# Patient Record
Sex: MALE | Race: WHITE | NOT HISPANIC OR LATINO | Employment: OTHER | URBAN - METROPOLITAN AREA
[De-identification: names, ages, dates, MRNs, and addresses within clinical notes are randomized per-mention and may not be internally consistent; named-entity substitution may affect disease eponyms.]

---

## 2023-03-08 LAB
MUCUS, URINE: NORMAL /LPF
RBC, URINE: <1 /HPF (ref 0–5)
SQUAMOUS EPITHELIAL CELLS, URINE: <1 /HPF
WBC, URINE: 3 /HPF (ref 0–5)

## 2023-03-09 LAB — URINE CULTURE: NO GROWTH

## 2023-03-29 ENCOUNTER — HOSPITAL ENCOUNTER (OUTPATIENT)
Dept: DATA CONVERSION | Facility: HOSPITAL | Age: 73
End: 2023-03-30
Attending: ORTHOPAEDIC SURGERY | Admitting: ORTHOPAEDIC SURGERY
Payer: MEDICARE

## 2023-03-29 DIAGNOSIS — G47.00 INSOMNIA, UNSPECIFIED: ICD-10-CM

## 2023-03-29 DIAGNOSIS — Z96.612 PRESENCE OF LEFT ARTIFICIAL SHOULDER JOINT: ICD-10-CM

## 2023-03-29 DIAGNOSIS — G47.31 PRIMARY CENTRAL SLEEP APNEA: ICD-10-CM

## 2023-03-29 DIAGNOSIS — M06.9 RHEUMATOID ARTHRITIS, UNSPECIFIED (MULTI): ICD-10-CM

## 2023-03-29 DIAGNOSIS — G47.33 OBSTRUCTIVE SLEEP APNEA (ADULT) (PEDIATRIC): ICD-10-CM

## 2023-03-29 DIAGNOSIS — N40.1 BENIGN PROSTATIC HYPERPLASIA WITH LOWER URINARY TRACT SYMPTOMS: ICD-10-CM

## 2023-03-29 DIAGNOSIS — Z87.891 PERSONAL HISTORY OF NICOTINE DEPENDENCE: ICD-10-CM

## 2023-03-29 DIAGNOSIS — H91.93 UNSPECIFIED HEARING LOSS, BILATERAL: ICD-10-CM

## 2023-03-29 DIAGNOSIS — E78.5 HYPERLIPIDEMIA, UNSPECIFIED: ICD-10-CM

## 2023-03-29 DIAGNOSIS — L40.50 ARTHROPATHIC PSORIASIS, UNSPECIFIED (MULTI): ICD-10-CM

## 2023-03-29 DIAGNOSIS — I48.92 UNSPECIFIED ATRIAL FLUTTER (MULTI): ICD-10-CM

## 2023-03-29 DIAGNOSIS — Z79.01 LONG TERM (CURRENT) USE OF ANTICOAGULANTS: ICD-10-CM

## 2023-03-29 DIAGNOSIS — K21.9 GASTRO-ESOPHAGEAL REFLUX DISEASE WITHOUT ESOPHAGITIS: ICD-10-CM

## 2023-03-29 DIAGNOSIS — E66.9 OBESITY, UNSPECIFIED: ICD-10-CM

## 2023-03-29 DIAGNOSIS — I87.2 VENOUS INSUFFICIENCY (CHRONIC) (PERIPHERAL): ICD-10-CM

## 2023-03-29 DIAGNOSIS — Z96.643 PRESENCE OF ARTIFICIAL HIP JOINT, BILATERAL: ICD-10-CM

## 2023-03-29 DIAGNOSIS — I10 ESSENTIAL (PRIMARY) HYPERTENSION: ICD-10-CM

## 2023-03-29 DIAGNOSIS — Z95.5 PRESENCE OF CORONARY ANGIOPLASTY IMPLANT AND GRAFT: ICD-10-CM

## 2023-03-29 DIAGNOSIS — I25.118 ATHEROSCLEROTIC HEART DISEASE OF NATIVE CORONARY ARTERY WITH OTHER FORMS OF ANGINA PECTORIS (CMS-HCC): ICD-10-CM

## 2023-03-29 DIAGNOSIS — I48.19 OTHER PERSISTENT ATRIAL FIBRILLATION (MULTI): ICD-10-CM

## 2023-03-29 DIAGNOSIS — R33.8 OTHER RETENTION OF URINE: ICD-10-CM

## 2023-03-29 DIAGNOSIS — Z79.82 LONG TERM (CURRENT) USE OF ASPIRIN: ICD-10-CM

## 2023-03-29 DIAGNOSIS — M17.12 UNILATERAL PRIMARY OSTEOARTHRITIS, LEFT KNEE: ICD-10-CM

## 2023-03-30 LAB
ANION GAP IN SER/PLAS: 10 MMOL/L (ref 10–20)
BASOPHILS (10*3/UL) IN BLOOD BY AUTOMATED COUNT: 0.01 X10E9/L (ref 0–0.1)
BASOPHILS/100 LEUKOCYTES IN BLOOD BY AUTOMATED COUNT: 0.1 % (ref 0–2)
CALCIUM (MG/DL) IN SER/PLAS: 8.6 MG/DL (ref 8.6–10.3)
CARBON DIOXIDE, TOTAL (MMOL/L) IN SER/PLAS: 28 MMOL/L (ref 21–32)
CHLORIDE (MMOL/L) IN SER/PLAS: 102 MMOL/L (ref 98–107)
CREATININE (MG/DL) IN SER/PLAS: 0.82 MG/DL (ref 0.5–1.3)
ERYTHROCYTE DISTRIBUTION WIDTH (RATIO) BY AUTOMATED COUNT: 20.7 % (ref 11.5–14.5)
ERYTHROCYTE MEAN CORPUSCULAR HEMOGLOBIN CONCENTRATION (G/DL) BY AUTOMATED: 33.2 G/DL (ref 32–36)
ERYTHROCYTE MEAN CORPUSCULAR VOLUME (FL) BY AUTOMATED COUNT: 91 FL (ref 80–100)
ERYTHROCYTES (10*6/UL) IN BLOOD BY AUTOMATED COUNT: 3.5 X10E12/L (ref 4.5–5.9)
GFR MALE: >90 ML/MIN/1.73M2
GLUCOSE (MG/DL) IN SER/PLAS: 145 MG/DL (ref 74–99)
HEMATOCRIT (%) IN BLOOD BY AUTOMATED COUNT: 31.9 % (ref 41–52)
HEMOGLOBIN (G/DL) IN BLOOD: 10.6 G/DL (ref 13.5–17.5)
IMMATURE GRANULOCYTES/100 LEUKOCYTES IN BLOOD BY AUTOMATED COUNT: 0.4 % (ref 0–0.9)
LEUKOCYTES (10*3/UL) IN BLOOD BY AUTOMATED COUNT: 13.5 X10E9/L (ref 4.4–11.3)
LYMPHOCYTES (10*3/UL) IN BLOOD BY AUTOMATED COUNT: 0.74 X10E9/L (ref 0.8–3)
LYMPHOCYTES/100 LEUKOCYTES IN BLOOD BY AUTOMATED COUNT: 5.5 % (ref 13–44)
MONOCYTES (10*3/UL) IN BLOOD BY AUTOMATED COUNT: 1.44 X10E9/L (ref 0.05–0.8)
MONOCYTES/100 LEUKOCYTES IN BLOOD BY AUTOMATED COUNT: 10.7 % (ref 2–10)
NEUTROPHILS (10*3/UL) IN BLOOD BY AUTOMATED COUNT: 11.26 X10E9/L (ref 1.6–5.5)
NEUTROPHILS/100 LEUKOCYTES IN BLOOD BY AUTOMATED COUNT: 83.3 % (ref 40–80)
PLATELETS (10*3/UL) IN BLOOD AUTOMATED COUNT: 254 X10E9/L (ref 150–450)
POTASSIUM (MMOL/L) IN SER/PLAS: 3.6 MMOL/L (ref 3.5–5.3)
SODIUM (MMOL/L) IN SER/PLAS: 136 MMOL/L (ref 136–145)
UREA NITROGEN (MG/DL) IN SER/PLAS: 17 MG/DL (ref 6–23)

## 2023-04-11 LAB
ALANINE AMINOTRANSFERASE (SGPT) (U/L) IN SER/PLAS: 22 U/L (ref 10–52)
ALBUMIN (G/DL) IN SER/PLAS: 3.6 G/DL (ref 3.4–5)
ALKALINE PHOSPHATASE (U/L) IN SER/PLAS: 111 U/L (ref 33–136)
ANION GAP IN SER/PLAS: 14 MMOL/L (ref 10–20)
APPEARANCE, URINE: CLEAR
ASPARTATE AMINOTRANSFERASE (SGOT) (U/L) IN SER/PLAS: 26 U/L (ref 9–39)
BILIRUBIN TOTAL (MG/DL) IN SER/PLAS: 0.8 MG/DL (ref 0–1.2)
BILIRUBIN, URINE: NEGATIVE
BLOOD, URINE: NEGATIVE
CALCIUM (MG/DL) IN SER/PLAS: 9.5 MG/DL (ref 8.6–10.3)
CARBON DIOXIDE, TOTAL (MMOL/L) IN SER/PLAS: 26 MMOL/L (ref 21–32)
CHLORIDE (MMOL/L) IN SER/PLAS: 102 MMOL/L (ref 98–107)
CHOLESTEROL (MG/DL) IN SER/PLAS: 144 MG/DL (ref 0–199)
CHOLESTEROL IN HDL (MG/DL) IN SER/PLAS: 64.3 MG/DL
CHOLESTEROL/HDL RATIO: 2.2
COLOR, URINE: YELLOW
CREATININE (MG/DL) IN SER/PLAS: 0.94 MG/DL (ref 0.5–1.3)
GFR MALE: 85 ML/MIN/1.73M2
GLUCOSE (MG/DL) IN SER/PLAS: 122 MG/DL (ref 74–99)
GLUCOSE, URINE: NEGATIVE MG/DL
HYALINE CASTS, URINE: ABNORMAL /LPF
KETONES, URINE: NEGATIVE MG/DL
LDL: 57 MG/DL (ref 0–99)
LEUKOCYTE ESTERASE, URINE: ABNORMAL
MUCUS, URINE: ABNORMAL /LPF
NITRITE, URINE: NEGATIVE
PH, URINE: 7 (ref 5–8)
POTASSIUM (MMOL/L) IN SER/PLAS: 4.1 MMOL/L (ref 3.5–5.3)
PROTEIN TOTAL: 6.8 G/DL (ref 6.4–8.2)
PROTEIN, URINE: NEGATIVE MG/DL
RBC, URINE: 1 /HPF (ref 0–5)
SODIUM (MMOL/L) IN SER/PLAS: 138 MMOL/L (ref 136–145)
SPECIFIC GRAVITY, URINE: 1.01 (ref 1–1.03)
THYROTROPIN (MIU/L) IN SER/PLAS BY DETECTION LIMIT <= 0.05 MIU/L: 0.91 MIU/L (ref 0.44–3.98)
TRIGLYCERIDE (MG/DL) IN SER/PLAS: 114 MG/DL (ref 0–149)
UREA NITROGEN (MG/DL) IN SER/PLAS: 19 MG/DL (ref 6–23)
UROBILINOGEN, URINE: 4 MG/DL (ref 0–1.9)
VLDL: 23 MG/DL (ref 0–40)
WBC, URINE: 4 /HPF (ref 0–5)

## 2023-04-12 LAB
ESTIMATED AVERAGE GLUCOSE FOR HBA1C: 103 MG/DL
HEMOGLOBIN A1C/HEMOGLOBIN TOTAL IN BLOOD: 5.2 %
PROSTATE SPECIFIC AG (NG/ML) IN SER/PLAS: 2.09 NG/ML (ref 0–4)

## 2023-06-22 LAB — THYROTROPIN (MIU/L) IN SER/PLAS BY DETECTION LIMIT <= 0.05 MIU/L: 0.56 MIU/L (ref 0.44–3.98)

## 2023-08-16 LAB
ALANINE AMINOTRANSFERASE (SGPT) (U/L) IN SER/PLAS: 25 U/L (ref 10–52)
ALBUMIN (G/DL) IN SER/PLAS: 4 G/DL (ref 3.4–5)
ALKALINE PHOSPHATASE (U/L) IN SER/PLAS: 67 U/L (ref 33–136)
ANION GAP IN SER/PLAS: 14 MMOL/L (ref 10–20)
ASPARTATE AMINOTRANSFERASE (SGOT) (U/L) IN SER/PLAS: 29 U/L (ref 9–39)
BASOPHILS (10*3/UL) IN BLOOD BY AUTOMATED COUNT: 0.09 X10E9/L (ref 0–0.1)
BASOPHILS/100 LEUKOCYTES IN BLOOD BY AUTOMATED COUNT: 1.1 % (ref 0–2)
BILIRUBIN TOTAL (MG/DL) IN SER/PLAS: 0.8 MG/DL (ref 0–1.2)
C REACTIVE PROTEIN (MG/L) IN SER/PLAS: <0.1 MG/DL
CALCIUM (MG/DL) IN SER/PLAS: 10 MG/DL (ref 8.6–10.3)
CARBON DIOXIDE, TOTAL (MMOL/L) IN SER/PLAS: 25 MMOL/L (ref 21–32)
CHLORIDE (MMOL/L) IN SER/PLAS: 105 MMOL/L (ref 98–107)
CREATININE (MG/DL) IN SER/PLAS: 0.88 MG/DL (ref 0.5–1.3)
EOSINOPHILS (10*3/UL) IN BLOOD BY AUTOMATED COUNT: 0.36 X10E9/L (ref 0–0.4)
EOSINOPHILS/100 LEUKOCYTES IN BLOOD BY AUTOMATED COUNT: 4.3 % (ref 0–6)
ERYTHROCYTE DISTRIBUTION WIDTH (RATIO) BY AUTOMATED COUNT: 15.9 % (ref 11.5–14.5)
ERYTHROCYTE MEAN CORPUSCULAR HEMOGLOBIN CONCENTRATION (G/DL) BY AUTOMATED: 32.8 G/DL (ref 32–36)
ERYTHROCYTE MEAN CORPUSCULAR VOLUME (FL) BY AUTOMATED COUNT: 101 FL (ref 80–100)
ERYTHROCYTES (10*6/UL) IN BLOOD BY AUTOMATED COUNT: 3.58 X10E12/L (ref 4.5–5.9)
GFR MALE: 90 ML/MIN/1.73M2
GLUCOSE (MG/DL) IN SER/PLAS: 72 MG/DL (ref 74–99)
HEMATOCRIT (%) IN BLOOD BY AUTOMATED COUNT: 36 % (ref 41–52)
HEMOGLOBIN (G/DL) IN BLOOD: 11.8 G/DL (ref 13.5–17.5)
IMMATURE GRANULOCYTES/100 LEUKOCYTES IN BLOOD BY AUTOMATED COUNT: 0.5 % (ref 0–0.9)
LEUKOCYTES (10*3/UL) IN BLOOD BY AUTOMATED COUNT: 8.3 X10E9/L (ref 4.4–11.3)
LYMPHOCYTES (10*3/UL) IN BLOOD BY AUTOMATED COUNT: 1.31 X10E9/L (ref 0.8–3)
LYMPHOCYTES/100 LEUKOCYTES IN BLOOD BY AUTOMATED COUNT: 15.8 % (ref 13–44)
MONOCYTES (10*3/UL) IN BLOOD BY AUTOMATED COUNT: 0.84 X10E9/L (ref 0.05–0.8)
MONOCYTES/100 LEUKOCYTES IN BLOOD BY AUTOMATED COUNT: 10.1 % (ref 2–10)
NEUTROPHILS (10*3/UL) IN BLOOD BY AUTOMATED COUNT: 5.67 X10E9/L (ref 1.6–5.5)
NEUTROPHILS/100 LEUKOCYTES IN BLOOD BY AUTOMATED COUNT: 68.2 % (ref 40–80)
PLATELETS (10*3/UL) IN BLOOD AUTOMATED COUNT: 321 X10E9/L (ref 150–450)
POTASSIUM (MMOL/L) IN SER/PLAS: 4.1 MMOL/L (ref 3.5–5.3)
PROTEIN TOTAL: 6.8 G/DL (ref 6.4–8.2)
SEDIMENTATION RATE, ERYTHROCYTE: 4 MM/H (ref 0–20)
SODIUM (MMOL/L) IN SER/PLAS: 140 MMOL/L (ref 136–145)
URATE (MG/DL) IN SER/PLAS: 5.7 MG/DL (ref 4–7.5)
UREA NITROGEN (MG/DL) IN SER/PLAS: 20 MG/DL (ref 6–23)

## 2023-08-19 LAB
CHROMIUM SERUM: 2.4 UG/L
COBALT, SERUM OR PLASMA: 2.1 UG/L

## 2023-09-07 VITALS — BODY MASS INDEX: 31.51 KG/M2 | HEIGHT: 68 IN | WEIGHT: 207.89 LBS

## 2023-09-14 NOTE — DISCHARGE SUMMARY
Send Summary:   Discharge Summary Providers:  Provider Role Provider Name   · Attending USZAN NIELSON   · Referring SUZAN NIELSON   · Consulting Jared Harper   · Primary Ruben Bradford       Note Recipients: SUZAN NIELSON DO Znidarsic, Robert Mark, MD - [phone] []       Discharge:    Summary:   Admission Date: .29-Mar-2023 08:28:00   Discharge Date: 30-Mar-2023   Attending Physician at Discharge: SUZAN NIELSON   Admission Reason: S/p L TKA   Final Discharge Diagnoses: Status post total knee  replacement   Procedures: Date: 29-Mar-2023 14:15:00  Procedure Name: Left TKA   Condition at Discharge: Satisfactory   Disposition at Discharge: .Home   Vital Signs:        T   P  R  BP   MAP  SpO2   Value  36.3  54  18  148/73   98  94%  Date/Time 3/30 5:20 3/30 5:20 3/30 5:20 3/30 5:20  3/30 5:20 3/30 5:20  Range  (35.8C - 36.3C )  (53 - 66 )  (16 - 18 )  (140 - 160 )/ (73 - 85 )  (98 - 115 )  (94% - 97% )    Date:            Weight/Scale Type:  Height:   29-Mar-2023 16:24  94.3  kg         172.3  cm  Physical Exam:    Constitutional: Well developed, awake/alert/oriented x3, no distress, alert and cooperative  Head/Neck: NCAT  Respiratory/Thorax: Nonlabored breathing at rest  Cardiovascular: Warm, well perfused extremities  Extremities: See MSK  Neurological: Patient denies numbness and tingling of LLE  Psychological: Appropriate mood and behavior  Skin: Warm and dry. Surgical incision is clean, dry and intact.  Skin around the incision is soft, compressible, and non erythematous.   MSK; LLE  - SILT s/s/sp/dp/t  - fires PF/DF/EHL  - Toes WWP, 2+ DP pulses  - Calf soft and supple bilat   Hospital Course:    Patient was admitted s/p L TKA. Pt had uneventful hospital course. Pain was controlled with multimodal analgesics. Physical and Occupational therapy was started POD  1 and patient progressed well. After discussion and evaluation patient has elected to go home for continued care and physical therapy.  Dressing is clean, dry and intact, with no signs of infection. At time of discharge the patient was instructed about  continued care and will follow up in office for further evaluation.         Discharge Information:    and Continuing Care:   Lab Results - Pending:    None  Radiology Results - Pending: None   Discharge Instructions:    Activity:           activity as tolerated.          May shower..            May not drive.            Weight-bearing Instructions: weight-bearing as tolerated left leg.            Light activity. Frequent rest with leg elevated and Ice. Continue exercises to increase range of motion. Work on getting the knee straight.  Total Knee Replacement Precautions for 6 weeks: NO running, NO jumping, NO squatting, NO kneeling, NO twisting, NO crossing on operative knee.   Continue to wear compression stockings everyday. May take stockings off at night to sleep but reapply each morning for the next 2 weeks until seen by your surgeon at your follow up appt.    Nutrition/Diet:           resume normal diet    Wound Care:           Wound Site:   Left Total Knee Replacement          Wound Type:   surgical incision          Cover With:   Mepilex          Instructions:   no lotions, creams, or tub soaks          Other Instructions:   Do not remove Mepilex AG dressing from the knee  until follow up visit in 2 weeks with surgeon. If dressing gets saturated and leaks notify the surgeon.   Call the office if having increased redness, pain, swelling or drainage  at incision or if you have fever or chills.  May shower. pat dressing dry, no soap, no lotions and  no soaking.    Home Care Certification:           Home Care Agency:    [facility] [phone]          Skilled Disciplines Ordered:   PT    Home Care Services:           Home Care Skilled Service:   Rehab (PT/OT/SP eval and treat)    Discharge Medications: Home Medication   lisinopril 20 mg oral tablet - 1 tab(s) orally once a day  amiodarone 200  mg oral tablet - 1 tab(s) orally once a day  amLODIPine 5 mg oral tablet - 1 tab(s) orally once a day  Vitamin D3 50 mcg (2000 intl units) oral tablet - 1 tab(s) orally once a day  atorvastatin 80 mg oral tablet - 1 tab(s) orally once a day (at bedtime)  Metoprolol Succinate ER 25 mg oral tablet, extended release - 1 tab(s) orally once a day  Glucosamine Chondroitin MSM Complex oral tablet - 1  orally once a day  tamsulosin 0.4 mg oral capsule - 1 cap(s) orally once a day (at bedtime)  doxycycline hyclate 100 mg oral tablet - 1 tab(s) orally 2 times a day for 10 days  Aspirin Enteric Coated 325 mg oral delayed release tablet - 1 tab(s) orally 2 times a day   CeleBREX 200 mg oral capsule - 1 cap(s) orally 2 times a day   Colace 100 mg oral capsule - 1 cap(s) orally 2 times a day   gabapentin 300 mg oral capsule - 1 cap(s) orally 3 times a day   oxycodone-acetaminophen 5 mg-325 mg oral tablet - 1 tab(s) orally 4 times a day (after meals and at bedtime) as needed for pain  Soma 350 mg oral tablet - 1 tab(s) orally 3 times a day as needed for muscle spasms  Vitamin C 500 mg oral tablet - 1 tab(s) orally 2 times a day   omeprazole 20 mg oral delayed release capsule - 1 cap(s) orally once a day  folic acid 1 mg oral tablet - 1 tab(s) orally once a day  Xarelto 20 mg oral tablet - 1 tab(s) orally once a day (in the evening)     PRN Medication     DNR Status:   ·  Code Status Code Status order at time of discharge: Full Code     Attestation:   Note Completion:  I am a:  Resident/Fellow   Attending Attestation I saw and evaluated the patient.  I personally obtained the key and critical portions of the history and physical exam or was physically present for key and  critical portions performed by the resident/fellow. I reviewed the resident/fellow?s documentation and discussed the patient with the resident/fellow.  I agree with the resident/fellow?s medical decision making as documented in the note.     I personally evaluated  the patient on 30-Mar-2023         Electronic Signatures:  Kourtney Mckeon ( (Resident))  (Signed 30-Mar-2023 08:31)   Authored: Send Summary, Summary Content, Ongoing Care,  DNR Status, Note Completion  SUZAN NIELSON (DO)  (Signed 03-Apr-2023 07:34)   Authored: Note Completion   Co-Signer: Send Summary, Summary Content, Ongoing Care, DNR Status, Note Completion      Last Updated: 03-Apr-2023 07:34 by SUZAN NIELSON ()

## 2023-09-14 NOTE — H&P
History & Physical Reviewed:   I have reviewed the History and Physical dated:  09-Mar-2023   History and Physical reviewed and relevant findings noted. Patient examined to review pertinent physical  findings.: No significant changes   Home Medications Reviewed: no changes noted   Allergies Reviewed: no changes noted       ERAS (Enhanced Recovery After Surgery):  ·  ERAS Patient: no     Consent:   COVID-19 Consent:  ·  COVID-19 Risk Consent Surgeon has reviewed key risks related to the risk of nirmal COVID-19 and if they contract COVID-19 what the risks are.     Attestation:   Note Completion:  I am a:  Resident/Fellow   Attending Attestation I saw and evaluated the patient.  I personally obtained the key and critical portions of the history and physical exam or was physically present for key and  critical portions performed by the resident/fellow. I reviewed the resident/fellow?s documentation and discussed the patient with the resident/fellow.  I agree with the resident/fellow?s medical decision making as documented in the note.     I personally evaluated the patient on 29-Mar-2023         Electronic Signatures:  Kourtney Mckeon (Resident))  (Signed 29-Mar-2023 05:43)   Authored: History & Physical Reviewed, ERAS, Consent,  Note Completion  SUZAN NIELSON (DO)  (Signed 29-Mar-2023 07:26)   Authored: Note Completion   Co-Signer: History & Physical Reviewed, ERAS, Consent, Note Completion      Last Updated: 29-Mar-2023 07:26 by SUZAN NIELSON ()

## 2023-10-02 NOTE — OP NOTE
Post Operative Note:     PreOp Diagnosis: Left Knee Mixed Osteoarthritis and  Rheumatoid Arthritis   Post-Procedure Diagnosis: Same   Procedure: Left TKA   Surgeon: Ilana   Resident/Fellow/Other Assistant: DO Mike (Res)   Anesthesia: Spinal/MAC/Regional Block   I.V. Fluids: See Anesthesia Record   Estimated Blood Loss (mL): 35cc   Blood Replacement: None   Specimen: no   Complications: None   Findings: See OP Report   Patient Returned To/Condition: Stable to PACU   Urine Output: See Anesthesia Record   Drains and/or Catheters: None   Tourniquet Times: 85 Minutes   Implants: [place]     Operative Report Dictated:  Dictation: yes   Date of Dictation: 29-Mar-2023     Attestation:   Note Completion:  Attending Attestation I was present for the entire procedure         Electronic Signatures:  SUZAN NIELSON ()  (Signed 29-Mar-2023 14:17)   Authored: Post Operative Note, Note Completion      Last Updated: 29-Mar-2023 14:17 by SUZAN NIELSON ()

## 2023-10-08 DIAGNOSIS — E78.5 HYPERLIPIDEMIA, UNSPECIFIED HYPERLIPIDEMIA TYPE: Primary | ICD-10-CM

## 2023-10-10 RX ORDER — EZETIMIBE 10 MG/1
10 TABLET ORAL NIGHTLY
Qty: 90 TABLET | Refills: 3 | Status: SHIPPED | OUTPATIENT
Start: 2023-10-10

## 2023-10-24 DIAGNOSIS — I10 ESSENTIAL HYPERTENSION: Primary | ICD-10-CM

## 2023-10-24 RX ORDER — METOPROLOL SUCCINATE 25 MG/1
25 TABLET, EXTENDED RELEASE ORAL DAILY
Qty: 90 TABLET | Refills: 3 | Status: SHIPPED | OUTPATIENT
Start: 2023-10-24 | End: 2024-02-08 | Stop reason: WASHOUT

## 2023-10-25 ENCOUNTER — TELEPHONE (OUTPATIENT)
Dept: PRIMARY CARE | Facility: CLINIC | Age: 73
End: 2023-10-25
Payer: MEDICARE

## 2023-10-25 DIAGNOSIS — G62.9 NEUROPATHY: Primary | ICD-10-CM

## 2023-10-25 RX ORDER — GABAPENTIN 300 MG/1
CAPSULE ORAL
Qty: 90 CAPSULE | Refills: 2 | Status: SHIPPED | OUTPATIENT
Start: 2023-10-25 | End: 2024-03-22 | Stop reason: DRUGHIGH

## 2023-10-25 NOTE — TELEPHONE ENCOUNTER
Rasta wife, Jos stopped by with a paper to up date you on Rasta condition. He mentioned that his arm had recovered well with minor tingling in the AM. His thumb and for finger are still useless, has trouble bending them. Other fingers are fine with slight . Since he has stopped the Gabapentin the tingles have returned. It had worked best when taking two at night and 1 in the AM. He is asking should he start again, if so can you send it to [facility].    He does work his fingers several times a day, however, the two are not responding.     He wants to know when he should get his next shot.     Note placed in fax box.

## 2023-11-13 ENCOUNTER — HOSPITAL ENCOUNTER (OUTPATIENT)
Dept: RADIOLOGY | Facility: HOSPITAL | Age: 73
Discharge: HOME | End: 2023-11-13
Payer: MEDICARE

## 2023-11-13 DIAGNOSIS — M25.551 PAIN IN RIGHT HIP: ICD-10-CM

## 2023-11-13 DIAGNOSIS — Z96.641 PRESENCE OF RIGHT ARTIFICIAL HIP JOINT: ICD-10-CM

## 2023-11-13 PROCEDURE — 78315 BONE IMAGING 3 PHASE: CPT | Performed by: RADIOLOGY

## 2023-11-13 PROCEDURE — A9503 TC99M MEDRONATE: HCPCS | Performed by: ORTHOPAEDIC SURGERY

## 2023-11-13 PROCEDURE — 3430000001 HC RX 343 DIAGNOSTIC RADIOPHARMACEUTICALS: Performed by: ORTHOPAEDIC SURGERY

## 2023-11-13 PROCEDURE — 78315 BONE IMAGING 3 PHASE: CPT

## 2023-11-13 PROCEDURE — 78830 RP LOCLZJ TUM SPECT W/CT 1: CPT

## 2023-11-13 RX ADMIN — TECHNETIUM TC 99M MEDRONATE 24.9 MILLICURIE: 25 INJECTION, POWDER, FOR SOLUTION INTRAVENOUS at 09:45

## 2023-11-14 DIAGNOSIS — I10 PRIMARY HYPERTENSION: Primary | ICD-10-CM

## 2023-11-14 RX ORDER — LISINOPRIL 40 MG/1
40 TABLET ORAL DAILY
Qty: 90 TABLET | Refills: 3 | Status: SHIPPED | OUTPATIENT
Start: 2023-11-14

## 2023-11-18 DIAGNOSIS — E78.5 HYPERLIPIDEMIA, UNSPECIFIED HYPERLIPIDEMIA TYPE: Primary | ICD-10-CM

## 2023-11-20 RX ORDER — ATORVASTATIN CALCIUM 80 MG/1
80 TABLET, FILM COATED ORAL NIGHTLY
Qty: 90 TABLET | Refills: 3 | Status: SHIPPED | OUTPATIENT
Start: 2023-11-20

## 2023-11-30 DIAGNOSIS — I48.91 ATRIAL FIBRILLATION, UNSPECIFIED TYPE (MULTI): Primary | ICD-10-CM

## 2023-12-14 ENCOUNTER — OFFICE VISIT (OUTPATIENT)
Dept: CARDIOLOGY | Facility: CLINIC | Age: 73
End: 2023-12-14
Payer: MEDICARE

## 2023-12-14 VITALS
HEIGHT: 68 IN | SYSTOLIC BLOOD PRESSURE: 128 MMHG | RESPIRATION RATE: 16 BRPM | BODY MASS INDEX: 32.13 KG/M2 | OXYGEN SATURATION: 97 % | DIASTOLIC BLOOD PRESSURE: 80 MMHG | WEIGHT: 212 LBS | HEART RATE: 58 BPM

## 2023-12-14 DIAGNOSIS — I25.10 CORONARY ARTERY DISEASE INVOLVING NATIVE CORONARY ARTERY OF NATIVE HEART WITHOUT ANGINA PECTORIS: ICD-10-CM

## 2023-12-14 DIAGNOSIS — I10 PRIMARY HYPERTENSION: ICD-10-CM

## 2023-12-14 DIAGNOSIS — I48.21 PERMANENT ATRIAL FIBRILLATION (MULTI): ICD-10-CM

## 2023-12-14 DIAGNOSIS — E78.2 MIXED HYPERLIPIDEMIA: ICD-10-CM

## 2023-12-14 PROCEDURE — 99214 OFFICE O/P EST MOD 30 MIN: CPT | Performed by: INTERNAL MEDICINE

## 2023-12-14 PROCEDURE — 3079F DIAST BP 80-89 MM HG: CPT | Performed by: INTERNAL MEDICINE

## 2023-12-14 PROCEDURE — 1125F AMNT PAIN NOTED PAIN PRSNT: CPT | Performed by: INTERNAL MEDICINE

## 2023-12-14 PROCEDURE — 3074F SYST BP LT 130 MM HG: CPT | Performed by: INTERNAL MEDICINE

## 2023-12-14 PROCEDURE — 1159F MED LIST DOCD IN RCRD: CPT | Performed by: INTERNAL MEDICINE

## 2023-12-14 PROCEDURE — 1036F TOBACCO NON-USER: CPT | Performed by: INTERNAL MEDICINE

## 2023-12-14 RX ORDER — LANOLIN ALCOHOL/MO/W.PET/CERES
1000 CREAM (GRAM) TOPICAL
COMMUNITY

## 2023-12-14 RX ORDER — FOLIC ACID 1 MG/1
1 TABLET ORAL EVERY 24 HOURS
COMMUNITY
Start: 2023-06-14

## 2023-12-14 RX ORDER — PREDNISONE 20 MG/1
20 TABLET ORAL DAILY
COMMUNITY
Start: 2023-08-24 | End: 2024-02-08 | Stop reason: WASHOUT

## 2023-12-14 RX ORDER — RIVAROXABAN 20 MG/1
20 TABLET, FILM COATED ORAL EVERY 24 HOURS
COMMUNITY
Start: 2019-09-26 | End: 2024-03-04

## 2023-12-14 RX ORDER — TAMSULOSIN HYDROCHLORIDE 0.4 MG/1
0.4 CAPSULE ORAL EVERY 24 HOURS
COMMUNITY
Start: 2018-10-10 | End: 2024-03-19

## 2023-12-14 RX ORDER — NAPROXEN SODIUM 220 MG/1
81 TABLET, FILM COATED ORAL EVERY 24 HOURS
COMMUNITY

## 2023-12-14 RX ORDER — OMEPRAZOLE 20 MG/1
20 CAPSULE, DELAYED RELEASE ORAL DAILY
COMMUNITY
Start: 2013-03-21

## 2023-12-14 RX ORDER — TOFACITINIB 11 MG/1
11 TABLET, FILM COATED, EXTENDED RELEASE ORAL DAILY
COMMUNITY

## 2023-12-14 ASSESSMENT — ENCOUNTER SYMPTOMS: DEPRESSION: 0

## 2023-12-14 ASSESSMENT — PATIENT HEALTH QUESTIONNAIRE - PHQ9
SUM OF ALL RESPONSES TO PHQ9 QUESTIONS 1 AND 2: 0
2. FEELING DOWN, DEPRESSED OR HOPELESS: NOT AT ALL
1. LITTLE INTEREST OR PLEASURE IN DOING THINGS: NOT AT ALL

## 2023-12-14 ASSESSMENT — PAIN SCALES - GENERAL: PAINLEVEL: 4

## 2023-12-14 NOTE — PATIENT INSTRUCTIONS
Follow up in February  Lab work was done in August  Continue all current medications  Hold Xarelto three days prior to back injection

## 2023-12-14 NOTE — PROGRESS NOTES
Subjective   Pro Belle Jr. is a 73 y.o. male.    Chief Complaint:  PRO BELLE is being seen for a three month follow-up of atrial fibrillation,  coronary artery disease dyslipidemia, hypertension and a routine medication evaluation.   HPI  This is a 72 y/o male here today for a three month Cardiology follow up visit. A few months ago he had noticed his smart watch getting heart rates over 100 and came in for an ecg. Today he denies chest pain, sob, heart palpitations, or lower leg edema. He is schedule to have injections to his back for pain. Instructed to hold Xarelto for three days prior. If pain injections are not effective he will be scheduled for surgery. An Echo was done in May- see report.     ROS    Objective   Physical Exam  This is an older appearing 72 y/o male here today for a three month follow up visit  Cardiovascular Rate and Rhythm regular with S1 and S2 without murmur rub or click   Pulmonary lungs bilaterally clear to auscultation posterior   Neck supple no JVP no bruit good carotid upstroke   Abdomen soft nontender bowel sounds present in all four quadrants   Extremities with no lower leg edema, pedal pulse present bilaterally    Lab Review:   Legacy Encounter on 09/14/2023   Component Date Value    Ventricular Rate 09/14/2023 52     Atrial Rate 09/14/2023 52     ID Interval 09/14/2023 206     QRS Duration 09/14/2023 144     QT Interval 09/14/2023 482     QTC Calculation(Bazett) 09/14/2023 448     P Axis 09/14/2023 34     R Benson 09/14/2023 10     T Axis 09/14/2023 31     QRS Count 09/14/2023 8     Q Onset 09/14/2023 222     P Onset 09/14/2023 119     P Offset 09/14/2023 189     T Offset 09/14/2023 463     QTC Fredericia 09/14/2023 459    Legacy Encounter on 09/07/2023   Component Date Value    Ventricular Rate 09/07/2023 139     Atrial Rate 09/07/2023 139     ID Interval 09/07/2023 140     QRS Duration 09/07/2023 132     QT Interval 09/07/2023 300     QTC Calculation(Bazett) 09/07/2023 456      P Axis 09/07/2023 8     R Wellington 09/07/2023 -42     T Axis 09/07/2023 19     QRS Count 09/07/2023 23     Q Onset 09/07/2023 223     P Onset 09/07/2023 153     P Offset 09/07/2023 175     T Offset 09/07/2023 373     QTC Fredericia 09/07/2023 397    Legacy Encounter on 08/23/2023   Component Date Value    Ventricular Rate 08/23/2023 64     Atrial Rate 08/23/2023 64     VA Interval 08/23/2023 180     QRS Duration 08/23/2023 144     QT Interval 08/23/2023 474     QTC Calculation(Bazett) 08/23/2023 489     P Axis 08/23/2023 44     R Axis 08/23/2023 8     T [place] 08/23/2023 14     QRS Count 08/23/2023 11     Q Onset 08/23/2023 218     P Onset 08/23/2023 128     P Offset 08/23/2023 160     T Offset 08/23/2023 455     QTC Fredericia 08/23/2023 484    Orders Only on 08/16/2023   Component Date Value    Sedimentation Rate 08/16/2023 4     WBC 08/16/2023 8.3     RBC 08/16/2023 3.58 (L)     Hemoglobin 08/16/2023 11.8 (L)     Hematocrit 08/16/2023 36.0 (L)     MCV 08/16/2023 101 (H)     MCHC 08/16/2023 32.8     Platelets 08/16/2023 321     RDW 08/16/2023 15.9 (H)     Neutrophils % 08/16/2023 68.2     Immature Granulocytes %,* 08/16/2023 0.5     Lymphocytes % 08/16/2023 15.8     Monocytes % 08/16/2023 10.1     Eosinophils % 08/16/2023 4.3     Basophils % 08/16/2023 1.1     Neutrophils Absolute 08/16/2023 5.67 (H)     Lymphocytes Absolute 08/16/2023 1.31     Monocytes Absolute 08/16/2023 0.84 (H)     Eosinophils Absolute 08/16/2023 0.36     Basophils Absolute 08/16/2023 0.09     CRP 08/16/2023 <0.10     Uric Acid 08/16/2023 5.7     Glucose 08/16/2023 72 (L)     Sodium 08/16/2023 140     Potassium 08/16/2023 4.1     Chloride 08/16/2023 105     Bicarbonate 08/16/2023 25     Anion Gap 08/16/2023 14     Urea Nitrogen 08/16/2023 20     Creatinine 08/16/2023 0.88     GFR MALE 08/16/2023 90     Calcium 08/16/2023 10.0     Albumin 08/16/2023 4.0     Alkaline Phosphatase 08/16/2023 67     Total Protein 08/16/2023 6.8     AST 08/16/2023 29      Total Bilirubin 08/16/2023 0.8     ALT (SGPT) 08/16/2023 25     [place], Serum or Plasma 08/16/2023 2.1 (H)     Chromium, Serum 08/16/2023 2.4    Orders Only on 06/22/2023   Component Date Value    TSH 06/22/2023 0.56        Assessment/Plan   1. Coronary artery disease, status post PCI to the distal RCA 09/10/2019. This patient originally was identified as having low-grade CAD by cardiac catheterization 3/2003 [facility]. He did have a nuclear stress test performed in 1/2009 and again on 11/13/2015 negative for evidence of ischemia. He presented to [facility] system 9/9/ 2019 with central chest discomfort and shortness of breath. It was detected that he was in atrial fibrillation with RVR. He had EKG and enzymatic evidence of a NSTEMI. He had cardiac cath September 10, 2019 with Dr. Mynor Hurst which showed single-vessel CAD in the distal third of the distal right coronary artery about 90% stenosis and distal to the origin of the PDA but prior to the origin of the trifurcating post inferior lateral LV branch. He had successful distal RCA stenting September 10, 2019 with Dr. Aniceto Richardson at [facility]. He will continue metoprolol succinate to 100 mg daily, asa 81 mg daily. Now on metoprolol 25 mg daily. ECG done prior shows sinus tachycardia with PACs RBBB, 139 bpm. Will increase metoprolol to 100 mg daily. ECG done today shows sinus bradycardia.  The patient will be instructed that he should have a repeat pharmacological nuclear stress test performed if lumbar surgery is actually scheduled in the future.     2. Paroxysmal atrial fibrillation. Please see previous office notes for review. This patient was recently admitted to [facility] from 04/15/2018 to 04/18/2018 with pleuritic-type chest pain along with cough and shortness of breath. The patient was identified by chest x-ray to have a left lower lobe infiltrate suggestive of pneumonia associated with pleuritis. He was also  detected to have new onset or recurrent atrial fibrillation with a rapid ventricular rate. The patient was initially placed on an IV Cardizem infusion but ultimately was converted to metoprolol. The patient's ventricular rate remained rapid despite the initiation of metoprolol and as such amiodarone 200 mg daily was added. At the time of discharge on 04/18/2018 he was still in atrial fibrillation with a ventricular rate in the range of 110/m. He did have an echocardiogram performed during the admission on 04/16/2018 that demonstrated an LV ejection fraction of 60-64% with mild left atrial enlargement and trace to mild mitral valve regurgitation. At the time of his next follow-up visit it was found that by both EKG and examination he was back in sinus rhythm with sinus bradycardia. For now he will remain on the amiodarone 200 mg daily plus without will reduce metoprolol by switching metoprolol tartrate 50 mg twice a day to metoprolol succinate 50 mg daily. He will remain on Xarelto 20 mg daily. He presented to [facility] September 9, 2019 with central chest discomfort and shortness of breath. It was detected that he was in atrial fibrillation with RVR. He converted to sinus rhythm with IV diltiazem infusion. Echocardiogram done at the time showed EF 60-64%, left atrial size mildly dilated, trace MR, trivial to mild TR. He is now back on on amiodarone 200 mg daily as well as Xarelto 20 mg daily which had been discontinued prior to the readmission. Had afib with RVR and failed DCCV. He saw Dr Nassar and had RFA 10/2/2020. ECG today shows likely aflutter. Will plan for DCCV next week. See Dr Nassar in follow up. Had DCCV 11/12/2020. Went out of rhythm again for a brief period 11/2021. Went out of rhythm for 4 days 01/2022. Patient had RFA done 05/24/2022 with Dr Nassar. ECG done prior shows NSR, RBBB.  The patient is no longer on amiodarone which was discontinued last visit.  He will remain on Xarelto  anticoagulation.  He will be stopping 3 days before any type of back injection or lumbar surgery.  Patient will return in 8 weeks for follow-up.     3 Xarelto anticoagulation.      4. Hypertension. Blood pressure is adequately controlled today. He will be continued on amlodipine 5 mg daily and lisinopril 20 mg daily. His dose of metoprolol succinate 25 mg a day will be continued.      5. Hyperlipidemia. The patient did have lab work performed on 10/08/2019 including a lipid panel with cholesterol 218  HDL 61 triglyceride 154. These results are continue to be disappointing on his statin agent atorvastatin 80 mg daily. He will be started on supplemental Zetia 10 mg daily. 5/10/2022, Chol 141, LDL 67, HDL 60, trig 70.     6. Sleep apnea. Please see previous office notes. The patient was referred to sleep medicine and was in fact identified as having sleep apnea that was severe. The patient's initial sleep study was done at [facility] on 01/04/2018 and showed severe obstructive sleep apnea and he ultimately had a titration study on 02/22/2018. He was placed on ASV therapy with significant clinical improvement. His compliance has been 74% with no leak.     7. BPH.      8. GERD.     9. Bilateral carpal tunnel release     10. S/P Left hip surgery 2007, right 2021. Plans for LTK 03/2023.     11. S/P Right inguinal hernia repair 4/2014, [facility].     12. Osteoarthritis.      13. Status post left shoulder replacement 11/10/2015 with repeat left shoulder replacement 02/22/2016 because of repetitive dislocation.     14. Lumbar Laminectomy, 10/01/2018 with Dr Cardenas at [facility].  Patient recently has been experiencing and further lower back pain.  His L4-L5 disc is shifted.  He is scheduled for an epidural injection on 12/22/2023 and possible surgery on 2/14/2024.     15. Prostate Urolift surgery with Dr Werner, fall 2018.     16. Gen. medical. The patient had lab work on 10/08/2019 including  glycohemoglobin 5.2%, TSH 1.38, PSA 1.6 with both CBC and SMA panels being normal.     17. Venous insufficiency.      18. Hx of psoriatic arthritis     19. Hx of covid-19 vaccine #1 and #2.      20. Planned LTK.

## 2023-12-19 ENCOUNTER — TELEMEDICINE (OUTPATIENT)
Dept: PHARMACY | Facility: HOSPITAL | Age: 73
End: 2023-12-19
Payer: MEDICARE

## 2023-12-19 DIAGNOSIS — I48.91 ATRIAL FIBRILLATION, UNSPECIFIED TYPE (MULTI): ICD-10-CM

## 2023-12-19 RX ORDER — METOPROLOL SUCCINATE 100 MG/1
100 TABLET, EXTENDED RELEASE ORAL DAILY
COMMUNITY
End: 2024-02-08 | Stop reason: DRUGHIGH

## 2023-12-19 NOTE — PROGRESS NOTES
"Pharmacist Clinic: Anticoagulation Management  Pro Belle  was referred to the Clinical Pharmacy Team for his anticoagulation management.    Referring Provider:  ANA Dick  _______________________________________________________________________  PHARMACY ASSESSMENT    Allergies Reviewed? Yes  Home Pharmacy Reviewed? Yes, describe:  PAP pending approval    Affordability/Accessibility: Xarelto is becoming too expensive  Adherence/Organization: no issues reported   Adverse Effects: no issues with bruising or bleeding per patient    MEDICATION RECONCILIATION  Changed:  - Patient is taking metoprolol succinate 100mg daily (not taking metoprolol 25 mg)      RELEVANT LAB RESULTS  Lab Results   Component Value Date    BILITOT 0.8 08/16/2023    CALCIUM 10.0 08/16/2023    CO2 25 08/16/2023     08/16/2023    CREATININE 0.88 08/16/2023    GLUCOSE 72 (L) 08/16/2023    ALKPHOS 67 08/16/2023    K 4.1 08/16/2023    PROT 6.8 08/16/2023     08/16/2023    AST 29 08/16/2023    ALT 25 08/16/2023    BUN 20 08/16/2023    ANIONGAP 14 08/16/2023    MG 2.04 05/25/2022    PHOS 2.1 (L) 05/25/2022    ALBUMIN 4.0 08/16/2023    GFRMALE 90 08/16/2023     Lab Results   Component Value Date    TRIG 114 04/11/2023    CHOL 144 04/11/2023    LDLCALC 52 (L) 07/13/2021    HDL 64.3 04/11/2023     No results found for: \"BMCBC\", \"CBCDIF\"       DRUG INTERACTIONS  - No clinically significant drug interactions found at the time of this visit.  _______________________________________________________________________  ANTICOAGULATION ASSESSMENT    The ASCVD Risk score (Alphonso ORELLANA, et al., 2019) failed to calculate for the following reasons:    The patient has a prior MI or stroke diagnosis    DIAGNOSIS: prevention of nonvalvular atrial fibrilliation stroke and systemic embolism  - Patient is projected to be on anticoagulation - depending on if in afib  - [id number] Score: [4] (only included if diagnosis is atrial fibrillation) "   Age: [<65 (0)] [65-74 (+1)] [> 75 (+2)]: 2  Sex: [Male/Female (+1)]: 0  CHF history: [No/Yes(+1)]: 0  Hypertension history: [No/Yes(+1)]: 1  Stroke/TIA/thromboembolism history: [No/Yes(+2)]: 0  Vascular disease history (prior MI, peripheral artery disease, aortic plaque): [No/Yes(+1)]: 1  Diabetes history: [No/Yes(+1)]: 0    CURRENT PHARMACOTHERAPY:   - Xarelto 20mg 1 tablet by mouth daily   Dosage is appropriate for patient's renal function (eGFR 90 mL/min/1.73m2 8/16/2023)      REVIEW OF PHARMACOTHERAPY/MEDICAL HISTORY  - Atrial fibrillation detected in 2018, anticoagulated with Xarelto. Has been previously off Xarelto when not in afib, but has been back on since 5/2022.    PERTINANT MEDICAL HISTORY:  - Medical history: Afib, HTN, HLD, CAD, venous insufficiency   - Medication history: Anticoagulated with Xarelto on and off since 2018    _______________________________________________________________________  PATIENT EDUCATION/GOALS  - Counseled patient on MOA, expectations, duration of therapy, contraindications, administration, and monitoring parameters  - Counseled patient of side effects that are indicative of bleeding such as dark tarry stool, unexplainable bruising, or vomiting up a coffee ground like substance  - Answered all patient questions and concerns     Patient Assistance Program (PAP)    Patient verbally reports monthly or yearly income which is less than 400% federal poverty level    Application for program to be submitted for the following medications: Xarelto    Prescription Insurance: Yes  Members of Household: 2  Files Taxes: Yes    Patient will be email financial information to pharmacist directly at [email or web address].    Patient aware this process may take up to 6 weeks.     If approved medication must be filled through [facility] pharmacy and mailed to patient.    _______________________________________________________________________  RECOMMENDATIONS/PLAN  1. Patient to email  1040 form to pharmacist for submittal to [facility]. Will follow up with patient pending approval and additionally follow up to evaluate Xarelto in 3 months.    Next Cardiology Appointment: 2/8/2024  Clinical Pharmacist follow up: 3/19/2024  Type of Encounter: Deepak Ordonez Pharm D  PGY-1 Pharmacy Resident, [facility]     Verbal consent to manage patient's drug therapy was obtained from the patient . They were informed they may decline to participate or withdraw from participation in pharmacy services at any time.    Continue all meds under the continuation of care with the referring provider and clinical pharmacy team.

## 2023-12-19 NOTE — Clinical Note
Hi Isabella! We are going to be working with Pro to get him signed up for [facility] PAP. Will keep you updated!

## 2023-12-20 ENCOUNTER — HOSPITAL ENCOUNTER (OUTPATIENT)
Dept: RADIOLOGY | Facility: HOSPITAL | Age: 73
Discharge: HOME | End: 2023-12-20
Payer: MEDICARE

## 2023-12-20 DIAGNOSIS — M48.062 SPINAL STENOSIS, LUMBAR REGION WITH NEUROGENIC CLAUDICATION: ICD-10-CM

## 2023-12-20 PROCEDURE — 72131 CT LUMBAR SPINE W/O DYE: CPT

## 2023-12-20 PROCEDURE — 72131 CT LUMBAR SPINE W/O DYE: CPT | Performed by: RADIOLOGY

## 2023-12-22 ENCOUNTER — TELEPHONE (OUTPATIENT)
Dept: PHARMACY | Facility: HOSPITAL | Age: 73
End: 2023-12-22
Payer: MEDICARE

## 2023-12-22 NOTE — TELEPHONE ENCOUNTER
Unfortunately, at this time, Pro Belle Jr. is ineligible to receive financial assistance through  Patient Assistance Program because his household income for 2022 was too high.    Patient was notified of ineligibility and given the following options: may be re-evaluated after taxes are filed for 2023. Patient would like to keep 3/19/24 appointment for re-evaluation through DOAC optimization clinic. Patient will also contact cardiology office for Xarelto samples as he states he will need about a 1 week supply to last until January when co-pay will decrease.    Referring provider will be notified of denial.     Eva Ordonez, Pharm D  PGY-1 Pharmacy Resident, [facility]

## 2024-01-17 ENCOUNTER — EVALUATION (OUTPATIENT)
Dept: OCCUPATIONAL THERAPY | Facility: CLINIC | Age: 74
End: 2024-01-17
Payer: MEDICARE

## 2024-01-17 DIAGNOSIS — G56.91 NEUROPATHY OF UPPER EXTREMITY, RIGHT: Primary | ICD-10-CM

## 2024-01-17 DIAGNOSIS — B02.29 POST HERPETIC NEURALGIA: ICD-10-CM

## 2024-01-17 PROCEDURE — 97166 OT EVAL MOD COMPLEX 45 MIN: CPT | Mod: GO | Performed by: OCCUPATIONAL THERAPIST

## 2024-01-17 PROCEDURE — 97110 THERAPEUTIC EXERCISES: CPT | Mod: GO | Performed by: OCCUPATIONAL THERAPIST

## 2024-01-18 ASSESSMENT — ENCOUNTER SYMPTOMS
PAIN LOCATION: R HAND
PAIN SCALE: 1

## 2024-01-18 NOTE — PROGRESS NOTES
Evaluation/Treatment    Patient Name: Pro Belle Jr.  MRN: [id number]  : 1950  Today's Date: 24    Time Calculation  Start Time: 1145  Stop Time: 1230  Time Calculation (min): 45 min  OT Evaluation Time Entry  OT Evaluation (Moderate) Time Entry: 20  OT Therapeutic Procedures Time Entry  Therapeutic Exercise Time Entry: 25      Subjective   Current Problem/Diagnosis:  1. Neuropathy of upper extremity, right  Referral to Occupational Therapy    Follow Up In Occupational Therapy      2. Post herpetic neuralgia  Referral to Occupational Therapy    Follow Up In Occupational Therapy        Subjective Evaluation    History of Present Illness  Mechanism of injury: Patient reports onset of Shingles in May of 2023 along R FA; per patient description pattern of Shingles along C6, C7, and C8 dermatomes. Patient reports following outbreak and clear-up, inability to move R hand; since onset, patient is now able to functionally flex and extend digits 3-5, however, minimal movement noted at IF and thumb.    Pain  Current pain ratin  Location: R hand (Reports use of Gabapentin for residual nerve pain)    Social Support  Lives with: spouse    Hand dominance: left    Patient Goals  Patient goals for therapy: decreased edema, decreased pain, increased motion, increased strength, independence with ADLs/IADLs and return to sport/leisure activities         Precautions: N/a       Objective     Prior Functional Status: Fully Independent     Work History:     Occupation and Activities:  Work status: retired  Job title/type of work:       Current functional limitations: Grooming, Bathing, Dressing, Lifting, Holding, Crafts/hobbies, Tool handling, and Driving       Objective     Sensation: +Constant numbness to volar side of R thumb and tip of R IF; +Mid-FA centeral tingling occasionally; patient reports RUE is consistently cold to touch (due to temperatures outside on arrival to clinic, but UE's cold to touch this  date)    Appearance: +Discoloration to skin    Edema: +Slight throughout R distal FA and R hand    Coordination: +Impaired       Range of Motion/Strength:     Upper Extremity ROM    AROM     Right Left   FOREARM Pronation 90 90    Supination  65 65   WRIST Extension  40 50    Flexion  40 60    Radial Deviation  10 15    Ulnar Deviation  25 25       Hand Range of Motion       AROM     Right Left   THUMB CMC Extension/Flexion 0/20 WFL    MP Extension/Flexion 0/30 WFL    IP Extension/Flexion 0/0 WFL     AROM  INDEX LONG RING SMALL    MCP Extension/Flexion 0/65 0/90 0/90 0/90    PIP Extension/Flexion 0/15 0/90 0/90 0/90    DIP Extension/Flexion 0/0 0/45 0/20 0/45   WAKEFIELD  80 225 200 225     **Noted triggering/catching in R IF  **Noted wandering of R SF with intentional use     Hand Strength   (lbs) Right Left   1     2 28# 65#   3     4     5       Pinch Right Left   2-pt Flicker 10#   3-pt 1# 10#   Lateral 1# 17#       Outcome Measure: UEFI=52/80; 35% impaired    Splinting: N/a    Modalities: N/a    Therapy/Activity: Therapist provided demonstration with verbal instruction for R thumb AROM including radial abduction/adduction, opposition, composite flexion/extension, acosta abduction/adduction, IP flexion/extension, and circumduction x10 reps each; written handout issued; HEP established. Instructed patient in PROM composite thumb flexion x5 reps with prolonged hold at end range. Therapist provided demonstration with verbal instruction for place and hold composite fist R hand with 5 second hold; 10 reps. Therapist instructed patient in PIP blocking ther ex for R IF with 5 second hold x10 reps; handout issued for all. Therapist provided demonstration for thenar and hypothenar strengthening with use of two band resistance x10 reps with 5 second hold and isometric composite and lumbrical gripping x10 reps; written handout issued. Patient demonstrates good return demo for all ther ex at this time.    EDUCATION: Therapist  established HEP; instructed patient in proper form/tech to decrease compensatory body movement.         Assessment & Plan     Assessment  Impairments: abnormal coordination, abnormal or restricted ROM, activity intolerance, impaired physical strength and lacks appropriate home exercise program  Assessment details: Patient is a 73-year-old male who presents s/p outbreak of herpes zoster ~6 months ago with residual impairments to R hand/UE including pain, edema, weakness, impaired coordination, and impaired functional use of R hand. Skilled OT intervention indicated to address deficits and facilitate greater functional use of hand for return to PLOF.     Moderate complexity evaluation selected due to patient's evolving clinical presentation with changing characteristics, existing and impacting co-morbidities including age, medical history, and persistent symptoms, all of which may negatively impact patient's rehab tolerance, progression, and potential.   Prognosis: good    Plan  Planned modality interventions: fluidotherapy, low level laser therapy and electrical stimulation/Russian stimulation  Planned therapy interventions: ADL retraining, fine motor coordination training, flexibility, functional ROM exercises, home exercise program, IADL retraining, manual therapy, motor coordination training, neuromuscular re-education, strengthening, stretching and therapeutic activities  Frequency: 1-2x/week.  Duration in visits: 12  Treatment plan discussed with: patient           Goals:  Active       OT Goals       1. Patient will increase R IF MP and PIP flexion by at least 25 degrees to increase functional gripping abilities with ADL's with R hand.       Start:  01/18/24    Expected End:  01/31/24            2. Patient will increase R thumb MP flexion by at least 20 degrees and IP flexion by at least 10 to increase functional use of R thumb with gripping tasks.       Start:  01/18/24    Expected End:  01/31/24            3.  Patient will demonstrate ability to utilize 2-pt and 3-pt pinch patterns to  10 small objects without dropping.       Start:  01/18/24    Expected End:  02/14/24            4. Patient will increase R hand  strength by at least 15# to increase functional use of R hand.        Start:  01/18/24    Expected End:  04/10/24            5. Patient will increase R hand pinch strength in all patterns to at least 3# to increase functional use of R hand.       Start:  01/18/24    Expected End:  04/10/24            7. Patient will increase overall functional ease and independence to at least 65/80 for increased quality of life for patient.        Start:  01/18/24    Expected End:  04/10/24

## 2024-01-24 ENCOUNTER — TREATMENT (OUTPATIENT)
Dept: OCCUPATIONAL THERAPY | Facility: CLINIC | Age: 74
End: 2024-01-24
Payer: MEDICARE

## 2024-01-24 DIAGNOSIS — B02.29 POST HERPETIC NEURALGIA: Primary | ICD-10-CM

## 2024-01-24 DIAGNOSIS — G56.91 NEUROPATHY OF UPPER EXTREMITY, RIGHT: ICD-10-CM

## 2024-01-24 PROCEDURE — 97022 WHIRLPOOL THERAPY: CPT | Mod: GO,CO

## 2024-01-24 PROCEDURE — 97110 THERAPEUTIC EXERCISES: CPT | Mod: GO,CO

## 2024-01-24 ASSESSMENT — PAIN SCALES - GENERAL: PAINLEVEL_OUTOF10: 0 - NO PAIN

## 2024-01-24 ASSESSMENT — PAIN - FUNCTIONAL ASSESSMENT: PAIN_FUNCTIONAL_ASSESSMENT: 0-10

## 2024-01-24 NOTE — PROGRESS NOTES
"Occupational Therapy    Occupational Therapy Treatment    Patient Name: Pro Belle Jr.  MRN: [id number]  Today's Date: 1/24/2024    Time Calculation  Start Time: 1115  Stop Time: 1208  Time Calculation (min): 53 min  OT Modalities Time Entry  Whirlpool Time Entry: 10  OT Therapeutic Procedures Time Entry  Therapeutic Exercise Time Entry: 43  Insurance:  Visit number: 2 of 12  Insurance Type: Medicare A and B    Subjective     Neuropathy of upper extremity, right  Referral to Occupational Therapy      Follow Up In Occupational Therapy       2. Post herpetic neuralgia  Referral to Occupational Therapy     Follow Up In Occupational Therapy             History of Present Illness  Mechanism of injury: Patient reports onset of Shingles in May of 2023 along R FA; per patient description pattern of Shingles along C6, C7, and C8 dermatomes. Patient reports following outbreak and clear-up, inability to move R hand; since onset, patient is now able to functionally flex and extend digits 3-5, however, minimal movement noted at IF and thumb.     Patient reports \" I feel like my 3 fingers are  already moving better. I am still  stiff and struggling with my thumb and index finger\". Goals reviewed with pt, he verbalized understanding.     Performing HEP?: Yes    Pain:  Pain Assessment  Pain Assessment: 0-10  Pain Score: 0 - No pain  Clinical Progression: Gradually improving  Pain Interventions:  (pt using heat daily. educated in \"cold hand\") Handout provided    Objective     Sensory: impaired  Numbness/Tingling: noted tingling in tip of ring finger , thumb and base of palm.   Educated pt in re-sensitization tech , provided with written information. Pt able to return demo from writer.   Pt now able to oppose all fingers to thumb with the exception of small finger.   Treatment:    Modalities:  R thumb  and hand AROM x 10 min while in fluidotherapy to promote muscle movement during session.    Therapeutic Exercise:  Pt completed 10 " reps of R thumb AROM  10 reps of place and hold thumb flexion with focus on IP joint and R index finger with focus on PIP and DIP flexion. Added to HEP  Pt educated in and completed blocking flexion of thumb IP and R index finger PIP and DIP, 10 reps with 5 sec hold. Added to HEP  Pt completed golf ball manipulation with moderate difficulty, 5 reps clock wise and 5 reps counter clockwise, added to HEP.     Post-tx pain:0/10, unchanged since start of session    Assessment/Plan moderate fatigue at end of session. Pt to follow through with added HEP for place and hold, fine motor coordination , re-sensitization and blocking exer. Pt highly motivated to return to function.     OP EDUCATION:  Education  Individual(s) Educated: Patient  Education Provided: POC discussed and agreed upon, Symptom management  Home Program: Modalities, Tendon gliding, PROM, AROM  Risk and Benefits Discussed with Patient/Caregiver/Other: yes  Patient/Caregiver Demonstrated Understanding: yes  Patient Response to Education: Patient/Caregiver Verbalized Understanding of Information, Patient/Caregiver Performed Return Demonstration of Exercises/Activities, Patient/Caregiver Asked Appropriate Questions    Goals:  Active       OT Goals       1. Patient will increase R IF MP and PIP flexion by at least 25 degrees to increase functional gripping abilities with ADL's with R hand. (Progressing)       Start:  01/18/24    Expected End:  01/31/24            2. Patient will increase R thumb MP flexion by at least 20 degrees and IP flexion by at least 10 to increase functional use of R thumb with gripping tasks. (Progressing)       Start:  01/18/24    Expected End:  01/31/24            3. Patient will demonstrate ability to utilize 2-pt and 3-pt pinch patterns to  10 small objects without dropping. (Progressing)       Start:  01/18/24    Expected End:  02/14/24            4. Patient will increase R hand  strength by at least 15# to increase  functional use of R hand.  (Progressing)       Start:  01/18/24    Expected End:  04/10/24            5. Patient will increase R hand pinch strength in all patterns to at least 3# to increase functional use of R hand. (Progressing)       Start:  01/18/24    Expected End:  04/10/24            7. Patient will increase overall functional ease and independence to at least 65/80 for increased quality of life for patient.  (Progressing)       Start:  01/18/24    Expected End:  04/10/24

## 2024-01-31 ENCOUNTER — TREATMENT (OUTPATIENT)
Dept: OCCUPATIONAL THERAPY | Facility: CLINIC | Age: 74
End: 2024-01-31
Payer: MEDICARE

## 2024-01-31 DIAGNOSIS — G56.91 NEUROPATHY OF UPPER EXTREMITY, RIGHT: ICD-10-CM

## 2024-01-31 DIAGNOSIS — B02.29 POST HERPETIC NEURALGIA: ICD-10-CM

## 2024-01-31 PROCEDURE — 97022 WHIRLPOOL THERAPY: CPT | Mod: GO,CO

## 2024-01-31 PROCEDURE — 97110 THERAPEUTIC EXERCISES: CPT | Mod: GO,CO

## 2024-01-31 ASSESSMENT — PAIN - FUNCTIONAL ASSESSMENT: PAIN_FUNCTIONAL_ASSESSMENT: 0-10

## 2024-01-31 ASSESSMENT — PAIN SCALES - GENERAL: PAINLEVEL_OUTOF10: 0 - NO PAIN

## 2024-01-31 NOTE — PROGRESS NOTES
"  Occupational Therapy    Occupational Therapy Treatment    Patient Name: Pro Belle Jr.  MRN: [id number]  Today's Date: 1/31/2024    Time Calculation  Start Time: 1115  Stop Time: 1210  Time Calculation (min): 55 min  OT Modalities Time Entry  Whirlpool Time Entry: 10  OT Therapeutic Procedures Time Entry  Therapeutic Exercise Time Entry: 45  Insurance:  Visit number: 3 of 12  Insurance Type: Medicare A and B    Subjective     Neuropathy of upper extremity, right  Referral to Occupational Therapy      Follow Up In Occupational Therapy       2. Post herpetic neuralgia  Referral to Occupational Therapy     Follow Up In Occupational Therapy             History of Present Illness  Mechanism of injury: Patient reports onset of Shingles in May of 2023 along R FA; per patient description pattern of Shingles along C6, C7, and C8 dermatomes. Patient reports following outbreak and clear-up, inability to move R hand; since onset, patient is now able to functionally flex and extend digits 3-5, however, minimal movement noted at IF and thumb.     Patient reports \" I am doing my exercises and using my heat multiple times a day but I am still having problems with my thumb and index finger moving. \". Goals reviewed with pt, he verbalized understanding.  Pt stated he is out of his script for Gabopenten for overall nerve pain. Stated he will call Dr Yanez and request a refill.     Performing HEP?: Yes    Pain:0/10    Objective   Hand Range of Motion                AROM       Right Left   THUMB CMC Extension/Flexion 0/20 WFL     MP Extension/Flexion 0/45(inc 15) WFL     IP Extension/Flexion 0/0(same) WFL      AROM   INDEX LONG RING SMALL     MCP Extension/Flexion 0/75(inc 10) 0/90 0/90 0/90     PIP Extension/Flexion 0/15 0/90 0/90 0/90     DIP Extension/Flexion 0/0 0/45 0/20 0/45   WAKEFIELD   80 225 200 225     Sensory: impaired  Numbness/Tingling: noted tingling in tip of small  finger , thumb and base of palm.   Reviewed " re-sensitization tech. Pt able to return demo from writer.   Pt still able to oppose all fingers to thumb with the exception of small finger.   Treatment:    Modalities:  R thumb  and hand AROM x 10 min while in fluidotherapy to promote muscle movement during session.    Therapeutic Exercise:  Tendon glides x 5 reps performed with cues for positioning  Educated pt in coban wrapping of IF , thumb IP , completed 1 min long stretch with each finger,  provided with coban for home follow through  Isolated flexion of IF x 5 reps, added to HEP  10 reps of place and hold thumb flexion with focus on IP joint and R index finger with focus on PIP and DIP flexion.   Reviewed and completed  blocking flexion of thumb IP and R index finger PIP and DIP, 10 reps with 5 sec hold. Added to HEP  3 point pinch completed with 3# resistance clothespins,2 sets of 15 reps  mod cues to use IF. Added to HEP  FMC task performed withpalm down and focus on IF and thumb flexion, gathering small items into palm. To follow through as part of HEP  3 point pinch completed with mod difficulty including index finger placing cloth pieces into bucket.   Post-tx pain:0/10, unchanged since start of session    Assessment/Plan min (down from moderate) fatigue at end of session. Pt to follow through with added HEP for place and hold, fine motor coordination , re-sensitization and blocking exer. Noted increase in active MP movement in IF and thumb. Pt highly motivated to return to function.     OP EDUCATION:  Education  Individual(s) Educated: Patient  Education Provided: POC discussed and agreed upon, Edema control  Home Program: AROM, PROM, Edema control, Fine motor tasks, Modalities, Tendon gliding  Equipment: Coban (for long finger flexion hold)  Risk and Benefits Discussed with Patient/Caregiver/Other: yes  Patient/Caregiver Demonstrated Understanding: yes  Plan of Care Discussed and Agreed Upon: yes  Patient Response to Education: Patient/Caregiver  Verbalized Understanding of Information, Patient/Caregiver Performed Return Demonstration of Exercises/Activities, Patient/Caregiver Asked Appropriate Questions    Goals:  Active       OT Goals       1. Patient will increase R IF MP and PIP flexion by at least 25 degrees to increase functional gripping abilities with ADL's with R hand. (Progressing)       Start:  01/18/24    Expected End:  01/31/24            2. Patient will increase R thumb MP flexion by at least 20 degrees and IP flexion by at least 10 to increase functional use of R thumb with gripping tasks. (Progressing)       Start:  01/18/24    Expected End:  01/31/24            3. Patient will demonstrate ability to utilize 2-pt and 3-pt pinch patterns to  10 small objects without dropping. (Progressing)       Start:  01/18/24    Expected End:  02/14/24            4. Patient will increase R hand  strength by at least 15# to increase functional use of R hand.  (Progressing)       Start:  01/18/24    Expected End:  04/10/24            5. Patient will increase R hand pinch strength in all patterns to at least 3# to increase functional use of R hand. (Progressing)       Start:  01/18/24    Expected End:  04/10/24            7. Patient will increase overall functional ease and independence to at least 65/80 for increased quality of life for patient.  (Progressing)       Start:  01/18/24    Expected End:  04/10/24

## 2024-02-07 ENCOUNTER — TREATMENT (OUTPATIENT)
Dept: OCCUPATIONAL THERAPY | Facility: CLINIC | Age: 74
End: 2024-02-07
Payer: MEDICARE

## 2024-02-07 DIAGNOSIS — B02.29 POST HERPETIC NEURALGIA: ICD-10-CM

## 2024-02-07 DIAGNOSIS — G56.91 NEUROPATHY OF UPPER EXTREMITY, RIGHT: ICD-10-CM

## 2024-02-07 PROCEDURE — 97022 WHIRLPOOL THERAPY: CPT | Mod: GO | Performed by: OCCUPATIONAL THERAPIST

## 2024-02-07 PROCEDURE — 97110 THERAPEUTIC EXERCISES: CPT | Mod: GO | Performed by: OCCUPATIONAL THERAPIST

## 2024-02-07 ASSESSMENT — PAIN SCALES - GENERAL: PAINLEVEL_OUTOF10: 0 - NO PAIN

## 2024-02-07 ASSESSMENT — PAIN - FUNCTIONAL ASSESSMENT: PAIN_FUNCTIONAL_ASSESSMENT: 0-10

## 2024-02-07 NOTE — PROGRESS NOTES
"Occupational Therapy Treatment    Patient Name: Pro Belle Jr.  MRN: [id number]  Today's Date: 2/8/2024    Time Calculation  Start Time: 1100  Stop Time: 1150  Time Calculation (min): 50 min  OT Modalities Time Entry  Infrared Time Entry: 10  Whirlpool Time Entry: 10  OT Therapeutic Procedures Time Entry  Therapeutic Exercise Time Entry: 30    Insurance:  Visit number: 4 of 12  Insurance Type: Medicare    Subjective   Current Problem/Reason for visit:  Patient reports onset of Shingles in May of 2023 along R FA; per patient description pattern of Shingles along C6, C7, and C8 dermatomes. Patient reports following outbreak and clear-up, inability to move R hand; since onset, patient is now able to functionally flex and extend digits 3-5, however, minimal movement noted at IF and thumb.     Referred by: Dr. Yanez    Patient reports \"The thumb is worrisome. My index finger is moving more, but the thumb just doesn't seem to want to go.\"    Performing HEP?: Yes    Pain:  Pain Assessment  Pain Assessment: 0-10  Pain Score: 0 - No pain  \"It just seems stiff, but not painful.\"    Objective     Edema: Mild R hand  Sensory: +Impaired   Numbness/Tingling: Randomized numbness throughout R hand including tips of all digits, in palm, and distal FA.      Treatment:    Modalities:   Fluidotherapy in conjunction with AROM ther ex R hand/thumb all planes to optimize joint mobility and comfort x10 minutes.   Use of infrared light administered to right hand x10 minutes to promote improved circulation/blood flow, reduce inflammation, facilitate healing, and promote nitric oxide synthesis, to improve nerve and muscle responses for increased R IF AROM.  Light Therapy: Infrared/Red wave length; 10J/cm2 applied w/pads; 10 mins; applied to R hand, in Seated     Therapeutic Exercise:  Therapist implemented R thumb IP blocking for flexion/extension; subtle flicker of movement noted with attempts; 10 reps with 5 second hold; therapist " fabricated patient hand based exercise splint to immobilize CMC add MP to allow for isolated EPL exertion with ther ex during HEP; instructed patient in use recommendations, don/doff tech.  Therapist implemented R IF PIP and DIP blocking ther ex x10 reps each with 5 second hold.  Place and hold for composite fist R hand x10 reps with 5 second hold.  Place and hold composite thumb flexion x10 reps with 5 second hold.      Post-tx pain: 0/10    Assessment/Plan Patient with improving AROM in R IF; minimally improvements visualized in R thumb; however, patient reporting improved functional use of R hand with ADL's including pinching tasks. Patient with planned back sx on 2/14/24; will resume OT following surgical intervention as able. At that time will continue to advance ther ex as tolerated.      OP EDUCATION:  Education  Individual(s) Educated: Patient  Home Program: PROM, AAROM, AROM, Fine motor tasks, Modalities    Goals:  Active       OT Goals       1. Patient will increase R IF MP and PIP flexion by at least 25 degrees to increase functional gripping abilities with ADL's with R hand. (Progressing)       Start:  01/18/24    Expected End:  01/31/24            2. Patient will increase R thumb MP flexion by at least 20 degrees and IP flexion by at least 10 to increase functional use of R thumb with gripping tasks. (Progressing)       Start:  01/18/24    Expected End:  01/31/24            3. Patient will demonstrate ability to utilize 2-pt and 3-pt pinch patterns to  10 small objects without dropping. (Progressing)       Start:  01/18/24    Expected End:  02/14/24            4. Patient will increase R hand  strength by at least 15# to increase functional use of R hand.  (Progressing)       Start:  01/18/24    Expected End:  04/10/24            5. Patient will increase R hand pinch strength in all patterns to at least 3# to increase functional use of R hand. (Progressing)       Start:  01/18/24    Expected  End:  04/10/24            7. Patient will increase overall functional ease and independence to at least 65/80 for increased quality of life for patient.  (Progressing)       Start:  01/18/24    Expected End:  04/10/24

## 2024-02-08 ENCOUNTER — OFFICE VISIT (OUTPATIENT)
Dept: CARDIOLOGY | Facility: CLINIC | Age: 74
End: 2024-02-08
Payer: MEDICARE

## 2024-02-08 VITALS
RESPIRATION RATE: 18 BRPM | BODY MASS INDEX: 32.84 KG/M2 | HEART RATE: 60 BPM | HEIGHT: 68 IN | SYSTOLIC BLOOD PRESSURE: 176 MMHG | OXYGEN SATURATION: 99 % | DIASTOLIC BLOOD PRESSURE: 78 MMHG | WEIGHT: 216.7 LBS

## 2024-02-08 DIAGNOSIS — I10 PRIMARY HYPERTENSION: ICD-10-CM

## 2024-02-08 PROCEDURE — 3077F SYST BP >= 140 MM HG: CPT | Performed by: INTERNAL MEDICINE

## 2024-02-08 PROCEDURE — 99214 OFFICE O/P EST MOD 30 MIN: CPT | Performed by: INTERNAL MEDICINE

## 2024-02-08 PROCEDURE — 1159F MED LIST DOCD IN RCRD: CPT | Performed by: INTERNAL MEDICINE

## 2024-02-08 PROCEDURE — 1125F AMNT PAIN NOTED PAIN PRSNT: CPT | Performed by: INTERNAL MEDICINE

## 2024-02-08 PROCEDURE — 3078F DIAST BP <80 MM HG: CPT | Performed by: INTERNAL MEDICINE

## 2024-02-08 PROCEDURE — 1036F TOBACCO NON-USER: CPT | Performed by: INTERNAL MEDICINE

## 2024-02-08 RX ORDER — AMLODIPINE BESYLATE 5 MG/1
5 TABLET ORAL DAILY
Qty: 30 TABLET | Refills: 11 | Status: SHIPPED | OUTPATIENT
Start: 2024-02-08 | End: 2025-02-07

## 2024-02-08 RX ORDER — CHOLECALCIFEROL (VITAMIN D3) 50 MCG
1 TABLET ORAL DAILY
COMMUNITY
Start: 2021-09-02

## 2024-02-08 RX ORDER — METOPROLOL SUCCINATE 50 MG/1
50 TABLET, EXTENDED RELEASE ORAL DAILY
Qty: 30 TABLET | Refills: 11 | Status: SHIPPED | OUTPATIENT
Start: 2024-02-08 | End: 2024-05-09 | Stop reason: HOSPADM

## 2024-02-08 ASSESSMENT — PATIENT HEALTH QUESTIONNAIRE - PHQ9
1. LITTLE INTEREST OR PLEASURE IN DOING THINGS: NOT AT ALL
2. FEELING DOWN, DEPRESSED OR HOPELESS: NOT AT ALL
SUM OF ALL RESPONSES TO PHQ9 QUESTIONS 1 AND 2: 0

## 2024-02-08 ASSESSMENT — ENCOUNTER SYMPTOMS: DEPRESSION: 0

## 2024-02-08 NOTE — PROGRESS NOTES
Eamon Belle Jr. is a 73 y.o. male.    Chief Complaint:  Follow-up    HPI    ROS    Objective   Physical Exam    Lab Review:   Legacy Encounter on 09/14/2023   Component Date Value    Ventricular Rate 09/14/2023 52     Atrial Rate 09/14/2023 52     [place] Interval 09/14/2023 206     QRS Duration 09/14/2023 144     QT Interval 09/14/2023 482     QTC Calculation(Bazett) 09/14/2023 448     P Axis 09/14/2023 34     R [place] 09/14/2023 10     T Axis 09/14/2023 31     QRS Count 09/14/2023 8     Q Onset 09/14/2023 222     P Onset 09/14/2023 119     P Offset 09/14/2023 189     T Offset 09/14/2023 463     QTC Fred[place]ia 09/14/2023 459    Legacy Encounter on 09/07/2023   Component Date Value    Ventricular Rate 09/07/2023 139     Atrial Rate 09/07/2023 139     NJ Interval 09/07/2023 140     QRS Duration 09/07/2023 132     QT Interval 09/07/2023 300     QTC Calculation(Bazett) 09/07/2023 456     P Axis 09/07/2023 8     R [place] 09/07/2023 -42     T Axis 09/07/2023 19     QRS Count 09/07/2023 23     Q Onset 09/07/2023 223     P Onset 09/07/2023 153     P Offset 09/07/2023 175     T Offset 09/07/2023 373     QTC Fredericia 09/07/2023 397    Legacy Encounter on 08/23/2023   Component Date Value    Ventricular Rate 08/23/2023 64     Atrial Rate 08/23/2023 64     NJ Interval 08/23/2023 180     QRS Duration 08/23/2023 144     QT Interval 08/23/2023 474     QTC Calculation(Bazett) 08/23/2023 489     P Axis 08/23/2023 44     R Axis 08/23/2023 8     T [place] 08/23/2023 14     QRS Count 08/23/2023 11     Q Onset 08/23/2023 218     P Onset 08/23/2023 128     P Offset 08/23/2023 160     T Offset 08/23/2023 455     QTC Fredericia 08/23/2023 484    Orders Only on 08/16/2023   Component Date Value    Sedimentation Rate 08/16/2023 4     WBC 08/16/2023 8.3     RBC 08/16/2023 3.58 (L)     Hemoglobin 08/16/2023 11.8 (L)     Hematocrit 08/16/2023 36.0 (L)     MCV 08/16/2023 101 (H)     MCHC 08/16/2023 32.8     Platelets 08/16/2023 321      RDW 08/16/2023 15.9 (H)     Neutrophils % 08/16/2023 68.2     Immature Granulocytes %,* 08/16/2023 0.5     Lymphocytes % 08/16/2023 15.8     Monocytes % 08/16/2023 10.1     Eosinophils % 08/16/2023 4.3     Basophils % 08/16/2023 1.1     Neutrophils Absolute 08/16/2023 5.67 (H)     Lymphocytes Absolute 08/16/2023 1.31     Monocytes Absolute 08/16/2023 0.84 (H)     Eosinophils Absolute 08/16/2023 0.36     Basophils Absolute 08/16/2023 0.09     CRP 08/16/2023 <0.10     Uric Acid 08/16/2023 5.7     Glucose 08/16/2023 72 (L)     Sodium 08/16/2023 140     Potassium 08/16/2023 4.1     Chloride 08/16/2023 105     Bicarbonate 08/16/2023 25     Anion Gap 08/16/2023 14     Urea Nitrogen 08/16/2023 20     Creatinine 08/16/2023 0.88     GFR MALE 08/16/2023 90     Calcium 08/16/2023 10.0     Albumin 08/16/2023 4.0     Alkaline Phosphatase 08/16/2023 67     Total Protein 08/16/2023 6.8     AST 08/16/2023 29     Total Bilirubin 08/16/2023 0.8     ALT (SGPT) 08/16/2023 25     [place], Serum or Plasma 08/16/2023 2.1 (H)     Chromium, Serum 08/16/2023 2.4        Assessment/Plan   There were no encounter diagnoses.  1. Coronary artery disease, status post PCI to the distal RCA 09/10/2019. This patient originally was identified as having low-grade CAD by cardiac catheterization 3/2003 [facility]. He did have a nuclear stress test performed in 1/2009 and again on 11/13/2015 negative for evidence of ischemia. He presented to [facility] system 9/9/ 2019 with central chest discomfort and shortness of breath. It was detected that he was in atrial fibrillation with RVR. He had EKG and enzymatic evidence of a NSTEMI. He had cardiac cath September 10, 2019 with Dr. Mynor Hurst which showed single-vessel CAD in the distal third of the distal right coronary artery about 90% stenosis and distal to the origin of the PDA but prior to the origin of the trifurcating post inferior lateral LV branch. He had successful distal RCA stenting  September 10, 2019 with Dr. Aniceto Richardson at [facility]. He will continue metoprolol succinate to 100 mg daily, asa 81 mg daily. Now on metoprolol 25 mg daily. ECG done prior shows sinus tachycardia with PACs RBBB, 139 bpm. Will increase metoprolol to 100 mg daily. ECG done today shows sinus bradycardia.  The patient will be instructed that he should have a repeat pharmacological nuclear stress test performed if lumbar surgery is actually scheduled in the future.     2. Paroxysmal atrial fibrillation. Please see previous office notes for review. This patient was recently admitted to [facility] from 04/15/2018 to 04/18/2018 with pleuritic-type chest pain along with cough and shortness of breath. The patient was identified by chest x-ray to have a left lower lobe infiltrate suggestive of pneumonia associated with pleuritis. He was also detected to have new onset or recurrent atrial fibrillation with a rapid ventricular rate. The patient was initially placed on an IV Cardizem infusion but ultimately was converted to metoprolol. The patient's ventricular rate remained rapid despite the initiation of metoprolol and as such amiodarone 200 mg daily was added. At the time of discharge on 04/18/2018 he was still in atrial fibrillation with a ventricular rate in the range of 110/m. He did have an echocardiogram performed during the admission on 04/16/2018 that demonstrated an LV ejection fraction of 60-64% with mild left atrial enlargement and trace to mild mitral valve regurgitation. At the time of his next follow-up visit it was found that by both EKG and examination he was back in sinus rhythm with sinus bradycardia. For now he will remain on the amiodarone 200 mg daily plus without will reduce metoprolol by switching metoprolol tartrate 50 mg twice a day to metoprolol succinate 50 mg daily. He will remain on Xarelto 20 mg daily. He presented to [facility] September 9, 2019 with [place]  chest discomfort and shortness of breath. It was detected that he was in atrial fibrillation with RVR. He converted to sinus rhythm with IV diltiazem infusion. Echocardiogram done at the time showed EF 60-64%, left atrial size mildly dilated, trace MR, trivial to mild TR. He is now back on on amiodarone 200 mg daily as well as Xarelto 20 mg daily which had been discontinued prior to the readmission. Had afib with RVR and failed DCCV. He saw Dr Nassar and had RFA 10/2/2020. ECG today shows likely aflutter. Will plan for DCCV next week. See Dr Nassar in follow up. Had DCCV 11/12/2020. Went out of rhythm again for a brief period 11/2021. Went out of rhythm for 4 days 01/2022. Patient had RFA done 05/24/2022 with Dr Nassar. ECG done prior shows NSR, RBBB.  The patient is no longer on amiodarone which was discontinued last visit.  He will remain on Xarelto anticoagulation.  He will be stopping 3 days before any type of back injection or lumbar surgery.  Patient will return in 8 weeks for follow-up.  The patient currently remains in sinus rhythm.  He underwent preadmission testing on 2/1/2024 for lumbar surgery EKG shows sinus bradycardia at 49/min occasional PVCs nonspecific ST-T abnormality.  Will reduce the dose of Toprol-XL from 100 mg daily to 50 mg daily.     3 Xarelto anticoagulation.  Patient may hold Xarelto and aspirin for 5 days prior to lumbar surgery.     4. Hypertension. Blood pressure is adequately controlled today. He will be continued on amlodipine 5 mg daily and lisinopril 20 mg daily. His dose of metoprolol succinate 25 mg a day will be continued.  His systolic blood pressure is elevated today.  Will begin amlodipine 5 mg daily.  His dose of Toprol-XL as noted above will be reduced from 100 to 50 mg daily to avoid prominent bradycardia.     5. Hyperlipidemia. The patient did have lab work performed on 10/08/2019 including a lipid panel with cholesterol 218  HDL 61 triglyceride 154. These  results are continue to be disappointing on his statin agent atorvastatin 80 mg daily. He will be started on supplemental Zetia 10 mg daily. 5/10/2022, Chol 141, LDL 67, HDL 60, trig 70.     6. Sleep apnea. Please see previous office notes. The patient was referred to sleep medicine and was in fact identified as having sleep apnea that was severe. The patient's initial sleep study was done at [facility] on 01/04/2018 and showed severe obstructive sleep apnea and he ultimately had a titration study on 02/22/2018. He was placed on ASV therapy with significant clinical improvement. His compliance has been 74% with no leak.     7. BPH.      8. GERD.     9. Bilateral carpal tunnel release     10. S/P Left hip surgery 2007, right 2021. Plans for LTK 03/2023.     11. S/P Right inguinal hernia repair 4/2014, [facility].     12. Osteoarthritis.      13. Status post left shoulder replacement 11/10/2015 with repeat left shoulder replacement 02/22/2016 because of repetitive dislocation.     14. Lumbar Laminectomy, 10/01/2018 with Dr Cardenas at [facility].  Patient recently has been experiencing and further lower back pain.  His L4-L5 disc is shifted.  He is scheduled for an epidural injection on 12/22/2023 and possible surgery on 2/14/2024.  The patient is in fact scheduled for revision of an L4 laminectomy with excision of synovial cyst the right with robotic pedicle screw and interbody fusion 2/14/2024.  He is cleared from the cardiac standpoint for the procedure may hold Xarelto and aspirin 5 days preoperatively.  Lab work 8/16/2023 included sedimentation rate 4 CRP less than 0.10 hematocrit 36.0 uric acid 5.7 and normal electrolyte panel.     15. Prostate Urolift surgery with Dr Werner, fall 2018.     16. Gen. medical. The patient had lab work on 10/08/2019 including glycohemoglobin 5.2%, TSH 1.38, PSA 1.6 with both CBC and SMA panels being normal.     17. Venous insufficiency.      18. Hx of psoriatic  arthritis     19. Hx of covid-19 vaccine #1 and #2.      20. Planned LTK.

## 2024-02-21 ENCOUNTER — TREATMENT (OUTPATIENT)
Dept: OCCUPATIONAL THERAPY | Facility: CLINIC | Age: 74
End: 2024-02-21
Payer: MEDICARE

## 2024-02-21 DIAGNOSIS — G56.91 NEUROPATHY OF UPPER EXTREMITY, RIGHT: ICD-10-CM

## 2024-02-21 DIAGNOSIS — B02.29 POST HERPETIC NEURALGIA: ICD-10-CM

## 2024-02-21 PROCEDURE — 97110 THERAPEUTIC EXERCISES: CPT | Mod: GO,CO

## 2024-02-21 PROCEDURE — 97026 INFRARED THERAPY: CPT | Mod: GO,CO

## 2024-02-21 PROCEDURE — 97022 WHIRLPOOL THERAPY: CPT | Mod: GO,CO

## 2024-02-21 ASSESSMENT — PAIN - FUNCTIONAL ASSESSMENT: PAIN_FUNCTIONAL_ASSESSMENT: 0-10

## 2024-02-21 ASSESSMENT — PAIN SCALES - GENERAL: PAINLEVEL_OUTOF10: 0 - NO PAIN

## 2024-02-21 NOTE — PROGRESS NOTES
"  Occupational Therapy    Occupational Therapy Treatment    Patient Name: Pro Belle Jr.  MRN: [id number]  Today's Date: 2/21/2024    Insurance:  Visit number: 5 of 12  Insurance Type: Medicare A and B    Subjective     Neuropathy of upper extremity, right  Referral to Occupational Therapy      Follow Up In Occupational Therapy       2. Post herpetic neuralgia  Referral to Occupational Therapy     Follow Up In Occupational Therapy             History of Present Illness  Mechanism of injury: Patient reports onset of Shingles in May of 2023 along R FA; per patient description pattern of Shingles along C6, C7, and C8 dermatomes. Patient reports following outbreak and clear-up, inability to move R hand; since onset, patient is now able to functionally flex and extend digits 3-5, however, minimal movement noted at IF and thumb.     Patient reports \" I am having tingling in my fingers, I assume that is a good thing. I am now able to  more things with this right hand\". Goals reviewed with pt, he verbalized understanding. Pt had spine surgery a week ago on his lower back and nerve in his R hip. Stated he is \"very sore\". Now ambulating slowly to session on a wheeled walker. He noted L arm weakness since the surgery, \"but not so much in my left hand \". Pt stated he is taking pain meds every 4 hours due to surgery. Using heat and ice sometimes on his R hand.     Performing HEP?: Yes    Pt referred by doctor Yanez, next follow up 2/22/24    Pain:0/10    Objective   Hand Range of Motion   Grasp the same as last session  2 point pinch R at 1 #(was flicker) and L at # 12(was 10#)  3 point pinch R at  5#(was 1#) and L at14 # (was 10#)  Lat pinch R at 3# (was 1#) and L at  18# (was 17#)     All AROM measurements remain the same.             AROM       Right Left   THUMB CMC Extension/Flexion 0/20 WFL     MP Extension/Flexion 0/45(same) WFL     IP Extension/Flexion 0/0(same) WFL      AROM   INDEX LONG RING SMALL     MCP " Extension/Flexion 0/75 0/90 0/90 0/90     PIP Extension/Flexion 0/15 0/90 0/90 0/90     DIP Extension/Flexion 0/0 0/45 0/20 0/45   [place]   80 225 200 225     Sensory: impaired  Numbness/Tingling: noted tingling in tip of small  finger , thumb and base of palm.   Reviewed re-sensitization tech. Pt able to return demo from writer.   Pt still able to oppose all fingers to thumb with the exception of small finger.   Treatment:    Modalities:  R thumb  and hand AROM x 10 min while in fluidotherapy to promote muscle movement during session.  Infared heat to R hand x 10 min to remodel tissue, promote blood flow and circulation during goal review.     Therapeutic Exercise:  Educated pt in re-sensitization tech, tapping, rubbing various fabrics, slides along table for thumb, index and middle finger.   Reviewed and completed  blocking flexion of thumb IP and R index finger PIP and DIP, 10 reps with 5 sec hold.  AROM of R thumb for circumduction, flexion/ext and abd/adduction  Educated pt in and performed yellow (low resistance) theraputty HEP, completed 10 reps for gross grasp, thumb flexion, individual pinch and lumbrical pinch. Written program provided.   Post-tx pain:2/10, volar side of SF increased since start of session    Assessment/Plan min (down from moderate) fatigue at end of session. Pt to follow through with modalities,  HEP for place and hold, fine motor coordination ,added theraputty, re-sensitization and blocking exer. Noted increase in all 3 areas of pinch today. Pt highly motivated to return to function.     OP EDUCATION:  Education  Individual(s) Educated: Patient  Education Provided: POC discussed and agreed upon  Home Program: PROM, AAROM, AROM, Fine motor tasks, Modalities  Equipment: Thera-putty  Risk and Benefits Discussed with Patient/Caregiver/Other: yes  Patient/Caregiver Demonstrated Understanding: yes  Patient Response to Education: Patient/Caregiver Verbalized Understanding of Information,  Patient/Caregiver Performed Return Demonstration of Exercises/Activities, Patient/Caregiver Asked Appropriate Questions    Goals:  Active       OT Goals       1. Patient will increase R IF MP and PIP flexion by at least 25 degrees to increase functional gripping abilities with ADL's with R hand. (Progressing)       Start:  01/18/24    Expected End:  01/31/24            2. Patient will increase R thumb MP flexion by at least 20 degrees and IP flexion by at least 10 to increase functional use of R thumb with gripping tasks. (Progressing)       Start:  01/18/24    Expected End:  01/31/24            3. Patient will demonstrate ability to utilize 2-pt and 3-pt pinch patterns to  10 small objects without dropping. (Progressing)       Start:  01/18/24    Expected End:  02/14/24            4. Patient will increase R hand  strength by at least 15# to increase functional use of R hand.  (Progressing)       Start:  01/18/24    Expected End:  04/10/24            5. Patient will increase R hand pinch strength in all patterns to at least 3# to increase functional use of R hand. (Progressing)       Start:  01/18/24    Expected End:  04/10/24            7. Patient will increase overall functional ease and independence to at least 65/80 for increased quality of life for patient.  (Progressing)       Start:  01/18/24    Expected End:  04/10/24

## 2024-02-28 ENCOUNTER — TREATMENT (OUTPATIENT)
Dept: OCCUPATIONAL THERAPY | Facility: CLINIC | Age: 74
End: 2024-02-28
Payer: MEDICARE

## 2024-02-28 DIAGNOSIS — B02.29 POST HERPETIC NEURALGIA: ICD-10-CM

## 2024-02-28 DIAGNOSIS — G56.91 NEUROPATHY OF UPPER EXTREMITY, RIGHT: ICD-10-CM

## 2024-02-28 PROCEDURE — 97022 WHIRLPOOL THERAPY: CPT | Mod: GO,CO

## 2024-02-28 PROCEDURE — 97110 THERAPEUTIC EXERCISES: CPT | Mod: GO,CO

## 2024-02-28 ASSESSMENT — PAIN - FUNCTIONAL ASSESSMENT: PAIN_FUNCTIONAL_ASSESSMENT: 0-10

## 2024-02-28 ASSESSMENT — PAIN SCALES - GENERAL: PAINLEVEL_OUTOF10: 0 - NO PAIN

## 2024-02-28 NOTE — PROGRESS NOTES
"  Occupational Therapy    Occupational Therapy Treatment    Patient Name: Pro Belle Jr.  MRN: [id number]  Today's Date: 2/28/2024    Insurance:  Visit number: 5 of 12  Insurance Type: Medicare A and B    Subjective     Neuropathy of upper extremity, right  Referral to Occupational Therapy      Follow Up In Occupational Therapy       2. Post herpetic neuralgia  Referral to Occupational Therapy     Follow Up In Occupational Therapy             History of Present Illness  Mechanism of injury: Patient reports onset of Shingles in May of 2023 along R FA; per patient description pattern of Shingles along C6, C7, and C8 dermatomes. Patient reports following outbreak and clear-up, inability to move R hand; since onset, patient is now able to functionally flex and extend digits 3-5, however, minimal movement noted at IF and thumb.     Patient reports \" I am waking up with my hand stiff and I still am not sure about this thumb\". Goals reviewed with pt, he verbalized understanding. Pt had spine surgery 2 weeks ago on his lower back and nerve in his R hip. Stated he is \"very sore\". Now ambulated to session with straight cane. . He noted continued L arm weakness since the surgery, \"but not so much in my left hand \". Pt stated he is now only taking pain meds twice a day , mostly at bed time(was  every 4 hours due to surgery.) Using heat and ice sometimes on his R hand. Educated in importance of ice use to reduce edema.     Performing HEP?: Yes    Pt referred by doctor Yanez, next follow up 2/22/24    Pain:0/10    Objective   Sensory: impaired  Numbness/Tingling: noted tingling in tip of  thumb only today, was in palm and base of thumb  Reviewed re-sensitization tech. Pt able to return demo from writer.   Pt still able to oppose all fingers to thumb with the exception of small finger.(MKI 5)   Treatment:Explored online options for isotoner glove for night wear, pt to order a size large with open fingers.     Modalities:  R " thumb  and hand AROM x 10 min while in fluidotherapy to promote muscle movement during session.  Infared heat to R hand x 10 min to remodel tissue, promote blood flow and circulation during goal review.     Therapeutic Exercise:   Reviewed e-sensitization tech, tapping, rubbing various fabrics, slides along table for thumb, index and middle finger.   AROM of R thumb for circumduction, flexion/ext and abd/adduction  Initiated BTE today, 2 full charts completed at the following resistance:Grasp at 15#, 3 point pinch at 9#, horizontal(palm down) jar lid at 4# clockwise and 4# counter clockwise, jar lid laterally at 4# CW and 3# CCW with difficulty placing index finger.    Fine motor manipulation performed with focus on 3 point pinch into slotted bucket(provided with bucket for home use) and FT to palm marble manipulation. Able to hold 7 of 10 marbles before dropping.   Reviewed place and hold of index finger.   Post-tx pain:2/10, volar side of SF increased since start of session    Assessment/Plan  moderate fatigue at end of session. Pt to follow through with modalities,  HEP for place and hold, fine motor coordination ,theraputty, re-sensitization and blocking exer. Pt highly motivated to return to function.     OP EDUCATION:  Education  Individual(s) Educated: Patient  Education Provided: POC discussed and agreed upon  Home Program: PROM, AAROM, AROM, Fine motor tasks, Modalities  Equipment: Glove(Edema) (to purchase in community)  Risk and Benefits Discussed with Patient/Caregiver/Other: yes  Patient/Caregiver Demonstrated Understanding: yes  Patient Response to Education: Patient/Caregiver Verbalized Understanding of Information, Patient/Caregiver Performed Return Demonstration of Exercises/Activities, Patient/Caregiver Asked Appropriate Questions    Goals:  Active       OT Goals       1. Patient will increase R IF MP and PIP flexion by at least 25 degrees to increase functional gripping abilities with ADL's with R  hand. (Progressing)       Start:  01/18/24    Expected End:  01/31/24            2. Patient will increase R thumb MP flexion by at least 20 degrees and IP flexion by at least 10 to increase functional use of R thumb with gripping tasks. (Progressing)       Start:  01/18/24    Expected End:  01/31/24            3. Patient will demonstrate ability to utilize 2-pt and 3-pt pinch patterns to  10 small objects without dropping. (Progressing)       Start:  01/18/24    Expected End:  02/14/24            4. Patient will increase R hand  strength by at least 15# to increase functional use of R hand.  (Progressing)       Start:  01/18/24    Expected End:  04/10/24            5. Patient will increase R hand pinch strength in all patterns to at least 3# to increase functional use of R hand. (Progressing)       Start:  01/18/24    Expected End:  04/10/24            7. Patient will increase overall functional ease and independence to at least 65/80 for increased quality of life for patient.  (Progressing)       Start:  01/18/24    Expected End:  04/10/24

## 2024-03-04 DIAGNOSIS — I48.21 PERMANENT ATRIAL FIBRILLATION (MULTI): Primary | ICD-10-CM

## 2024-03-04 RX ORDER — RIVAROXABAN 20 MG/1
20 TABLET, FILM COATED ORAL DAILY
Qty: 90 TABLET | Refills: 3 | Status: SHIPPED | OUTPATIENT
Start: 2024-03-04 | End: 2024-04-10

## 2024-03-06 ENCOUNTER — TREATMENT (OUTPATIENT)
Dept: OCCUPATIONAL THERAPY | Facility: CLINIC | Age: 74
End: 2024-03-06
Payer: MEDICARE

## 2024-03-06 DIAGNOSIS — G56.91 NEUROPATHY OF UPPER EXTREMITY, RIGHT: ICD-10-CM

## 2024-03-06 DIAGNOSIS — B02.29 POST HERPETIC NEURALGIA: ICD-10-CM

## 2024-03-06 PROCEDURE — 97032 APPL MODALITY 1+ESTIM EA 15: CPT | Mod: GO | Performed by: OCCUPATIONAL THERAPIST

## 2024-03-06 PROCEDURE — 97110 THERAPEUTIC EXERCISES: CPT | Mod: GO | Performed by: OCCUPATIONAL THERAPIST

## 2024-03-06 PROCEDURE — 97014 ELECTRIC STIMULATION THERAPY: CPT | Mod: GO | Performed by: OCCUPATIONAL THERAPIST

## 2024-03-06 ASSESSMENT — PAIN - FUNCTIONAL ASSESSMENT: PAIN_FUNCTIONAL_ASSESSMENT: 0-10

## 2024-03-06 ASSESSMENT — PAIN SCALES - GENERAL: PAINLEVEL_OUTOF10: 0 - NO PAIN

## 2024-03-06 NOTE — PROGRESS NOTES
"Occupational Therapy Treatment    Patient Name: Pro Belle Jr.  MRN: [id number]  Today's Date: 3/6/2024    Time Calculation  Start Time: 1145  Stop Time: 1234  Time Calculation (min): 49 min  OT Modalities Time Entry  E-Stim (Attended) Time Entry: 9  E-Stim (Unattended) Time Entry: 15  Whirlpool Time Entry: 10  OT Therapeutic Procedures Time Entry  Therapeutic Exercise Time Entry: 15    Insurance:  Visit number: 7 of 12  Insurance Type: Medicare    Subjective   Current Problem/Reason for visit:  Patient reports onset of Shingles in May of 2023 along R FA; per patient description pattern of Shingles along C6, C7, and C8 dermatomes. Patient reports following outbreak and clear-up, inability to move R hand; since onset, patient is now able to functionally flex and extend digits 3-5, however, minimal movement noted at IF and thumb.     Referred by: Dr. Yanez    Patient reports \"My fingers are moving better and I can use my hand more, but it just isn't where I want it to be.\"    Performing HEP?: Yes    Pain:  Pain Assessment  Pain Assessment: 0-10  Pain Score: 0 - No pain    Objective   Edema: Mild R hand  Sensory: +Impaired   Numbness/Tingling: Randomized numbness throughout R hand including tips of all digits, in palm, and distal FA.      Treatment:    Modalities:   Fluidotherapy in conjunction with AROM ther ex R hand/thumb all planes to optimize joint mobility and comfort x10 minutes.   Therapist educated patient on purpose of/benefits of Electrical Stimulation for neuromuscular re-education to promote digital and thumb flexion for improved gripping with ADL's and activities of choice.  Unattended e-stim: Thai: applied to R volar FA at flexor muscle bellies; Intensity: 35 Ekta, continuous, applied for 15 minutes with patient seated at table; promoted composite R IF flexion intermittently throughout administration of stim and encouraged patient to actively contract stimulated muscles for max " benefit/neuroplasticity in conjunction with stim cycle.  Attended e-stim administered with use of US gel to allow for subtle electrode movement throughout session to elecit maximum muscle contraction; with focus on terminal flexion of digits/thumb; therapist promoted thumb flexion through PROM in conjunction with electical stimulation  Therapeutic Exercise:  Reviewed HEP; all questions/concerns answered/addressed.  Therapist implemented PROM ther ex with prolonged hold at end range to R IF for composite flexion and R thumb for composite flexion x10 reps in preparation for use of electrical stim as described above.        Post-tx pain: 0/10    Assessment/Plan Patient demonstrates good tolerance for intervention/use of electrical stimulation this date; patient reports he will obtain personal unit to establish HEP; anticipate set-up of personal unit at next follow-up.      OP EDUCATION:  Education  Individual(s) Educated: Patient  Home Program: Modalities (Electrical stimulation)    Goals:  Active       OT Goals       1. Patient will increase R IF MP and PIP flexion by at least 25 degrees to increase functional gripping abilities with ADL's with R hand. (Progressing)       Start:  01/18/24    Expected End:  01/31/24            2. Patient will increase R thumb MP flexion by at least 20 degrees and IP flexion by at least 10 to increase functional use of R thumb with gripping tasks. (Progressing)       Start:  01/18/24    Expected End:  01/31/24            3. Patient will demonstrate ability to utilize 2-pt and 3-pt pinch patterns to  10 small objects without dropping. (Progressing)       Start:  01/18/24    Expected End:  02/14/24            4. Patient will increase R hand  strength by at least 15# to increase functional use of R hand.  (Progressing)       Start:  01/18/24    Expected End:  04/10/24            5. Patient will increase R hand pinch strength in all patterns to at least 3# to increase functional  use of R hand. (Progressing)       Start:  01/18/24    Expected End:  04/10/24            7. Patient will increase overall functional ease and independence to at least 65/80 for increased quality of life for patient.  (Progressing)       Start:  01/18/24    Expected End:  04/10/24

## 2024-03-06 NOTE — CONSULTS
Service:   Service: Medicine     Consult:  Consult requested by (Attending Name): SUZAN NIELSON   Reason: medical management s/p tka     History of Present Illness:   HPI:    JAKOB BUTLER is a 72 year old Male with PMH of Afib on xarelto, BPH, HTN, CAD, and GERD that presented for scheduled left TKA. Medicine was consulted for apoorva-operative  medical management. Patient currently had no complaints and states that he has already worked a little with physical therapy. He denies any recent fever/chills, chest pain, dyspnea, N/V, abdominal pain, and changes in urinary or bowel habits.    PMH: Afib on xarelto, BPH, HTN, CAD, and GERD  PSH: bilateral RADHA, inguinal hernia repair, left shoulder surgery  SH: former tobacc o abuse, occasional ETOH use, denies illicit drug use  FH: Father- CVA     Review Family/Social History and ROS:   Social History:    Smoking Status: former smoker  (1)   Alcohol Use: occasionally (1)   Drug Use: denies  (1)     Constitutional: NEGATIVE: Fever, Chills     Eyes: NEGATIVE: Vision Loss/ Change     ENMT: NEGATIVE: Throat Pain     Respiratory: NEGATIVE: Dry Cough, Productive Cough,  Shortness of Breath     Cardiac: NEGATIVE: Chest Pain, Dyspnea on Exertion,  Palpitations     Gastrointestinal: NEGATIVE: Nausea, Vomiting, Diarrhea,  Constipation, Abdominal Pain     Genitourinary: NEGATIVE: Dysuria     Musculoskeletal: POSITIVE: Decreased ROM, Pain, Weakness     Neurological: NEGATIVE: Dizziness, Headache     Skin: NEGATIVE: Rash              Allergies:  ·  No Known Allergies :     Objective:     Objective Information:        T   P  R  BP   MAP  SpO2   Value  35.8  66  16  160/85   115  97%  Date/Time 3/29 17:54 3/29 17:54 3/29 17:54 3/29 17:54  3/29 17:54 3/29 17:54  Range  (35.8C - 35.8C )  (53 - 66 )  (16 - 16 )  (140 - 160 )/ (77 - 85 )  (98 - 115 )  (95% - 97% )      Physical Exam by System:    Constitutional: Well developed, no apparent distress,  alert and oriented x3    Head/Neck: Normocephalic, atraumatic   Respiratory/Thorax: CTAB with good inspiratory effort,  no wheezing/crackles/rales   Cardiovascular: RRR with normal S1 and S2, no murmurs   Gastrointestinal: Soft, nontender, nondistended,  BSx4   Musculoskeletal: ROM intact, normal strength except  LLE limited in post-op setting   Extremities: No pretibial edema, good and equal pedal  pulses   Neurological: Alert and oriented x3   Psychological: Appropriate mood and behavior   Skin: Warm and dry, left knee post-op dressing in  place     Refresh Home Medications List:  ·  Select to Refresh Home Medication List Refresh Home Medications      Medications:    Medications:          Continuous Medications       --------------------------------    1. Lactated Ringers Infusion:  1000  mL  IntraVenous  <Continuous>    2. Lactated Ringers Infusion:  1000  mL  IntraVenous  <Continuous>         Scheduled Medications       --------------------------------    1. Acetaminophen:  975  mg  Oral  Every 8 Hours    2. Amiodarone:  200  mg  Oral  Every 24 Hours    3. amLODIPine (NORVASC):  5  mg  Oral  Daily    4. Ascorbic Acid:  500  mg  Oral  2 Times a Day    5. Atorvastatin:  80  mg  Oral  Every Night    6. ceFAZolin 2 gram/ D5W 100 mL Premix IVPB:  100  mL  IntraVenous Piggyback  Every 6 Hours    7. Cholecalciferol (Vitamin D3):  2000  International Unit(s)  Oral  Daily    8. Docusate:  100  mg  Oral  2 Times a Day    9. Folic Acid:  1  mg  Oral  Daily    10. Gabapentin:  300  mg  Oral  3 Times a Day    11. Lisinopril:  20  mg  Oral  Daily    12. Metoprolol Succinate Extended Release:  25  mg  Oral  Daily    13. Pantoprazole:  40  mg  Oral  Daily    14. Polyethylene Glycol:  17  gram(s)  Oral  2 Times a Day    15. Tamsulosin:  0.4  mg  Oral  Daily         PRN Medications       --------------------------------    1. Carisoprodol:  350  mg  Oral  Every 8 Hours    2. diphenhydrAMINE Injectable:  12.5  mg  IntraVenous Push  Every 6 Hours    3.  "HYDROmorphone Injectable:  0.4  mg  IntraVenous Push  Every 2 Hours    4. Ketorolac Injectable:  15  mg  IntraVenous Push  Every 6 Hours    5. Ondansetron Injectable:  4  mg  IntraVenous Push  Every 6 Hours    6. oxyCODONE Immediate Release:  5  mg  Oral  Every 4 Hours    7. oxyCODONE Immediate Release:  10  mg  Oral  Every 4 Hours    8. Sore Throat Lozenge:  1  lozenge(s)  Oral  Every 4 Hours    9. traMADol:  50  mg  Oral  Every 6 Hours            Assessment:    JAKOB BUTLER is a 72 year old Male with PMH of Afib on xarelto, BPH, HTN, CAD, and GERD that presented for scheduled left TKA. Medicine was consulted for apoorva-operative  medical management.     Left knee pain  - POD#0 left TKA  - multimodal pain regimen per primary ortho team  - PT/OT consulted    Afib, HTN, CAD  - continue all home meds except rivaroxaban in post-op setting    GERD  - continue home PPI    BPH  - continue home tamsulosin    DVT Proph: xarelto to start POD#1 per ortho    Dispo: Patient appears medically stable, discharge per primary orthopedic team. Will continue to follow while admitted.       Consult Status:  Consult Order ID: [id number]       Electronic Signatures:  Ara Ruby)  (Signed 29-Mar-2023 20:24)   Authored: Service, History of Present Illness, Review  Family/Social History and ROS, Allergies, Objective, Assessment/Recommendations, Note Completion      Last Updated: 29-Mar-2023 20:24 by Ara Ruby ()    References:  1.  Data Referenced From \"Patient Profile - Adult v2\" 29-Mar-2023 16:24   "

## 2024-03-13 ENCOUNTER — TREATMENT (OUTPATIENT)
Dept: OCCUPATIONAL THERAPY | Facility: CLINIC | Age: 74
End: 2024-03-13
Payer: MEDICARE

## 2024-03-13 DIAGNOSIS — B02.29 POST HERPETIC NEURALGIA: ICD-10-CM

## 2024-03-13 DIAGNOSIS — G56.91 NEUROPATHY OF UPPER EXTREMITY, RIGHT: ICD-10-CM

## 2024-03-13 PROCEDURE — 97014 ELECTRIC STIMULATION THERAPY: CPT | Mod: GO,CO

## 2024-03-13 PROCEDURE — 97022 WHIRLPOOL THERAPY: CPT | Mod: GO,CO

## 2024-03-13 PROCEDURE — 97110 THERAPEUTIC EXERCISES: CPT | Mod: GO,CO

## 2024-03-13 ASSESSMENT — PAIN SCALES - GENERAL: PAINLEVEL_OUTOF10: 0 - NO PAIN

## 2024-03-13 ASSESSMENT — PAIN - FUNCTIONAL ASSESSMENT: PAIN_FUNCTIONAL_ASSESSMENT: 0-10

## 2024-03-13 NOTE — PROGRESS NOTES
"  Occupational Therapy    Occupational Therapy Treatment    Patient Name: Pro Belle Jr.  MRN: [id number]  Today's Date: 3/13/2024    Insurance:  Visit number: 8 of 12  Insurance Type: Medicare A and B    Subjective     Neuropathy of upper extremity, right  Referral to Occupational Therapy      Follow Up In Occupational Therapy       2. Post herpetic neuralgia  Referral to Occupational Therapy     Follow Up In Occupational Therapy             History of Present Illness  Mechanism of injury: Patient reports onset of Shingles in May of 2023 along R FA; per patient description pattern of Shingles along C6, C7, and C8 dermatomes. Patient reports following outbreak and clear-up, inability to move R hand; since onset, patient is now able to functionally flex and extend digits 3-5, however, minimal movement noted at IF and thumb.     Patient reports \" I am waking up with my hand stiff and I still am not sure about this thumb\". Goals reviewed with pt, he verbalized understanding.. Pt stated he is now only taking pain meds twice a day , mostly at bed time(was  every 4 hours due to surgery.) Using heat and ice sometimes on his R hand. Educated in importance of ice use to reduce edema.     Performing HEP?: Yes    Pt referred by doctor Renata pt to follow up after therapy is completed.    Pain:0/10, \" nothing really at all now\".   Continues to lack R IF PIP and DIP flexion    Objective   Sensory: impaired  Numbness/Tingling: noted tingling in tip of  thumb only today, sometimes along lateral side of small finger. Previously reported it was in palm and base of thumb  Reviewed re-sensitization tech. Pt able to return demo from writer.   Pt able to oppose all fingers to thumb with the exception of ring and small finger.(MKI 4)   Treatment:  Modalities:  R thumb  and hand AROM x 10 min while in fluidotherapy to promote muscle movement during session.      Therapeutic Exercise:   Reviewed re-sensitization tech, tapping, rubbing " "various fabrics, slides along table for thumb, index and middle finger.   Electrical e stim completed /educated pt today with patients personal unit with focus on R IF PIP and DIP flexion, encouraging pt to actively engage muscles with contraction. Provided pt with written information for follow through twice daily, 10-15 min per session at 15MHZ , decreasing or increasing as tolerated. Pt to use only channel A (10HZ), setting P1 \"chronic constant\". Pt also took photo with his phone for follow up as part of HEP  Reviewed existing HEP, encouraged pt to gradually increase reps as tolerated to build strength and endurance.   Post-tx pain:0/10, unchanged  since start of session    Assessment/Plan Plan to educate pt in thumb e stim program on next visit.  Min fatigue at end of session. Pt to follow through with added e stim program, use of modalities,  HEP for place and hold, fine motor coordination ,theraputty, isotoner glove wear, re-sensitization and blocking exer. Pt highly motivated to return to function.     OP EDUCATION:  Education  Individual(s) Educated: Patient  Education Provided: POC discussed and agreed upon  Home Program: Modalities (Electrical stimulation)  Equipment:  (personal e stim unit)  Risk and Benefits Discussed with Patient/Caregiver/Other: yes  Patient/Caregiver Demonstrated Understanding: yes  Patient Response to Education: Patient/Caregiver Verbalized Understanding of Information, Patient/Caregiver Performed Return Demonstration of Exercises/Activities, Patient/Caregiver Asked Appropriate Questions    Goals:  Active       OT Goals       1. Patient will increase R IF MP and PIP flexion by at least 25 degrees to increase functional gripping abilities with ADL's with R hand. (Progressing)       Start:  01/18/24    Expected End:  01/31/24            2. Patient will increase R thumb MP flexion by at least 20 degrees and IP flexion by at least 10 to increase functional use of R thumb with gripping " tasks. (Progressing)       Start:  01/18/24    Expected End:  01/31/24            3. Patient will demonstrate ability to utilize 2-pt and 3-pt pinch patterns to  10 small objects without dropping. (Progressing)       Start:  01/18/24    Expected End:  02/14/24            4. Patient will increase R hand  strength by at least 15# to increase functional use of R hand.  (Progressing)       Start:  01/18/24    Expected End:  04/10/24            5. Patient will increase R hand pinch strength in all patterns to at least 3# to increase functional use of R hand. (Progressing)       Start:  01/18/24    Expected End:  04/10/24            7. Patient will increase overall functional ease and independence to at least 65/80 for increased quality of life for patient.  (Progressing)       Start:  01/18/24    Expected End:  04/10/24

## 2024-03-17 DIAGNOSIS — N40.1 BENIGN PROSTATIC HYPERPLASIA WITH LOWER URINARY TRACT SYMPTOMS, SYMPTOM DETAILS UNSPECIFIED: Primary | ICD-10-CM

## 2024-03-19 ENCOUNTER — TELEMEDICINE (OUTPATIENT)
Dept: PHARMACY | Facility: HOSPITAL | Age: 74
End: 2024-03-19
Payer: MEDICARE

## 2024-03-19 DIAGNOSIS — I48.21 PERMANENT ATRIAL FIBRILLATION (MULTI): ICD-10-CM

## 2024-03-19 DIAGNOSIS — I48.91 ATRIAL FIBRILLATION, UNSPECIFIED TYPE (MULTI): ICD-10-CM

## 2024-03-19 RX ORDER — TAMSULOSIN HYDROCHLORIDE 0.4 MG/1
0.4 CAPSULE ORAL DAILY
Qty: 90 CAPSULE | Refills: 3 | Status: SHIPPED | OUTPATIENT
Start: 2024-03-19

## 2024-03-19 NOTE — PROGRESS NOTES
"Pharmacist Clinic: Anticoagulation Management  Pro Belle JrLolita was referred to the Clinical Pharmacy Team for his anticoagulation management.    Referring Provider:  Mildred DAMICO-CNP    Last Appointment w/ Pharmacist: 12/19/23  Pharmacist Name: Eva Ordonez  _______________________________________________________________________  PHARMACY ASSESSMENT    Review of Past Appointment:   - Screened for [facility] PAP, and was found to have too high of income to qualify for the program. Will reassess with 2023 tax information.    RELEVANT LAB RESULTS  Lab Results   Component Value Date    BILITOT 0.8 08/16/2023    CALCIUM 10.0 08/16/2023    CO2 25 08/16/2023     08/16/2023    CREATININE 0.88 08/16/2023    GLUCOSE 72 (L) 08/16/2023    ALKPHOS 67 08/16/2023    K 4.1 08/16/2023    PROT 6.8 08/16/2023     08/16/2023    AST 29 08/16/2023    ALT 25 08/16/2023    BUN 20 08/16/2023    ANIONGAP 14 08/16/2023    MG 2.04 05/25/2022    PHOS 2.1 (L) 05/25/2022    ALBUMIN 4.0 08/16/2023    GFRMALE 90 08/16/2023     Lab Results   Component Value Date    TRIG 114 04/11/2023    CHOL 144 04/11/2023    LDLCALC 52 (L) 07/13/2021    HDL 64.3 04/11/2023     No results found for: \"BMCBC\", \"CBCDIF\"   _______________________________________________________________________  ANTICOAGULATION ASSESSMENT    The ASCVD Risk score (Alphonso ORELLANA, et al., 2019) failed to calculate for the following reasons:    The patient has a prior MI or stroke diagnosis    DIAGNOSIS: prevention of nonvalvular atrial fibrilliation stroke and systemic embolism  - Patient is projected to be on anticoagulation - depending on if in afib  - [id number] Score: [4] (only included if diagnosis is atrial fibrillation)   Age: [<65 (0)] [65-74 (+1)] [> 75 (+2)]: 2  Sex: [Male/Female (+1)]: 0  CHF history: [No/Yes(+1)]: 0  Hypertension history: [No/Yes(+1)]: 1  Stroke/TIA/thromboembolism history: [No/Yes(+2)]: 0  Vascular disease history (prior MI, peripheral artery " disease, aortic plaque): [No/Yes(+1)]: 1  Diabetes history: [No/Yes(+1)]: 0    CURRENT PHARMACOTHERAPY:   - Xarelto 20mg one tablet daily which is appropraite for a patient whose estimated CrCl is 82ml/min.    UPDATE ON PHARMACOTHERAPY:   Affordability/Accessibility: has been paying out for Xarelto and has about 2 weeks of medication left.  Adherence/Organization: reports adherence  Adverse Effects: minor bruises but all heal  Recent Hospitalizations: Back surgery a month ago  Recent Falls/Trauma: none  Changes in Tobacco or Alcohol Intake: none noted    DISCUSSION/NOTES:  - previously had bleeding issues when taking other blood thinners.    _______________________________________________________________________  PATIENT EDUCATION/GOALS  - Counseled patient on MOA, expectations, duration of therapy, contraindications, administration, and monitoring parameters  - Counseled patient of side effects that are indicative of bleeding such as dark tarry stool, unexplainable bruising, or vomiting up a coffee ground like substance  - Answered all patient questions and concerns  _______________________________________________________________________   Patient Assistance Program (PAP)    Patient verbally reports monthly or yearly income which is less than 400% federal poverty level    Application for program to be submitted for the following medications: Xarelto    Prescription Insurance: Yes  Members of Household: 2  Files Taxes: Yes    Patient will be email financial information to pharmacist directly at [email or web address].    Patient aware this process may take up to 6 weeks.     If approved medication must be filled through [facility] pharmacy and mailed to patient.       RECOMMENDATIONS/PLAN  1. Continue Xarelto 20mg one tablet daily.    Next Cardiology Appointment: 3/21/24  Clinical Pharmacist follow up: pending approval of  PAP  Type of Encounter: Virtual    Jani Clayton PharmD  PGY1 Resident     Verbal  consent to manage patient's drug therapy was obtained from the patient . They were informed they may decline to participate or withdraw from participation in pharmacy services at any time.    Continue all meds under the continuation of care with the referring provider and clinical pharmacy team.

## 2024-03-20 ENCOUNTER — TREATMENT (OUTPATIENT)
Dept: OCCUPATIONAL THERAPY | Facility: CLINIC | Age: 74
End: 2024-03-20
Payer: MEDICARE

## 2024-03-20 DIAGNOSIS — B02.29 POST HERPETIC NEURALGIA: ICD-10-CM

## 2024-03-20 DIAGNOSIS — G56.91 NEUROPATHY OF UPPER EXTREMITY, RIGHT: ICD-10-CM

## 2024-03-20 PROCEDURE — 97022 WHIRLPOOL THERAPY: CPT | Mod: GO,CO

## 2024-03-20 PROCEDURE — 97110 THERAPEUTIC EXERCISES: CPT | Mod: GO,CO

## 2024-03-20 PROCEDURE — 97014 ELECTRIC STIMULATION THERAPY: CPT | Mod: GO,CO

## 2024-03-20 ASSESSMENT — PAIN - FUNCTIONAL ASSESSMENT: PAIN_FUNCTIONAL_ASSESSMENT: 0-10

## 2024-03-20 ASSESSMENT — PAIN SCALES - GENERAL: PAINLEVEL_OUTOF10: 2

## 2024-03-20 NOTE — PROGRESS NOTES
"  Occupational Therapy    Occupational Therapy Treatment    Patient Name: Pro Belle Jr.  MRN: [id number]  Today's Date: 3/20/2024    Insurance:  Visit number: 9 of 12  Insurance Type: Medicare A and B    Subjective     Neuropathy of upper extremity, right  Referral to Occupational Therapy      Follow Up In Occupational Therapy       2. Post herpetic neuralgia  Referral to Occupational Therapy     Follow Up In Occupational Therapy             History of Present Illness  Mechanism of injury: Patient reports onset of Shingles in May of 2023 along R FA; per patient description pattern of Shingles along C6, C7, and C8 dermatomes. Patient reports following outbreak and clear-up, inability to move R hand; since onset, patient is now able to functionally flex and extend digits 3-5, however, minimal movement noted at IF and thumb.     Patient reports \" I have been using my e stim twice a day for this index finger. The stim is working pretty good. I feel like I am gripping better.\". Goals reviewed with pt, he verbalized understanding.. Pt stated he is now only taking pain meds twice a day , mostly at bed time(was  every 4 hours due to surgery.) Using heat on and off all day on his R hand.     Performing HEP?: Yes    Pt referred by doctor avtar Yanez to follow up after therapy is completed.    Pain:1-2/10, \" it's definitely better\".     Objective   Hand Strength   (lbs) Right Left   1       2 28#(same) 65#(same)   3       4       5          Pinch Right Left   2-pt 1#(was flicker) 11#(inc 1)   3-pt 3.5#(inc 2.5#) 11(inc 1)   Lateral 2.5#(inc 1.5#) 17#(same)        Sensory: impaired  Numbness/Tingling: noted tingling in tip of  thumb today and along lateral side of small finger. Previously reported it was in palm and base of thumb    Pt able to oppose all fingers to thumb with the exception of ring and small finger  MKI 5(was 4)   Treatment:  Modalities:  R thumb  and hand AROM x 10 min while in fluidotherapy to promote " "muscle movement during session.      Therapeutic Exercise:  Electrical e stim completed /educated pt today with patients personal unit with focus on R thumb flexion, encouraging pt to actively engage muscles with contraction. Provided pt with written information for follow through twice daily, 15 min per session at 8MHZ , decreasing or increasing as tolerated. Pt to use only channel A (10HZ), setting P1 \"chronic constant\". Pt also took photo with his phone for follow up as part of HEP  3 point pinch with marbles, able to  10 with min difficulty  2 point pinch attempted with marbles, unable to complete. Attempted with cloth pieces, placed into a slotted container with min/mod difficulty  Reviewed tendon glides, encouraged pt to complete  to discourage tendon \"sticking\" in R IF Pt able to return demo from writer.   Provided pt with orange(low/medium resistance) theraputty  for upgrading gross grasp only portion of HEP  Post-tx pain:0/10, unchanged  since start of session    Assessment/Plan   Min fatigue at end of session. Pt to follow through with added e stim program, use of modalities,  HEP for place and hold, fine motor coordination ,theraputty, continued isotoner glove wear, re-sensitization and blocking exer. Pt highly motivated to return to function.     OP EDUCATION:  Education  Individual(s) Educated: Patient  Education Provided: POC discussed and agreed upon  Home Program: Modalities (Electrical stimulation)  Equipment:  (personal e stim unit)  Risk and Benefits Discussed with Patient/Caregiver/Other: yes  Patient/Caregiver Demonstrated Understanding: yes  Patient Response to Education: Patient/Caregiver Verbalized Understanding of Information, Patient/Caregiver Performed Return Demonstration of Exercises/Activities, Patient/Caregiver Asked Appropriate Questions    Goals:  Active       OT Goals       1. Patient will increase R IF MP and PIP flexion by at least 25 degrees to increase functional gripping " abilities with ADL's with R hand. (Progressing)       Start:  01/18/24    Expected End:  01/31/24            2. Patient will increase R thumb MP flexion by at least 20 degrees and IP flexion by at least 10 to increase functional use of R thumb with gripping tasks. (Progressing)       Start:  01/18/24    Expected End:  01/31/24            3. Patient will demonstrate ability to utilize 2-pt and 3-pt pinch patterns to  10 small objects without dropping. (Progressing)       Start:  01/18/24    Expected End:  02/14/24            4. Patient will increase R hand  strength by at least 15# to increase functional use of R hand.  (Progressing)       Start:  01/18/24    Expected End:  04/10/24            5. Patient will increase R hand pinch strength in all patterns to at least 3# to increase functional use of R hand. (Progressing)       Start:  01/18/24    Expected End:  04/10/24            7. Patient will increase overall functional ease and independence to at least 65/80 for increased quality of life for patient.  (Progressing)       Start:  01/18/24    Expected End:  04/10/24

## 2024-03-21 ENCOUNTER — OFFICE VISIT (OUTPATIENT)
Dept: CARDIOLOGY | Facility: CLINIC | Age: 74
End: 2024-03-21
Payer: MEDICARE

## 2024-03-21 VITALS
OXYGEN SATURATION: 100 % | RESPIRATION RATE: 16 BRPM | HEIGHT: 68 IN | BODY MASS INDEX: 31.98 KG/M2 | WEIGHT: 211 LBS | DIASTOLIC BLOOD PRESSURE: 74 MMHG | HEART RATE: 50 BPM | SYSTOLIC BLOOD PRESSURE: 130 MMHG

## 2024-03-21 DIAGNOSIS — I48.21 PERMANENT ATRIAL FIBRILLATION (MULTI): Primary | ICD-10-CM

## 2024-03-21 PROCEDURE — 1036F TOBACCO NON-USER: CPT | Performed by: NURSE PRACTITIONER

## 2024-03-21 PROCEDURE — 93010 ELECTROCARDIOGRAM REPORT: CPT | Performed by: INTERNAL MEDICINE

## 2024-03-21 PROCEDURE — 93005 ELECTROCARDIOGRAM TRACING: CPT | Performed by: NURSE PRACTITIONER

## 2024-03-21 PROCEDURE — 1160F RVW MEDS BY RX/DR IN RCRD: CPT | Performed by: NURSE PRACTITIONER

## 2024-03-21 PROCEDURE — 1159F MED LIST DOCD IN RCRD: CPT | Performed by: NURSE PRACTITIONER

## 2024-03-21 PROCEDURE — 99214 OFFICE O/P EST MOD 30 MIN: CPT | Performed by: NURSE PRACTITIONER

## 2024-03-21 ASSESSMENT — ENCOUNTER SYMPTOMS
MUSCULOSKELETAL NEGATIVE: 1
CONSTITUTIONAL NEGATIVE: 1
CARDIOVASCULAR NEGATIVE: 1
GASTROINTESTINAL NEGATIVE: 1
RESPIRATORY NEGATIVE: 1
NEUROLOGICAL NEGATIVE: 1

## 2024-03-21 ASSESSMENT — PATIENT HEALTH QUESTIONNAIRE - PHQ9
1. LITTLE INTEREST OR PLEASURE IN DOING THINGS: NOT AT ALL
SUM OF ALL RESPONSES TO PHQ9 QUESTIONS 1 AND 2: 0
2. FEELING DOWN, DEPRESSED OR HOPELESS: NOT AT ALL

## 2024-03-21 NOTE — PROGRESS NOTES
"Chief Complaint:   Atrial Fibrillation    History Of Present Illness:    .Mr Belle returns in follow up.  He noted an episode of afib on his smart watch, but it did not last long.  Denies chest pain, sob, palpitations or pedal edema.  He would like to postpone an EP visit until this summer as he has two trips coming up.  Will notify the office if symptoms persist.         Last Recorded Vitals:  Blood pressure 130/74, pulse 50, resp. rate 16, height 1.727 m (5' 8\"), weight 95.7 kg (211 lb), SpO2 100 %.     Past Medical History:  History reviewed. No pertinent past medical history.     Past Surgical History:  Past Surgical History:   Procedure Laterality Date    HERNIA REPAIR  06/20/2014    Inguinal Hernia Repair    OTHER SURGICAL HISTORY  10/06/2022    Cystoscopy       Social History:  Social History     Socioeconomic History    Marital status:      Spouse name: None    Number of children: None    Years of education: None    Highest education level: None   Occupational History    None   Tobacco Use    Smoking status: Former     Types: Cigarettes    Smokeless tobacco: Never   Substance and Sexual Activity    Alcohol use: Not Currently     Alcohol/week: 2.0 standard drinks of alcohol     Types: 2 Standard drinks or equivalent per week    Drug use: Never    Sexual activity: None   Other Topics Concern    None   Social History Narrative    None     Social Determinants of Health     Financial Resource Strain: Not on file   Food Insecurity: Not on file   Transportation Needs: Not on file   Physical Activity: Not on file   Stress: Not on file   Social Connections: Not on file   Intimate Partner Violence: Not on file   Housing Stability: Not on file       Family History:  No family history on file.      Allergies:  Patient has no known allergies.    Outpatient Medications:  Current Outpatient Medications   Medication Sig Dispense Refill    amLODIPine (Norvasc) 5 mg tablet Take 1 tablet (5 mg) by mouth once daily. 30 " tablet 11    aspirin 81 mg chewable tablet Chew 1 tablet (81 mg) once every 24 hours.      atorvastatin (Lipitor) 80 mg tablet TAKE 1 TABLET BY MOUTH AT  BEDTIME 90 tablet 3    cholecalciferol (Vitamin D-3) 50 MCG (2000 UT) tablet Take 1 tablet (2,000 Units) by mouth once daily.      cyanocobalamin (Vitamin B-12) 1,000 mcg tablet Take 1 tablet (1,000 mcg) by mouth once daily.      ezetimibe (Zetia) 10 mg tablet TAKE 1 TABLET BY MOUTH AT  BEDTIME 90 tablet 3    folic acid (Folvite) 1 mg tablet Take 1 tablet (1 mg) by mouth once every 24 hours.      gabapentin (Neurontin) 300 mg capsule 1 capsule in am and 2 capsules in pm (Patient taking differently: Take 2 capsules (600 mg) by mouth 2 times a day.) 90 capsule 2    lisinopril 40 mg tablet TAKE 1 TABLET BY MOUTH ONCE DAILY 90 tablet 3    metoprolol succinate XL (Toprol XL) 50 mg 24 hr tablet Take 1 tablet (50 mg) by mouth once daily. Do not crush or chew. 30 tablet 11    omeprazole (PriLOSEC) 20 mg DR capsule Take 1 capsule (20 mg) by mouth once daily.      tamsulosin (Flomax) 0.4 mg 24 hr capsule TAKE 1 CAPSULE BY MOUTH DAILY 90 capsule 3    tofacitinib ER (Xeljanz XR) 11 mg tablet extended release 24 hr Take 1 tablet (11 mg) by mouth once daily. Do not crush, chew or split. Swallow whole.      Xarelto 20 mg tablet TAKE 1 TABLET BY MOUTH DAILY 90 tablet 3     No current facility-administered medications for this visit.        Physical Exam:  Cardiovascular:      PMI at left midclavicular line. Normal rate. Regular rhythm. Normal S1. Normal S2.       Murmurs: There is no murmur.      No gallop.  No click. No rub.   Pulses:     Intact distal pulses.   Edema:     Peripheral edema absent.         ROS:  Review of Systems   Constitutional: Negative.   Cardiovascular: Negative.    Respiratory: Negative.     Skin: Negative.    Musculoskeletal: Negative.    Gastrointestinal: Negative.    Genitourinary: Negative.    Neurological: Negative.           Last Labs:  CBC -  Lab  Results   Component Value Date    WBC 8.3 08/16/2023    HGB 11.8 (L) 08/16/2023    HCT 36.0 (L) 08/16/2023     (H) 08/16/2023     08/16/2023       CMP -  Lab Results   Component Value Date    CALCIUM 10.0 08/16/2023    PHOS 2.1 (L) 05/25/2022    PROT 6.8 08/16/2023    ALBUMIN 4.0 08/16/2023    ALBUMIN 4.1 07/12/2021    AST 29 08/16/2023    ALT 25 08/16/2023    ALKPHOS 67 08/16/2023    BILITOT 0.8 08/16/2023       LIPID PANEL -   Lab Results   Component Value Date    CHOL 144 04/11/2023    TRIG 114 04/11/2023    HDL 64.3 04/11/2023    CHHDL 2.2 04/11/2023    CHHDL 1.8 07/13/2021    LDLF 57 04/11/2023    VLDL 23 04/11/2023       RENAL FUNCTION PANEL -   Lab Results   Component Value Date    GLUCOSE 72 (L) 08/16/2023     08/16/2023    K 4.1 08/16/2023     08/16/2023    CO2 25 08/16/2023    ANIONGAP 14 08/16/2023    BUN 20 08/16/2023    CREATININE 0.88 08/16/2023    GFRMALE 90 08/16/2023    CALCIUM 10.0 08/16/2023    PHOS 2.1 (L) 05/25/2022    ALBUMIN 4.0 08/16/2023    ALBUMIN 4.1 07/12/2021        Lab Results   Component Value Date    HGBA1C 5.2 04/11/2023         Assessment/Plan   Problem List Items Addressed This Visit    None  Visit Diagnoses       Permanent atrial fibrillation (CMS/HCC)    -  Primary    Relevant Orders    ECG 12 lead (Clinic Performed)            1. Coronary artery disease, status post PCI to the distal RCA 09/10/2019. This patient originally was identified as having low-grade CAD by cardiac catheterization 3/2003 [facility]. He did have a nuclear stress test performed in 1/2009 and again on 11/13/2015 negative for evidence of ischemia. He presented to [facility] 9/9/ 2019 with central chest discomfort and shortness of breath. It was detected that he was in atrial fibrillation with RVR. He had EKG and enzymatic evidence of a NSTEMI. He had cardiac cath September 10, 2019 with Dr. Mynor Hurst which showed single-vessel CAD in the distal third of the  distal right coronary artery about 90% stenosis and distal to the origin of the PDA but prior to the origin of the trifurcating post inferior lateral LV branch. He had successful distal RCA stenting September 10, 2019 with Dr. Aniceto Richardson at [facility]. He will continue metoprolol succinate to 100 mg daily, asa 81 mg daily. Now on metoprolol 25 mg daily. ECG done prior shows sinus tachycardia with PACs RBBB, 139 bpm. Will increase metoprolol to 100 mg daily. ECG done today shows sinus bradycardia.  The patient will be instructed that he should have a repeat pharmacological nuclear stress test performed if lumbar surgery is actually scheduled in the future.     2. Paroxysmal atrial fibrillation. Please see previous office notes for review. This patient was recently admitted to [facility] from 04/15/2018 to 04/18/2018 with pleuritic-type chest pain along with cough and shortness of breath. The patient was identified by chest x-ray to have a left lower lobe infiltrate suggestive of pneumonia associated with pleuritis. He was also detected to have new onset or recurrent atrial fibrillation with a rapid ventricular rate. The patient was initially placed on an IV Cardizem infusion but ultimately was converted to metoprolol. The patient's ventricular rate remained rapid despite the initiation of metoprolol and as such amiodarone 200 mg daily was added. At the time of discharge on 04/18/2018 he was still in atrial fibrillation with a ventricular rate in the range of 110/m. He did have an echocardiogram performed during the admission on 04/16/2018 that demonstrated an LV ejection fraction of 60-64% with mild left atrial enlargement and trace to mild mitral valve regurgitation. At the time of his next follow-up visit it was found that by both EKG and examination he was back in sinus rhythm with sinus bradycardia. For now he will remain on the amiodarone 200 mg daily plus without will reduce metoprolol  by switching metoprolol tartrate 50 mg twice a day to metoprolol succinate 50 mg daily. He will remain on Xarelto 20 mg daily. He presented to [facility] September 9, 2019 with central chest discomfort and shortness of breath. It was detected that he was in atrial fibrillation with RVR. He converted to sinus rhythm with IV diltiazem infusion. Echocardiogram done at the time showed EF 60-64%, left atrial size mildly dilated, trace MR, trivial to mild TR. He is now back on on amiodarone 200 mg daily as well as Xarelto 20 mg daily which had been discontinued prior to the readmission. Had afib with RVR and failed DCCV. He saw Dr Nassar and had RFA 10/2/2020. ECG today shows likely aflutter. Will plan for DCCV next week. See Dr Nassar in follow up. Had DCCV 11/12/2020. Went out of rhythm again for a brief period 11/2021. Went out of rhythm for 4 days 01/2022. Patient had RFA done 05/24/2022 with Dr Nassar. ECG done prior shows NSR, RBBB.  The patient is no longer on amiodarone which was discontinued last visit.  He will remain on Xarelto anticoagulation.  He will be stopping 3 days before any type of back injection or lumbar surgery.  Patient will return in 8 weeks for follow-up.  The patient currently remains in sinus rhythm.  He underwent preadmission testing on 2/1/2024 for lumbar surgery EKG shows sinus bradycardia at 49/min occasional PVCs nonspecific ST-T abnormality.  Will reduce the dose of Toprol-XL from 100 mg daily to 50 mg daily.     3 Xarelto anticoagulation.  Patient may hold Xarelto and aspirin for 5 days prior to lumbar surgery.     4. Hypertension. Blood pressure is adequately controlled today. He will be continued on amlodipine 5 mg daily and lisinopril 20 mg daily. His dose of metoprolol succinate 25 mg a day will be continued.  His systolic blood pressure is elevated today.  Will begin amlodipine 5 mg daily.  His dose of Toprol-XL as noted above will be reduced from 100 to 50 mg daily to  avoid prominent bradycardia.     5. Hyperlipidemia. The patient did have lab work performed on 10/08/2019 including a lipid panel with cholesterol 218  HDL 61 triglyceride 154. These results are continue to be disappointing on his statin agent atorvastatin 80 mg daily. He will be started on supplemental Zetia 10 mg daily. 5/10/2022, Chol 141, LDL 67, HDL 60, trig 70.     6. Sleep apnea. Please see previous office notes. The patient was referred to sleep medicine and was in fact identified as having sleep apnea that was severe. The patient's initial sleep study was done at [facility] on 01/04/2018 and showed severe obstructive sleep apnea and he ultimately had a titration study on 02/22/2018. He was placed on ASV therapy with significant clinical improvement. His compliance has been 74% with no leak.     7. BPH.      8. GERD.     9. Bilateral carpal tunnel release     10. S/P Left hip surgery 2007, right 2021. Plans for LTK 03/2023.     11. S/P Right inguinal hernia repair 4/2014, [facility].     12. Osteoarthritis.      13. Status post left shoulder replacement 11/10/2015 with repeat left shoulder replacement 02/22/2016 because of repetitive dislocation.     14. Lumbar Laminectomy, 10/01/2018 with Dr Cardenas at [facility].  Patient recently has been experiencing and further lower back pain.  His L4-L5 disc is shifted.  He is scheduled for an epidural injection on 12/22/2023 and possible surgery on 2/14/2024.  The patient is in fact scheduled for revision of an L4 laminectomy with excision of synovial cyst the right with robotic pedicle screw and interbody fusion 2/14/2024.  He is cleared from the cardiac standpoint for the procedure may hold Xarelto and aspirin 5 days preoperatively.  Lab work 8/16/2023 included sedimentation rate 4 CRP less than 0.10 hematocrit 36.0 uric acid 5.7 and normal electrolyte panel.     15. Prostate Urolift surgery with Dr Werner, fall 2018.     16. Gen. medical.  The patient had lab work on 10/08/2019 including glycohemoglobin 5.2%, TSH 1.38, PSA 1.6 with both CBC and SMA panels being normal.     17. Venous insufficiency.      18. Hx of psoriatic arthritis     19. Hx of covid-19 vaccine #1 and #2.      20. Planned LTK.        Mildred Valencia, APRN-CNP

## 2024-03-22 ENCOUNTER — OFFICE VISIT (OUTPATIENT)
Dept: PRIMARY CARE | Facility: CLINIC | Age: 74
End: 2024-03-22
Payer: MEDICARE

## 2024-03-22 VITALS
HEIGHT: 68 IN | TEMPERATURE: 98.6 F | BODY MASS INDEX: 32.43 KG/M2 | WEIGHT: 214 LBS | SYSTOLIC BLOOD PRESSURE: 136 MMHG | OXYGEN SATURATION: 96 % | DIASTOLIC BLOOD PRESSURE: 72 MMHG | HEART RATE: 52 BPM

## 2024-03-22 DIAGNOSIS — Z00.00 ROUTINE GENERAL MEDICAL EXAMINATION AT HEALTH CARE FACILITY: Primary | ICD-10-CM

## 2024-03-22 DIAGNOSIS — G62.9 NEUROPATHY: ICD-10-CM

## 2024-03-22 DIAGNOSIS — Z12.5 SCREENING FOR PROSTATE CANCER: ICD-10-CM

## 2024-03-22 DIAGNOSIS — I25.118 ATHEROSCLEROTIC HEART DISEASE OF NATIVE CORONARY ARTERY WITH OTHER FORMS OF ANGINA PECTORIS (CMS-HCC): ICD-10-CM

## 2024-03-22 DIAGNOSIS — M06.9 RHEUMATOID ARTHRITIS, INVOLVING UNSPECIFIED SITE, UNSPECIFIED WHETHER RHEUMATOID FACTOR PRESENT (MULTI): ICD-10-CM

## 2024-03-22 DIAGNOSIS — I10 BENIGN ESSENTIAL HYPERTENSION: ICD-10-CM

## 2024-03-22 DIAGNOSIS — I83.003: ICD-10-CM

## 2024-03-22 PROBLEM — N40.1 BPH WITH OBSTRUCTION/LOWER URINARY TRACT SYMPTOMS: Status: ACTIVE | Noted: 2024-03-22

## 2024-03-22 PROBLEM — E78.2 COMBINED HYPERLIPIDEMIA: Status: ACTIVE | Noted: 2024-03-22

## 2024-03-22 PROBLEM — L40.50 PSORIASIS WITH ARTHROPATHY (MULTI): Status: ACTIVE | Noted: 2024-03-22

## 2024-03-22 PROBLEM — G47.31 PRIMARY CENTRAL SLEEP APNEA: Status: ACTIVE | Noted: 2024-03-22

## 2024-03-22 PROBLEM — M54.12 CERVICAL RADICULOPATHY: Status: ACTIVE | Noted: 2024-03-22

## 2024-03-22 PROBLEM — Z96.643 HISTORY OF BILATERAL HIP REPLACEMENTS: Status: ACTIVE | Noted: 2024-03-22

## 2024-03-22 PROBLEM — R73.03 PREDIABETES: Status: ACTIVE | Noted: 2024-03-22

## 2024-03-22 PROBLEM — K21.9 GASTROESOPHAGEAL REFLUX DISEASE: Status: ACTIVE | Noted: 2023-06-28

## 2024-03-22 PROBLEM — M51.369 DDD (DEGENERATIVE DISC DISEASE), LUMBAR: Status: ACTIVE | Noted: 2024-03-22

## 2024-03-22 PROBLEM — H91.93 BILATERAL HEARING LOSS: Status: ACTIVE | Noted: 2024-03-22

## 2024-03-22 PROBLEM — I25.10 ARTERIOSCLEROTIC CORONARY ARTERY DISEASE: Status: ACTIVE | Noted: 2024-03-22

## 2024-03-22 PROBLEM — K59.09 CHRONIC CONSTIPATION: Status: ACTIVE | Noted: 2024-03-22

## 2024-03-22 PROBLEM — N13.8 BPH WITH OBSTRUCTION/LOWER URINARY TRACT SYMPTOMS: Status: ACTIVE | Noted: 2024-03-22

## 2024-03-22 PROBLEM — M51.36 DDD (DEGENERATIVE DISC DISEASE), LUMBAR: Status: ACTIVE | Noted: 2024-03-22

## 2024-03-22 LAB
ATRIAL RATE: 59 BPM
P AXIS: 86 DEGREES
P OFFSET: 166 MS
P ONSET: 127 MS
PR INTERVAL: 190 MS
Q ONSET: 222 MS
QRS COUNT: 9 BEATS
QRS DURATION: 146 MS
QT INTERVAL: 488 MS
QTC CALCULATION(BAZETT): 483 MS
QTC FREDERICIA: 485 MS
R AXIS: -10 DEGREES
T AXIS: -6 DEGREES
T OFFSET: 466 MS
VENTRICULAR RATE: 59 BPM

## 2024-03-22 PROCEDURE — 1123F ACP DISCUSS/DSCN MKR DOCD: CPT | Performed by: FAMILY MEDICINE

## 2024-03-22 PROCEDURE — 99215 OFFICE O/P EST HI 40 MIN: CPT | Performed by: FAMILY MEDICINE

## 2024-03-22 PROCEDURE — 1159F MED LIST DOCD IN RCRD: CPT | Performed by: FAMILY MEDICINE

## 2024-03-22 PROCEDURE — G0439 PPPS, SUBSEQ VISIT: HCPCS | Performed by: FAMILY MEDICINE

## 2024-03-22 PROCEDURE — 1160F RVW MEDS BY RX/DR IN RCRD: CPT | Performed by: FAMILY MEDICINE

## 2024-03-22 PROCEDURE — 1170F FXNL STATUS ASSESSED: CPT | Performed by: FAMILY MEDICINE

## 2024-03-22 PROCEDURE — 1036F TOBACCO NON-USER: CPT | Performed by: FAMILY MEDICINE

## 2024-03-22 PROCEDURE — 1126F AMNT PAIN NOTED NONE PRSNT: CPT | Performed by: FAMILY MEDICINE

## 2024-03-22 PROCEDURE — 3075F SYST BP GE 130 - 139MM HG: CPT | Performed by: FAMILY MEDICINE

## 2024-03-22 PROCEDURE — 99214 OFFICE O/P EST MOD 30 MIN: CPT | Performed by: FAMILY MEDICINE

## 2024-03-22 PROCEDURE — 3078F DIAST BP <80 MM HG: CPT | Performed by: FAMILY MEDICINE

## 2024-03-22 PROCEDURE — 1158F ADVNC CARE PLAN TLK DOCD: CPT | Performed by: FAMILY MEDICINE

## 2024-03-22 RX ORDER — GABAPENTIN 300 MG/1
600 CAPSULE ORAL 2 TIMES DAILY
Qty: 180 CAPSULE | Refills: 1 | Status: SHIPPED | OUTPATIENT
Start: 2024-03-22

## 2024-03-22 ASSESSMENT — ACTIVITIES OF DAILY LIVING (ADL)
DRESSING: INDEPENDENT
DOING HOUSEWORK: INDEPENDENT
STIL DRIVING: NO
PILL BOX USED: YES
GROCERY_SHOPPING: INDEPENDENT
GROOMING: INDEPENDENT
TOILETING: INDEPENDENT
WALKS IN HOME: INDEPENDENT
PATIENT'S MEMORY ADEQUATE TO SAFELY COMPLETE DAILY ACTIVITIES?: YES
BATHING: INDEPENDENT
BATHING: INDEPENDENT
NEEDS ASSISTANCE WITH FOOD: INDEPENDENT
JUDGMENT_ADEQUATE_SAFELY_COMPLETE_DAILY_ACTIVITIES: YES
HEARING - LEFT EAR: FUNCTIONAL
HEARING - RIGHT EAR: FUNCTIONAL
EATING: INDEPENDENT
USING TRANSPORTATION: INDEPENDENT
TAKING MEDICATION: INDEPENDENT
MANAGING FINANCES: INDEPENDENT
MANAGING_FINANCES: INDEPENDENT
TAKING_MEDICATION: INDEPENDENT
FEEDING YOURSELF: INDEPENDENT
ADEQUATE_TO_COMPLETE_ADL: YES
DOING_HOUSEWORK: INDEPENDENT
DRESSING YOURSELF: INDEPENDENT

## 2024-03-22 ASSESSMENT — ENCOUNTER SYMPTOMS
LOSS OF SENSATION IN FEET: 0
NEUROLOGICAL NEGATIVE: 1
ALLERGIC/IMMUNOLOGIC NEGATIVE: 1
EYES NEGATIVE: 1
MUSCULOSKELETAL NEGATIVE: 1
GASTROINTESTINAL NEGATIVE: 1
PSYCHIATRIC NEGATIVE: 1
CONSTITUTIONAL NEGATIVE: 1
ENDOCRINE NEGATIVE: 1
DEPRESSION: 0
OCCASIONAL FEELINGS OF UNSTEADINESS: 0
RESPIRATORY NEGATIVE: 1

## 2024-03-22 ASSESSMENT — PAIN SCALES - GENERAL: PAINLEVEL: 0-NO PAIN

## 2024-03-22 NOTE — PROGRESS NOTES
"Subjective   Reason for Visit: Pro Belle Jr. is an 73 y.o. male here for a Medicare Wellness visit.     Past Medical, Surgical, and Family History reviewed and updated in chart.    Reviewed all medications by prescribing practitioner or clinical pharmacist (such as prescriptions, OTCs, herbal therapies and supplements) and documented in the medical record.    HPI  Hypertension:          c/o Patient here for F/U. at goal in office          c/o Med compliance.          Denies : Med side effects.          Denies : Chest pain.          Denies : Dizziness.          Denies : Leg edema.          Denies : Palpitations.   CAD/ASCVD:          c/o Patient here for F/U. stable, stent to rca.   Hyperlipidemia:          Patient here for F/U. doing well and without complaints, tolerating medicine well.   GERD:          GERD F/U doing well and without complaints, controlled.   Patient Care Team:  Ruben Bradford MD as PCP - General  Taj Cardenas MD as Surgeon (Orthopaedic Surgery)  Mynor Hurst MD as Consulting Physician (Cardiology)  Umu Cage MD as Referring Physician (Rheumatology)     Review of Systems   Constitutional: Negative.    HENT: Negative.     Eyes: Negative.    Respiratory: Negative.     Gastrointestinal: Negative.    Endocrine: Negative.    Genitourinary: Negative.    Musculoskeletal: Negative.    Skin: Negative.    Allergic/Immunologic: Negative.    Neurological: Negative.    Psychiatric/Behavioral: Negative.         Objective   Vitals:  /72 (BP Location: Right arm)   Pulse 52   Temp 37 °C (98.6 °F) (Temporal)   Ht 1.727 m (5' 8\")   Wt 97.1 kg (214 lb)   SpO2 96%   BMI 32.54 kg/m²       Physical Exam  Vitals reviewed.   Constitutional:       Appearance: Normal appearance.   HENT:      Right Ear: Tympanic membrane, ear canal and external ear normal.      Left Ear: Tympanic membrane, ear canal and external ear normal.      Nose: Nose normal.      Mouth/Throat:      Mouth: Mucous membranes " are moist.   Eyes:      Extraocular Movements: Extraocular movements intact.      Conjunctiva/sclera: Conjunctivae normal.      Pupils: Pupils are equal, round, and reactive to light.   Cardiovascular:      Rate and Rhythm: Normal rate and regular rhythm.      Pulses: Normal pulses.      Heart sounds: Normal heart sounds.   Pulmonary:      Breath sounds: Normal breath sounds.   Abdominal:      General: Abdomen is flat. Bowel sounds are normal.      Palpations: Abdomen is soft.   Musculoskeletal:         General: Normal range of motion.      Cervical back: Neck supple.      Right lower leg: Edema present.      Left lower leg: Edema present.   Skin:     General: Skin is warm.   Neurological:      General: No focal deficit present.      Mental Status: He is alert and oriented to person, place, and time.      Cranial Nerves: Cranial nerves 2-12 are intact.   Psychiatric:         Attention and Perception: Attention normal.         Mood and Affect: Mood normal.         Assessment/Plan   Problem List Items Addressed This Visit       Atherosclerotic heart disease of native coronary artery with other forms of angina pectoris (CMS/HCC)    Benign essential hypertension    Relevant Orders    Urinalysis with Reflex Culture and Microscopic    Comprehensive Metabolic Panel    CBC and Auto Differential    TSH with reflex to Free T4 if abnormal    Lipid Panel    Neuropathy    Relevant Medications    gabapentin (Neurontin) 300 mg capsule    Rheumatoid arthritis (CMS/HCC)    Varicose veins of unspecified lower extremity with ulcer of ankle (CODE) (CMS/HCC)     Other Visit Diagnoses       Routine general medical examination at health care facility    -  Primary    Relevant Orders    1 Year Follow Up In Primary Care - Wellness Exam    Screening for prostate cancer        Relevant Orders    Prostate Specific Antigen, Screen

## 2024-03-27 ENCOUNTER — TREATMENT (OUTPATIENT)
Dept: OCCUPATIONAL THERAPY | Facility: CLINIC | Age: 74
End: 2024-03-27
Payer: MEDICARE

## 2024-03-27 ENCOUNTER — LAB (OUTPATIENT)
Dept: LAB | Facility: LAB | Age: 74
End: 2024-03-27
Payer: MEDICARE

## 2024-03-27 DIAGNOSIS — Z12.5 SCREENING FOR PROSTATE CANCER: ICD-10-CM

## 2024-03-27 DIAGNOSIS — B02.29 POST HERPETIC NEURALGIA: ICD-10-CM

## 2024-03-27 DIAGNOSIS — G56.91 NEUROPATHY OF UPPER EXTREMITY, RIGHT: ICD-10-CM

## 2024-03-27 DIAGNOSIS — I10 BENIGN ESSENTIAL HYPERTENSION: ICD-10-CM

## 2024-03-27 LAB
ALBUMIN SERPL BCP-MCNC: 3.9 G/DL (ref 3.4–5)
ALP SERPL-CCNC: 106 U/L (ref 33–136)
ALT SERPL W P-5'-P-CCNC: 17 U/L (ref 10–52)
AMORPH CRY #/AREA UR COMP ASSIST: ABNORMAL /HPF
ANION GAP SERPL CALC-SCNC: 11 MMOL/L (ref 10–20)
APPEARANCE UR: ABNORMAL
AST SERPL W P-5'-P-CCNC: 26 U/L (ref 9–39)
BACTERIA #/AREA URNS AUTO: ABNORMAL /HPF
BASOPHILS # BLD AUTO: 0.03 X10*3/UL (ref 0–0.1)
BASOPHILS NFR BLD AUTO: 0.4 %
BILIRUB SERPL-MCNC: 0.5 MG/DL (ref 0–1.2)
BILIRUB UR STRIP.AUTO-MCNC: NEGATIVE MG/DL
BUN SERPL-MCNC: 20 MG/DL (ref 6–23)
CALCIUM SERPL-MCNC: 10 MG/DL (ref 8.6–10.3)
CHLORIDE SERPL-SCNC: 105 MMOL/L (ref 98–107)
CHOLEST SERPL-MCNC: 140 MG/DL (ref 0–199)
CHOLESTEROL/HDL RATIO: 1.9
CO2 SERPL-SCNC: 29 MMOL/L (ref 21–32)
COLOR UR: YELLOW
CREAT SERPL-MCNC: 0.88 MG/DL (ref 0.5–1.3)
EGFRCR SERPLBLD CKD-EPI 2021: 90 ML/MIN/1.73M*2
EOSINOPHIL # BLD AUTO: 0.3 X10*3/UL (ref 0–0.4)
EOSINOPHIL NFR BLD AUTO: 3.8 %
ERYTHROCYTE [DISTWIDTH] IN BLOOD BY AUTOMATED COUNT: 14.5 % (ref 11.5–14.5)
GLUCOSE SERPL-MCNC: 84 MG/DL (ref 74–99)
GLUCOSE UR STRIP.AUTO-MCNC: NEGATIVE MG/DL
HCT VFR BLD AUTO: 40.6 % (ref 41–52)
HDLC SERPL-MCNC: 74.9 MG/DL
HGB BLD-MCNC: 13 G/DL (ref 13.5–17.5)
HOLD SPECIMEN: NORMAL
IMM GRANULOCYTES # BLD AUTO: 0.03 X10*3/UL (ref 0–0.5)
IMM GRANULOCYTES NFR BLD AUTO: 0.4 % (ref 0–0.9)
KETONES UR STRIP.AUTO-MCNC: NEGATIVE MG/DL
LDLC SERPL CALC-MCNC: 53 MG/DL
LEUKOCYTE ESTERASE UR QL STRIP.AUTO: ABNORMAL
LYMPHOCYTES # BLD AUTO: 1.33 X10*3/UL (ref 0.8–3)
LYMPHOCYTES NFR BLD AUTO: 16.7 %
MCH RBC QN AUTO: 32.7 PG (ref 26–34)
MCHC RBC AUTO-ENTMCNC: 32 G/DL (ref 32–36)
MCV RBC AUTO: 102 FL (ref 80–100)
MONOCYTES # BLD AUTO: 0.96 X10*3/UL (ref 0.05–0.8)
MONOCYTES NFR BLD AUTO: 12 %
NEUTROPHILS # BLD AUTO: 5.33 X10*3/UL (ref 1.6–5.5)
NEUTROPHILS NFR BLD AUTO: 66.7 %
NITRITE UR QL STRIP.AUTO: NEGATIVE
NON HDL CHOLESTEROL: 65 MG/DL (ref 0–149)
NRBC BLD-RTO: 0 /100 WBCS (ref 0–0)
PH UR STRIP.AUTO: 7 [PH]
PLATELET # BLD AUTO: 329 X10*3/UL (ref 150–450)
POTASSIUM SERPL-SCNC: 4.1 MMOL/L (ref 3.5–5.3)
PROT SERPL-MCNC: 7.1 G/DL (ref 6.4–8.2)
PROT UR STRIP.AUTO-MCNC: NEGATIVE MG/DL
RBC # BLD AUTO: 3.97 X10*6/UL (ref 4.5–5.9)
RBC # UR STRIP.AUTO: ABNORMAL /UL
RBC #/AREA URNS AUTO: ABNORMAL /HPF
SODIUM SERPL-SCNC: 141 MMOL/L (ref 136–145)
SP GR UR STRIP.AUTO: 1.01
TRIGL SERPL-MCNC: 59 MG/DL (ref 0–149)
TSH SERPL-ACNC: 0.91 MIU/L (ref 0.44–3.98)
UROBILINOGEN UR STRIP.AUTO-MCNC: <2 MG/DL
VLDL: 12 MG/DL (ref 0–40)
WBC # BLD AUTO: 8 X10*3/UL (ref 4.4–11.3)
WBC #/AREA URNS AUTO: >50 /HPF
WBC CLUMPS #/AREA URNS AUTO: ABNORMAL /HPF

## 2024-03-27 PROCEDURE — 84443 ASSAY THYROID STIM HORMONE: CPT

## 2024-03-27 PROCEDURE — 97110 THERAPEUTIC EXERCISES: CPT | Mod: GO | Performed by: OCCUPATIONAL THERAPIST

## 2024-03-27 PROCEDURE — 85025 COMPLETE CBC W/AUTO DIFF WBC: CPT

## 2024-03-27 PROCEDURE — 87186 SC STD MICRODIL/AGAR DIL: CPT

## 2024-03-27 PROCEDURE — 80061 LIPID PANEL: CPT

## 2024-03-27 PROCEDURE — 80053 COMPREHEN METABOLIC PANEL: CPT

## 2024-03-27 PROCEDURE — G0103 PSA SCREENING: HCPCS

## 2024-03-27 PROCEDURE — 36415 COLL VENOUS BLD VENIPUNCTURE: CPT

## 2024-03-27 PROCEDURE — 87086 URINE CULTURE/COLONY COUNT: CPT

## 2024-03-27 PROCEDURE — 81001 URINALYSIS AUTO W/SCOPE: CPT

## 2024-03-27 ASSESSMENT — PAIN SCALES - GENERAL: PAINLEVEL_OUTOF10: 0 - NO PAIN

## 2024-03-27 ASSESSMENT — PAIN - FUNCTIONAL ASSESSMENT: PAIN_FUNCTIONAL_ASSESSMENT: 0-10

## 2024-03-27 NOTE — PROGRESS NOTES
"         Occupational Therapy Treatment    Patient Name: Pro Belle Jr.  MRN: [id number]  Today's Date: 3/27/24    Time Calculation  Start Time: 1155  Stop Time: 1235  Time Calculation (min): 40 min  OT Therapeutic Procedures Time Entry  Therapeutic Exercise Time Entry: 40    Insurance:  Visit number: 10 of 12  Insurance Type: Medicare    Subjective   Current Problem/Reason for visit:  Patient reports onset of Shingles in May of 2023 along R FA; per patient description pattern of Shingles along C6, C7, and C8 dermatomes. Patient reports following outbreak and clear-up, inability to move R hand; since onset, patient is now able to functionally flex and extend digits 3-5, however, minimal movement noted at IF and thumb.     Referred by: Dr. Yanez    Patient reports \"The hand does get stiff; every morning it's the worst, but I don't have pain.\"    Performing HEP?: Yes    Pain:  Pain Assessment  Pain Assessment: 0-10  Pain Score: 0 - No pain    Objective     UEFI= 41/80; 49% impaired       AROM     Right Left   THUMB CMC Extension/Flexion 0/20 WFL    MP Extension/Flexion 0/45 WFL    IP Extension/Flexion +25/0 WFL     AROM  INDEX LONG RING SMALL    MCP Extension/Flexion 0/80 0/90 0/90 0/90    PIP Extension/Flexion 0/15 0/90 0/90 0/90    DIP Extension/Flexion 0/0 0/45 0/35 0/55   [place]  95 225 215 235       Edema: Mild R hand  Sensory: +Impaired   Numbness/Tingling: Randomized numbness throughout R hand including tips of all digits, in palm, and distal FA.      Treatment:    Therapeutic Exercise:  Reassessment this date; results discussed with patient.  Reviewed HEP; all questions/concerns answered/addressed.        Post-tx pain: 0/10    Assessment/Plan Patient with improvement noted in R thumb and IF AROM from evaluation, improved strength noted in R hand as well. No significant improvements noted in functional use of R hand since evaluation. Patient appears to have reached maximum potential with skilled OT intervention " at this time.       OP EDUCATION:  Education  Individual(s) Educated: Patient  Home Program: AROM, AAROM, PROM, Strengthening (electrical stimulation)    Goals:  Active       OT Goals       1. Patient will increase R IF MP and PIP flexion by at least 25 degrees to increase functional gripping abilities with ADL's with R hand. (Progressing)       Start:  01/18/24    Expected End:  01/31/24            2. Patient will increase R thumb MP flexion by at least 20 degrees and IP flexion by at least 10 to increase functional use of R thumb with gripping tasks. (Progressing)       Start:  01/18/24    Expected End:  01/31/24            3. Patient will demonstrate ability to utilize 2-pt and 3-pt pinch patterns to  10 small objects without dropping. (Met)       Start:  01/18/24    Expected End:  02/14/24    Resolved:  03/27/24         4. Patient will increase R hand  strength by at least 15# to increase functional use of R hand.  (Progressing)       Start:  01/18/24    Expected End:  04/10/24            5. Patient will increase R hand pinch strength in all patterns to at least 3# to increase functional use of R hand. (Progressing)       Start:  01/18/24    Expected End:  04/10/24            7. Patient will increase overall functional ease and independence to at least 65/80 for increased quality of life for patient.  (Not Progressing)       Start:  01/18/24    Expected End:  04/10/24

## 2024-03-28 LAB — PSA SERPL-MCNC: 4.33 NG/ML

## 2024-03-28 NOTE — PROGRESS NOTES
Occupational Therapy Discharge [facility]    Patient Name: Pro Belle Jr.  MRN: [id number]  Today's Date: 3/28/2024    Subjective   Current Problem:  Patient reports onset of Shingles in May of 2023 along R FA; per patient description pattern of Shingles along C6, C7, and C8 dermatomes. Patient reports following outbreak and clear-up, inability to move R hand; since onset, patient is now able to functionally flex and extend digits 3-5, however, minimal movement noted at IF and thumb.     Pain:  Pain Assessment  Pain Assessment: 0-10  Pain Score: 0 - No pain    Objective   Interventions: Ther ex, manual, ther act, neuromuscular re-education, ADL retraining, infrared light, electrical stimulation     ADL Assessment:   Current UEFI= 41/80; 49% impaired      UEFI at evaluation= 52/80; 35% impaired     Extremity Assessments:   Current AROM:    AROM     Right Left   THUMB CMC Extension/Flexion 0/20 WFL    MP Extension/Flexion 0/45 WFL    IP Extension/Flexion +25/0 WFL     AROM  INDEX LONG RING SMALL    MCP Extension/Flexion 0/80 0/90 0/90 0/90    PIP Extension/Flexion 0/15 0/90 0/90 0/90    DIP Extension/Flexion 0/0 0/45 0/35 0/55   [place]  95 225 215 235     AROM at evaluation:    AROM     Right Left   THUMB CMC Extension/Flexion 0/20 WFL    MP Extension/Flexion 0/30 WFL    IP Extension/Flexion 0/0 WFL     AROM  INDEX LONG RING SMALL    MCP Extension/Flexion 0/65 0/90 0/90 0/90    PIP Extension/Flexion 0/15 0/90 0/90 0/90    DIP Extension/Flexion 0/0 0/45 0/20 0/45   [place]  80 225 200 225     Strength as of 3/20/24:   (lbs) Right Left   1       2 28# 65#   3       4       5          Pinch Right Left   2-pt 1# 11#   3-pt 3.5# 11#   Lateral 2.5# 17#      Strength at evaluation:   (lbs) Right Left   1     2 28# 65#   3     4     5       Pinch Right Left   2-pt [place] 10#   3-pt 1# 10#   Lateral 1# 17#          Assessment/Plan Patient with improvement noted in R thumb and IF AROM from evaluation, improved strength noted  in R hand as well. No significant improvements noted in functional use of R hand since evaluation. Patient appears to have reached maximum potential with skilled OT intervention at this time.           Goals:  Active       OT Goals       1. Patient will increase R IF MP and PIP flexion by at least 25 degrees to increase functional gripping abilities with ADL's with R hand. (Progressing)       Start:  01/18/24    Expected End:  01/31/24            2. Patient will increase R thumb MP flexion by at least 20 degrees and IP flexion by at least 10 to increase functional use of R thumb with gripping tasks. (Progressing)       Start:  01/18/24    Expected End:  01/31/24            3. Patient will demonstrate ability to utilize 2-pt and 3-pt pinch patterns to  10 small objects without dropping. (Met)       Start:  01/18/24    Expected End:  02/14/24    Resolved:  03/27/24         4. Patient will increase R hand  strength by at least 15# to increase functional use of R hand.  (Progressing)       Start:  01/18/24    Expected End:  04/10/24            5. Patient will increase R hand pinch strength in all patterns to at least 3# to increase functional use of R hand. (Progressing)       Start:  01/18/24    Expected End:  04/10/24            7. Patient will increase overall functional ease and independence to at least 65/80 for increased quality of life for patient.  (Not Progressing)       Start:  01/18/24    Expected End:  04/10/24

## 2024-03-30 LAB — BACTERIA UR CULT: ABNORMAL

## 2024-04-10 ENCOUNTER — SPECIALTY PHARMACY (OUTPATIENT)
Dept: PHARMACY | Facility: CLINIC | Age: 74
End: 2024-04-10

## 2024-04-10 DIAGNOSIS — I48.21 PERMANENT ATRIAL FIBRILLATION (MULTI): ICD-10-CM

## 2024-04-11 ENCOUNTER — TELEPHONE (OUTPATIENT)
Dept: PHARMACY | Facility: HOSPITAL | Age: 74
End: 2024-04-11
Payer: MEDICARE

## 2024-04-11 PROCEDURE — RXMED WILLOW AMBULATORY MEDICATION CHARGE

## 2024-04-11 NOTE — TELEPHONE ENCOUNTER
Patient Assistance Program Approval:     We are pleased to inform you that your application for assistance has been approved.     This approval is valid through  4/11/2025  as long as the following criteria continue to be satisfied:     Your medication (Xarelto) remains covered under your current insurance plan.   Your prescriber does not discontinue therapy.   You do not seek reimbursement from any other private or government-funded programs for the  medication.    Under this program, the pharmacy will first bill your insurance plan for your indemnified specified medication. The [employer] Assistance Fund will then offset your copay balance, so that your out-of pocket expense for your specialty medication will be $0.00.    Rosario Abebe, PharmD

## 2024-04-12 ENCOUNTER — PHARMACY VISIT (OUTPATIENT)
Dept: PHARMACY | Facility: CLINIC | Age: 74
End: 2024-04-12
Payer: COMMERCIAL

## 2024-04-18 ENCOUNTER — DOCUMENTATION (OUTPATIENT)
Dept: PRIMARY CARE | Facility: CLINIC | Age: 74
End: 2024-04-18
Payer: MEDICARE

## 2024-04-24 ENCOUNTER — TREATMENT (OUTPATIENT)
Dept: PHYSICAL THERAPY | Facility: CLINIC | Age: 74
End: 2024-04-24

## 2024-04-24 DIAGNOSIS — M54.12 CERVICAL RADICULOPATHY: Primary | ICD-10-CM

## 2024-04-24 DIAGNOSIS — G56.00 CARPAL TUNNEL SYNDROME: ICD-10-CM

## 2024-04-24 DIAGNOSIS — G56.10 MEDIAN NEUROPATHY: ICD-10-CM

## 2024-04-24 PROCEDURE — 4200000004 HC PT PHASE II 15 MIN CHG: Mod: GP

## 2024-04-24 PROCEDURE — 4200000002 HC PT PACKAGE EVALUATION: Mod: GP

## 2024-04-24 ASSESSMENT — PAIN SCALES - GENERAL: PAINLEVEL_OUTOF10: 0 - NO PAIN

## 2024-04-24 ASSESSMENT — PAIN - FUNCTIONAL ASSESSMENT: PAIN_FUNCTIONAL_ASSESSMENT: 0-10

## 2024-04-24 NOTE — PROGRESS NOTES
Physical Therapy    Patient Name: Pro Belle Jr.  MRN: [id number]  PT Received On: 04/24/24  Time Calculation  Start Time: 1330  Stop Time: 1405  Time Calculation (min): 35 min  Phase II and Package Pay  Package Evaluation : 15  Phase II 15 minute: 15    Current Problem:   1. Cervical radiculopathy  Follow Up In Physical Therapy      2. Carpal tunnel syndrome  Follow Up In Physical Therapy      3. Median neuropathy          Precautions:        Subjective:      Phase II service:   Dry Needling    Appropriateness for service:   Discharged from Therapy in the past 30 days  Medical History and Health Screening Form Reviewed  Phase II Service is Appropriate and Safe for Patient to Receive    Objective: Mini-Evaluation/Treatment:   74 yo male presents after recently completing hand therapy/OT for a median neuropathy, post herpetic neuralgia/neuropathy of R UE. He had shingles one year ago and started developing hand weakness since in his R hand. He also recently had back surgery in February that he is recovering from. He has a history of carpal tunnel surgery bilaterally 20 years ago. Pt c/o R thumb and 2nd digit weakness especially with pinching. Wants to dry needling to reduce numbness along his R lateral forearm ( C6/C7 dermatome) and to help his pinch/ and hand function.     Objective:     R lateral and anterior forearm atrophy compared to L     strength: R: 33# L: 36#  Pinch strength: R: 1# L: 18.5#    R 2nd PIP trigger finger    R hand feels cold to touch    Treatment:     Dry needling following informed consent: with e-stim; 6Hz, mA to tolerance, applied to R superficial deep and radial nerve points, median nerve points, abductor pollicis brevis, extensor pollicis, opponens pollicis, extensor digitorum, and brachialis and flexor carpi radialis , for 15 minutes. Pt seated.     Assessment:   Pt would benefit from dry needling intervention to promote median nerve stimulation and blood to his wrist and  thumb/hand muscles to promote function. Pt would benefit from 6 dry needling visits while he continues his previously prescribed home exercises from OT/hand therapy.     Rehab potential:   fair    Plan: 6 dry needling visits

## 2024-04-26 ENCOUNTER — OFFICE VISIT (OUTPATIENT)
Dept: PRIMARY CARE | Facility: CLINIC | Age: 74
End: 2024-04-26
Payer: MEDICARE

## 2024-04-26 VITALS
BODY MASS INDEX: 32.58 KG/M2 | HEART RATE: 62 BPM | OXYGEN SATURATION: 98 % | TEMPERATURE: 98.4 F | SYSTOLIC BLOOD PRESSURE: 122 MMHG | DIASTOLIC BLOOD PRESSURE: 78 MMHG | WEIGHT: 215 LBS | HEIGHT: 68 IN

## 2024-04-26 DIAGNOSIS — R53.1 WEAKNESS: Primary | Chronic | ICD-10-CM

## 2024-04-26 PROBLEM — R73.03 PREDIABETES: Chronic | Status: ACTIVE | Noted: 2024-03-22

## 2024-04-26 PROBLEM — I10 BENIGN ESSENTIAL HYPERTENSION: Chronic | Status: ACTIVE | Noted: 2023-06-28

## 2024-04-26 PROBLEM — I10 HYPERTENSION: Chronic | Status: ACTIVE | Noted: 2024-03-22

## 2024-04-26 PROBLEM — E78.2 COMBINED HYPERLIPIDEMIA: Chronic | Status: ACTIVE | Noted: 2024-03-22

## 2024-04-26 PROBLEM — I10 HYPERTENSION: Chronic | Status: RESOLVED | Noted: 2024-03-22 | Resolved: 2024-04-26

## 2024-04-26 PROCEDURE — 1123F ACP DISCUSS/DSCN MKR DOCD: CPT | Performed by: FAMILY MEDICINE

## 2024-04-26 PROCEDURE — 1036F TOBACCO NON-USER: CPT | Performed by: FAMILY MEDICINE

## 2024-04-26 PROCEDURE — 1125F AMNT PAIN NOTED PAIN PRSNT: CPT | Performed by: FAMILY MEDICINE

## 2024-04-26 PROCEDURE — 1159F MED LIST DOCD IN RCRD: CPT | Performed by: FAMILY MEDICINE

## 2024-04-26 PROCEDURE — 3074F SYST BP LT 130 MM HG: CPT | Performed by: FAMILY MEDICINE

## 2024-04-26 PROCEDURE — 1160F RVW MEDS BY RX/DR IN RCRD: CPT | Performed by: FAMILY MEDICINE

## 2024-04-26 PROCEDURE — 99213 OFFICE O/P EST LOW 20 MIN: CPT | Performed by: FAMILY MEDICINE

## 2024-04-26 PROCEDURE — 3078F DIAST BP <80 MM HG: CPT | Performed by: FAMILY MEDICINE

## 2024-04-26 ASSESSMENT — PATIENT HEALTH QUESTIONNAIRE - PHQ9
2. FEELING DOWN, DEPRESSED OR HOPELESS: NOT AT ALL
1. LITTLE INTEREST OR PLEASURE IN DOING THINGS: NOT AT ALL
SUM OF ALL RESPONSES TO PHQ9 QUESTIONS 1 AND 2: 0

## 2024-04-26 ASSESSMENT — ENCOUNTER SYMPTOMS
DEPRESSION: 0
SHORTNESS OF BREATH: 0
ABDOMINAL DISTENTION: 0
LOSS OF SENSATION IN FEET: 0
PALPITATIONS: 0
OCCASIONAL FEELINGS OF UNSTEADINESS: 0
COUGH: 0

## 2024-04-26 ASSESSMENT — PAIN SCALES - GENERAL: PAINLEVEL: 3

## 2024-04-26 NOTE — PROGRESS NOTES
"Subjective   Patient ID: Pro Belle Jr. is a 73 y.o. male who presents for No chief complaint on file..    HPI     S/p back  2/14/2024 and hip surgery, Dr TAMEKA Cardenas, just recommended walking for post op treatment for this, he is getting weaker, wants to be set up for physical therapy  Review of Systems   Respiratory:  Negative for cough and shortness of breath.    Cardiovascular:  Negative for chest pain and palpitations.   Gastrointestinal:  Negative for abdominal distention.       Objective   /78 (BP Location: Left arm)   Pulse 62   Temp 36.9 °C (98.4 °F) (Temporal)   Ht 1.727 m (5' 8\")   Wt 97.5 kg (215 lb)   SpO2 98%   BMI 32.69 kg/m²     Physical Exam  Vitals reviewed.   Constitutional:       Appearance: Normal appearance.   Cardiovascular:      Rate and Rhythm: Normal rate and regular rhythm.      Heart sounds: Normal heart sounds. No murmur heard.  Pulmonary:      Breath sounds: Normal breath sounds.   Abdominal:      General: Abdomen is flat. Bowel sounds are normal.      Palpations: Abdomen is soft.   Musculoskeletal:         General: No swelling.   Neurological:      Mental Status: He is alert.     4+/5 danyelle lower/upper strength    Assessment/Plan   Diagnoses and all orders for this visit:  Weakness  -     Referral to Physical Therapy; Future  Encouraged lifting weights  Rec Going to the [facility] for resistance training       "

## 2024-05-02 ENCOUNTER — TREATMENT (OUTPATIENT)
Dept: PHYSICAL THERAPY | Facility: CLINIC | Age: 74
End: 2024-05-02

## 2024-05-02 DIAGNOSIS — G56.10 MEDIAN NEUROPATHY: ICD-10-CM

## 2024-05-02 DIAGNOSIS — M54.12 CERVICAL RADICULOPATHY: ICD-10-CM

## 2024-05-02 DIAGNOSIS — G56.00 CARPAL TUNNEL SYNDROME: Primary | ICD-10-CM

## 2024-05-02 PROCEDURE — 4200000004 HC PT PHASE II 15 MIN CHG: Mod: GP

## 2024-05-02 ASSESSMENT — PAIN SCALES - GENERAL: PAINLEVEL_OUTOF10: 0 - NO PAIN

## 2024-05-02 ASSESSMENT — PAIN - FUNCTIONAL ASSESSMENT: PAIN_FUNCTIONAL_ASSESSMENT: 0-10

## 2024-05-02 NOTE — PROGRESS NOTES
Physical Therapy Note    Patient Name: Pro Belle Jr.  MRN: [id number]  PT Received On: 05/02/24  Time Calculation  Start Time: 1330  Stop Time: 1350  Time Calculation (min): 20 min  Phase II and Package Pay  Phase II 15 minute: 15  Visit: 2/6  Current Problem:   No diagnosis found.    Precautions:        Subjective:  Pt states he had no bruising or soreness after the last needling treatment. Feels like he has gotten more ROM with his thumb flexion.     Phase II service:   Dry Needling    Appropriateness for service:   Discharged from Therapy in the past 30 days  Medical History and Health Screening Form Reviewed  Phase II Service is Appropriate and Safe for Patient to Receive    Objective: Mini-Evaluation/Treatment:       Objective:     R lateral and anterior forearm atrophy compared to L     strength: R: 33# L: 36#  Pinch strength: R: 1# L: 18.5#    R 2nd PIP trigger finger    R hand feels cold to touch    Treatment:     Dry needling following informed consent: with e-stim; 100Hz, mA to tolerance, applied to R superficial deep and radial nerve points, median nerve points, adductor pollicis, abductor pollicis brevis, extensor pollicis, opponens pollicis, extensor digitorum, and flexor carpi radialis , for 15 minutes. Pt seated.     Assessment:   Completed median nerve needling protocol to help stimulate median nerve innervation as well as muscular needling into digits 1-2 to improve his hand function.     Rehab potential:   fair    Plan: 6 dry needling visits

## 2024-05-06 NOTE — OP NOTE
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     OPERATION/PROCEDURE:  Left total knee arthroplasty.    SURGEON:  Noble Preciado DO.    ASSISTANT(S):  First assistant is Kourtney Mckeon DO, orthopedic resident.    ANESTHESIA:    IMPLANTS UTILIZED:  1. [place] Triathlon posterior-stabilized femur, size 6.  2. [place] Triathlon Universal tibial base plate, size 6.  3. [place] X3 TS polyethylene, size 6 x 11 mm thickness.  4. [place] X3 asymmetric all-polyethylene patella, size A35.  5. Components were cemented.    INTRAOPERATIVE PATHOLOGY AND FINDINGS/OPERATIVE INDICATIONS:  The patient is a pleasant 72-year-old male with past medical history  of rheumatoid arthritis.  He does take disease-modifying  anti-rheumatologic drugs.  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  Both of his knees are significantly  painful, left worse than right, with noticeable instability about the  left knee while ambulating.  He has been treated with extensive  conservative care, ultimately with short-term relief.  His left knee  pain progressed to the point that it greatly interfered with his  quality of life.  He presented for evaluation.  He was found to have  a varus thrust on ambulation.  Range of motion 0 degrees of  extension, flexion to approximately 120 degrees.  Proximal tibial  varus, which was correctable towards neutral.  Obvious laxity of the  fibular collateral ligament and posterolateral capsule was  appreciated.  Crepitance and palpable effusion of his knee noted.  Preoperative radiographs revealed severe bone-on-bone  tricompartmental osteoarthritis of bilateral  knees.  Left knee  demonstrated significant varus and gapping of the lateral joint line  on both the AP as well as Agrawal view, bone-on-bone apposition in  the medial compartment, bone-on-bone apposition in the patellofemoral  compartment, subchondral cystic changes, subchondral sclerosis, and  small marginal osteophytic formation tricompartmentally.  Treatment  options were discussed in detail with the patient.  With continued  pain and disability related to his left knee, ultimately he did  choose to move forward with total knee arthroplasty.  At the time of  the procedure, exam under anesthesia revealed preoperative range of  motion 0 degrees of extension, flexion to 130 degrees, varus  minimally correctable towards neutral, obvious varus thrust  instability again noted.  Intraoperatively, the patient was found to  have a large arthritic-related joint effusion, hypertrophic  arthritic-related synovitis.  Complete articular cartilage loss noted  in the medial and patellofemoral compartments with macerated  articular cartilage of the lateral compartment, macerated medial and  lateral meniscus tissue was noted with mucoid degeneration with  synovial erosion of the cruciate ligaments also appreciated.  Bone  quality was well preserved without obvious indication of osteopenia  or osteoporosis.  There was no indication of infection at the time of  the procedure.     PROCEDURE IN DETAIL:  The patient was correctly identified and marked in preoperative  holding.  Risks, benefits, alternatives, reasonable expectations for  outcomes had previously been discussed.  Also in preoperative  holding, the patient received regional nerve blocks by the Department  of Anesthesia.  The patient was then escorted to operative suite #4  at [facility].  Once in the operative suite, surgical  pause/time-out was performed, preoperative antibiotics were  administered, intravenous tranexamic acid was administered, and  spinal  anesthetic was provided by the Department of Anesthesia  followed by MAC sedation.  The patient was positioned supine on a  standard operative table, bony prominences well padded, nonsterile  tourniquet applied to the left proximal thigh.  Left lower extremity  was then sterilely prepped and draped in standard fashion.  Anatomic  landmarks were identified and marked with a sterile marking pen.  Esmarch bandage was applied.  Knee was flexed.  Tourniquet was raised  to 250 mmHg.  A standard anterior incision was created beginning at  the medial aspect of the tibial tubercle and carried 4 fingerbreadths  proximal to the superior pole of the patella.  Careful dissection  through subcutaneous tissues deep to the level of the Stulberg fascia  exposing the underlying extensor mechanism was performed, creating a  medial flap only.  At this time, a fresh 10 blade was used to make a  standard medial parapatellar arthrotomy.  Anteromedial capsule and  MCL were then elevated off the medial tibial plateau subperiosteally  with Bovie cautery.  Medial collateral ligament retractor was  inserted.  Suprapatellar synovitis was sharply excised with Bovie  cautery.  Retropatellar recess was lysed of adhesions with Bovie  cautery.  Patella everted, knee hyperflexed.  Hoffa fat pad was  sharply excised with a 10 blade.  At this time, the knee was taken to  terminal extension.  Patella was measured at 25 mm of thickness.  Freehand cut of the patella was performed, resecting 10 mm of bone.  At this time, the knee was once again hyperflexed.  ACL was  sacrificed with 10 blade.  Appropriate retractors were positioned.  Retrograde reamer passed up the femoral canal, femoral canal was  suctioned.  Long intramedullary guide was then inserted, set at 8 mm  of distal femoral resection and 5 degrees of valgus.  Distal femoral  cut was then performed.  Posterior referencing guide was attached.  The femur was sized.  A size 6 femoral component was  found to be most  appropriate.  As such, a size 6 4-in-1 cut block was then attached to  the femur, pinned in place, and sequential distal femoral cuts were  then performed.  Excess bone then removed with curved osteotome and a  rongeur.  PCL retractor was then inserted, knee hyperflexed, medial  and lateral meniscus sharply excised with Bovie cautery.  Extramedullary tibial guide was then attached, set at 0 degrees of  posterior slope, 0 degrees of varus/valgus in line with the long axis  of the tibia, measuring 2 mm of medial tibial plateau as there was  some associated medial tibial plateau bone loss.  Proximal tibial cut  was then performed.  A wafer of bone was removed utilizing Bovie  cautery to free any soft tissue adhesions.  Medial osteophytes were  then removed with a rongeur.  The knee was taken to 90 degrees of  flexion.  Lamina  was placed between the femur and the tibia.   Any remnant posterior capsular adhesion and meniscus tissue were  sharply excised with Bovie cautery.  Posterior condylar osteophytes  were removed with a curved osteotome.  The ACL and PCL were  sacrificed with Bovie cautery.  The knee was hyperflexed.  Femoral  box cut guide was then attached, box cut then created in standard  fashion, wafer of bone removed.  Knee once again hyperflexed, tibia  subluxed anteriorly.  Tibia was sized, size 6 tibial tray was found  to be most appropriate.  Trial tibial tray was then pinned into place  with rotation oriented towards the medial one-third of the tibial  tubercle.  Trial femoral component was impacted into place and trial  polyethylene was inserted.  Knee was then taken through range of  motion and found to easily achieve 0 degrees of extension, flexion to  140 degrees.  At this time, a size A35 guide was attached to the  patella.  Three lug holes were created through the already prepared  patella and a size A35 trial patellar button was attached,  recapitulating 25 mm of patellar  thickness.  The knee again taken  through range of motion, patella again found to track midline.  The  knee was found to be varus and valgus stable at both 0 degrees, 30  degrees, 60 degrees.  No pistoning noted at 90 degrees.  At this  time, the knee was hyperflexed, trial polyethylenes and trial femoral  components were removed.  Repeat retractors inserted.  Keel punch  guide was attached to the trial tibial tray, Boss reamer was passed  followed by the keel punch.  Trial tibial tray was then removed.  Final implants were opened and cement was mixed.  Wound copiously  irrigated with sterile saline via Simpulse lavage.  Knee hyperflexed,  retractors re-inserted.  Simpulse lavage of the bony prominences was  performed creating a dry bony bed.  At this time, the final implants  were cemented and impacted into place.  Excess cement was removed  with [place] elevators.  The knee was kept in terminal extension as the  cement was allowed to cure.  Knee copiously irrigated with dilute  Betadine and allowed to soak x3 minutes followed by copious  irrigation with sterile saline via Simpulse lavage.  At this time,  the knee was once again hyperflexed.  The tourniquet was dropped at  85 minutes.  Hemostasis was achieved with a combination of Gracy  powder and Bovie cautery.  The knee then taken back into extension.  Medial parapatellar arthrotomy was closed with a #1 Vicryl ligature  in interrupted figure-of-eight and running locked segmental fashion.  Deep subcutaneous tissues were closed with 2-0 Vicryl ligature in  buried interrupted fashion.  Skin reapproximated with 3-0 V-Loc in  running subcuticular fashion followed by application of a Prineo  dressing.  Once the Prineo dressing was completely dried,  self-adherent Mepilex silver dressing was applied over top of the  wound followed by application of a thigh-high CÉSAR hose.  The patient  was then awoken, transferred to a hospital bed, and returned to PACU  in stable  condition.  Counts were correct.  Case was clean, elective.     COMPLICATIONS:  None.    SPECIMENS:  None.    ESTIMATED BLOOD LOSS:  35 cc post tourniquet drop.    TOURNIQUET TIME:  85 minutes.    The patient has been off his disease-modifying anti-rheumatologic  drugs for approximately 9 weeks.  He will remain off those for  another 15 weeks postoperative, then restart.  He will be treated  with extended oral antibiotics for 10 days postoperatively as a  precautionary measure as well.       Noble Nielson DO    DD:  03/29/2023 14:48:49 EST  DT:  03/30/2023 11:07:22 EST  DICTATION NUMBER:  [id number]  INTERNAL JOB NUMBER:  [id number]    CC:  Noble Nielson DO, Fax: [phone]        Electronic Signatures:  NOBLE NIELSON () (Signed on 03-Apr-2023 07:39)   Authored  Unsigned, Draft (SYS GENERATED) (Entered on 30-Mar-2023 11:07)   Entered    Last Updated: 03-Apr-2023 07:39 by NOBLE NIELSON ()

## 2024-05-07 ENCOUNTER — PATIENT OUTREACH (OUTPATIENT)
Dept: PRIMARY CARE | Facility: CLINIC | Age: 74
End: 2024-05-07
Payer: MEDICARE

## 2024-05-07 DIAGNOSIS — I10 BENIGN ESSENTIAL HYPERTENSION: Chronic | ICD-10-CM

## 2024-05-07 DIAGNOSIS — R73.03 PREDIABETES: Chronic | ICD-10-CM

## 2024-05-07 DIAGNOSIS — M06.9 RHEUMATOID ARTHRITIS, INVOLVING UNSPECIFIED SITE, UNSPECIFIED WHETHER RHEUMATOID FACTOR PRESENT (MULTI): ICD-10-CM

## 2024-05-07 PROCEDURE — 99490 CHRNC CARE MGMT STAFF 1ST 20: CPT | Performed by: FAMILY MEDICINE

## 2024-05-07 NOTE — PROGRESS NOTES
"Initial CCM call with patient.  Introduced self and explained reason for call.  Reviewed chronic care case management services with patient, who is in agreement at this time.  Patient stated that he is getting ready to start physical therapy in the next few days.  Patient stated that his blood pressure was \"good\".  Does not check daily at home, provided education regarding the importance of checking blood pressure prior to taking any antihypertensives.  Patient denies any recent falls, acute pain,or shortness of breath.  No prescription refills needed at time of call, in agreement to future follow-up calls.  "

## 2024-05-08 ENCOUNTER — APPOINTMENT (OUTPATIENT)
Dept: UROLOGY | Facility: CLINIC | Age: 74
End: 2024-05-08
Payer: MEDICARE

## 2024-05-08 ENCOUNTER — APPOINTMENT (OUTPATIENT)
Dept: CARDIOLOGY | Facility: HOSPITAL | Age: 74
DRG: 309 | End: 2024-05-08
Payer: MEDICARE

## 2024-05-08 ENCOUNTER — APPOINTMENT (OUTPATIENT)
Dept: RADIOLOGY | Facility: HOSPITAL | Age: 74
DRG: 309 | End: 2024-05-08
Payer: MEDICARE

## 2024-05-08 ENCOUNTER — HOSPITAL ENCOUNTER (OUTPATIENT)
Dept: CARDIOLOGY | Facility: CLINIC | Age: 74
Discharge: HOME | End: 2024-05-08
Payer: MEDICARE

## 2024-05-08 ENCOUNTER — HOSPITAL ENCOUNTER (INPATIENT)
Facility: HOSPITAL | Age: 74
LOS: 1 days | Discharge: HOME | DRG: 309 | End: 2024-05-09
Attending: INTERNAL MEDICINE | Admitting: INTERNAL MEDICINE
Payer: MEDICARE

## 2024-05-08 ENCOUNTER — TELEPHONE (OUTPATIENT)
Dept: CARDIOLOGY | Facility: CLINIC | Age: 74
End: 2024-05-08

## 2024-05-08 DIAGNOSIS — R07.9 CHEST PAIN, UNSPECIFIED TYPE: ICD-10-CM

## 2024-05-08 DIAGNOSIS — I48.91 ATRIAL FIBRILLATION WITH RVR (MULTI): Primary | ICD-10-CM

## 2024-05-08 DIAGNOSIS — I48.91 ATRIAL FIBRILLATION, UNSPECIFIED TYPE (MULTI): Primary | ICD-10-CM

## 2024-05-08 LAB
ALBUMIN SERPL-MCNC: 3.7 G/DL (ref 3.5–5)
ALP BLD-CCNC: 120 U/L (ref 35–125)
ALT SERPL-CCNC: 19 U/L (ref 5–40)
ANION GAP SERPL CALC-SCNC: 11 MMOL/L
AST SERPL-CCNC: 35 U/L (ref 5–40)
ATRIAL RATE: 174 BPM
BASOPHILS # BLD AUTO: 0.04 X10*3/UL (ref 0–0.1)
BASOPHILS NFR BLD AUTO: 0.5 %
BILIRUB SERPL-MCNC: 0.5 MG/DL (ref 0.1–1.2)
BUN SERPL-MCNC: 19 MG/DL (ref 8–25)
CALCIUM SERPL-MCNC: 9.8 MG/DL (ref 8.5–10.4)
CHLORIDE SERPL-SCNC: 101 MMOL/L (ref 97–107)
CO2 SERPL-SCNC: 24 MMOL/L (ref 24–31)
CREAT SERPL-MCNC: 1 MG/DL (ref 0.4–1.6)
EGFRCR SERPLBLD CKD-EPI 2021: 78 ML/MIN/1.73M*2
EOSINOPHIL # BLD AUTO: 0.16 X10*3/UL (ref 0–0.4)
EOSINOPHIL NFR BLD AUTO: 1.9 %
ERYTHROCYTE [DISTWIDTH] IN BLOOD BY AUTOMATED COUNT: 14.1 % (ref 11.5–14.5)
GLUCOSE SERPL-MCNC: 146 MG/DL (ref 65–99)
HCT VFR BLD AUTO: 39.1 % (ref 41–52)
HGB BLD-MCNC: 13.1 G/DL (ref 13.5–17.5)
IMM GRANULOCYTES # BLD AUTO: 0.02 X10*3/UL (ref 0–0.5)
IMM GRANULOCYTES NFR BLD AUTO: 0.2 % (ref 0–0.9)
LYMPHOCYTES # BLD AUTO: 1.67 X10*3/UL (ref 0.8–3)
LYMPHOCYTES NFR BLD AUTO: 20 %
MAGNESIUM SERPL-MCNC: 2.1 MG/DL (ref 1.6–3.1)
MCH RBC QN AUTO: 32.3 PG (ref 26–34)
MCHC RBC AUTO-ENTMCNC: 33.5 G/DL (ref 32–36)
MCV RBC AUTO: 97 FL (ref 80–100)
MONOCYTES # BLD AUTO: 0.86 X10*3/UL (ref 0.05–0.8)
MONOCYTES NFR BLD AUTO: 10.3 %
NEUTROPHILS # BLD AUTO: 5.58 X10*3/UL (ref 1.6–5.5)
NEUTROPHILS NFR BLD AUTO: 67.1 %
NRBC BLD-RTO: 0 /100 WBCS (ref 0–0)
PLATELET # BLD AUTO: 284 X10*3/UL (ref 150–450)
POTASSIUM SERPL-SCNC: 3.9 MMOL/L (ref 3.4–5.1)
PROT SERPL-MCNC: 6.6 G/DL (ref 5.9–7.9)
Q ONSET: 199 MS
QRS COUNT: 24 BEATS
QRS DURATION: 122 MS
QT INTERVAL: 324 MS
QTC CALCULATION(BAZETT): 510 MS
QTC FREDERICIA: 439 MS
R AXIS: -45 DEGREES
RBC # BLD AUTO: 4.05 X10*6/UL (ref 4.5–5.9)
SODIUM SERPL-SCNC: 136 MMOL/L (ref 133–145)
T AXIS: 6 DEGREES
T OFFSET: 361 MS
TROPONIN T SERPL-MCNC: 126 NG/L
TROPONIN T SERPL-MCNC: 163 NG/L
TROPONIN T SERPL-MCNC: 218 NG/L
VENTRICULAR RATE: 149 BPM
WBC # BLD AUTO: 8.3 X10*3/UL (ref 4.4–11.3)

## 2024-05-08 PROCEDURE — 80053 COMPREHEN METABOLIC PANEL: CPT | Performed by: EMERGENCY MEDICINE

## 2024-05-08 PROCEDURE — 71046 X-RAY EXAM CHEST 2 VIEWS: CPT | Performed by: RADIOLOGY

## 2024-05-08 PROCEDURE — 2500000005 HC RX 250 GENERAL PHARMACY W/O HCPCS

## 2024-05-08 PROCEDURE — 71046 X-RAY EXAM CHEST 2 VIEWS: CPT

## 2024-05-08 PROCEDURE — 2500000006 HC RX 250 W HCPCS SELF ADMINISTERED DRUGS (ALT 637 FOR ALL PAYERS): Mod: MUEWO,MUE | Performed by: NURSE PRACTITIONER

## 2024-05-08 PROCEDURE — 93005 ELECTROCARDIOGRAM TRACING: CPT

## 2024-05-08 PROCEDURE — 2500000004 HC RX 250 GENERAL PHARMACY W/ HCPCS (ALT 636 FOR OP/ED)

## 2024-05-08 PROCEDURE — 36415 COLL VENOUS BLD VENIPUNCTURE: CPT | Performed by: EMERGENCY MEDICINE

## 2024-05-08 PROCEDURE — 2060000001 HC INTERMEDIATE ICU ROOM DAILY

## 2024-05-08 PROCEDURE — 96375 TX/PRO/DX INJ NEW DRUG ADDON: CPT

## 2024-05-08 PROCEDURE — 96365 THER/PROPH/DIAG IV INF INIT: CPT

## 2024-05-08 PROCEDURE — 83735 ASSAY OF MAGNESIUM: CPT | Performed by: EMERGENCY MEDICINE

## 2024-05-08 PROCEDURE — 84484 ASSAY OF TROPONIN QUANT: CPT | Mod: 91 | Performed by: EMERGENCY MEDICINE

## 2024-05-08 PROCEDURE — 93010 ELECTROCARDIOGRAM REPORT: CPT | Performed by: INTERNAL MEDICINE

## 2024-05-08 PROCEDURE — 2500000001 HC RX 250 WO HCPCS SELF ADMINISTERED DRUGS (ALT 637 FOR MEDICARE OP): Performed by: NURSE PRACTITIONER

## 2024-05-08 PROCEDURE — 99291 CRITICAL CARE FIRST HOUR: CPT

## 2024-05-08 PROCEDURE — 93005 ELECTROCARDIOGRAM TRACING: CPT | Mod: 59

## 2024-05-08 PROCEDURE — 85025 COMPLETE CBC W/AUTO DIFF WBC: CPT | Performed by: EMERGENCY MEDICINE

## 2024-05-08 PROCEDURE — 84484 ASSAY OF TROPONIN QUANT: CPT | Performed by: EMERGENCY MEDICINE

## 2024-05-08 RX ORDER — ACETAMINOPHEN 325 MG/1
975 TABLET ORAL ONCE
Status: COMPLETED | OUTPATIENT
Start: 2024-05-08 | End: 2024-05-08

## 2024-05-08 RX ORDER — ACETAMINOPHEN 325 MG/1
650 TABLET ORAL EVERY 4 HOURS PRN
Status: DISCONTINUED | OUTPATIENT
Start: 2024-05-08 | End: 2024-05-09 | Stop reason: HOSPADM

## 2024-05-08 RX ORDER — ATORVASTATIN CALCIUM 80 MG/1
80 TABLET, FILM COATED ORAL NIGHTLY
Status: DISCONTINUED | OUTPATIENT
Start: 2024-05-08 | End: 2024-05-09 | Stop reason: HOSPADM

## 2024-05-08 RX ORDER — LANOLIN ALCOHOL/MO/W.PET/CERES
1000 CREAM (GRAM) TOPICAL
Status: DISCONTINUED | OUTPATIENT
Start: 2024-05-09 | End: 2024-05-09 | Stop reason: HOSPADM

## 2024-05-08 RX ORDER — EZETIMIBE 10 MG/1
10 TABLET ORAL NIGHTLY
Status: DISCONTINUED | OUTPATIENT
Start: 2024-05-08 | End: 2024-05-09 | Stop reason: HOSPADM

## 2024-05-08 RX ORDER — METOPROLOL SUCCINATE 50 MG/1
50 TABLET, EXTENDED RELEASE ORAL DAILY
Status: DISCONTINUED | OUTPATIENT
Start: 2024-05-09 | End: 2024-05-09

## 2024-05-08 RX ORDER — ONDANSETRON HYDROCHLORIDE 2 MG/ML
4 INJECTION, SOLUTION INTRAVENOUS EVERY 8 HOURS PRN
Status: DISCONTINUED | OUTPATIENT
Start: 2024-05-08 | End: 2024-05-09 | Stop reason: HOSPADM

## 2024-05-08 RX ORDER — DILTIAZEM HCL/D5W 125 MG/125
5-15 PLASTIC BAG, INJECTION (ML) INTRAVENOUS CONTINUOUS
Status: DISCONTINUED | OUTPATIENT
Start: 2024-05-08 | End: 2024-05-09

## 2024-05-08 RX ORDER — GABAPENTIN 600 MG/1
600 TABLET ORAL 2 TIMES DAILY
Status: DISCONTINUED | OUTPATIENT
Start: 2024-05-08 | End: 2024-05-09 | Stop reason: HOSPADM

## 2024-05-08 RX ORDER — DEXTROMETHORPHAN HYDROBROMIDE, GUAIFENESIN 5; 100 MG/5ML; MG/5ML
650 LIQUID ORAL EVERY 8 HOURS PRN
COMMUNITY

## 2024-05-08 RX ORDER — PANTOPRAZOLE SODIUM 40 MG/1
40 TABLET, DELAYED RELEASE ORAL
Status: DISCONTINUED | OUTPATIENT
Start: 2024-05-09 | End: 2024-05-09 | Stop reason: HOSPADM

## 2024-05-08 RX ORDER — ONDANSETRON 4 MG/1
4 TABLET, ORALLY DISINTEGRATING ORAL EVERY 8 HOURS PRN
Status: DISCONTINUED | OUTPATIENT
Start: 2024-05-08 | End: 2024-05-09 | Stop reason: HOSPADM

## 2024-05-08 RX ORDER — METOPROLOL TARTRATE 1 MG/ML
5 INJECTION, SOLUTION INTRAVENOUS ONCE
Status: COMPLETED | OUTPATIENT
Start: 2024-05-08 | End: 2024-05-08

## 2024-05-08 RX ORDER — POLYETHYLENE GLYCOL 3350 17 G/17G
17 POWDER, FOR SOLUTION ORAL DAILY PRN
Status: DISCONTINUED | OUTPATIENT
Start: 2024-05-08 | End: 2024-05-09 | Stop reason: HOSPADM

## 2024-05-08 RX ORDER — TAMSULOSIN HYDROCHLORIDE 0.4 MG/1
0.4 CAPSULE ORAL DAILY
Status: DISCONTINUED | OUTPATIENT
Start: 2024-05-09 | End: 2024-05-09 | Stop reason: HOSPADM

## 2024-05-08 RX ORDER — CHOLECALCIFEROL (VITAMIN D3) 50 MCG
2000 TABLET ORAL DAILY
Status: DISCONTINUED | OUTPATIENT
Start: 2024-05-09 | End: 2024-05-09 | Stop reason: HOSPADM

## 2024-05-08 RX ORDER — AMLODIPINE BESYLATE 5 MG/1
5 TABLET ORAL DAILY
Status: DISCONTINUED | OUTPATIENT
Start: 2024-05-09 | End: 2024-05-09 | Stop reason: HOSPADM

## 2024-05-08 RX ORDER — FOLIC ACID 1 MG/1
1 TABLET ORAL DAILY
Status: DISCONTINUED | OUTPATIENT
Start: 2024-05-09 | End: 2024-05-09 | Stop reason: HOSPADM

## 2024-05-08 RX ORDER — LISINOPRIL 40 MG/1
40 TABLET ORAL DAILY
Status: DISCONTINUED | OUTPATIENT
Start: 2024-05-09 | End: 2024-05-09 | Stop reason: HOSPADM

## 2024-05-08 RX ORDER — NAPROXEN SODIUM 220 MG/1
81 TABLET, FILM COATED ORAL EVERY 24 HOURS
Status: DISCONTINUED | OUTPATIENT
Start: 2024-05-09 | End: 2024-05-09 | Stop reason: HOSPADM

## 2024-05-08 RX ADMIN — ATORVASTATIN CALCIUM 80 MG: 80 TABLET, FILM COATED ORAL at 20:30

## 2024-05-08 RX ADMIN — Medication 5 MG/HR: at 16:14

## 2024-05-08 RX ADMIN — ACETAMINOPHEN 975 MG: 325 TABLET ORAL at 16:34

## 2024-05-08 RX ADMIN — METOPROLOL TARTRATE 5 MG: 5 INJECTION INTRAVENOUS at 14:48

## 2024-05-08 RX ADMIN — EZETIMIBE 10 MG: 10 TABLET ORAL at 20:30

## 2024-05-08 RX ADMIN — GABAPENTIN 600 MG: 600 TABLET, FILM COATED ORAL at 20:30

## 2024-05-08 SDOH — ECONOMIC STABILITY: HOUSING INSECURITY
IN THE LAST 12 MONTHS, WAS THERE A TIME WHEN YOU DID NOT HAVE A STEADY PLACE TO SLEEP OR SLEPT IN A SHELTER (INCLUDING NOW)?: NO

## 2024-05-08 SDOH — SOCIAL STABILITY: SOCIAL INSECURITY: ARE YOU OR HAVE YOU BEEN THREATENED OR ABUSED PHYSICALLY, EMOTIONALLY, OR SEXUALLY BY ANYONE?: NO

## 2024-05-08 SDOH — ECONOMIC STABILITY: INCOME INSECURITY: IN THE LAST 12 MONTHS, WAS THERE A TIME WHEN YOU WERE NOT ABLE TO PAY THE MORTGAGE OR RENT ON TIME?: NO

## 2024-05-08 SDOH — HEALTH STABILITY: MENTAL HEALTH
STRESS IS WHEN SOMEONE FEELS TENSE, NERVOUS, ANXIOUS, OR CAN'T SLEEP AT NIGHT BECAUSE THEIR MIND IS TROUBLED. HOW STRESSED ARE YOU?: NOT AT ALL

## 2024-05-08 SDOH — SOCIAL STABILITY: SOCIAL INSECURITY
WITHIN THE LAST YEAR, HAVE TO BEEN RAPED OR FORCED TO HAVE ANY KIND OF SEXUAL ACTIVITY BY YOUR PARTNER OR EX-PARTNER?: NO

## 2024-05-08 SDOH — HEALTH STABILITY: MENTAL HEALTH: HOW MANY STANDARD DRINKS CONTAINING ALCOHOL DO YOU HAVE ON A TYPICAL DAY?: 1 OR 2

## 2024-05-08 SDOH — SOCIAL STABILITY: SOCIAL INSECURITY
WITHIN THE LAST YEAR, HAVE YOU BEEN KICKED, HIT, SLAPPED, OR OTHERWISE PHYSICALLY HURT BY YOUR PARTNER OR EX-PARTNER?: NO

## 2024-05-08 SDOH — ECONOMIC STABILITY: HOUSING INSECURITY: IN THE LAST 12 MONTHS, HOW MANY PLACES HAVE YOU LIVED?: 1

## 2024-05-08 SDOH — ECONOMIC STABILITY: FOOD INSECURITY: WITHIN THE PAST 12 MONTHS, THE FOOD YOU BOUGHT JUST DIDN'T LAST AND YOU DIDN'T HAVE MONEY TO GET MORE.: NEVER TRUE

## 2024-05-08 SDOH — SOCIAL STABILITY: SOCIAL INSECURITY: WERE YOU ABLE TO COMPLETE ALL THE BEHAVIORAL HEALTH SCREENINGS?: YES

## 2024-05-08 SDOH — SOCIAL STABILITY: SOCIAL NETWORK
DO YOU BELONG TO ANY CLUBS OR ORGANIZATIONS SUCH AS CHURCH GROUPS UNIONS, FRATERNAL OR ATHLETIC GROUPS, OR SCHOOL GROUPS?: NO

## 2024-05-08 SDOH — SOCIAL STABILITY: SOCIAL NETWORK: HOW OFTEN DO YOU ATTENT MEETINGS OF THE CLUB OR ORGANIZATION YOU BELONG TO?: NEVER

## 2024-05-08 SDOH — HEALTH STABILITY: MENTAL HEALTH: HOW OFTEN DO YOU HAVE 6 OR MORE DRINKS ON ONE OCCASION?: LESS THAN MONTHLY

## 2024-05-08 SDOH — SOCIAL STABILITY: SOCIAL INSECURITY: ARE THERE ANY APPARENT SIGNS OF INJURIES/BEHAVIORS THAT COULD BE RELATED TO ABUSE/NEGLECT?: NO

## 2024-05-08 SDOH — ECONOMIC STABILITY: INCOME INSECURITY: IN THE PAST 12 MONTHS, HAS THE ELECTRIC, GAS, OIL, OR WATER COMPANY THREATENED TO SHUT OFF SERVICE IN YOUR HOME?: NO

## 2024-05-08 SDOH — ECONOMIC STABILITY: FOOD INSECURITY: WITHIN THE PAST 12 MONTHS, YOU WORRIED THAT YOUR FOOD WOULD RUN OUT BEFORE YOU GOT MONEY TO BUY MORE.: NEVER TRUE

## 2024-05-08 SDOH — SOCIAL STABILITY: SOCIAL INSECURITY: WITHIN THE LAST YEAR, HAVE YOU BEEN AFRAID OF YOUR PARTNER OR EX-PARTNER?: NO

## 2024-05-08 SDOH — SOCIAL STABILITY: SOCIAL INSECURITY: WITHIN THE LAST YEAR, HAVE YOU BEEN HUMILIATED OR EMOTIONALLY ABUSED IN OTHER WAYS BY YOUR PARTNER OR EX-PARTNER?: NO

## 2024-05-08 SDOH — HEALTH STABILITY: MENTAL HEALTH
HOW OFTEN DO YOU NEED TO HAVE SOMEONE HELP YOU WHEN YOU READ INSTRUCTIONS, PAMPHLETS, OR OTHER WRITTEN MATERIAL FROM YOUR DOCTOR OR PHARMACY?: NEVER

## 2024-05-08 SDOH — SOCIAL STABILITY: SOCIAL NETWORK: ARE YOU MARRIED, WIDOWED, DIVORCED, SEPARATED, NEVER MARRIED, OR LIVING WITH A PARTNER?: MARRIED

## 2024-05-08 SDOH — SOCIAL STABILITY: SOCIAL INSECURITY: HAVE YOU HAD THOUGHTS OF HARMING ANYONE ELSE?: NO

## 2024-05-08 SDOH — HEALTH STABILITY: PHYSICAL HEALTH: ON AVERAGE, HOW MANY MINUTES DO YOU ENGAGE IN EXERCISE AT THIS LEVEL?: 0 MIN

## 2024-05-08 SDOH — HEALTH STABILITY: MENTAL HEALTH: HOW OFTEN DO YOU HAVE A DRINK CONTAINING ALCOHOL?: 2-3 TIMES A WEEK

## 2024-05-08 SDOH — ECONOMIC STABILITY: TRANSPORTATION INSECURITY
IN THE PAST 12 MONTHS, HAS THE LACK OF TRANSPORTATION KEPT YOU FROM MEDICAL APPOINTMENTS OR FROM GETTING MEDICATIONS?: NO

## 2024-05-08 SDOH — SOCIAL STABILITY: SOCIAL INSECURITY: DO YOU FEEL ANYONE HAS EXPLOITED OR TAKEN ADVANTAGE OF YOU FINANCIALLY OR OF YOUR PERSONAL PROPERTY?: NO

## 2024-05-08 SDOH — SOCIAL STABILITY: SOCIAL INSECURITY: ABUSE: ADULT

## 2024-05-08 SDOH — SOCIAL STABILITY: SOCIAL INSECURITY: DOES ANYONE TRY TO KEEP YOU FROM HAVING/CONTACTING OTHER FRIENDS OR DOING THINGS OUTSIDE YOUR HOME?: NO

## 2024-05-08 SDOH — SOCIAL STABILITY: SOCIAL INSECURITY: HAS ANYONE EVER THREATENED TO HURT YOUR FAMILY OR YOUR PETS?: NO

## 2024-05-08 SDOH — SOCIAL STABILITY: SOCIAL INSECURITY: HAVE YOU HAD ANY THOUGHTS OF HARMING ANYONE ELSE?: NO

## 2024-05-08 SDOH — SOCIAL STABILITY: SOCIAL NETWORK: HOW OFTEN DO YOU ATTEND CHURCH OR RELIGIOUS SERVICES?: 1 TO 4 TIMES PER YEAR

## 2024-05-08 SDOH — SOCIAL STABILITY: SOCIAL NETWORK: HOW OFTEN DO YOU GET TOGETHER WITH FRIENDS OR RELATIVES?: THREE TIMES A WEEK

## 2024-05-08 SDOH — HEALTH STABILITY: PHYSICAL HEALTH: ON AVERAGE, HOW MANY DAYS PER WEEK DO YOU ENGAGE IN MODERATE TO STRENUOUS EXERCISE (LIKE A BRISK WALK)?: 0 DAYS

## 2024-05-08 SDOH — ECONOMIC STABILITY: INCOME INSECURITY: HOW HARD IS IT FOR YOU TO PAY FOR THE VERY BASICS LIKE FOOD, HOUSING, MEDICAL CARE, AND HEATING?: NOT HARD AT ALL

## 2024-05-08 SDOH — SOCIAL STABILITY: SOCIAL INSECURITY: DO YOU FEEL UNSAFE GOING BACK TO THE PLACE WHERE YOU ARE LIVING?: NO

## 2024-05-08 SDOH — SOCIAL STABILITY: SOCIAL NETWORK
IN A TYPICAL WEEK, HOW MANY TIMES DO YOU TALK ON THE PHONE WITH FAMILY, FRIENDS, OR NEIGHBORS?: MORE THAN THREE TIMES A WEEK

## 2024-05-08 SDOH — ECONOMIC STABILITY: TRANSPORTATION INSECURITY
IN THE PAST 12 MONTHS, HAS LACK OF TRANSPORTATION KEPT YOU FROM MEETINGS, WORK, OR FROM GETTING THINGS NEEDED FOR DAILY LIVING?: NO

## 2024-05-08 ASSESSMENT — LIFESTYLE VARIABLES
AUDIT TOTAL SCORE: 0
AUDIT-C TOTAL SCORE: 5
HOW OFTEN DURING THE LAST YEAR HAVE YOU HAD A FEELING OF GUILT OR REMORSE AFTER DRINKING: NEVER
HOW OFTEN DO YOU HAVE A DRINK CONTAINING ALCOHOL: 2-3 TIMES A WEEK
PRESCIPTION_ABUSE_PAST_12_MONTHS: NO
HOW OFTEN DURING THE LAST YEAR HAVE YOU NEEDED AN ALCOHOLIC DRINK FIRST THING IN THE MORNING TO GET YOURSELF GOING AFTER A NIGHT OF HEAVY DRINKING: NEVER
AUDIT TOTAL SCORE: 5
AUDIT-C TOTAL SCORE: 5
HAVE YOU OR SOMEONE ELSE BEEN INJURED AS A RESULT OF YOUR DRINKING: NO
SUBSTANCE_ABUSE_PAST_12_MONTHS: NO
HOW OFTEN DURING THE LAST YEAR HAVE YOU BEEN UNABLE TO REMEMBER WHAT HAPPENED THE NIGHT BEFORE BECAUSE YOU HAD BEEN DRINKING: NEVER
SKIP TO QUESTIONS 9-10: 0
HOW OFTEN DO YOU HAVE 6 OR MORE DRINKS ON ONE OCCASION: LESS THAN MONTHLY
HOW MANY STANDARD DRINKS CONTAINING ALCOHOL DO YOU HAVE ON A TYPICAL DAY: 3 OR 4
SKIP TO QUESTIONS 9-10: 0
HAS A RELATIVE, FRIEND, DOCTOR, OR ANOTHER HEALTH PROFESSIONAL EXPRESSED CONCERN ABOUT YOUR DRINKING OR SUGGESTED YOU CUT DOWN: NO
HOW OFTEN DURING THE LAST YEAR HAVE YOU FAILED TO DO WHAT WAS NORMALLY EXPECTED FROM YOU BECAUSE OF DRINKING: NEVER
AUDIT-C TOTAL SCORE: 4
HOW OFTEN DURING THE LAST YEAR HAVE YOU FOUND THAT YOU WERE NOT ABLE TO STOP DRINKING ONCE YOU HAD STARTED: NEVER

## 2024-05-08 ASSESSMENT — ACTIVITIES OF DAILY LIVING (ADL)
WALKS IN HOME: NEEDS ASSISTANCE
ASSISTIVE_DEVICE: EYEGLASSES;CANE
HEARING - LEFT EAR: HEARING AID
ADEQUATE_TO_COMPLETE_ADL: YES
DRESSING YOURSELF: INDEPENDENT
GROOMING: INDEPENDENT
HEARING - RIGHT EAR: HEARING AID
BATHING: INDEPENDENT
PATIENT'S MEMORY ADEQUATE TO SAFELY COMPLETE DAILY ACTIVITIES?: YES
TOILETING: INDEPENDENT
JUDGMENT_ADEQUATE_SAFELY_COMPLETE_DAILY_ACTIVITIES: YES
FEEDING YOURSELF: INDEPENDENT

## 2024-05-08 ASSESSMENT — ENCOUNTER SYMPTOMS
NEUROLOGICAL NEGATIVE: 1
ENDOCRINE NEGATIVE: 1
RESPIRATORY NEGATIVE: 1
FATIGUE: 1
GASTROINTESTINAL NEGATIVE: 1
EYES NEGATIVE: 1
PALPITATIONS: 1
PSYCHIATRIC NEGATIVE: 1
MUSCULOSKELETAL NEGATIVE: 1

## 2024-05-08 ASSESSMENT — COGNITIVE AND FUNCTIONAL STATUS - GENERAL
CLIMB 3 TO 5 STEPS WITH RAILING: A LITTLE
PATIENT BASELINE BEDBOUND: NO
DAILY ACTIVITIY SCORE: 24
MOBILITY SCORE: 22
WALKING IN HOSPITAL ROOM: A LITTLE

## 2024-05-08 ASSESSMENT — PAIN DESCRIPTION - ONSET: ONSET: SUDDEN

## 2024-05-08 ASSESSMENT — PAIN - FUNCTIONAL ASSESSMENT: PAIN_FUNCTIONAL_ASSESSMENT: 0-10

## 2024-05-08 ASSESSMENT — PAIN SCALES - GENERAL
PAINLEVEL_OUTOF10: 5 - MODERATE PAIN
PAINLEVEL_OUTOF10: 0 - NO PAIN
PAINLEVEL_OUTOF10: 0 - NO PAIN

## 2024-05-08 ASSESSMENT — PAIN DESCRIPTION - DESCRIPTORS
DESCRIPTORS: OTHER (COMMENT)
DESCRIPTORS: TIGHTNESS

## 2024-05-08 ASSESSMENT — PATIENT HEALTH QUESTIONNAIRE - PHQ9
SUM OF ALL RESPONSES TO PHQ9 QUESTIONS 1 & 2: 0
2. FEELING DOWN, DEPRESSED OR HOPELESS: NOT AT ALL
1. LITTLE INTEREST OR PLEASURE IN DOING THINGS: NOT AT ALL

## 2024-05-08 ASSESSMENT — PAIN DESCRIPTION - FREQUENCY: FREQUENCY: INTERMITTENT

## 2024-05-08 ASSESSMENT — PAIN DESCRIPTION - PROGRESSION: CLINICAL_PROGRESSION: GRADUALLY WORSENING

## 2024-05-08 ASSESSMENT — PAIN DESCRIPTION - LOCATION: LOCATION: CHEST

## 2024-05-08 ASSESSMENT — COLUMBIA-SUICIDE SEVERITY RATING SCALE - C-SSRS
1. IN THE PAST MONTH, HAVE YOU WISHED YOU WERE DEAD OR WISHED YOU COULD GO TO SLEEP AND NOT WAKE UP?: NO
2. HAVE YOU ACTUALLY HAD ANY THOUGHTS OF KILLING YOURSELF?: NO
6. HAVE YOU EVER DONE ANYTHING, STARTED TO DO ANYTHING, OR PREPARED TO DO ANYTHING TO END YOUR LIFE?: NO

## 2024-05-08 ASSESSMENT — PAIN DESCRIPTION - PAIN TYPE: TYPE: ACUTE PAIN

## 2024-05-08 ASSESSMENT — PAIN DESCRIPTION - ORIENTATION: ORIENTATION: LEFT;MID

## 2024-05-08 ASSESSMENT — PAIN SCALES - WONG BAKER: WONGBAKER_NUMERICALRESPONSE: NO HURT

## 2024-05-08 NOTE — PROGRESS NOTES
Pharmacy Medication History Review    Pro Belle Jr. is a 73 y.o. male admitted for chest pain. Pharmacy reviewed the patient's yucln-ux-bthucrize medications and allergies for accuracy.    The list below reflectives the updated PTA list. Please review each medication in order reconciliation for additional clarification and justification.  Prior to Admission Medications   Prescriptions Last Dose Informant Patient Reported? Taking?   acetaminophen (Tylenol 8 HOUR) 650 mg ER tablet 5/7/2024 at pm  Yes Yes   Sig: Take 1 tablet (650 mg) by mouth every 8 hours if needed for mild pain (1 - 3). Do not crush, chew, or split.   amLODIPine (Norvasc) 5 mg tablet 5/8/2024  No Yes   Sig: Take 1 tablet (5 mg) by mouth once daily.   aspirin 81 mg chewable tablet 5/8/2024 at am  Yes Yes   Sig: Chew 1 tablet (81 mg) once every 24 hours.   atorvastatin (Lipitor) 80 mg tablet 5/7/2024 at pm  No Yes   Sig: TAKE 1 TABLET BY MOUTH AT  BEDTIME   cholecalciferol (Vitamin D-3) 50 MCG (2000 UT) tablet 5/8/2024 at am  Yes Yes   Sig: Take 1 tablet (2,000 Units) by mouth once daily.   cyanocobalamin (Vitamin B-12) 1,000 mcg tablet 5/8/2024 at am  Yes Yes   Sig: Take 1 tablet (1,000 mcg) by mouth once daily.   ezetimibe (Zetia) 10 mg tablet 5/7/2024 at pm  No Yes   Sig: TAKE 1 TABLET BY MOUTH AT  BEDTIME   folic acid (Folvite) 1 mg tablet 5/8/2024 at am  Yes Yes   Sig: Take 1 tablet (1 mg) by mouth once every 24 hours.   gabapentin (Neurontin) 300 mg capsule 5/8/2024 at am  No Yes   Sig: Take 2 capsules (600 mg) by mouth 2 times a day.   lisinopril 40 mg tablet 5/8/2024 at am  No Yes   Sig: TAKE 1 TABLET BY MOUTH ONCE DAILY   metoprolol succinate XL (Toprol XL) 50 mg 24 hr tablet 5/8/2024 at am  No Yes   Sig: Take 1 tablet (50 mg) by mouth once daily. Do not crush or chew.   omeprazole (PriLOSEC) 20 mg DR capsule 5/7/2024 at pm  Yes Yes   Sig: Take 1 capsule (20 mg) by mouth once daily.   rivaroxaban (Xarelto) 20 mg tablet 5/8/2024 at am  No  Yes   Sig: Take 1 tablet (20 mg) by mouth once daily.   tamsulosin (Flomax) 0.4 mg 24 hr capsule 5/7/2024 at pm  No Yes   Sig: TAKE 1 CAPSULE BY MOUTH DAILY   tofacitinib ER (Xeljanz XR) 11 mg tablet extended release 24 hr 5/8/2024 at am  Yes Yes   Sig: Take 1 tablet (11 mg) by mouth once daily. Do not crush, chew or split. Swallow whole.      Facility-Administered Medications: None          The list below reflectives the updated allergy list. Please review each documented allergy for additional clarification and justification.  Allergies  Reviewed by Monica Godoy CPhT on 5/8/2024   No Known Allergies         Below are additional concerns with the patient's PTA list.  - pt is taking Xarelto and Aspirin 81 mg.    MONICA GODOY CPhT

## 2024-05-08 NOTE — ED PROVIDER NOTES
HPI   Chief Complaint   Patient presents with    Chest Pain     This morning I woke up with chest tightness and fatigued I went into doctor erik office and the did a ecg and it was a-fib rvr       Patient is a 73-year-old male presenting to the emergency department for evaluation of A-fib with RVR.  Patient states he woke up this morning and felt some heaviness in his chest.  He states he went to his cardiologist and they did an EKG which showed A-fib with RVR.  Patient states he has a history of A-fib and is on Eliquis and aspirin daily.  He states he normally spontaneously converts out of A-fib however he does admit to taking 50 mg of metoprolol daily.  He states he no longer has the chest heaviness sensation however he does feel tired.  He denies shortness of breath, fever, chills, nausea, vomiting, abdominal pain, recent travel, recent illness, cough, congestion, headaches, numbness, tingling, hematuria, dysuria, constipation, diarrhea.                          [place] Coma Scale Score: 15                     Patient History   No past medical history on file.  Past Surgical History:   Procedure Laterality Date    BACK SURGERY  02/14/2024    l4-5 fusion    HERNIA REPAIR  06/20/2014    Inguinal Hernia Repair    OTHER SURGICAL HISTORY  10/06/2022    Cystoscopy     No family history on file.  Social History     Tobacco Use    Smoking status: Former     Types: Cigarettes    Smokeless tobacco: Never   Substance Use Topics    Alcohol use: Not Currently     Alcohol/week: 2.0 standard drinks of alcohol     Types: 2 Standard drinks or equivalent per week    Drug use: Never       Physical Exam   ED Triage Vitals [05/08/24 1323]   Temperature Heart Rate Respirations BP   36.5 °C (97.7 °F) (!) 127 18 137/76      Pulse Ox Temp Source Heart Rate Source Patient Position   96 % Oral Monitor Sitting      BP Location FiO2 (%)     Left arm --       Physical Exam  Vitals and nursing note reviewed.   Constitutional:       General:  He is not in acute distress.     Appearance: He is well-developed. He is not ill-appearing or toxic-appearing.   HENT:      Head: Normocephalic and atraumatic.   Cardiovascular:      Rate and Rhythm: Tachycardia present. Rhythm irregular.      Pulses:           Radial pulses are 2+ on the right side and 2+ on the left side.      Heart sounds: Normal heart sounds.      Comments: A-fib with RVR  Pulmonary:      Breath sounds: Normal breath sounds. No decreased breath sounds, wheezing, rhonchi or rales.   Abdominal:      Palpations: Abdomen is soft.      Tenderness: There is no abdominal tenderness.   Musculoskeletal:         General: Normal range of motion.      Cervical back: Normal range of motion.   Skin:     General: Skin is warm and dry.   Neurological:      General: No focal deficit present.      Mental Status: He is alert and oriented to person, place, and time.   Psychiatric:         Mood and Affect: Mood normal.         Behavior: Behavior normal.         ED Course & MDM   ED Course as of 05/08/24 1730   Wed May 08, 2024   1335 EKG on my independent interpretation: Atrial fibrillation with  bpm, normal axis, wide  ms with right bundle branch block morphology, nonspecific T wave abnormality with inversions in V2-V3, ST depression in multiple leads new compared to prior EKG 3/21/2024 [JOSH]      ED Course User Index  [JOSH] Sheryl Urena MD         Diagnoses as of 05/08/24 1730   Atrial fibrillation with RVR (Multi)   Chest pain, unspecified type       Medical Decision Making  **Disclaimer parts of this chart have been completed using voice recognition software. Please excuse any errors of transcription.     Evaluated this patient independently and my supervising physician was available for consultation.    HPI: Detailed above.    Exam: A medically appropriate exam performed, outlined above, given the known history and presentation.    History obtained from: Patient    EKG: Reviewed and  interpreted by my attending physician    Labs/Diagnostics:  Labs Reviewed   CBC WITH AUTO DIFFERENTIAL - Abnormal       Result Value    WBC 8.3      nRBC 0.0      RBC 4.05 (*)     Hemoglobin 13.1 (*)     Hematocrit 39.1 (*)     MCV 97      MCH 32.3      MCHC 33.5      RDW 14.1      Platelets 284      Neutrophils % 67.1      Immature Granulocytes %, Automated 0.2      Lymphocytes % 20.0      Monocytes % 10.3      Eosinophils % 1.9      Basophils % 0.5      Neutrophils Absolute 5.58 (*)     Immature Granulocytes Absolute, Automated 0.02      Lymphocytes Absolute 1.67      Monocytes Absolute 0.86 (*)     Eosinophils Absolute 0.16      Basophils Absolute 0.04     COMPREHENSIVE METABOLIC PANEL - Abnormal    Glucose 146 (*)     Sodium 136      Potassium 3.9      Chloride 101      Bicarbonate 24      Urea Nitrogen 19      Creatinine 1.00      eGFR 78      Calcium 9.8      Albumin 3.7      Alkaline Phosphatase 120      Total Protein 6.6      AST 35      Bilirubin, Total 0.5      ALT 19      Anion Gap 11     SERIAL TROPONIN, INITIAL ([facility]) - Abnormal    Troponin T, High Sensitivity 126 (*)    SERIAL TROPONIN,  2 HOUR ([facility]) - Abnormal    Troponin T, High Sensitivity 163 (*)    MAGNESIUM - Normal    Magnesium 2.10     TROPONIN T SERIES, HIGH SENSITIVITY (0, 2 HR, 6 HR)    Narrative:     The following orders were created for panel order Troponin T Series, High Sensitivity (0, 2HR, 6HR).  Procedure                               Abnormality         Status                     ---------                               -----------         ------                     Serial Troponin, Initial...[[id number]]  Abnormal            Final result               Serial Troponin, 2 Hour ...[[id number]]  Abnormal            Final result                 Please view results for these tests on the individual orders.     XR chest 2 views   Final Result   No focal infiltrate or pneumothorax.        MACRO:   None.        Signed by: Jovan Schaeffer 5/8/2024  "2:27 PM   Dictation workstation:   [id number]        EMERGENCY DEPARTMENT COURSE and DIFFERENTIAL DIAGNOSIS/MDM:  Patient is a 73-year-old male presenting to the emergency department for evaluation of A-fib with RVR.  On physical exam vital signs remarkable for tachycardia but otherwise stable patient is in no acute distress.  He is in A-fib with RVR.  Cardiac workup initiated and patient was given a dose of metoprolol.  After some discussion with attending physician we decided to place the patient on a diltiazem drip.  Patient's heart rate is steadily decreasing on the diltiazem drip and now ranging from 115 bpm to 100 bpm.  Initial troponin 126 and repeat troponin 163.  These levels are likely elevated due to the patient being in A-fib with RVR.  CMP showed no electrolyte abnormalities.  Magnesium normal.  CBC showed no leukocytosis or anemia needing blood transfusion.  Chest x-ray showed no local infiltrate or pneumothorax.  Due to patient's elevated troponin and consistent A-fib with RVR I feel he warrants admission for further cardiac workup and A-fib management.  I spoke with hospitalist  who agreed with admission.     Patient had a high probability of life-threatening deterioration due to injuries or symptoms concerning for A-fib with RVR requiring my direct attention, intervention and management. I spent 32 minutes of critical care time with this patient involving bedside care, chart review, interpreting labs and diagnostics, and consultations.  Excluding separately billable procedures.      Vitals:    Vitals:    05/08/24 1323 05/08/24 1534 05/08/24 1657   BP: 137/76 106/67 (!) 126/95   BP Location: Left arm     Patient Position: Sitting     Pulse: (!) 127 (!) 111 (!) 115   Resp: 18 18 20   Temp: 36.5 °C (97.7 °F)     TempSrc: Oral     SpO2: 96% 96% 96%   Weight: 95.1 kg (209 lb 10.5 oz)     Height: 1.727 m (5' 8\")       History Limited by:    None    Independent history obtained " from:    None      External records reviewed:    Outpatient Note previous note from cardiologist on 3/21/2024      Diagnostics interpreted by me:    Xrays - see my independent interpretation in MDM    1. Coronary artery disease, status post PCI to the distal RCA 09/10/2019. This patient originally was identified as having low-grade CAD by cardiac catheterization 3/2003 [facility]. He did have a nuclear stress test performed in 1/2009 and again on 11/13/2015 negative for evidence of ischemia. He presented to [facility] system 9/9/ 2019 with central chest discomfort and shortness of breath. It was detected that he was in atrial fibrillation with RVR. He had EKG and enzymatic evidence of a NSTEMI. He had cardiac cath September 10, 2019 with Dr. Mynor Hurst which showed single-vessel CAD in the distal third of the distal right coronary artery about 90% stenosis and distal to the origin of the PDA but prior to the origin of the trifurcating post inferior lateral LV branch. He had successful distal RCA stenting September 10, 2019 with Dr. Aniceto Richardson at [facility]. He will continue metoprolol succinate to 100 mg daily, asa 81 mg daily. Now on metoprolol 25 mg daily. ECG done prior shows sinus tachycardia with PACs RBBB, 139 bpm. Will increase metoprolol to 100 mg daily. ECG done today shows sinus bradycardia.  The patient will be instructed that he should have a repeat pharmacological nuclear stress test performed if lumbar surgery is actually scheduled in the future.     2. Paroxysmal atrial fibrillation. Please see previous office notes for review. This patient was recently admitted to [facility] from 04/15/2018 to 04/18/2018 with pleuritic-type chest pain along with cough and shortness of breath. The patient was identified by chest x-ray to have a left lower lobe infiltrate suggestive of pneumonia associated with pleuritis. He was also detected to have new onset or recurrent atrial  fibrillation with a rapid ventricular rate. The patient was initially placed on an IV Cardizem infusion but ultimately was converted to metoprolol. The patient's ventricular rate remained rapid despite the initiation of metoprolol and as such amiodarone 200 mg daily was added. At the time of discharge on 04/18/2018 he was still in atrial fibrillation with a ventricular rate in the range of 110/m. He did have an echocardiogram performed during the admission on 04/16/2018 that demonstrated an LV ejection fraction of 60-64% with mild left atrial enlargement and trace to mild mitral valve regurgitation. At the time of his next follow-up visit it was found that by both EKG and examination he was back in sinus rhythm with sinus bradycardia. For now he will remain on the amiodarone 200 mg daily plus without will reduce metoprolol by switching metoprolol tartrate 50 mg twice a day to metoprolol succinate 50 mg daily. He will remain on Xarelto 20 mg daily. He presented to [facility] September 9, 2019 with central chest discomfort and shortness of breath. It was detected that he was in atrial fibrillation with RVR. He converted to sinus rhythm with IV diltiazem infusion. Echocardiogram done at the time showed EF 60-64%, left atrial size mildly dilated, trace MR, trivial to mild TR. He is now back on on amiodarone 200 mg daily as well as Xarelto 20 mg daily which had been discontinued prior to the readmission. Had afib with RVR and failed DCCV. He saw Dr Nassar and had RFA 10/2/2020. ECG today shows likely aflutter. Will plan for DCCV next week. See Dr Nassar in follow up. Had DCCV 11/12/2020. Went out of rhythm again for a brief period 11/2021. Went out of rhythm for 4 days 01/2022. Patient had RFA done 05/24/2022 with Dr Nassar. ECG done prior shows NSR, RBBB.  The patient is no longer on amiodarone which was discontinued last visit.  He will remain on Xarelto anticoagulation.  He will be stopping 3 days before  any type of back injection or lumbar surgery.  Patient will return in 8 weeks for follow-up.  The patient currently remains in sinus rhythm.  He underwent preadmission testing on 2/1/2024 for lumbar surgery EKG shows sinus bradycardia at 49/min occasional PVCs nonspecific ST-T abnormality.  Will reduce the dose of Toprol-XL from 100 mg daily to 50 mg daily.     3 Xarelto anticoagulation.  Patient may hold Xarelto and aspirin for 5 days prior to lumbar surgery.     4. Hypertension. Blood pressure is adequately controlled today. He will be continued on amlodipine 5 mg daily and lisinopril 20 mg daily. His dose of metoprolol succinate 25 mg a day will be continued.  His systolic blood pressure is elevated today.  Will begin amlodipine 5 mg daily.  His dose of Toprol-XL as noted above will be reduced from 100 to 50 mg daily to avoid prominent bradycardia.     5. Hyperlipidemia. The patient did have lab work performed on 10/08/2019 including a lipid panel with cholesterol 218  HDL 61 triglyceride 154. These results are continue to be disappointing on his statin agent atorvastatin 80 mg daily. He will be started on supplemental Zetia 10 mg daily. 5/10/2022, Chol 141, LDL 67, HDL 60, trig 70.     6. Sleep apnea. Please see previous office notes. The patient was referred to sleep medicine and was in fact identified as having sleep apnea that was severe. The patient's initial sleep study was done at [facility] on 01/04/2018 and showed severe obstructive sleep apnea and he ultimately had a titration study on 02/22/2018. He was placed on ASV therapy with significant clinical improvement. His compliance has been 74% with no leak.     7. BPH.      8. GERD.     9. Bilateral carpal tunnel release     10. S/P Left hip surgery 2007, right 2021. Plans for LTK 03/2023.     11. S/P Right inguinal hernia repair 4/2014, [facility].     12. Osteoarthritis.      13. Status post left shoulder replacement 11/10/2015  with repeat left shoulder replacement 02/22/2016 because of repetitive dislocation.     14. Lumbar Laminectomy, 10/01/2018 with Dr Cardenas at [facility].  Patient recently has been experiencing and further lower back pain.  His L4-L5 disc is shifted.  He is scheduled for an epidural injection on 12/22/2023 and possible surgery on 2/14/2024.  The patient is in fact scheduled for revision of an L4 laminectomy with excision of synovial cyst the right with robotic pedicle screw and interbody fusion 2/14/2024.  He is cleared from the cardiac standpoint for the procedure may hold Xarelto and aspirin 5 days preoperatively.  Lab work 8/16/2023 included sedimentation rate 4 CRP less than 0.10 hematocrit 36.0 uric acid 5.7 and normal electrolyte panel.     15. Prostate Urolift surgery with Dr Werner, fall 2018.     16. Gen. medical. The patient had lab work on 10/08/2019 including glycohemoglobin 5.2%, TSH 1.38, PSA 1.6 with both CBC and SMA panels being normal.     17. Venous insufficiency.      18. Hx of psoriatic arthritis     19. Hx of covid-19 vaccine #1 and #2.      20. Planned LTK.     Discussions with other clinicians:    Hospitalist/Admitting Team Dr. Flores      Chronic conditions impacting care:    None      Social determinants of health affecting care:    None    Diagnostic tests considered but not performed: None    ED Medications managed:    Medications   dilTIAZem (Cardizem) 125 mg in dextrose 5% 125 mL (1 mg/mL) infusion (premix) (10 mg/hr intravenous Rate/Dose Change 5/8/24 1658)   metoprolol tartrate (Lopressor) injection 5 mg (5 mg intravenous Given 5/8/24 1448)   acetaminophen (Tylenol) tablet 975 mg (975 mg oral Given 5/8/24 1634)         Prescription drugs considered:    None      Procedure  Procedures     Gila Will PA-C  05/08/24 7762

## 2024-05-08 NOTE — H&P
History Of Present Illness  Pro Belle Jr. is a 73 y.o. male presenting with chest pressure and fatigue. States he went to his cardiologists office and confirmed that he was in a-fib with RVR. He was instructed to come to ER for evaluation. States chest pressure resolved. These are similar symptoms to previous episodes with a-fib. Denies shortness of breath, abdominal pain, fevers, chills, recent illness. He was in [place] over the weekend for a  so had increased alcohol intake.      Past Medical History  History reviewed. No pertinent past medical history.    Surgical History  Past Surgical History:   Procedure Laterality Date    BACK SURGERY  2024    l4-5 fusion    HERNIA REPAIR  2014    Inguinal Hernia Repair    JOINT REPLACEMENT Bilateral     hip    JOINT REPLACEMENT Left     knee and shoulder    OTHER SURGICAL HISTORY  10/06/2022    Cystoscopy        Social History  He reports that he has quit smoking. His smoking use included cigarettes. He has never used smokeless tobacco. He reports that he does not currently use alcohol after a past usage of about 2.0 standard drinks of alcohol per week. He reports that he does not use drugs.    Family History  Family History   Problem Relation Name Age of Onset    Cancer Mother      Heart disease Father      Cancer Sister      Coronary artery disease Brother          Allergies  Patient has no known allergies.    Review of Systems   Constitutional:  Positive for fatigue.   HENT: Negative.     Eyes: Negative.    Respiratory: Negative.     Cardiovascular:  Positive for chest pain and palpitations.   Gastrointestinal: Negative.    Endocrine: Negative.    Genitourinary: Negative.    Musculoskeletal: Negative.    Skin: Negative.    Neurological: Negative.    Psychiatric/Behavioral: Negative.          Physical Exam  Constitutional:       Appearance: Normal appearance.   HENT:      Head: Normocephalic and atraumatic.      Mouth/Throat:      Mouth: Mucous  "membranes are moist.      Pharynx: Oropharynx is clear.   Eyes:      Extraocular Movements: Extraocular movements intact.      Conjunctiva/sclera: Conjunctivae normal.      Pupils: Pupils are equal, round, and reactive to light.   Cardiovascular:      Rate and Rhythm: Tachycardia present. Rhythm irregular.      Pulses: Normal pulses.      Heart sounds: Normal heart sounds.   Pulmonary:      Effort: Pulmonary effort is normal.      Breath sounds: Normal breath sounds.   Abdominal:      General: Bowel sounds are normal.      Palpations: Abdomen is soft.      Tenderness: There is no abdominal tenderness.   Musculoskeletal:         General: Normal range of motion.      Cervical back: Normal range of motion and neck supple.   Skin:     General: Skin is warm and dry.      Capillary Refill: Capillary refill takes less than 2 seconds.   Neurological:      General: No focal deficit present.      Mental Status: He is alert and oriented to person, place, and time.   Psychiatric:         Mood and Affect: Mood normal.         Behavior: Behavior normal.          Last Recorded Vitals  Blood pressure 131/81, pulse 74, temperature 36.5 °C (97.7 °F), temperature source Oral, resp. rate 18, height 1.727 m (5' 8\"), weight 95.1 kg (209 lb 10.5 oz), SpO2 96%.    Relevant Results  Results for orders placed or performed during the hospital encounter of 05/08/24 (from the past 24 hour(s))   ECG 12 lead   Result Value Ref Range    Ventricular Rate 133 BPM    Atrial Rate 153 BPM    QRS Duration 128 ms    QT Interval 302 ms    QTC Calculation(Bazett) 449 ms    R Axis -39 degrees    T Axis 1 degrees    QRS Count 22 beats    Q Onset 226 ms    T Offset 377 ms    QTC Fredericia 393 ms   CBC and Auto Differential   Result Value Ref Range    WBC 8.3 4.4 - 11.3 x10*3/uL    nRBC 0.0 0.0 - 0.0 /100 WBCs    RBC 4.05 (L) 4.50 - 5.90 x10*6/uL    Hemoglobin 13.1 (L) 13.5 - 17.5 g/dL    Hematocrit 39.1 (L) 41.0 - 52.0 %    MCV 97 80 - 100 fL    MCH 32.3 " 26.0 - 34.0 pg    MCHC 33.5 32.0 - 36.0 g/dL    RDW 14.1 11.5 - 14.5 %    Platelets 284 150 - 450 x10*3/uL    Neutrophils % 67.1 40.0 - 80.0 %    Immature Granulocytes %, Automated 0.2 0.0 - 0.9 %    Lymphocytes % 20.0 13.0 - 44.0 %    Monocytes % 10.3 2.0 - 10.0 %    Eosinophils % 1.9 0.0 - 6.0 %    Basophils % 0.5 0.0 - 2.0 %    Neutrophils Absolute 5.58 (H) 1.60 - 5.50 x10*3/uL    Immature Granulocytes Absolute, Automated 0.02 0.00 - 0.50 x10*3/uL    Lymphocytes Absolute 1.67 0.80 - 3.00 x10*3/uL    Monocytes Absolute 0.86 (H) 0.05 - 0.80 x10*3/uL    Eosinophils Absolute 0.16 0.00 - 0.40 x10*3/uL    Basophils Absolute 0.04 0.00 - 0.10 x10*3/uL   Comprehensive Metabolic Panel   Result Value Ref Range    Glucose 146 (H) 65 - 99 mg/dL    Sodium 136 133 - 145 mmol/L    Potassium 3.9 3.4 - 5.1 mmol/L    Chloride 101 97 - 107 mmol/L    Bicarbonate 24 24 - 31 mmol/L    Urea Nitrogen 19 8 - 25 mg/dL    Creatinine 1.00 0.40 - 1.60 mg/dL    eGFR 78 >60 mL/min/1.73m*2    Calcium 9.8 8.5 - 10.4 mg/dL    Albumin 3.7 3.5 - 5.0 g/dL    Alkaline Phosphatase 120 35 - 125 U/L    Total Protein 6.6 5.9 - 7.9 g/dL    AST 35 5 - 40 U/L    Bilirubin, Total 0.5 0.1 - 1.2 mg/dL    ALT 19 5 - 40 U/L    Anion Gap 11 <=19 mmol/L   Magnesium   Result Value Ref Range    Magnesium 2.10 1.60 - 3.10 mg/dL   Serial Troponin, Initial ([facility])   Result Value Ref Range    Troponin T, High Sensitivity 126 () <=14 ng/L   Serial Troponin, 2 Hour ([facility])   Result Value Ref Range    Troponin T, High Sensitivity 163 () <=14 ng/L     ECG 12 lead (Clinic Performed)    Result Date: 5/8/2024  Atrial fibrillation with rapid ventricular response with premature ventricular or aberrantly conducted complexes Left axis deviation Right bundle branch block Abnormal ECG When compared with ECG of 21-MAR-2024 10:24, Atrial fibrillation has replaced Sinus rhythm Vent. rate has increased BY  90 BPM QRS axis Shifted left ST now depressed in Lateral leads    XR chest 2  views    Result Date: 5/8/2024  Interpreted By:  Jovan Schaeffer, STUDY: XR CHEST 2 VIEWS  5/8/2024 2:19 pm   INDICATION: Signs/Symptoms:Chest Pain   COMPARISON: 06/28/2023   ACCESSION NUMBER(S): [id number]   ORDERING CLINICIAN: DANIELLE GARY   TECHNIQUE: PA and lateral views of the chest were obtained.   FINDINGS: Cardiac monitoring leads are seen over the chest.  No focal infiltrate, pleural effusion or pneumothorax is identified. The cardiac silhouette is within normal limits for size. Mild-to-moderate discogenic degenerative changes are seen throughout the thoracic spine.       No focal infiltrate or pneumothorax.   MACRO: None.   Signed by: Jovan Schaeffer 5/8/2024 2:27 PM Dictation workstation:   [id number]    ECG 12 lead    Result Date: 5/8/2024  Atrial fibrillation with rapid ventricular response Left axis deviation Right bundle branch block Abnormal ECG When compared with ECG of 21-MAR-2024 10:24, Atrial fibrillation has replaced Sinus rhythm Vent. rate has increased BY  74 BPM T wave inversion now evident in Anterior leads          Assessment/Plan   Principal Problem:    Atrial fibrillation with RVR (Multi)   IV diltiazem    Continue home metoprolol dose    Continue Xarelto    Consult cardiology    Admit to SDU, telemetry   Active Problems:    Atherosclerotic heart disease of native coronary artery with other forms of angina pectoris (CMS-[facility])   Angina resolved    HS troponin elevated.    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     Continue gabapentin     DVT prophylaxis     Xarelto      Jamila Pop, APRN-CNP

## 2024-05-08 NOTE — CARE PLAN
Phoned pt in his ED room at ext [phone], no answer; phoned pts cell phone, no answer- did not leave a message; phoned pts wife Maame at [phone], no answer; did not leave a message.  Pt is here for AFIB RVR

## 2024-05-09 VITALS
OXYGEN SATURATION: 97 % | HEART RATE: 61 BPM | SYSTOLIC BLOOD PRESSURE: 129 MMHG | WEIGHT: 209.66 LBS | TEMPERATURE: 97.9 F | RESPIRATION RATE: 18 BRPM | DIASTOLIC BLOOD PRESSURE: 85 MMHG | HEIGHT: 68 IN | BODY MASS INDEX: 31.78 KG/M2

## 2024-05-09 LAB
ANION GAP SERPL CALC-SCNC: 8 MMOL/L
ATRIAL RATE: 153 BPM
BUN SERPL-MCNC: 18 MG/DL (ref 8–25)
CALCIUM SERPL-MCNC: 9.6 MG/DL (ref 8.5–10.4)
CHLORIDE SERPL-SCNC: 108 MMOL/L (ref 97–107)
CO2 SERPL-SCNC: 24 MMOL/L (ref 24–31)
CREAT SERPL-MCNC: 0.8 MG/DL (ref 0.4–1.6)
EGFRCR SERPLBLD CKD-EPI 2021: >90 ML/MIN/1.73M*2
ERYTHROCYTE [DISTWIDTH] IN BLOOD BY AUTOMATED COUNT: 14.4 % (ref 11.5–14.5)
GLUCOSE SERPL-MCNC: 95 MG/DL (ref 65–99)
HCT VFR BLD AUTO: 37 % (ref 41–52)
HGB BLD-MCNC: 12.1 G/DL (ref 13.5–17.5)
MCH RBC QN AUTO: 32.3 PG (ref 26–34)
MCHC RBC AUTO-ENTMCNC: 32.7 G/DL (ref 32–36)
MCV RBC AUTO: 99 FL (ref 80–100)
NRBC BLD-RTO: 0 /100 WBCS (ref 0–0)
PLATELET # BLD AUTO: 247 X10*3/UL (ref 150–450)
POTASSIUM SERPL-SCNC: 3.7 MMOL/L (ref 3.4–5.1)
Q ONSET: 226 MS
QRS COUNT: 22 BEATS
QRS DURATION: 128 MS
QT INTERVAL: 302 MS
QTC CALCULATION(BAZETT): 449 MS
QTC FREDERICIA: 393 MS
R AXIS: -39 DEGREES
RBC # BLD AUTO: 3.75 X10*6/UL (ref 4.5–5.9)
SODIUM SERPL-SCNC: 140 MMOL/L (ref 133–145)
T AXIS: 1 DEGREES
T OFFSET: 377 MS
VENTRICULAR RATE: 133 BPM
WBC # BLD AUTO: 7.5 X10*3/UL (ref 4.4–11.3)

## 2024-05-09 PROCEDURE — 36415 COLL VENOUS BLD VENIPUNCTURE: CPT | Performed by: NURSE PRACTITIONER

## 2024-05-09 PROCEDURE — 2500000005 HC RX 250 GENERAL PHARMACY W/O HCPCS: Performed by: INTERNAL MEDICINE

## 2024-05-09 PROCEDURE — 2500000006 HC RX 250 W HCPCS SELF ADMINISTERED DRUGS (ALT 637 FOR ALL PAYERS): Mod: MUEWO,MUE | Performed by: NURSE PRACTITIONER

## 2024-05-09 PROCEDURE — 82374 ASSAY BLOOD CARBON DIOXIDE: CPT | Performed by: NURSE PRACTITIONER

## 2024-05-09 PROCEDURE — 2500000001 HC RX 250 WO HCPCS SELF ADMINISTERED DRUGS (ALT 637 FOR MEDICARE OP): Performed by: NURSE PRACTITIONER

## 2024-05-09 PROCEDURE — 85027 COMPLETE CBC AUTOMATED: CPT | Performed by: NURSE PRACTITIONER

## 2024-05-09 PROCEDURE — 2500000001 HC RX 250 WO HCPCS SELF ADMINISTERED DRUGS (ALT 637 FOR MEDICARE OP): Performed by: INTERNAL MEDICINE

## 2024-05-09 PROCEDURE — 99222 1ST HOSP IP/OBS MODERATE 55: CPT | Performed by: INTERNAL MEDICINE

## 2024-05-09 RX ORDER — METOPROLOL SUCCINATE 25 MG/1
75 TABLET, EXTENDED RELEASE ORAL DAILY
Start: 2024-05-10 | End: 2024-06-09

## 2024-05-09 RX ADMIN — GABAPENTIN 600 MG: 600 TABLET, FILM COATED ORAL at 09:28

## 2024-05-09 RX ADMIN — ACETAMINOPHEN 650 MG: 325 TABLET ORAL at 04:32

## 2024-05-09 RX ADMIN — Medication 2 L/MIN: at 00:45

## 2024-05-09 RX ADMIN — LISINOPRIL 40 MG: 40 TABLET ORAL at 09:22

## 2024-05-09 RX ADMIN — PANTOPRAZOLE SODIUM 40 MG: 40 TABLET, DELAYED RELEASE ORAL at 06:45

## 2024-05-09 RX ADMIN — Medication 3 L/MIN: at 08:00

## 2024-05-09 RX ADMIN — TAMSULOSIN HYDROCHLORIDE 0.4 MG: 0.4 CAPSULE ORAL at 09:28

## 2024-05-09 RX ADMIN — Medication 1000 MCG: at 06:45

## 2024-05-09 RX ADMIN — Medication 2000 UNITS: at 09:28

## 2024-05-09 RX ADMIN — ASPIRIN 81 MG CHEWABLE TABLET 81 MG: 81 TABLET CHEWABLE at 09:28

## 2024-05-09 RX ADMIN — FOLIC ACID 1 MG: 1 TABLET ORAL at 09:28

## 2024-05-09 RX ADMIN — AMLODIPINE BESYLATE 5 MG: 5 TABLET ORAL at 09:28

## 2024-05-09 RX ADMIN — METOPROLOL SUCCINATE 75 MG: 50 TABLET, EXTENDED RELEASE ORAL at 09:28

## 2024-05-09 ASSESSMENT — COGNITIVE AND FUNCTIONAL STATUS - GENERAL
DAILY ACTIVITIY SCORE: 24
CLIMB 3 TO 5 STEPS WITH RAILING: A LITTLE
WALKING IN HOSPITAL ROOM: A LITTLE
MOBILITY SCORE: 22

## 2024-05-09 ASSESSMENT — ACTIVITIES OF DAILY LIVING (ADL): LACK_OF_TRANSPORTATION: NO

## 2024-05-09 ASSESSMENT — PAIN SCALES - GENERAL
PAINLEVEL_OUTOF10: 0 - NO PAIN
PAINLEVEL_OUTOF10: 6
PAINLEVEL_OUTOF10: 0 - NO PAIN
PAINLEVEL_OUTOF10: 0 - NO PAIN

## 2024-05-09 ASSESSMENT — PAIN DESCRIPTION - LOCATION: LOCATION: HEAD

## 2024-05-09 ASSESSMENT — PAIN - FUNCTIONAL ASSESSMENT
PAIN_FUNCTIONAL_ASSESSMENT: 0-10
PAIN_FUNCTIONAL_ASSESSMENT: 0-10
PAIN_FUNCTIONAL_ASSESSMENT: FLACC (FACE, LEGS, ACTIVITY, CRY, CONSOLABILITY)
PAIN_FUNCTIONAL_ASSESSMENT: 0-10

## 2024-05-09 NOTE — CARE PLAN
The patient's goals for the shift include     The clinical goals for the shift include remain in [facility]

## 2024-05-09 NOTE — PROGRESS NOTES
05/09/24 1212   Discharge Planning   Living Arrangements Spouse/significant other   Support Systems Spouse/significant other;Family members   Assistance Needed None   Type of Residence Private residence   Number of Stairs to Enter Residence 1   Number of Stairs Within Residence 13   Do you have animals or pets at home? Yes   Type of Animals or Pets Cats   Home or Post Acute Services None   Patient expects to be discharged to: Home   Does the patient need discharge transport arranged? No   Financial Resource Strain   How hard is it for you to pay for the very basics like food, housing, medical care, and heating? Not very   Housing Stability   In the last 12 months, was there a time when you were not able to pay the mortgage or rent on time? N   In the last 12 months, how many places have you lived? 1   In the last 12 months, was there a time when you did not have a steady place to sleep or slept in a shelter (including now)? N   Transportation Needs   In the past 12 months, has lack of transportation kept you from medical appointments or from getting medications? no   In the past 12 months, has lack of transportation kept you from meetings, work, or from getting things needed for daily living? No     Home with no needs at discharge.

## 2024-05-09 NOTE — CONSULTS
Consults  History Of Present Illness:    Pro Belle Jr. is a 73 y.o. male presenting with recurrent atrial fibrillation.    The patient's past medical history includes the followin. Coronary artery disease, status post PCI to the distal RCA 09/10/2019. This patient originally was identified as having low-grade CAD by cardiac catheterization 3/2003 [facility]. He did have a nuclear stress test performed in 2009 and again on 2015 negative for evidence of ischemia. He presented to [facility], [place], operated by [facility] 2019 with central chest discomfort and shortness of breath. It was detected that he was in atrial fibrillation with RVR. He had EKG and enzymatic evidence of a NSTEMI. He had cardiac cath September 10, 2019 with Dr. Mynor Hurst which showed single-vessel CAD in the distal third of the distal right coronary artery about 90% stenosis and distal to the origin of the PDA but prior to the origin of the trifurcating post inferior lateral LV branch. He had successful distal RCA stenting September 10, 2019 with Dr. Aniceto Richardson at [facility]. He will continue metoprolol succinate to 100 mg daily, asa 81 mg daily. Now on metoprolol 25 mg daily. ECG done prior shows sinus tachycardia with PACs RBBB, 139 bpm. Will increase metoprolol to 100 mg daily. ECG done today shows sinus bradycardia.  The patient will be instructed that he should have a repeat pharmacological nuclear stress test performed if lumbar surgery is actually scheduled in the future.     2. Paroxysmal atrial fibrillation. Please see previous office notes for review. This patient was recently admitted to [facility] from 04/15/2018 to 2018 with pleuritic-type chest pain along with cough and shortness of breath. The patient was identified by chest x-ray to have a left lower lobe infiltrate suggestive of pneumonia associated with pleuritis. He was also detected to have new onset or recurrent atrial  fibrillation with a rapid ventricular rate. The patient was initially placed on an IV Cardizem infusion but ultimately was converted to metoprolol. The patient's ventricular rate remained rapid despite the initiation of metoprolol and as such amiodarone 200 mg daily was added. At the time of discharge on 04/18/2018 he was still in atrial fibrillation with a ventricular rate in the range of 110/m. He did have an echocardiogram performed during the admission on 04/16/2018 that demonstrated an LV ejection fraction of 60-64% with mild left atrial enlargement and trace to mild mitral valve regurgitation. At the time of his next follow-up visit it was found that by both EKG and examination he was back in sinus rhythm with sinus bradycardia. For now he will remain on the amiodarone 200 mg daily plus without will reduce metoprolol by switching metoprolol tartrate 50 mg twice a day to metoprolol succinate 50 mg daily. He will remain on Xarelto 20 mg daily. He presented to [facility] September 9, 2019 with central chest discomfort and shortness of breath. It was detected that he was in atrial fibrillation with RVR. He converted to sinus rhythm with IV diltiazem infusion. Echocardiogram done at the time showed EF 60-64%, left atrial size mildly dilated, trace MR, trivial to mild TR. He is now back on on amiodarone 200 mg daily as well as Xarelto 20 mg daily which had been discontinued prior to the readmission. Had afib with RVR and failed DCCV. He saw Dr Nassar and had RFA 10/2/2020. ECG today shows likely aflutter. Will plan for DCCV next week. See Dr Nassar in follow up. Had DCCV 11/12/2020. Went out of rhythm again for a brief period 11/2021. Went out of rhythm for 4 days 01/2022. Patient had RFA done 05/24/2022 with Dr Nassar. ECG done prior shows NSR, RBBB.  The patient is no longer on amiodarone which was discontinued last visit.  He will remain on Xarelto anticoagulation.  He will be stopping 3 days before  any type of back injection or lumbar surgery.  Patient will return in 8 weeks for follow-up.  The patient currently remains in sinus rhythm.  He underwent preadmission testing on 2/1/2024 for lumbar surgery EKG shows sinus bradycardia at 49/min occasional PVCs nonspecific ST-T abnormality.  Will reduce the dose of Toprol-XL from 100 mg daily to 50 mg daily.     3 Xarelto anticoagulation.  Patient may hold Xarelto and aspirin for 5 days prior to lumbar surgery.     4. Hypertension. Blood pressure is adequately controlled today. He will be continued on amlodipine 5 mg daily and lisinopril 20 mg daily. His dose of metoprolol succinate 25 mg a day will be continued.  His systolic blood pressure is elevated today.  Will begin amlodipine 5 mg daily.  His dose of Toprol-XL as noted above will be reduced from 100 to 50 mg daily to avoid prominent bradycardia.     5. Hyperlipidemia. The patient did have lab work performed on 10/08/2019 including a lipid panel with cholesterol 218  HDL 61 triglyceride 154. These results are continue to be disappointing on his statin agent atorvastatin 80 mg daily. He will be started on supplemental Zetia 10 mg daily. 5/10/2022, Chol 141, LDL 67, HDL 60, trig 70.     6. Sleep apnea. Please see previous office notes. The patient was referred to sleep medicine and was in fact identified as having sleep apnea that was severe. The patient's initial sleep study was done at [facility] on 01/04/2018 and showed severe obstructive sleep apnea and he ultimately had a titration study on 02/22/2018. He was placed on ASV therapy with significant clinical improvement. His compliance has been 74% with no leak.     7. BPH.      8. GERD.     9. Bilateral carpal tunnel release     10. S/P Left hip surgery 2007, right 2021. Plans for LTK 03/2023.     11. S/P Right inguinal hernia repair 4/2014, [facility].     12. Osteoarthritis.      13. Status post left shoulder replacement 11/10/2015  with repeat left shoulder replacement 02/22/2016 because of repetitive dislocation.     14. Lumbar Laminectomy, 10/01/2018 with Dr Cardenas at [facility].  Patient recently has been experiencing and further lower back pain.  His L4-L5 disc is shifted.  He is scheduled for an epidural injection on 12/22/2023 and possible surgery on 2/14/2024.  The patient is in fact scheduled for revision of an L4 laminectomy with excision of synovial cyst the right with robotic pedicle screw and interbody fusion 2/14/2024.  He is cleared from the cardiac standpoint for the procedure may hold Xarelto and aspirin 5 days preoperatively.  Lab work 8/16/2023 included sedimentation rate 4 CRP less than 0.10 hematocrit 36.0 uric acid 5.7 and normal electrolyte panel.     15. Prostate Urolift surgery with Dr Werner, fall 2018.     16. Gen. medical. The patient had lab work on 10/08/2019 including glycohemoglobin 5.2%, TSH 1.38, PSA 1.6 with both CBC and SMA panels being normal.     17. Venous insufficiency.      18. Hx of psoriatic arthritis     19. Hx of covid-19 vaccine #1 and #2.      20. Planned LTK.    The patient awoke yesterday morning and felt some vague chest heaviness.  He called the office was instructed to go in to have an EKG performed which confirmed recurrent atrial fibrillation with a rapid ventricular rate.  The patient had been on his eliquis anticoagulation plus aspirin along with the Toprol-XL 50 mg daily.  Evaluation in the emergency room included a CBC with hematocrit 39.1 WBC 8300.  The comprehensive metabolic panel essentially unremarkable other than glucose of 146.  His creatinine was 1.0.  High-sensitivity troponin was 126 repeated at 163.  Magnesium level was 2.10.  The patient in the emergency room was started on IV diltiazem infusion.  Chest x-ray did not demonstrate any evidence of CHF or any focal infiltrate.  Lab work from today includes a CBC hematocrit 37.0 WBC of 7500.  Basic panel unremarkable creatinine  0.8 potassium 3.7.  The patient has converted back to sinus rhythm.             Last Recorded Vitals:  Vitals:    05/09/24 0045 05/09/24 0429 05/09/24 0731 05/09/24 0759   BP:  156/78 152/88    BP Location:  Right arm Right arm    Patient Position:  Lying Lying    Pulse:  70 71    Resp:  20 20    Temp:  36.2 °C (97.2 °F) 36.6 °C (97.9 °F)    TempSrc:  Temporal Temporal    SpO2: 97%  99% 96%   Weight:       Height:           Last Labs:  CBC - 5/9/2024:  6:26 AM  7.5 12.1 247    37.0      CMP - 5/9/2024:  6:26 AM  9.6 6.6 35 --- 0.5   _ 3.7 19 120      PTT - 6/28/2023:  5:47 PM  1.1   11.6 29.9     Hemoglobin A1C   Date/Time Value Ref Range Status   04/11/2023 04:00 PM 5.2 % Final     Comment:          Diagnosis of Diabetes-Adults   Non-Diabetic: < or = 5.6%   Increased risk for developing diabetes: 5.7-6.4%   Diagnostic of diabetes: > or = 6.5%  .       Monitoring of Diabetes                Age (y)     Therapeutic Goal (%)   Adults:          >18           <7.0   Pediatrics:    13-18           <7.5                   7-12           <8.0                   0- 6            7.5-8.5   American Diabetes Association. Diabetes Care 33(S1), Jan 2010.     10/12/2022 09:22 AM 5.2 % Final     Comment:          Diagnosis of Diabetes-Adults   Non-Diabetic: < or = 5.6%   Increased risk for developing diabetes: 5.7-6.4%   Diagnostic of diabetes: > or = 6.5%  .       Monitoring of Diabetes                Age (y)     Therapeutic Goal (%)   Adults:          >18           <7.0   Pediatrics:    13-18           <7.5                   7-12           <8.0                   0- 6            7.5-8.5   American Diabetes Association. Diabetes Care 33(S1), Jan 2010.       LDL Calculated   Date/Time Value Ref Range Status   03/27/2024 08:44 AM 53 <=99 mg/dL Final     Comment:                                 Near   Borderline      AGE      Desirable  Optimal    High     High     Very High     0-19 Y     0 - 109     ---    110-129   >/= 130     ----    " 20-24 Y     0 - 119     ---    120-159   >/= 160     ----      >24 Y     0 -  99   100-129  130-159   160-189     >/=190     07/13/2021 04:27 AM 52 (L) 65 - 130 MG/DL Final   05/27/2021 03:56 PM 86 65 - 130 MG/DL Final   05/12/2020 12:09 PM 60 (L) 65 - 130 MG/DL Final     VLDL   Date/Time Value Ref Range Status   03/27/2024 08:44 AM 12 0 - 40 mg/dL Final   04/11/2023 04:00 PM 23 0 - 40 mg/dL Final   10/12/2022 09:22 AM 14 0 - 40 mg/dL Final   03/26/2021 08:07 AM 30 0 - 40 mg/dL Final      Last I/O:  No intake/output data recorded.    Past Cardiology Tests (Last 3 Years):  EKG:  ECG 12 lead 05/08/2024      ECG 12 lead (Clinic Performed) 05/08/2024      ECG 12 lead (Clinic Performed) 03/21/2024    Echo:  No results found for this or any previous visit from the past 1095 days.    Ejection Fractions:  No results found for: \"EF\"  Cath:  No results found for this or any previous visit from the past 1095 days.    Stress Test:  No results found for this or any previous visit from the past 1095 days.    Cardiac Imaging:  No results found for this or any previous visit from the past 1095 days.      Past Medical History:  He has no past medical history on file.    Past Surgical History:  He has a past surgical history that includes Hernia repair (06/20/2014); Other surgical history (10/06/2022); Back surgery (02/14/2024); Joint replacement (Bilateral); and Joint replacement (Left).      Social History:  He reports that he has quit smoking. His smoking use included cigarettes. He has never used smokeless tobacco. He reports that he does not currently use alcohol after a past usage of about 2.0 standard drinks of alcohol per week. He reports that he does not use drugs.    Family History:  Family History   Problem Relation Name Age of Onset    Cancer Mother      Heart disease Father      Cancer Sister      Coronary artery disease Brother          Allergies:  Patient has no known allergies.    Inpatient Medications:  Scheduled " medications   Medication Dose Route Frequency    amLODIPine  5 mg oral Daily    aspirin  81 mg oral q24h    atorvastatin  80 mg oral Nightly    cholecalciferol  2,000 Units oral Daily    cyanocobalamin  1,000 mcg oral Daily    ezetimibe  10 mg oral Nightly    folic acid  1 mg oral Daily    gabapentin  600 mg oral BID    lisinopril  40 mg oral Daily    metoprolol succinate XL  75 mg oral Daily    oxygen   inhalation Continuous - Inhalation    pantoprazole  40 mg oral Daily before breakfast    rivaroxaban  20 mg oral Daily with evening meal    tamsulosin  0.4 mg oral Daily    tofacitinib ER  11 mg oral Daily     PRN medications   Medication    acetaminophen    ondansetron ODT    Or    ondansetron    polyethylene glycol     Continuous Medications   Medication Dose Last Rate     Outpatient Medications:  Current Outpatient Medications   Medication Instructions    acetaminophen (TYLENOL 8 HOUR) 650 mg, oral, Every 8 hours PRN, Do not crush, chew, or split.    amLODIPine (NORVASC) 5 mg, oral, Daily    aspirin 81 mg, oral, Every 24 hours    atorvastatin (LIPITOR) 80 mg, oral, Nightly    cholecalciferol (Vitamin D-3) 50 MCG (2000 UT) tablet 1 tablet, oral, Daily    cyanocobalamin (VITAMIN B-12) 1,000 mcg, oral, Daily RT    ezetimibe (ZETIA) 10 mg, oral, Nightly    folic acid (FOLVITE) 1 mg, oral, Every 24 hours    gabapentin (NEURONTIN) 600 mg, oral, 2 times daily    lisinopril 40 mg, oral, Daily    metoprolol succinate XL (TOPROL XL) 50 mg, oral, Daily, Do not crush or chew.    omeprazole (PRILOSEC) 20 mg, oral, Daily    tamsulosin (FLOMAX) 0.4 mg, oral, Daily    Xarelto 20 mg, oral, Daily    Xeljanz XR 11 mg, oral, Daily, Do not crush, chew or split. Swallow whole.       Physical Exam:  The patient is a well-appearing slightly overweight early elderly white male sitting in bedside bed awake alert conversant no complaints visiting with wife.  JVP not elevated carotid impulses 2+  Chest fair air movement breath sounds  clear  Cardiac rhythm is regular no premature beats S1-S2 normal no gallop murmur rub  Abdomen soft benign no Passman megaly  No peripheral edema pedal pulses present     Assessment/Plan     5/9: The patient is a 73-year-old white male who does have a history of coronary artery disease with a remote PCI to distal RCA in 9/2019 with his most recent cardiac cath in 9/2019 showing single-vessel disease within the distal RCA measuring 90% stenosis beyond the origin of the PDA but before the point of origin of a trifurcating posterolateral LV branch.  He did have a distal RCA stent on 9/10/2019 at that point.  The patient has had longstanding issues with paroxysmal atrial fibrillation and treated with a variety of combinations of antiarrhythmic therapy including primarily metoprolol and amiodarone.  The patient did have a radiofrequency ablation on 10/2/2020 and again on 5/24/2022.  Following the second radiofrequency ablation procedure and the patient's amiodarone was discontinued.  Patient is currently admitted with recurrent atrial fibrillation with a rapid ventricular rate.  The patient has converted back to sinus rhythm after a brief course of IV diltiazem.  His resting heart rate typically is a 60/min on Toprol-XL 50 mg daily.  The dose will be increased slightly to 75 mg daily.  If the patient has recurring episodes of atrial fibrillation in the future may need to reinstitute amiodarone.  The patient does have a follow-up visit scheduled with electrophysiology in 6/2024 and hold for now defer any thoughts of alternative therapy pending their review.  Patient has been on Xarelto anticoagulation.  The patient's high-sensitivity troponins on admission were slightly elevated at 126 163 218 consistent with probable demand ischemia.  The patient will be scheduled for a pharmacological nuclear stress test as outpatient within the next 1 to 2 months to reassess.    Peripheral IV 05/08/24 20 G Right;Ventral Forearm  (Active)   Site Assessment Clean;Dry;Intact 05/09/24 0900   Dressing Status Clean;Dry;Occlusive 05/09/24 0900   Number of days: 1       Code Status:  DNR and No Intubation    I spent 30 minutes in the professional and overall care of this patient.      Mynor Hurst MD

## 2024-05-09 NOTE — CONSULTS
"Nutrition Assessement Note    Nutrition Assessment    Reason for Assessment: Admission nursing screening    Reason for Hospital Admission:  Pro Belle Jr. is a 73 y.o. male who is admitted for chest pain, atrial fibrillation with RVR. Ready for discharge. Wife at bedside.     History reviewed. No pertinent past medical history.   Past Surgical History:   Procedure Laterality Date    BACK SURGERY  2024    l4-5 fusion    HERNIA REPAIR  2014    Inguinal Hernia Repair    JOINT REPLACEMENT Bilateral     hip    JOINT REPLACEMENT Left     knee and shoulder    OTHER SURGICAL HISTORY  10/06/2022    Cystoscopy     Nutrition History:  Food and Nutrient History: reports having a good appetite. wife cooks at home and does add salt when cooking but states she does not add as much as recipes call for. pt states he was out of town for a  recently - less active and likely eating foods higher in salt.  Energy Intake: Good > 75 %    Anthropometrics:  Ht: 172.7 cm (5' 8\"), Wt: 95.1 kg (209 lb 10.5 oz), BMI: 31.89  IBW/kg (Dietitian Calculated): 70 kg  Percent of IBW: 136 %  Adjusted Body Weight (kg): 76.36 kg    Weight Change:  Daily Weight  24 : 95.1 kg (209 lb 10.5 oz)  24 : 97.5 kg (215 lb)  24 : 97.1 kg (214 lb)  24 : 95.7 kg (211 lb)  24 : 98.3 kg (216 lb 11.2 oz)  23 : 96.2 kg (212 lb)  09/15/23 : 94.8 kg (209 lb)  23 : 93.8 kg (206 lb 12.8 oz)  23 : 91.3 kg (201 lb 5 oz)  23 : 90.7 kg (200 lb)     Weight History / % Weight Change: denies wt changes. usual wt 210#  Significant Weight Loss: No    Nutrition Focused Physical Exam Findings:   Subcutaneous Fat Loss  Orbital Fat Pads: Well nourished (slightly bulging fat pads)  Buccal Fat Pads: Well nourished (full, rounded cheeks)    Muscle Wasting  Temporalis: Well nourished (well-defined muscle)  Pectoralis (Clavicular Region): Well nourished (clavicle not visible)  Deltoid/Trapezius: Well nourished " (rounded appearance at arm, shoulder, neck)    Nutrition Significant Labs:  Lab Results   Component Value Date    WBC 7.5 05/09/2024    HGB 12.1 (L) 05/09/2024    HCT 37.0 (L) 05/09/2024     05/09/2024    CHOL 140 03/27/2024    TRIG 59 03/27/2024    HDL 74.9 03/27/2024    ALT 19 05/08/2024    AST 35 05/08/2024     05/09/2024    K 3.7 05/09/2024     (H) 05/09/2024    CREATININE 0.80 05/09/2024    BUN 18 05/09/2024    CO2 24 05/09/2024    TSH 0.91 03/27/2024    PSA 2.09 04/11/2023    INR 1.1 06/28/2023    HGBA1C 5.2 04/11/2023     Nutrition Specific Medications:  amLODIPine, 5 mg, oral, Daily  aspirin, 81 mg, oral, q24h  atorvastatin, 80 mg, oral, Nightly  cholecalciferol, 2,000 Units, oral, Daily  cyanocobalamin, 1,000 mcg, oral, Daily  ezetimibe, 10 mg, oral, Nightly  folic acid, 1 mg, oral, Daily  gabapentin, 600 mg, oral, BID  lisinopril, 40 mg, oral, Daily  metoprolol succinate XL, 75 mg, oral, Daily  oxygen, , inhalation, Continuous - Inhalation  pantoprazole, 40 mg, oral, Daily before breakfast  rivaroxaban, 20 mg, oral, Daily with evening meal  tamsulosin, 0.4 mg, oral, Daily  tofacitinib ER, 11 mg, oral, Daily      Dietary Orders (From admission, onward)       Start     Ordered    05/08/24 1924  Adult diet Regular  Diet effective now        Question:  Diet type  Answer:  Regular    05/08/24 1923                  Estimated Needs:   Estimated Energy Needs  Total Energy Estimated Needs (kCal): 1906 kCal  Total Estimated Energy Need per Day (kCal/kg): 25 kCal/kg  Method for Estimating Needs: ABW    Estimated Protein Needs  Total Protein Estimated Needs (g): 92 g  Total Protein Estimated Needs (g/kg): 1.2 g/kg  Method for Estimating Needs: ABW    Estimated Fluid Needs  Method for Estimating Needs: 1 ml/kcal        Nutrition Diagnosis   Nutrition Diagnosis:  Malnutrition Diagnosis  Patient has Malnutrition Diagnosis: No    Nutrition Diagnosis  Patient has Nutrition Diagnosis: Yes  Diagnosis  Status (1): New  Nutrition Diagnosis 1: Food and nutrition related knowledge deficit  Related to (1): lack of prior exposure to accurate nutrition related information  As Evidenced by (1): request for education       Nutrition Interventions/Recommendations   Nutrition Interventions and Recommendations:    Nutrition Prescription:  Individualized Nutrition Prescription Provided for : 1906 kcals and 92g protein to be provided via diet    Nutrition Interventions:   Food and/or Nutrient Delivery Interventions  Interventions: Meals and snacks  Meals and Snacks: Mineral-modified diet  Goal: suggest low Na+ diet  Additional Interventions: low Na+ diet reviewed    Education Documentation  Nutrition Care Manual, taught by Nicole Ortez RD, LD at 5/9/2024 11:35 AM.  Learner: Family, Patient  Readiness: Acceptance  Method: Explanation  Response: Verbalizes Understanding  Comment: low Na+ diet             Nutrition Monitoring and Evaluation   Monitoring/Evaluation:   Food/Nutrient Related History Monitoring  Monitoring and Evaluation Plan: Energy intake  Energy Intake: Estimated energy intake  Criteria: pt to consume >/= 75% estimated needs  Additional Plans: pt/family will plan meals within prescribed guidelines       Time Spent/Follow-up:   Follow Up  Time Spent (min): 25 minutes  Last Date of Nutrition Visit: 05/09/24  Nutrition Follow-Up Needed?: 7-10 days  Follow up Comment: 5/16/24

## 2024-05-09 NOTE — DISCHARGE SUMMARY
Discharge Diagnosis  Atrial fibrillation with RVR (Multi)    Issues Requiring Follow-Up  Follow-up with primary cardiologist next week as scheduled.    Discharge Meds     Your medication list        CHANGE how you take these medications        Instructions Last Dose Given Next Dose Due   metoprolol succinate XL 25 mg 24 hr tablet  Commonly known as: Toprol XL  Start taking on: May 10, 2024  What changed:   medication strength  how much to take      Take 3 tablets (75 mg) by mouth once daily. Do not crush or chew.              CONTINUE taking these medications        Instructions Last Dose Given Next Dose Due   acetaminophen 650 mg ER tablet  Commonly known as: Tylenol 8 HOUR           amLODIPine 5 mg tablet  Commonly known as: Norvasc      Take 1 tablet (5 mg) by mouth once daily.       aspirin 81 mg chewable tablet           atorvastatin 80 mg tablet  Commonly known as: Lipitor      TAKE 1 TABLET BY MOUTH AT  BEDTIME       cholecalciferol 50 MCG (2000 UT) tablet  Commonly known as: Vitamin D-3           cyanocobalamin 1,000 mcg tablet  Commonly known as: Vitamin B-12           ezetimibe 10 mg tablet  Commonly known as: Zetia      TAKE 1 TABLET BY MOUTH AT  BEDTIME       folic acid 1 mg tablet  Commonly known as: Folvite           gabapentin 300 mg capsule  Commonly known as: Neurontin      Take 2 capsules (600 mg) by mouth 2 times a day.       lisinopril 40 mg tablet      TAKE 1 TABLET BY MOUTH ONCE DAILY       omeprazole 20 mg DR capsule  Commonly known as: PriLOSEC           tamsulosin 0.4 mg 24 hr capsule  Commonly known as: Flomax      TAKE 1 CAPSULE BY MOUTH DAILY       Xarelto 20 mg tablet  Generic drug: rivaroxaban      Take 1 tablet (20 mg) by mouth once daily.       Xeljanz XR 11 mg tablet extended release 24 hr  Generic drug: tofacitinib ER                     Where to Get Your Medications        Information about where to get these medications is not yet available    Ask your nurse or doctor about these  medications  metoprolol succinate XL 25 mg 24 hr tablet         Test Results Pending At Discharge  Pending Labs       No current pending labs.            Hospital Course   Patient was admitted to medical trauma unit for evaluation and treatment of atrial fibrillation with rapid ventricular response.  He was initially treated with IV diltiazem drip in the emergency department and was continued on the medical unit.  Heart rate control improved and was normal and he was treated just into oral metoprolol at a higher dose.  The patient reports on his most recent cardiology visit 2 weeks ago metoprolol was decreased from 100 mg to 50 mg for bradycardia.  He will be discharged to home on metoprolol 75 mg daily.  He is asymptomatic on discharge with normal vital signs and normal hemodynamic status.  He will follow-up with his primary cardiologist next week as scheduled.    Pertinent Physical Exam At Time of Discharge  Physical Exam  Constitutional:       Appearance: Normal appearance.   HENT:      Head: Normocephalic and atraumatic.      Nose: Nose normal.      Mouth/Throat:      Mouth: Mucous membranes are moist.      Pharynx: Oropharynx is clear.   Eyes:      Extraocular Movements: Extraocular movements intact.      Conjunctiva/sclera: Conjunctivae normal.      Pupils: Pupils are equal, round, and reactive to light.   Cardiovascular:      Rate and Rhythm: Normal rate and regular rhythm.      Pulses: Normal pulses.      Heart sounds: Normal heart sounds.   Pulmonary:      Effort: Pulmonary effort is normal.      Breath sounds: Normal breath sounds.   Abdominal:      General: Abdomen is flat. Bowel sounds are normal.      Palpations: Abdomen is soft.      Tenderness: There is no abdominal tenderness.   Musculoskeletal:         General: Normal range of motion.      Cervical back: Normal range of motion and neck supple.   Skin:     General: Skin is warm and dry.   Neurological:      General: No focal deficit present.       Mental Status: He is alert and oriented to person, place, and time.   Psychiatric:         Mood and Affect: Mood normal.         Behavior: Behavior normal.         Thought Content: Thought content normal.         Outpatient Follow-Up  Future Appointments   Date Time Provider Department [place]   5/13/2024 10:30 AM Анна Graff PT [id number] [facility]   5/16/2024  9:45 AM Lincoln Swanson, PT [id number] [facility]   6/5/2024 11:15 AM Lincoln Swanson, PT [id number] [facility]   6/6/2024  4:15 PM Mynor Hurst MD [id number] [facility]   6/12/2024 10:00 AM Kelechi Nassar MD [id number] [facility]   6/17/2024 11:00 AM Citlaly Shane MD [id number] [facility]   6/24/2024 10:45 AM Ruben [place] MD [email or web address] [facility]   7/11/2024  9:30 AM PHARMACY [facility] CARDIO RESOURCE [id number] [facility]   12/12/2024  1:45 PM Mynor Hurst MD [id number] [facility]   3/24/2025  9:45 AM Ruben [place] MD [email or web address] [facility]     Discharge time is 34 minutes    Jovan Flores MD

## 2024-05-10 ENCOUNTER — DOCUMENTATION (OUTPATIENT)
Dept: PRIMARY CARE | Facility: CLINIC | Age: 74
End: 2024-05-10
Payer: MEDICARE

## 2024-05-13 ENCOUNTER — DOCUMENTATION (OUTPATIENT)
Dept: PHYSICAL THERAPY | Facility: CLINIC | Age: 74
End: 2024-05-13
Payer: MEDICARE

## 2024-05-13 ENCOUNTER — DOCUMENTATION (OUTPATIENT)
Dept: PRIMARY CARE | Facility: CLINIC | Age: 74
End: 2024-05-13
Payer: MEDICARE

## 2024-05-13 ENCOUNTER — APPOINTMENT (OUTPATIENT)
Dept: PHYSICAL THERAPY | Facility: CLINIC | Age: 74
End: 2024-05-13
Payer: MEDICARE

## 2024-05-13 NOTE — PROGRESS NOTES
Physical Therapy                 Therapy Communication Note    Patient Name: Pro Belle Jr.  MRN: [id number]  Today's Date: 5/13/2024     Discipline: Physical Therapy    Missed Visit Reason:  Spoke with pt via phone. Pt recently discharged from hospital, will need cardiology or PCP clearance to begin PT. Pt also rescheduling due to illness.    Missed Time: Cancel

## 2024-05-14 ENCOUNTER — TELEPHONE (OUTPATIENT)
Dept: INTENSIVE CARE | Facility: HOSPITAL | Age: 74
End: 2024-05-14
Payer: MEDICARE

## 2024-05-15 ENCOUNTER — PATIENT OUTREACH (OUTPATIENT)
Dept: PRIMARY CARE | Facility: CLINIC | Age: 74
End: 2024-05-15

## 2024-05-15 ENCOUNTER — APPOINTMENT (OUTPATIENT)
Dept: PHARMACY | Facility: HOSPITAL | Age: 74
End: 2024-05-15
Payer: MEDICARE

## 2024-05-15 ENCOUNTER — OFFICE VISIT (OUTPATIENT)
Dept: PRIMARY CARE | Facility: CLINIC | Age: 74
End: 2024-05-15
Payer: MEDICARE

## 2024-05-15 VITALS
HEART RATE: 55 BPM | BODY MASS INDEX: 32.28 KG/M2 | WEIGHT: 213 LBS | TEMPERATURE: 97.5 F | SYSTOLIC BLOOD PRESSURE: 134 MMHG | OXYGEN SATURATION: 97 % | DIASTOLIC BLOOD PRESSURE: 74 MMHG | HEIGHT: 68 IN

## 2024-05-15 DIAGNOSIS — I48.91 ATRIAL FIBRILLATION WITH RVR (MULTI): Chronic | ICD-10-CM

## 2024-05-15 DIAGNOSIS — Z09 HOSPITAL DISCHARGE FOLLOW-UP: Primary | ICD-10-CM

## 2024-05-15 PROBLEM — G47.31 PRIMARY CENTRAL SLEEP APNEA: Chronic | Status: ACTIVE | Noted: 2024-03-22

## 2024-05-15 PROBLEM — N13.8 BPH WITH OBSTRUCTION/LOWER URINARY TRACT SYMPTOMS: Chronic | Status: ACTIVE | Noted: 2024-03-22

## 2024-05-15 PROBLEM — G62.9 NEUROPATHY: Status: RESOLVED | Noted: 2024-03-22 | Resolved: 2024-05-15

## 2024-05-15 PROBLEM — K21.9 GASTROESOPHAGEAL REFLUX DISEASE: Chronic | Status: ACTIVE | Noted: 2023-06-28

## 2024-05-15 PROBLEM — I83.003: Chronic | Status: ACTIVE | Noted: 2024-03-22

## 2024-05-15 PROBLEM — M06.9 RHEUMATOID ARTHRITIS (MULTI): Chronic | Status: ACTIVE | Noted: 2023-06-28

## 2024-05-15 PROBLEM — N40.1 BPH WITH OBSTRUCTION/LOWER URINARY TRACT SYMPTOMS: Chronic | Status: ACTIVE | Noted: 2024-03-22

## 2024-05-15 PROBLEM — I25.118 ATHEROSCLEROTIC HEART DISEASE OF NATIVE CORONARY ARTERY WITH OTHER FORMS OF ANGINA PECTORIS (CMS-HCC): Chronic | Status: ACTIVE | Noted: 2024-03-22

## 2024-05-15 PROCEDURE — 3078F DIAST BP <80 MM HG: CPT | Performed by: FAMILY MEDICINE

## 2024-05-15 PROCEDURE — 1036F TOBACCO NON-USER: CPT | Performed by: FAMILY MEDICINE

## 2024-05-15 PROCEDURE — 3075F SYST BP GE 130 - 139MM HG: CPT | Performed by: FAMILY MEDICINE

## 2024-05-15 PROCEDURE — 1111F DSCHRG MED/CURRENT MED MERGE: CPT | Performed by: FAMILY MEDICINE

## 2024-05-15 PROCEDURE — 1123F ACP DISCUSS/DSCN MKR DOCD: CPT | Performed by: FAMILY MEDICINE

## 2024-05-15 PROCEDURE — 1126F AMNT PAIN NOTED NONE PRSNT: CPT | Performed by: FAMILY MEDICINE

## 2024-05-15 PROCEDURE — 99495 TRANSJ CARE MGMT MOD F2F 14D: CPT | Performed by: FAMILY MEDICINE

## 2024-05-15 PROCEDURE — 1160F RVW MEDS BY RX/DR IN RCRD: CPT | Performed by: FAMILY MEDICINE

## 2024-05-15 PROCEDURE — 1159F MED LIST DOCD IN RCRD: CPT | Performed by: FAMILY MEDICINE

## 2024-05-15 ASSESSMENT — ENCOUNTER SYMPTOMS
HYPERTENSION: 1
PALPITATIONS: 0
ABDOMINAL DISTENTION: 0
LOSS OF SENSATION IN FEET: 0
DEPRESSION: 0
COUGH: 0
OCCASIONAL FEELINGS OF UNSTEADINESS: 0
SHORTNESS OF BREATH: 0

## 2024-05-15 ASSESSMENT — COLUMBIA-SUICIDE SEVERITY RATING SCALE - C-SSRS
6. HAVE YOU EVER DONE ANYTHING, STARTED TO DO ANYTHING, OR PREPARED TO DO ANYTHING TO END YOUR LIFE?: NO
1. IN THE PAST MONTH, HAVE YOU WISHED YOU WERE DEAD OR WISHED YOU COULD GO TO SLEEP AND NOT WAKE UP?: NO
2. HAVE YOU ACTUALLY HAD ANY THOUGHTS OF KILLING YOURSELF?: NO

## 2024-05-15 ASSESSMENT — PAIN SCALES - GENERAL: PAINLEVEL: 0-NO PAIN

## 2024-05-15 NOTE — PROGRESS NOTES
"Subjective   Patient ID: Pro Belle Jr. is a 73 y.o. male who presents for medical clearance, Hypertension, and Atrial Fibrillation.    Hypertension  Pertinent negatives include no chest pain, palpitations or shortness of breath.   Atrial Fibrillation  Symptoms are negative for chest pain, palpitations and shortness of breath. Past medical history includes atrial fibrillation.      Hospital discharge follow-up  Admission date 5/8/2024  Discharge date 5/9/2024  TCM note noted   Patient was admitted to medical trauma unit for evaluation and treatment of atrial fibrillation with rapid ventricular response.  He was initially treated with IV diltiazem drip in the emergency department and was continued on the medical unit.  Heart rate control improved and was normal and he was treated just into oral metoprolol at a higher dose.  The patient reports on his most recent cardiology visit 2 weeks ago metoprolol was decreased from 100 mg to 50 mg for bradycardia.  He will be discharged to home on metoprolol 75 mg daily.  He is asymptomatic on discharge with normal vital signs and normal hemodynamic status.  He will follow-up with his primary cardiologist next week as scheduled.   Doing well with no concerns  Denies chest pain shortness of breath palpitations or fatigue symptoms    Needs clearance for PT to be started  Review of Systems   Respiratory:  Negative for cough and shortness of breath.    Cardiovascular:  Negative for chest pain and palpitations.   Gastrointestinal:  Negative for abdominal distention.       Objective   /74   Pulse 55   Temp 36.4 °C (97.5 °F) (Temporal)   Ht 1.727 m (5' 8\")   Wt 96.6 kg (213 lb)   SpO2 97%   BMI 32.39 kg/m²     Physical Exam  Vitals reviewed.   Constitutional:       Appearance: Normal appearance.   Cardiovascular:      Rate and Rhythm: Normal rate and regular rhythm.      Heart sounds: Normal heart sounds. No murmur heard.  Pulmonary:      Breath sounds: Normal breath " sounds.   Abdominal:      General: Abdomen is flat. Bowel sounds are normal.      Palpations: Abdomen is soft.   Musculoskeletal:         General: No swelling.   Neurological:      Mental Status: He is alert.         Assessment/Plan   Diagnoses and all orders for this visit:  Hospital discharge follow-up  Atrial fibrillation with RVR (Multi)    Continue with 75 mg of metoprolol  Follow-up with cardiology as directed  There is talk about that he may need another ablation  Note given for him to start physical therapy with no restrictions  All questions were answered

## 2024-05-16 ENCOUNTER — TREATMENT (OUTPATIENT)
Dept: PHYSICAL THERAPY | Facility: CLINIC | Age: 74
End: 2024-05-16

## 2024-05-16 DIAGNOSIS — G56.00 CARPAL TUNNEL SYNDROME: ICD-10-CM

## 2024-05-16 DIAGNOSIS — G56.10 MEDIAN NEUROPATHY: Primary | ICD-10-CM

## 2024-05-16 DIAGNOSIS — M54.12 CERVICAL RADICULOPATHY: ICD-10-CM

## 2024-05-16 PROCEDURE — 4200000004 HC PT PHASE II 15 MIN CHG: Mod: GP

## 2024-05-16 ASSESSMENT — PAIN SCALES - GENERAL: PAINLEVEL_OUTOF10: 0 - NO PAIN

## 2024-05-16 ASSESSMENT — PAIN - FUNCTIONAL ASSESSMENT: PAIN_FUNCTIONAL_ASSESSMENT: 0-10

## 2024-05-16 NOTE — PROGRESS NOTES
Physical Therapy Note    Patient Name: Pro Belle Jr.  MRN: [id number]  PT Received On: 05/16/24  Time Calculation  Start Time: 0945  Stop Time: 1005  Time Calculation (min): 20 min  Phase II and Package Pay  Phase II 15 minute: 15  Visit: 3/6  Current Problem:   No diagnosis found.    Precautions:        Subjective:  Pt states he was hospitalized last week due to A-fib. Had medications adjusted. Continues to work on his hand stimulation using his estim at home. Feels like his thumb strength is better but his thumb adduction is still limited.    Phase II service:   Dry Needling    Appropriateness for service:   Discharged from Therapy in the past 30 days  Medical History and Health Screening Form Reviewed  Phase II Service is Appropriate and Safe for Patient to Receive    Objective: Mini-Evaluation/Treatment:       Objective:     R lateral and anterior forearm atrophy compared to L     strength: R: 33# L: 36#  Pinch strength: R: 1# L: 18.5#    R 2nd PIP trigger finger    R hand feels cold to touch    Treatment:     Dry needling following informed consent: with e-stim; 100Hz, mA to tolerance, applied to R superficial deep and radial nerve points, median nerve points, adductor pollicis, abductor pollicis brevis, extensor pollicis, opponens pollicis, extensor digitorum, and flexor carpi radialis , for 15 minutes. Pt seated.     Assessment:   Completed median nerve needling protocol to help stimulate median nerve innervation as well as muscular needling into digits 1-2 to improve his hand function. Pt to supplement dry needling treatment with his previously prescribed hand therapy exercises and using his home estim unit.     Rehab potential:   fair    Plan: 6 dry needling visits

## 2024-06-05 ENCOUNTER — TREATMENT (OUTPATIENT)
Dept: PHYSICAL THERAPY | Facility: CLINIC | Age: 74
End: 2024-06-05

## 2024-06-05 DIAGNOSIS — G56.01 CARPAL TUNNEL SYNDROME OF RIGHT WRIST: ICD-10-CM

## 2024-06-05 DIAGNOSIS — G56.11 RIGHT MEDIAN NERVE NEUROPATHY: Primary | ICD-10-CM

## 2024-06-05 PROCEDURE — 4200000004 HC PT PHASE II 15 MIN CHG: Mod: GP

## 2024-06-05 NOTE — PROGRESS NOTES
Physical Therapy Note    Patient Name: Pro Belle Jr.  MRN: [id number]     Time Calculation  Start Time: 0900  Stop Time: 0915  Time Calculation (min): 15 min  Phase II and Package Pay  Phase II 15 minute: 15  Visit: 4/6  Current Problem:   1. Right median nerve neuropathy        2. Carpal tunnel syndrome of right wrist            Precautions:        Subjective:  Continues to find benefit from physical therapy and dry needling. Feels like his hand, especially near thumb, is getting a little stronger. Still has difficulty moving his fingers, not sure how much better this will get.     Phase II service:   Dry Needling    Appropriateness for service:   Discharged from Therapy in the past 30 days  Medical History and Health Screening Form Reviewed  Phase II Service is Appropriate and Safe for Patient to Receive    Objective: Mini-Evaluation/Treatment:       Objective:     R lateral and anterior forearm atrophy compared to L     strength: R: 33# L: 36#  Pinch strength: R: 1# L: 18.5#    R 2nd PIP trigger finger    R hand feels cold to touch    Treatment:   Dry needling (no stim): applied to R superficial deep and radial nerve points, median nerve points, adductor pollicis, abductor pollicis brevis, extensor pollicis, opponens pollicis, flexor carpi radialis x 15 min. Pt seated.    Assessment:   Continue with median nerve needling protocol to help stimulate median nerve innervation as well as muscular needling into digits 1-2 to improve his hand function. Pt to supplement dry needling treatment with his previously prescribed hand therapy exercises and using his home estim unit.     Rehab potential:   fair    Plan: 6 dry needling visits

## 2024-06-06 ENCOUNTER — APPOINTMENT (OUTPATIENT)
Dept: CARDIOLOGY | Facility: CLINIC | Age: 74
End: 2024-06-06
Payer: MEDICARE

## 2024-06-06 ENCOUNTER — OFFICE VISIT (OUTPATIENT)
Dept: CARDIOLOGY | Facility: CLINIC | Age: 74
End: 2024-06-06
Payer: MEDICARE

## 2024-06-06 VITALS
BODY MASS INDEX: 32.23 KG/M2 | WEIGHT: 212 LBS | HEART RATE: 58 BPM | DIASTOLIC BLOOD PRESSURE: 80 MMHG | OXYGEN SATURATION: 97 % | SYSTOLIC BLOOD PRESSURE: 149 MMHG

## 2024-06-06 DIAGNOSIS — I48.91 ATRIAL FIBRILLATION WITH RVR (MULTI): Primary | Chronic | ICD-10-CM

## 2024-06-06 LAB
ATRIAL RATE: 56 BPM
P AXIS: 22 DEGREES
P OFFSET: 200 MS
P ONSET: 126 MS
PR INTERVAL: 192 MS
Q ONSET: 222 MS
QRS COUNT: 10 BEATS
QRS DURATION: 142 MS
QT INTERVAL: 466 MS
QTC CALCULATION(BAZETT): 449 MS
QTC FREDERICIA: 455 MS
R AXIS: -21 DEGREES
T AXIS: -3 DEGREES
T OFFSET: 455 MS
VENTRICULAR RATE: 56 BPM

## 2024-06-06 PROCEDURE — 99214 OFFICE O/P EST MOD 30 MIN: CPT | Performed by: INTERNAL MEDICINE

## 2024-06-06 PROCEDURE — 1111F DSCHRG MED/CURRENT MED MERGE: CPT | Performed by: INTERNAL MEDICINE

## 2024-06-06 PROCEDURE — 93010 ELECTROCARDIOGRAM REPORT: CPT | Performed by: INTERNAL MEDICINE

## 2024-06-06 PROCEDURE — 1123F ACP DISCUSS/DSCN MKR DOCD: CPT | Performed by: INTERNAL MEDICINE

## 2024-06-06 PROCEDURE — 3079F DIAST BP 80-89 MM HG: CPT | Performed by: INTERNAL MEDICINE

## 2024-06-06 PROCEDURE — 3077F SYST BP >= 140 MM HG: CPT | Performed by: INTERNAL MEDICINE

## 2024-06-06 PROCEDURE — 1159F MED LIST DOCD IN RCRD: CPT | Performed by: INTERNAL MEDICINE

## 2024-06-06 PROCEDURE — 93005 ELECTROCARDIOGRAM TRACING: CPT | Performed by: INTERNAL MEDICINE

## 2024-06-06 ASSESSMENT — ENCOUNTER SYMPTOMS
DEPRESSION: 0
LOSS OF SENSATION IN FEET: 0
OCCASIONAL FEELINGS OF UNSTEADINESS: 0

## 2024-06-10 ENCOUNTER — EVALUATION (OUTPATIENT)
Dept: PHYSICAL THERAPY | Facility: CLINIC | Age: 74
End: 2024-06-10
Payer: MEDICARE

## 2024-06-10 DIAGNOSIS — R53.1 WEAKNESS: Chronic | ICD-10-CM

## 2024-06-10 PROCEDURE — 97161 PT EVAL LOW COMPLEX 20 MIN: CPT | Mod: GP

## 2024-06-10 ASSESSMENT — ENCOUNTER SYMPTOMS
LOSS OF SENSATION IN FEET: 0
OCCASIONAL FEELINGS OF UNSTEADINESS: 1
DEPRESSION: 0

## 2024-06-10 ASSESSMENT — PAIN - FUNCTIONAL ASSESSMENT: PAIN_FUNCTIONAL_ASSESSMENT: 0-10

## 2024-06-10 ASSESSMENT — PAIN SCALES - GENERAL: PAINLEVEL_OUTOF10: 0 - NO PAIN

## 2024-06-10 NOTE — PROGRESS NOTES
Physical Therapy    Physical Therapy Evaluation     Patient Name: Pro Belle Jr.  MRN: [id number]  Today's Date: 6/10/2024  Time Calculation  Start Time: 0900  Stop Time: 0940  Time Calculation (min): 40 min    Assessment: Pt is a 75 yo male who presents to PT with chief complaint of BLE weakness, along with back pain/stiffness. Pt presents with impaired posture, impaired mobility, generalized weakness of BLE/core,and impaired balance. Pt may benefit from PT services at this time for strengthening, mobility training, and balance training to reach pt's goals, decrease risk of falls, and improve overall functional mobility.  PT Assessment  PT Assessment Results: Decreased strength, Decreased endurance, Impaired balance, Decreased mobility, Pain  Rehab Prognosis: Good  Evaluation/Treatment Tolerance: Patient tolerated treatment well  Medical Staff Made Aware: Yes  Strengths: Ability to acquire knowledge, Physical health, Rehab experience  Barriers to Participation: Housing layout     Plan:  OP PT Plan  Treatment/Interventions: Education/ Instruction, Electrical stimulation, Gait training, Hot pack, Manual therapy, Therapeutic exercises, Therapeutic activities  PT Plan: Skilled PT  PT Frequency: 2 times per week  Duration: 5 weeks then re-check  Onset Date: 04/26/24  Certification Period Start Date: 06/10/24  Certification Period End Date: 09/10/24  Rehab Potential: Good  Plan of Care Agreement: Patient    Current Problem:   1. Weakness  Referral to Physical Therapy    Follow Up In Physical Therapy          Subjective    General: Pt has had issues in the past that have slowed pt down. Ablation, at the end he was bleeding internally in RLE (2 years ago). Spent the next 6 months trying to recover from that surgery. Had to see wound doctor for RLE wounds. Pt was then prepped for L knee replacement. Pt was sent to therapy and at the end of 3rd session pt could hardly walk due to low back pain. L knee replacement was a  "little over a year ago and recovered well from that surgery. Pt reports a cyst was on a nerve of R side (hip), after imaging Feb 14, 2024 \"they put two plates in my back and cleaned the nerve with cyst\", which mostly rid of back pain.     Pt states his lower back is stiff and tired when he gets up in the morning. Goes into thighs. As morning goes on slightly improves. Once it is afternoon it is tolerable. His legs have gotten so weak that he now needs to walk with a cane. Pt states he is limited by back pain/stiffness and BLE weakness.     Low back pain is across entire low back.   Lowest pain level: 0/10  Highest pain level: 6/10    Increases pain: when you wake up in the morning, weakness/as day progresses  Decreases pain: Tylenol     Goals: I want to be comfortable on stairs, wants to walk without a cane, stand up straight with normal stride    Pt states his balance is off, has not fallen in last 6 months but \"everybody tells me I am a fall risk\"      General  Reason for Referral: Weakness  Referred By: Dr. Ruben [place]  Past Medical History Relevant to Rehab: PMH: a fib, high BP, ablasion, psoriatic arthritis, back surrgery, L TKA, Bilateral hip replacements, L shoulder replacement  Precautions:  Precautions  STEADI Fall Risk Score (The score of 4 or more indicates an increased risk of falling): 6  Post-Surgical Precautions:  (Back surgery 3/14/2024, no current restrictions per pt)  Precautions Comment: Pt has been cleared by PCP to complete PT following hospitalization of a fib       Pain:  Pain Assessment  Pain Assessment: 0-10  Pain Score: 0 - No pain  Home Living:  Home Living  Home Living Comment: Lives with wife. 2 AMANDA, 12 stairs to second floor with HR  Prior Level of Function:  Prior Function Per Pt/Caregiver Report  Level of [place]: Independent with ADLs and functional transfers, Independent with homemaking with ambulation  Prior Function Comments: Walking    Objective        General " Assessments:  Posture Comment: R shoulder elevated, L hip shift, LLE knee flexion, L foot eversion, forward head, decreased lumbar lordosis    Romberg Test: 2/6     Functional Assessments:  Gait Comment: Cane use RUE, R lateral lean, decreased WBing LLE  ,      , Stairs Comment: HR use, straight cane use, reciporocal pattern, increased time required, decreased WBing LLE with descending  ,    , and Transfers Comment: mod I, increased time required, use of BUE's required      Functional Rating Scale  LEFS   /80: 40            Lumbar AROM  Lumbar flexion: (60°): Mod limitation (compensates with bilateral knee flexion)  Lumbar extension (25°): Mod limitation  Lumbar rotation right (30°): Mod limitation (P!)  Lumbar rotation left (30°): Min limitation (P!)  Lumbar sidebend right (25°): Max limitation (P!)  Lumbar sidebend left (25°): Max limitation       Specific Lower Extremity MMT  Specific Lower Extremity MMT WFL:  (Glut bridge: able to clear plinth)  R Iliopsoas: (5/5): 4/5  L Iliopsoas: (5/5): 4/5  R knee flexion: (5/5): 4-/5  L knee flexion: (5/5): 4-/5  R knee extension: (5/5): 4-/5  L knee extension: (5/5): 4-/5  R hip abduction: 3/5  L hip abduction: 3/5  R hip adduction: 3+/5  L hip adduction: 3+/5              EDUCATION:  Outpatient Education  Individual(s) Educated: Patient  Education Provided: Body Mechanics, Fall Risk, Home Exercise Program, POC  Risk and Benefits Discussed with Patient/Caregiver/Other: yes  Patient/Caregiver Demonstrated Understanding: yes  Plan of Care Discussed and Agreed Upon: yes  Patient Response to Education: Patient/Caregiver Verbalized Understanding of Information, Patient/Caregiver Performed Return Demonstration of Exercises/Activities, Patient/Caregiver Asked Appropriate Questions    Goals:  Active       Balance       STG - Pt will improve Romberg test score to east least 4/6       Start:  06/10/24    Expected End:  09/10/24       I            Mobility       STG - Patient will  "ambulate without an assistive device independently without LOB       Start:  06/10/24    Expected End:  09/10/24            STG - Patient will ascend and descend 12 stairs with increased alexander and improved eccentric control/strength       Start:  06/10/24    Expected End:  09/10/24            Pt will improve \"LEFS\" score to 50 for increased independence and ease with ADL/IADL's, skills, and work/leisure activities.        Start:  06/10/24    Expected End:  09/10/24            Pt will increase BLE strength by at least 1 mm grade to participate in ADL, IADL, work, and leisure skills.        Start:  06/10/24    Expected End:  09/10/24                      "

## 2024-06-11 ENCOUNTER — PATIENT OUTREACH (OUTPATIENT)
Dept: PRIMARY CARE | Facility: CLINIC | Age: 74
End: 2024-06-11
Payer: MEDICARE

## 2024-06-11 DIAGNOSIS — M06.9 RHEUMATOID ARTHRITIS, INVOLVING UNSPECIFIED SITE, UNSPECIFIED WHETHER RHEUMATOID FACTOR PRESENT (MULTI): ICD-10-CM

## 2024-06-11 DIAGNOSIS — R53.1 WEAKNESS: ICD-10-CM

## 2024-06-11 DIAGNOSIS — I10 BENIGN ESSENTIAL HYPERTENSION: ICD-10-CM

## 2024-06-11 DIAGNOSIS — I48.91 ATRIAL FIBRILLATION WITH RVR (MULTI): ICD-10-CM

## 2024-06-11 NOTE — PROGRESS NOTES
Monthly CCM call with patient.  Patient stated that he started PT yesterday for weakness.  They developed a plan of action and will go from there.  Had cardiology appointment last week, EKG done, said all looked well.  Does have an appointment with rheumatology next week and is going to discuss increased discomfort in hands/wrists.  Blood pressure is checked 'a few times per week', no issues to report.  No refills needed at time of call.  In agreement of future follow up calls.

## 2024-06-12 ENCOUNTER — OFFICE VISIT (OUTPATIENT)
Dept: CARDIOLOGY | Facility: CLINIC | Age: 74
End: 2024-06-12
Payer: MEDICARE

## 2024-06-12 VITALS
RESPIRATION RATE: 16 BRPM | HEART RATE: 56 BPM | BODY MASS INDEX: 32.28 KG/M2 | HEIGHT: 68 IN | OXYGEN SATURATION: 96 % | SYSTOLIC BLOOD PRESSURE: 145 MMHG | WEIGHT: 213 LBS | DIASTOLIC BLOOD PRESSURE: 79 MMHG

## 2024-06-12 DIAGNOSIS — I48.0 PAF (PAROXYSMAL ATRIAL FIBRILLATION) (MULTI): ICD-10-CM

## 2024-06-12 PROCEDURE — 3078F DIAST BP <80 MM HG: CPT | Performed by: NURSE PRACTITIONER

## 2024-06-12 PROCEDURE — 99214 OFFICE O/P EST MOD 30 MIN: CPT | Performed by: NURSE PRACTITIONER

## 2024-06-12 PROCEDURE — 1160F RVW MEDS BY RX/DR IN RCRD: CPT | Performed by: NURSE PRACTITIONER

## 2024-06-12 PROCEDURE — 1159F MED LIST DOCD IN RCRD: CPT | Performed by: NURSE PRACTITIONER

## 2024-06-12 PROCEDURE — 3077F SYST BP >= 140 MM HG: CPT | Performed by: NURSE PRACTITIONER

## 2024-06-12 PROCEDURE — 93010 ELECTROCARDIOGRAM REPORT: CPT | Performed by: INTERNAL MEDICINE

## 2024-06-12 PROCEDURE — 93005 ELECTROCARDIOGRAM TRACING: CPT | Performed by: NURSE PRACTITIONER

## 2024-06-12 PROCEDURE — 1123F ACP DISCUSS/DSCN MKR DOCD: CPT | Performed by: NURSE PRACTITIONER

## 2024-06-12 PROCEDURE — 1036F TOBACCO NON-USER: CPT | Performed by: NURSE PRACTITIONER

## 2024-06-13 LAB
ATRIAL RATE: 58 BPM
P AXIS: 29 DEGREES
P OFFSET: 182 MS
P ONSET: 132 MS
PR INTERVAL: 182 MS
Q ONSET: 223 MS
QRS COUNT: 10 BEATS
QRS DURATION: 142 MS
QT INTERVAL: 474 MS
QTC CALCULATION(BAZETT): 465 MS
QTC FREDERICIA: 468 MS
R AXIS: -15 DEGREES
T AXIS: -13 DEGREES
T OFFSET: 460 MS
VENTRICULAR RATE: 58 BPM

## 2024-06-13 RX ORDER — METOPROLOL SUCCINATE 25 MG/1
75 TABLET, EXTENDED RELEASE ORAL DAILY
Qty: 270 TABLET | Refills: 3 | Status: SHIPPED | OUTPATIENT
Start: 2024-06-13 | End: 2025-06-08

## 2024-06-13 ASSESSMENT — ENCOUNTER SYMPTOMS
ABDOMINAL PAIN: 0
MYALGIAS: 0
FALLS: 0
DOUBLE VISION: 0
IRREGULAR HEARTBEAT: 0
LIGHT-HEADEDNESS: 0
PALPITATIONS: 0
FEVER: 0
DYSPNEA ON EXERTION: 0
HEMOPTYSIS: 0
SHORTNESS OF BREATH: 0
ORTHOPNEA: 0
PND: 0
COUGH: 0
NEAR-SYNCOPE: 0
HEADACHES: 0
NAUSEA: 0
DIARRHEA: 0
DIAPHORESIS: 0
SYNCOPE: 0
DIZZINESS: 0
SNORING: 0
BLURRED VISION: 0
WEAKNESS: 0
VOMITING: 0
SPUTUM PRODUCTION: 0
SORE THROAT: 0

## 2024-06-13 NOTE — PROGRESS NOTES
Subjective   Pro Belle Jr. is a 74 y.o. male.    Chief Complaint:  Follow-up    Pro Belle is a 74 year old with:      1. Coronary artery disease, status post PCI to the distal RCA 09/10/2019.   2. Persistent atrial fibrillation. Index diagnosis at [facility] from 04/15/2018 to 04/18/2018. Discharged with AF on Amiodarone. At the time of his next follow-up visit as an outpatient he was in sinus rhythm. He presented to [facility] system September 9, 2019 in atrial fibrillation with RVR. He converted to sinus rhythm with IV diltiazem infusion. He is on amiodarone 200 mg daily as well as Xarelto 20 mg daily. Recurred with AF. Had successful DCCV at [facility] 07/02/2020. Recurred with AF few days after.  3. Hypertension.  4. Hyperlipidemia.  5. Sleep apnea. The patient's initial sleep study was done at [facility] on 01/04/2018 and showed severe obstructive sleep apnea and he ultimately had a titration study on 02/22/2018. He was placed on ASV therapy with significant clinical improvement. His compliance has been 74% with no leak.  6. BPH.   7. GERD.  8. On 10/02/2020 he had EPS and PVI - his left atrial had some area of scar mainly in the roof (both during AF and NSR mapping) . Post PVI we induced right atrial tachycardia that terminated with RFA at the junction of the IVC and the right atria in the posterior wall. This was a focal tachycardia. He felt dramatically better in NSR. He recurred in November of 2020 with atrial flutter and was cardioverted by Dr Hurst. He did very well but 07/12/2021 presented at [facility] with what sounds to be AF. The episode lasted less than 24 hours and he reverted back into NSR on his own.    Echocardiogram 09/2019 shows EF 60-64%, left atrial size mildly dilated, trace MR, trivial to mild TR     s/p redo PVI and CTI RFA with Dr. Nassar 5/24/2022  Patient unfortunately had a right groin hematoma post procedure that required an overnight stay post  "ablation  ECG 8/24/2022 SB HR 54 bpm, RBBB, left axis deviation QTc 488 ms  ECG 8/23/2023 NSR with occasional PVCs, RBBB, QTc 489 ms    Patient unfortunately had a recurrence of his Afib 5/9/2024.  He went to the ED and he was discharged home on increased metoprolol. ECG 1 week later showed NSR, patient states that he was in Afib a little more than 24 hours.  He denies any symptoms other than some chest tightness and palpitations. He has felt good over the last month.    ECG 6/12/2024 SB with PVCs, RBBB, HR 58 bpm,  ms    /79 (BP Location: Left arm, Patient Position: Sitting)   Pulse 56   Resp 16   Ht 1.727 m (5' 8\")   Wt 96.6 kg (213 lb)   SpO2 96%   BMI 32.39 kg/m²   Current Outpatient Medications on File Prior to Visit   Medication Sig Dispense Refill    acetaminophen (Tylenol 8 HOUR) 650 mg ER tablet Take 1 tablet (650 mg) by mouth every 8 hours if needed for mild pain (1 - 3). Do not crush, chew, or split.      amLODIPine (Norvasc) 5 mg tablet Take 1 tablet (5 mg) by mouth once daily. 30 tablet 11    aspirin 81 mg chewable tablet Chew 1 tablet (81 mg) once every 24 hours.      atorvastatin (Lipitor) 80 mg tablet TAKE 1 TABLET BY MOUTH AT  BEDTIME 90 tablet 3    cholecalciferol (Vitamin D-3) 50 MCG (2000 UT) tablet Take 1 tablet (2,000 Units) by mouth once daily.      cyanocobalamin (Vitamin B-12) 1,000 mcg tablet Take 1 tablet (1,000 mcg) by mouth once daily.      ezetimibe (Zetia) 10 mg tablet TAKE 1 TABLET BY MOUTH AT  BEDTIME 90 tablet 3    folic acid (Folvite) 1 mg tablet Take 1 tablet (1 mg) by mouth once every 24 hours.      gabapentin (Neurontin) 300 mg capsule Take 2 capsules (600 mg) by mouth 2 times a day. 180 capsule 1    lisinopril 40 mg tablet TAKE 1 TABLET BY MOUTH ONCE DAILY 90 tablet 3    omeprazole (PriLOSEC) 20 mg DR capsule Take 1 capsule (20 mg) by mouth once daily.      rivaroxaban (Xarelto) 20 mg tablet Take 1 tablet (20 mg) by mouth once daily. 90 tablet 3    tamsulosin " (Flomax) 0.4 mg 24 hr capsule TAKE 1 CAPSULE BY MOUTH DAILY 90 capsule 3    tofacitinib ER (Xeljanz XR) 11 mg tablet extended release 24 hr Take 1 tablet (11 mg) by mouth once daily. Do not crush, chew or split. Swallow whole.      [DISCONTINUED] metoprolol succinate XL (Toprol XL) 25 mg 24 hr tablet Take 3 tablets (75 mg) by mouth once daily. Do not crush or chew. (Patient taking differently: Take 1 tablet (25 mg) by mouth once daily. Do not crush or chew.)       No current facility-administered medications on file prior to visit.         Review of Systems   Constitutional: Negative for diaphoresis, fever and malaise/fatigue.   HENT:  Negative for congestion and sore throat.    Eyes:  Negative for blurred vision and double vision.   Cardiovascular:  Negative for chest pain, dyspnea on exertion, irregular heartbeat, leg swelling, near-syncope, orthopnea, palpitations, paroxysmal nocturnal dyspnea and syncope.   Respiratory:  Negative for cough, hemoptysis, shortness of breath, snoring and sputum production.    Hematologic/Lymphatic: Negative for bleeding problem.   Skin:  Negative for rash.   Musculoskeletal:  Negative for falls, joint pain and myalgias.   Gastrointestinal:  Negative for abdominal pain, diarrhea, nausea and vomiting.   Neurological:  Negative for dizziness, headaches, light-headedness and weakness.   All other systems reviewed and are negative.      Objective   Constitutional:       Appearance: Healthy appearance. Not in distress.   Eyes:      Conjunctiva/sclera: Conjunctivae normal.   HENT:      Nose: Nose normal.    Mouth/Throat:      Pharynx: Oropharynx is clear.   Pulmonary:      Effort: Pulmonary effort is normal.      Breath sounds: Normal breath sounds. No wheezing. No rhonchi.   Chest:      Chest wall: Not tender to palpatation.   Cardiovascular:      Bradycardia present. Regular rhythm.      Murmurs: There is no murmur.      No rub.   Pulses:     Intact distal pulses.   Edema:     Ankle:  bilateral trace edema of the ankle.     Feet: bilateral trace edema of the feet.  Abdominal:      General: Bowel sounds are normal.      Palpations: Abdomen is soft.   Musculoskeletal: Normal range of motion.      Cervical back: Neck supple. Skin:     General: Skin is warm and dry.   Neurological:      Mental Status: Alert and oriented to person, place and time.      Motor: Motor function is intact.         Lab Review:   Lab Results   Component Value Date     05/09/2024    K 3.7 05/09/2024     (H) 05/09/2024    CO2 24 05/09/2024    BUN 18 05/09/2024    CREATININE 0.80 05/09/2024    GLUCOSE 95 05/09/2024    CALCIUM 9.6 05/09/2024     Lab Results   Component Value Date    WBC 7.5 05/09/2024    HGB 12.1 (L) 05/09/2024    HCT 37.0 (L) 05/09/2024    MCV 99 05/09/2024     05/09/2024       Assessment/Plan   The encounter diagnosis was PAF (paroxysmal atrial fibrillation) (Multi).  Patient presents for follow up after recurrent episode of Afib.  He did say this episode may have happened after some alcohol consumption.   We discussed repeat ablation, adding antiarrhythmic medications, or watching and waiting.  He is not against a repeat ablation, however he would like to wait and see if his arrhythmia gets worse.    We will follow up in about 3 months and consider repeating a monitor  Continue metoprolol 75 mg BID  Continue Xarelto

## 2024-06-14 ENCOUNTER — TREATMENT (OUTPATIENT)
Dept: PHYSICAL THERAPY | Facility: CLINIC | Age: 74
End: 2024-06-14

## 2024-06-14 DIAGNOSIS — G56.01 CARPAL TUNNEL SYNDROME OF RIGHT WRIST: ICD-10-CM

## 2024-06-14 DIAGNOSIS — G56.10 MEDIAN NEUROPATHY: Primary | ICD-10-CM

## 2024-06-14 PROCEDURE — 4200000004 HC PT PHASE II 15 MIN CHG: Mod: GP

## 2024-06-14 ASSESSMENT — PAIN - FUNCTIONAL ASSESSMENT: PAIN_FUNCTIONAL_ASSESSMENT: 0-10

## 2024-06-14 ASSESSMENT — PAIN SCALES - GENERAL: PAINLEVEL_OUTOF10: 0 - NO PAIN

## 2024-06-14 NOTE — PROGRESS NOTES
Physical Therapy Note    Patient Name: Pro Belle Jr.  MRN: [id number]  PT Received On: 06/14/24     Visit: 5/6  Current Problem:   No diagnosis found.    Precautions: A-fib       Subjective:  Pt states he is not sure how much improvement he has seen. R thumb/index finger still weak.     Phase II service:   Dry Needling    Appropriateness for service:   Discharged from Therapy in the past 30 days  Medical History and Health Screening Form Reviewed  Phase II Service is Appropriate and Safe for Patient to Receive    Objective: Mini-Evaluation/Treatment:       Objective:     R lateral and anterior forearm atrophy compared to L     strength: 6/14/24: R: 40#; L: 43#  4/24/24: R:33# L: 36#  Pinch strength: 6/14/24: R: 1#; 4/24/24: 1# L: 18.5#    R 2nd PIP trigger finger    R hand feels cold to touch    Treatment:     Dry needling following informed consent: with e-stim; 100Hz, mA to tolerance, applied to R superficial deep and radial nerve points, median nerve points, adductor pollicis, abductor pollicis brevis, extensor pollicis, opponens pollicis, extensor digitorum, and flexor carpi radialis , for 15 minutes. Pt seated.     Assessment:   Completed median nerve needling protocol to help stimulate median nerve innervation as well as muscular needling into digits 1-2 to improve his hand function. Pt to supplement dry needling treatment with his previously prescribed hand therapy exercises and using his home estim unit.     Rehab potential:   fair    Plan: 6 dry needling visits

## 2024-06-17 ENCOUNTER — APPOINTMENT (OUTPATIENT)
Dept: UROLOGY | Facility: CLINIC | Age: 74
End: 2024-06-17
Payer: MEDICARE

## 2024-06-17 ENCOUNTER — TREATMENT (OUTPATIENT)
Dept: PHYSICAL THERAPY | Facility: CLINIC | Age: 74
End: 2024-06-17
Payer: MEDICARE

## 2024-06-17 DIAGNOSIS — N40.1 BENIGN PROSTATIC HYPERPLASIA WITH NOCTURIA: Primary | ICD-10-CM

## 2024-06-17 DIAGNOSIS — N40.1 BENIGN PROSTATIC HYPERPLASIA WITH LOWER URINARY TRACT SYMPTOMS, SYMPTOM DETAILS UNSPECIFIED: ICD-10-CM

## 2024-06-17 DIAGNOSIS — R35.1 BENIGN PROSTATIC HYPERPLASIA WITH NOCTURIA: Primary | ICD-10-CM

## 2024-06-17 DIAGNOSIS — N30.00 ACUTE CYSTITIS WITHOUT HEMATURIA: ICD-10-CM

## 2024-06-17 DIAGNOSIS — N39.0 URINARY TRACT INFECTION WITHOUT HEMATURIA, SITE UNSPECIFIED: ICD-10-CM

## 2024-06-17 DIAGNOSIS — Z79.2 PROPHYLACTIC ANTIBIOTIC: ICD-10-CM

## 2024-06-17 DIAGNOSIS — R53.1 WEAKNESS: Chronic | ICD-10-CM

## 2024-06-17 LAB
POC APPEARANCE, URINE: ABNORMAL
POC BILIRUBIN, URINE: NEGATIVE
POC BLOOD, URINE: ABNORMAL
POC COLOR, URINE: YELLOW
POC GLUCOSE, URINE: NEGATIVE MG/DL
POC KETONES, URINE: NEGATIVE MG/DL
POC LEUKOCYTES, URINE: ABNORMAL
POC NITRITE,URINE: NEGATIVE
POC PH, URINE: 6.5 PH
POC PROTEIN, URINE: ABNORMAL MG/DL
POC SPECIFIC GRAVITY, URINE: 1.02
POC UROBILINOGEN, URINE: 1 EU/DL

## 2024-06-17 PROCEDURE — 87186 SC STD MICRODIL/AGAR DIL: CPT

## 2024-06-17 PROCEDURE — 99214 OFFICE O/P EST MOD 30 MIN: CPT | Performed by: STUDENT IN AN ORGANIZED HEALTH CARE EDUCATION/TRAINING PROGRAM

## 2024-06-17 PROCEDURE — 87086 URINE CULTURE/COLONY COUNT: CPT

## 2024-06-17 PROCEDURE — 97110 THERAPEUTIC EXERCISES: CPT | Mod: GP,CQ

## 2024-06-17 PROCEDURE — 52000 CYSTOURETHROSCOPY: CPT | Performed by: STUDENT IN AN ORGANIZED HEALTH CARE EDUCATION/TRAINING PROGRAM

## 2024-06-17 RX ORDER — TAMSULOSIN HYDROCHLORIDE 0.4 MG/1
0.8 CAPSULE ORAL DAILY
Qty: 180 CAPSULE | Refills: 3 | Status: SHIPPED | OUTPATIENT
Start: 2024-06-17 | End: 2025-06-17

## 2024-06-17 RX ORDER — LEVOFLOXACIN 500 MG/1
500 TABLET, FILM COATED ORAL DAILY
Qty: 5 TABLET | Refills: 0 | Status: SHIPPED | OUTPATIENT
Start: 2024-06-17 | End: 2024-06-22

## 2024-06-17 RX ORDER — LEVOFLOXACIN 500 MG/1
500 TABLET, FILM COATED ORAL ONCE
Status: COMPLETED | OUTPATIENT
Start: 2024-06-17 | End: 2024-06-17

## 2024-06-17 NOTE — PROGRESS NOTES
"Physical Therapy    Physical Therapy Treatment    Patient Name: Pro Belle Jr.  MRN: [id number]  Today's Date: 6/17/2024  Time Calculation  Start Time: 1515  Stop Time: 1603  Time Calculation (min): 48 min  General:   MEDICARE A&B, A[place], [place], NO AUTH ($ 0 USED PT/ST)  Onset date 4/26/24  Visit#2      Assessment:  Today 1st session since IE.Moderately challenged with exercise performed. Will benefit from PT services at this time for strengthening, mobility training, and balance training to reach pt's goals, decrease risk of falls, and improve overall functional mobility.        Plan:   OP PT Plan  Treatment/Interventions: Education/ Instruction, Electrical stimulation, Gait training, Hot pack, Manual therapy, Therapeutic exercises, Therapeutic activities  PT Plan: Skilled PT  PT Frequency: 2 times per week  Duration: 5 weeks then re-check    Current Problem:   1. Weakness  Follow Up In Physical Therapy          Subjective    No change in status since previous session. No falls. Pt voices he recognizes weakness augie with RLE .  \"The thing that hurt me physically the most was the complications sustained following ablation \"  Goals: I want to be comfortable on stairs, wants to walk without a cane, stand up straight with normal stride       Precautions:   STEADI Fall Risk Score (The score of 4 or more indicates an increased risk of falling): 6  Post-Surgical Precautions:  (Back surgery 3/14/2024, no current restrictions per pt)  Precautions Comment: Pt has been cleared by PCP to complete PT following hospitalization of a fib       Pain:  0/10 sedentary   5/10 with WBing   0/10 end of session    Treatment   Therapeutic exercise 48 minutes  Scifit level 1 , 95 spm, seat 13 , x 8 minutes  TA bracing 15 reps 5\" holds  TA Bracing with iso ball squeeze hip ADduction 15 reps 5\" holds  TA bracing with orange t-band resisted hip Abduction 15 reps   Supine march lifts 15 reps  SLR 15 reps L/R      EDUCATION:   Issued HEP   Access " "Code: [email or web address]  URL: [email or web address]  Date: 06/17/2024  Prepared by: Laura Medley    Exercises  - Supine Hamstring Stretch  - 1-2 x daily - 7 x weekly - 1 sets - 3-6 reps - 10-20\" hold  - Supine March  - 1-2 x daily - 7 x weekly - 1-3 sets - 10 reps  - Supine Straight Leg Raises  - 1-2 x daily - 7 x weekly - 1-3 sets - 10 reps - 2\" hold    Goals:  Active       Balance       STG - Pt will improve Romberg test score to east least 4/6       Start:  06/10/24    Expected End:  09/10/24       I            Mobility       STG - Patient will ambulate without an assistive device independently without LOB       Start:  06/10/24    Expected End:  09/10/24            STG - Patient will ascend and descend 12 stairs with increased alexander and improved eccentric control/strength       Start:  06/10/24    Expected End:  09/10/24            Pt will improve \"LEFS\" score to 50 for increased independence and ease with ADL/IADL's, skills, and work/leisure activities.        Start:  06/10/24    Expected End:  09/10/24            Pt will increase BLE strength by at least 1 mm grade to participate in ADL, IADL, work, and leisure skills.        Start:  06/10/24    Expected End:  09/10/24                      " Additional Support Person

## 2024-06-17 NOTE — PROGRESS NOTES
Patient ID: Pro Belle Jr. is a 74 y.o. male.    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    DISPOSITION:  The patient was discharged home after the procedure, per routine.    INDICATIONS: :  Mr. Belle is a 74 y.o. patient with a history of history of amyloid tissue in the bladder and BPH who presents today for Cystoscopy.     The indications, risks and benefits of this procedure were discussed with the patient, consent was obtained prior to the procedure, and to the best of my judgement the patient seemed to understand and agree to the procedure.    PROCEDURE:  The patient  was brought into the procedure suite and informed consent was reviewed and confirmed. Vital signs were obtained prior to the procedure: There were no vitals taken for this visit..  The patient was escorted onto the stretcher, placed supine, prepped with betadine and draped in the usual standard surgical fashion.  Intraurethral 2% viscous lidocaine jelly was used for local analgesia.  A 16 [place] flexible cystourethroscope was inserted into the urethra.   The penile urethra was normal.  The prostate urethra was normal.  Upon entering the bladder the entire bladder was surveyed in a 360 degree fashion.  The left and right ureteral orifices were in normal orthotopic position effluxing clear yellow urine, bilaterally.   There was no evidence of any bladder lesions, foreign objects, stones or evidence of any mucosal changes. There was mild erythema and turbid urine with some debris in the bladder. The cystoscope was then retroflexed.  The bladder neck was then further examined without any evidence of lesions. The scope was then  removed and in an antegrade fashion, the urethra and bladder were again resurveyed with no evidence of additional lesions.  The cystoscope was then fully removed.    After we were done a 16 Fr Steward was used to drain the bladder.   The patient tolerated the procedure well.  Vitals were stable after the procedure.  The patient was able to void and was discharged home.  Verbal and written Post procedure instructions were reviewed with the patient.    IMPRESSION:  No tumors  R/O UTI    PLAN:  Levofloxacin 500mg daily for 5 days pending urine culture  Make tamsulosin 0.4mg x2 pills at night      Subjective   Patient ID: Pro Blele Jr. is a 74 y.o. male.    HPI  75 yo male F/U BPH, on tamsulosin, s/p UroLift 5 years ago, by Dr. Werner with acceptable results. He is s/p cystoscopy with bladder biopsy 9/16/22, urothelial mucosa with amyloid deposition, on path.     He has increased nocturia at this point. Is affecting his quality of life.     April 2023 PSA 2.09. He presents for cystoscopy in office. ____________________________________________________  09/16/22 Surgical pathology: (s/p cystoscopy, bladder biopsy)  -- UROTHELIAL MUCOSA WITH AMYLOID DEPOSITION. SEE NOTE.  Note: The [place] Red special stain is positive for amyloid deposition in the  lamina propria.    Lab Results   Component Value Date    PSA 2.09 04/11/2023    PSA 2.2 08/30/2021    PSA 1.6 05/27/2021    PSA 1.6 10/08/2019       Review of Systems    Objective   Physical Exam    Assessment/Plan   75 yo male F/U BPH, on tamsulosin, s/p UroLift 5 years ago, by Dr. Werner with acceptable results. He is s/p cystoscopy with bladder biopsy 9/16/22, urothelial mucosa with amyloid deposition, on path.     April 2023 PSA 2.09. He presents for cystoscopy in office. Deferred today. Will order culture. Levaquin started, this will be switched based on culture.     He has increased nocturia at this point. Is affecting his quality of life.     Will increase tamsulosin to  double dose. He agrees with plan.     Plan:  Increase tamsulosin 0.4  mg 2 pills daily at bedtime.  Start Levaquin 500 mg daily for 5 days, and will decide based  on culture.   FUV 2 months.     Diagnoses and all orders for this visit:  Benign prostatic hyperplasia with nocturia  -     Urine Culture  Prophylactic antibiotic  -     levoFLOXacin (Levaquin) tablet 500 mg  Acute cystitis without hematuria  Benign prostatic hyperplasia with lower urinary tract symptoms, symptom details unspecified  -     tamsulosin (Flomax) 0.4 mg 24 hr capsule; Take 2 capsules (0.8 mg) by mouth once daily.  Urinary tract infection without hematuria, site unspecified  -     levoFLOXacin (Levaquin) 500 mg tablet; Take 1 tablet (500 mg) by mouth once daily for 5 days.    Scribe Attestation  By signing my name below, IRadha Scribe attest that this documentation has been prepared under the direction and in the presence of Citlaly Shane MD.

## 2024-06-19 ENCOUNTER — TREATMENT (OUTPATIENT)
Dept: PHYSICAL THERAPY | Facility: CLINIC | Age: 74
End: 2024-06-19
Payer: MEDICARE

## 2024-06-19 DIAGNOSIS — R53.1 WEAKNESS: Chronic | ICD-10-CM

## 2024-06-19 PROCEDURE — 97110 THERAPEUTIC EXERCISES: CPT | Mod: GP,CQ

## 2024-06-19 NOTE — PROGRESS NOTES
"Physical Therapy    Physical Therapy Treatment    Patient Name: Pro Belle Jr.  MRN: [id number]  Today's Date: 6/19/2024  Time Calculation  Start Time: 1315  Stop Time: 1410  Time Calculation (min): 55 min  General:   MEDICARE A&B, [place], NO AUTH ($ 0 USED PT/ST)  Onset date 4/26/24  Visit#3    Current Problem:   1. Weakness  Follow Up In Physical Therapy          Assessment:  Moderately challenged with exercise performed. Will benefit from PT services at this time for strengthening, mobility training, and balance training to reach pt's goals, decrease risk of falls, and improve overall functional mobility.        Plan:   OP PT Plan  Treatment/Interventions: Education/ Instruction, Electrical stimulation, Gait training, Hot pack, Manual therapy, Therapeutic exercises, Therapeutic activities  PT Plan: Skilled PT  PT Frequency: 2 times per week  Duration: 5 weeks then re-check        Subjective    Beginning to feel improving endurance . No falls. Pt voices he recognizes weakness augie with RLE .   Managing uneven ground walking in woods.  Discomfort to what  can be intense with coming to stand from sitting >15 minutes.  Posture adjustment and using cane symptoms will subside   Goals: I want to be comfortable on stairs, wants to walk without a cane, stand up straight with normal stride      Precautions:   STEADI Fall Risk Score (The score of 4 or more indicates an increased risk of falling): 6  Post-Surgical Precautions:  (Back surgery 3/14/2024, no current restrictions per pt)  Precautions Comment: Pt has been cleared by PCP to complete PT following hospitalization of a fib       Pain:  0/10 sedentary   0/10 end of session    Treatment   Therapeutic exercise 55 minutes  Scifit level 1 , 95 spm, seat 13 , x 8 minutes  TA bracing  2 sets 10 reps 5\" holds  Bridges 2 sets of 10  HS stretch with strap support 20\" x 3 L / R  TA Bracing with iso ball squeeze hip Adduction 2 sets of  10 5\" holds  PPT 10 reps 5\" holds   SAQ " "20 reps 3' holds  Side lying clamshell  with orange t-band resisted hip Abduction 2 sets 10 reps   Supine march lifts 10 reps  SLR 15 reps L/R    4\" riser calf stretch 2 rails hand support   4\" riser heel raise 2 sets of 10 2 rails hand support   4\" riser lateral lifts 10 reps L/R 2 hand rail support     EDUCATION:   Issued HEP   Access Code: [email or web address]  URL: [email or web address]  Date: 06/17/2024  Prepared by: Laura Medley    Exercises  - Supine Hamstring Stretch  - 1-2 x daily - 7 x weekly - 1 sets - 3-6 reps - 10-20\" hold  - Supine March  - 1-2 x daily - 7 x weekly - 1-3 sets - 10 reps  - Supine Straight Leg Raises  - 1-2 x daily - 7 x weekly - 1-3 sets - 10 reps - 2\" hold    Goals:  Active       Balance       STG - Pt will improve Romberg test score to east least 4/6       Start:  06/10/24    Expected End:  09/10/24       I            Mobility       STG - Patient will ambulate without an assistive device independently without LOB       Start:  06/10/24    Expected End:  09/10/24            STG - Patient will ascend and descend 12 stairs with increased alexander and improved eccentric control/strength       Start:  06/10/24    Expected End:  09/10/24            Pt will improve \"LEFS\" score to 50 for increased independence and ease with ADL/IADL's, skills, and work/leisure activities.        Start:  06/10/24    Expected End:  09/10/24            Pt will increase BLE strength by at least 1 mm grade to participate in ADL, IADL, work, and leisure skills.        Start:  06/10/24    Expected End:  09/10/24                      "

## 2024-06-20 ENCOUNTER — TREATMENT (OUTPATIENT)
Dept: PHYSICAL THERAPY | Facility: CLINIC | Age: 74
End: 2024-06-20

## 2024-06-20 ENCOUNTER — TELEPHONE (OUTPATIENT)
Dept: UROLOGY | Facility: CLINIC | Age: 74
End: 2024-06-20

## 2024-06-20 DIAGNOSIS — N39.0 URINARY TRACT INFECTION WITHOUT HEMATURIA, SITE UNSPECIFIED: ICD-10-CM

## 2024-06-20 DIAGNOSIS — G56.11 RIGHT MEDIAN NERVE NEUROPATHY: Primary | ICD-10-CM

## 2024-06-20 LAB — BACTERIA UR CULT: ABNORMAL

## 2024-06-20 PROCEDURE — 4200000004 HC PT PHASE II 15 MIN CHG: Mod: GP

## 2024-06-20 NOTE — PROGRESS NOTES
Physical Therapy Note    Patient Name: Pro Belle Jr.  MRN: [id number]     Time Calculation  Start Time: 1105  Stop Time: 1130  Time Calculation (min): 25 min  Phase II and Package Pay  Phase II 15 minute: 20  Visit: 6  Current Problem:   1. Right median nerve neuropathy            Precautions: A-fib       Subjective:  Pt states he is not sure how much improvement he has seen. R thumb/index finger still weak, does feels he can now squeeze some with thumb and middle finger.   Had physician appt this a.m., advised he should be following up with neurology. Dr. Thompson retiring, pt may try new neurologist.    Phase II service:   Dry Needling    Appropriateness for service:   Discharged from Therapy in the past 30 days  Medical History and Health Screening Form Reviewed  Phase II Service is Appropriate and Safe for Patient to Receive    Objective: Mini-Evaluation/Treatment:       Objective:     R lateral and anterior forearm atrophy compared to L     strength: 6/14/24: R: 40#; L: 43#  4/24/24: R:33# L: 36#  Pinch strength: 6/14/24: R: 1#; 4/24/24: 1# L: 18.5#    R 2nd PIP trigger finger    R hand feels cold to touch    Treatment:   Dry needling following informed consent: with e-stim; 100Hz, mA to tolerance, applied to R medial n near medial epicondyle to forearm, 2 channels; R pronator teres to AIN, for 5 minutes. Additional R superficial deep and radial nerve points, median nerve points, adductor pollicis, abductor pollicis brevis, extensor pollicis, opponens pollicis, extensor digitorum, and flexor carpi radialis, PIN, R median n at axilla x 4. Pt seated      Assessment:   Completed median nerve needling protocol to help stimulate median nerve innervation as well as muscular needling into digits 1-2 to improve his hand function. Reinforced supplementing dry needling treatment with his previously prescribed hand therapy exercises and using his home estim unit.   Instructed pt he may require 12-18 treatments to  observe benefit.    Rehab potential:   fair    Plan: Pt to follow up with neurology and return ad luis

## 2024-06-20 NOTE — TELEPHONE ENCOUNTER
Patient was notified of positive urine culture. Antibiotics were switched to Augmentin and patient was advised to stop Levofloxacin due to the bacteria that came back. New antibiotics were sent to retail pharmacy.

## 2024-06-21 RX ORDER — AMOXICILLIN AND CLAVULANATE POTASSIUM 875; 125 MG/1; MG/1
1 TABLET, FILM COATED ORAL 2 TIMES DAILY
Qty: 20 TABLET | Refills: 0 | Status: SHIPPED | OUTPATIENT
Start: 2024-06-21 | End: 2024-06-24 | Stop reason: ALTCHOICE

## 2024-06-24 ENCOUNTER — OFFICE VISIT (OUTPATIENT)
Dept: PRIMARY CARE | Facility: CLINIC | Age: 74
End: 2024-06-24
Payer: MEDICARE

## 2024-06-24 VITALS
DIASTOLIC BLOOD PRESSURE: 76 MMHG | WEIGHT: 214 LBS | TEMPERATURE: 97.8 F | HEIGHT: 68 IN | OXYGEN SATURATION: 99 % | HEART RATE: 60 BPM | SYSTOLIC BLOOD PRESSURE: 120 MMHG | BODY MASS INDEX: 32.43 KG/M2

## 2024-06-24 DIAGNOSIS — K21.9 GASTROESOPHAGEAL REFLUX DISEASE, UNSPECIFIED WHETHER ESOPHAGITIS PRESENT: Chronic | ICD-10-CM

## 2024-06-24 DIAGNOSIS — R73.03 PREDIABETES: Chronic | ICD-10-CM

## 2024-06-24 DIAGNOSIS — M06.9 RHEUMATOID ARTHRITIS, INVOLVING UNSPECIFIED SITE, UNSPECIFIED WHETHER RHEUMATOID FACTOR PRESENT (MULTI): Chronic | ICD-10-CM

## 2024-06-24 DIAGNOSIS — E78.2 COMBINED HYPERLIPIDEMIA: Chronic | ICD-10-CM

## 2024-06-24 DIAGNOSIS — I10 BENIGN ESSENTIAL HYPERTENSION: Primary | Chronic | ICD-10-CM

## 2024-06-24 PROCEDURE — 1159F MED LIST DOCD IN RCRD: CPT | Performed by: FAMILY MEDICINE

## 2024-06-24 PROCEDURE — 1125F AMNT PAIN NOTED PAIN PRSNT: CPT | Performed by: FAMILY MEDICINE

## 2024-06-24 PROCEDURE — 99214 OFFICE O/P EST MOD 30 MIN: CPT | Performed by: FAMILY MEDICINE

## 2024-06-24 PROCEDURE — 1160F RVW MEDS BY RX/DR IN RCRD: CPT | Performed by: FAMILY MEDICINE

## 2024-06-24 PROCEDURE — 3078F DIAST BP <80 MM HG: CPT | Performed by: FAMILY MEDICINE

## 2024-06-24 PROCEDURE — 1036F TOBACCO NON-USER: CPT | Performed by: FAMILY MEDICINE

## 2024-06-24 PROCEDURE — 1123F ACP DISCUSS/DSCN MKR DOCD: CPT | Performed by: FAMILY MEDICINE

## 2024-06-24 PROCEDURE — G2211 COMPLEX E/M VISIT ADD ON: HCPCS | Performed by: FAMILY MEDICINE

## 2024-06-24 PROCEDURE — 3074F SYST BP LT 130 MM HG: CPT | Performed by: FAMILY MEDICINE

## 2024-06-24 ASSESSMENT — PATIENT HEALTH QUESTIONNAIRE - PHQ9
SUM OF ALL RESPONSES TO PHQ9 QUESTIONS 1 AND 2: 0
SUM OF ALL RESPONSES TO PHQ9 QUESTIONS 1 AND 2: 0
2. FEELING DOWN, DEPRESSED OR HOPELESS: NOT AT ALL
1. LITTLE INTEREST OR PLEASURE IN DOING THINGS: NOT AT ALL
1. LITTLE INTEREST OR PLEASURE IN DOING THINGS: NOT AT ALL
2. FEELING DOWN, DEPRESSED OR HOPELESS: NOT AT ALL

## 2024-06-24 ASSESSMENT — ENCOUNTER SYMPTOMS
PALPITATIONS: 0
SHORTNESS OF BREATH: 0
OCCASIONAL FEELINGS OF UNSTEADINESS: 0
DEPRESSION: 0
LOSS OF SENSATION IN FEET: 0
COUGH: 0
ABDOMINAL DISTENTION: 0

## 2024-06-24 ASSESSMENT — LIFESTYLE VARIABLES
HOW OFTEN DO YOU HAVE SIX OR MORE DRINKS ON ONE OCCASION: NEVER
HOW OFTEN DO YOU HAVE A DRINK CONTAINING ALCOHOL: 2-4 TIMES A MONTH
AUDIT-C TOTAL SCORE: 2
HOW MANY STANDARD DRINKS CONTAINING ALCOHOL DO YOU HAVE ON A TYPICAL DAY: 1 OR 2
SKIP TO QUESTIONS 9-10: 1

## 2024-06-24 ASSESSMENT — PAIN SCALES - GENERAL: PAINLEVEL: 3

## 2024-06-24 NOTE — PROGRESS NOTES
"Subjective   Patient ID: Pro Belle Jr. is a 74 y.o. male who presents for Hypertension.    HPI   Hypertension :   -Patient is here for follow-up hypertension: chronic , stable  -Tolerating medications without side effects    CAD/ASCVD:          c/o Patient here for F/U. stable, stent to rca.   Hyperlipidemia:          Patient here for F/U. doing well and without complaints, tolerating medicine well.   GERD:          GERD F/U doing well and without complaints, controlled. Uses omeprazole daily    Review of Systems   Respiratory:  Negative for cough and shortness of breath.    Cardiovascular:  Negative for chest pain and palpitations.   Gastrointestinal:  Negative for abdominal distention.       Objective   /76   Pulse 60   Temp 36.6 °C (97.8 °F) (Temporal)   Ht 1.727 m (5' 8\")   Wt 97.1 kg (214 lb)   SpO2 99%   BMI 32.54 kg/m²   112/70 r   Physical Exam  Vitals reviewed.   Constitutional:       Appearance: Normal appearance.   Cardiovascular:      Rate and Rhythm: Normal rate and regular rhythm.      Heart sounds: Normal heart sounds. No murmur heard.  Pulmonary:      Breath sounds: Normal breath sounds.   Abdominal:      General: Abdomen is flat. Bowel sounds are normal.      Palpations: Abdomen is soft.   Musculoskeletal:         General: No swelling.   Neurological:      Mental Status: He is alert.         Assessment/Plan   Diagnoses and all orders for this visit:  Benign essential hypertension  Combined hyperlipidemia  Prediabetes  Rheumatoid arthritis, involving unspecified site, unspecified whether rheumatoid factor present (Multi)  Gastroesophageal reflux disease, unspecified whether esophagitis present  Other orders  -     3 Month Follow Up In Primary Care  -     Follow Up In Primary Care - Health Maintenance; Future  Cont meds  Rto 3 mo       "

## 2024-06-26 ENCOUNTER — TREATMENT (OUTPATIENT)
Dept: PHYSICAL THERAPY | Facility: CLINIC | Age: 74
End: 2024-06-26
Payer: MEDICARE

## 2024-06-26 DIAGNOSIS — R53.1 WEAKNESS: Chronic | ICD-10-CM

## 2024-06-26 PROCEDURE — 97110 THERAPEUTIC EXERCISES: CPT | Mod: GP,CQ

## 2024-06-26 NOTE — PROGRESS NOTES
"Physical Therapy                                                                                  Physical Therapy Treatment       Patient name: Pro Belle Jr.  MRN:   [id number]  Today's Date: 6/26/24     Time Calculation  Start Time: 0732  Stop Time: 0815  Time Calculation (min): 43 min    Current Problem  1. Weakness  Follow Up In Physical Therapy             General  Reason for Referral: Weakness  Referred By: Dr. Ruben Bradford  Past Medical History Relevant to Rehab: PMH: a fib, high BP, ablasion, psoriatic arthritis, back surrgery, L TKA, Bilateral hip replacements, L shoulder replacement  MEDICARE A&B, [employer], [place], NO AUTH ($ 0 USED PT/ST)  Onset date 4/26/24 Visit # 4    Precautions: Pt has been cleared by PCP to complete PT following hospitalization of a fib     Subjective: Feels what's worked on in PT is relieving back pain , say about 50% improved . Yesterday pretty active .   Pt reports he had 'sore area' top of L foot MT line, followed by toes bruising notes today signs of bruising fading . Had  follow up appt with PCP on Monday 6/24 all is well.  Pt reports he has had broken blood vessels in eye/eyes in the past   Response to last session:   good  Performing HEP: yes    Pain   0/10      Treatment:  therapeutic Exer 43minutes  TA bracing  2 sets 10 reps 5\" holds  Bridges 15 reps   TA Bracing with iso ball squeeze hip Adduction 2 sets of  10 5\" holds  PPT 10 reps 5\" holds   SAQ 20 reps 5\" holds 2# ankle cuffs  Hook lying clamshell  with orange t-band resisted hip Abduction 30 reps   Supine march lifts 15 reps orange t-band and 2# ankle cuffs  SLR 15 reps L/R 2# ankle cuffs  4\" riser lateral lifts 10 reps L/R 2 hand rail support    Assessment:  Note L eye red ,pt unaware until viewed in mirror  Pt. tolerated previous session well . No complaints voiced at beginning , during , or immed. Following PT session today    Plan:     OP PT Plan  Treatment/Interventions: Education/ Instruction, Electrical " "stimulation, Gait training, Hot pack, Manual therapy, Therapeutic exercises, Therapeutic activities  PT Plan: Skilled PT  PT Frequency: 2 times per week  Duration: 5 weeks then re-check  Education:    No additions to HEP today. Pt to discontinue heel raise from edge of step for time being ,  until bruising of toes dissipates    Laura Medley, PTA    Active       Balance       STG - Pt will improve Romberg test score to east least 4/6       Start:  06/10/24    Expected End:  09/10/24       I            Mobility       STG - Patient will ambulate without an assistive device independently without LOB       Start:  06/10/24    Expected End:  09/10/24            STG - Patient will ascend and descend 12 stairs with increased alexander and improved eccentric control/strength       Start:  06/10/24    Expected End:  09/10/24            Pt will improve \"LEFS\" score to 50 for increased independence and ease with ADL/IADL's, skills, and work/leisure activities.        Start:  06/10/24    Expected End:  09/10/24            Pt will increase BLE strength by at least 1 mm grade to participate in ADL, IADL, work, and leisure skills.        Start:  06/10/24    Expected End:  09/10/24              "

## 2024-06-28 ENCOUNTER — TREATMENT (OUTPATIENT)
Dept: PHYSICAL THERAPY | Facility: CLINIC | Age: 74
End: 2024-06-28
Payer: MEDICARE

## 2024-06-28 DIAGNOSIS — R53.1 WEAKNESS: Chronic | ICD-10-CM

## 2024-06-28 PROCEDURE — 97110 THERAPEUTIC EXERCISES: CPT | Mod: GP,CQ

## 2024-06-28 NOTE — PROGRESS NOTES
"Physical Therapy                                                                                  Physical Therapy Treatment       Patient name: Pro Belle Jr.  MRN:   [id number]  Today's Date: 6/28/24     Time Calculation  Start Time: 0730  Stop Time: 0830  Time Calculation (min): 60 min    1. Weakness  Follow Up In Physical Therapy             General  Reason for Referral: Weakness  Referred By: Dr. Ruben Bradford  Past Medical History Relevant to Rehab: PMH: a fib, high BP, ablasion, psoriatic arthritis, back surrgery, L TKA, Bilateral hip replacements, L shoulder replacement    [place] # 5, [place], [place] AUTH ,Onset date 4/26/24          Precautions:   STEADI Fall Risk Score (The score of 4 or more indicates an increased risk of falling): 6  Post-Surgical Precautions:  (Back surgery 3/14/2024, no current restrictions per pt)  Pt has been cleared by PCP to complete PT following  recent hospitalization for a- fib     Subjective:  Low back more sore upon waking this morning . Achy/tired.  Bruising of toes fading , tenderness in forefoot decreased .  Pt notes improvement in posture .   Response to last session:   Good  Performing HEP: yes    Pain  3/10 upon arrival   0-1/10 end of session    Treatment:  Therapeutic Exer 60 minutes  Sci fit level 1.5, seat 12  x 10 minutes  TA bracing  20 reps 5\" holds  Bridges 2 sets of 10 reps   TA Bracing with iso ball squeeze hip Adduction 20 5\" holds  PPT 2 sets 10 reps 5\" holds   SAQ 20 reps 5\" holds 2# ankle cuffs ball held between lower legs   Hook lying clamshell  with blue t-band resisted hip Abduction 20 reps   Supine march lifts 2 sets of 10 reps blue t-band and 2# ankle cuffs (deferred today /time constraints)  SLR 2 sets 10 reps L/R 2# ankle cuffs  Hip  lifts Abduction , ham curls,MIP, mini squat,heel raise 10 reps L/R , 1- 2 hand // bar  support    Assessment:  Moderately challenged with exercise performed.  Absorbing gradual increase in reps and resistance . Demonstrated " "and verbalized clear understanding of exercises added to HEP today. Motivated .Will benefit from PT services at this time for strengthening, mobility training, and balance training to reach pt's goals, decrease risk of falls, and improve overall functional mobility.     Plan:     OP PT Plan  Treatment/Interventions: Education/ Instruction, Electrical stimulation, Gait training, Hot pack, Manual therapy, Therapeutic exercises, Therapeutic activities  PT Plan: Skilled PT  PT Frequency: 2 times per week  Duration: 5 weeks then re-check    Education:   Pt instructed in and provided handout for LE seated and standing exercise    Laura Medley, PTA    Active       Balance       STG - Pt will improve Romberg test score to east least 4/6       Start:  06/10/24    Expected End:  09/10/24       I            Mobility       STG - Patient will ambulate without an assistive device independently without LOB       Start:  06/10/24    Expected End:  09/10/24            STG - Patient will ascend and descend 12 stairs with increased alexander and improved eccentric control/strength       Start:  06/10/24    Expected End:  09/10/24            Pt will improve \"LEFS\" score to 50 for increased independence and ease with ADL/IADL's, skills, and work/leisure activities.        Start:  06/10/24    Expected End:  09/10/24            Pt will increase BLE strength by at least 1 mm grade to participate in ADL, IADL, work, and leisure skills.        Start:  06/10/24    Expected End:  09/10/24              "

## 2024-06-30 NOTE — PROGRESS NOTES
Primary Care Physician: Ruben Bradford MD  Date of Visit: 06/06/2024  1:30 PM EDT  Location of visit: [address]     Chief Complaint:   Chief Complaint   Patient presents with    Follow-up    Atrial Fibrillation     Episode 1 month ago observation tripoint     HPI / Summary:   Pro Belle Jr. is a 74 y.o. male presents for     ROS    Medical History:   He has a past medical history of A-fib (Multi).  Surgical Hx:   He has a past surgical history that includes Hernia repair (06/20/2014); Other surgical history (10/06/2022); Back surgery (02/14/2024); Joint replacement (Bilateral); and Joint replacement (Left).   Social Hx:   He reports that he has quit smoking. His smoking use included cigarettes. He has never used smokeless tobacco. He reports current alcohol use of about 2.0 standard drinks of alcohol per week. He reports that he does not use drugs.  Family Hx:   His family history includes Cancer in his mother and sister; Coronary artery disease in his brother; Heart disease in his father.   Allergies:  No Known Allergies  Outpatient Medications:  Current Outpatient Medications   Medication Instructions    acetaminophen (TYLENOL 8 HOUR) 650 mg, oral, Every 8 hours PRN, Do not crush, chew, or split.    amLODIPine (NORVASC) 5 mg, oral, Daily    aspirin 81 mg, oral, Every 24 hours    atorvastatin (LIPITOR) 80 mg, oral, Nightly    cholecalciferol (Vitamin D-3) 50 MCG (2000 UT) tablet 1 tablet, oral, Daily    cyanocobalamin (VITAMIN B-12) 1,000 mcg, oral, Daily RT    ezetimibe (ZETIA) 10 mg, oral, Nightly    folic acid (FOLVITE) 1 mg, oral, Every 24 hours    gabapentin (NEURONTIN) 600 mg, oral, 2 times daily    lisinopril 40 mg, oral, Daily    metoprolol succinate XL (TOPROL XL) 75 mg, oral, Daily, Do not crush or chew.    omeprazole (PRILOSEC) 20 mg, oral, Daily    tamsulosin (FLOMAX) 0.8 mg, oral, Daily    Xarelto 20 mg, oral, Daily    Xeljanz XR 11 mg, oral, Daily, Do not crush, chew or split. Swallow  whole.     Physical Exam:  Vitals:    06/06/24 1333   BP: 149/80   BP Location: Left arm   Patient Position: Sitting   Pulse: 58   SpO2: 97%   Weight: 96.2 kg (212 lb)     Wt Readings from Last 5 Encounters:   06/24/24 97.1 kg (214 lb)   06/12/24 96.6 kg (213 lb)   06/06/24 96.2 kg (212 lb)   05/15/24 96.6 kg (213 lb)   05/08/24 95.1 kg (209 lb 10.5 oz)     Physical Exam  JVP not elevated. Carotid impulses are 2+ without overlying bruit.   Chest exhibits fair to good air movement with completely clear breath sounds.   The cardiac rhythm is regular with no premature beats.   Normal S1 and S2. No gallop, murmur or rub, or click.   Abdomen is soft and benign without focal tenderness.   With no lower leg edema. The pedal pulses are intact.     Last Labs:  Admission on 05/08/2024, Discharged on 05/09/2024   Component Date Value    WBC 05/08/2024 8.3     nRBC 05/08/2024 0.0     RBC 05/08/2024 4.05 (L)     Hemoglobin 05/08/2024 13.1 (L)     Hematocrit 05/08/2024 39.1 (L)     MCV 05/08/2024 97     MCH 05/08/2024 32.3     MCHC 05/08/2024 33.5     RDW 05/08/2024 14.1     Platelets 05/08/2024 284     Neutrophils % 05/08/2024 67.1     Immature Granulocytes %,* 05/08/2024 0.2     Lymphocytes % 05/08/2024 20.0     Monocytes % 05/08/2024 10.3     Eosinophils % 05/08/2024 1.9     Basophils % 05/08/2024 0.5     Neutrophils Absolute 05/08/2024 5.58 (H)     Immature Granulocytes Ab* 05/08/2024 0.02     Lymphocytes Absolute 05/08/2024 1.67     Monocytes Absolute 05/08/2024 0.86 (H)     Eosinophils Absolute 05/08/2024 0.16     Basophils Absolute 05/08/2024 0.04     Glucose 05/08/2024 146 (H)     Sodium 05/08/2024 136     Potassium 05/08/2024 3.9     Chloride 05/08/2024 101     Bicarbonate 05/08/2024 24     Urea Nitrogen 05/08/2024 19     Creatinine 05/08/2024 1.00     eGFR 05/08/2024 78     Calcium 05/08/2024 9.8     Albumin 05/08/2024 3.7     Alkaline Phosphatase 05/08/2024 120     Total Protein 05/08/2024 6.6     AST 05/08/2024 35      Bilirubin, Total 05/08/2024 0.5     ALT 05/08/2024 19     Anion Gap 05/08/2024 11     Magnesium 05/08/2024 2.10     Ventricular Rate 05/08/2024 133     Atrial Rate 05/08/2024 153     QRS Duration 05/08/2024 128     QT Interval 05/08/2024 302     QTC Calculation(Bazett) 05/08/2024 449     R Axis 05/08/2024 -39     T Avalon 05/08/2024 1     QRS Count 05/08/2024 22     Q Onset 05/08/2024 226     T Offset 05/08/2024 377     QTC Fredericia 05/08/2024 393     Troponin T, High Sensiti* 05/08/2024 126 ()     Troponin T, High Sensiti* 05/08/2024 163 ()     Troponin T, High Sensiti* 05/08/2024 218 ()     WBC 05/09/2024 7.5     nRBC 05/09/2024 0.0     RBC 05/09/2024 3.75 (L)     Hemoglobin 05/09/2024 12.1 (L)     Hematocrit 05/09/2024 37.0 (L)     MCV 05/09/2024 99     MCH 05/09/2024 32.3     MCHC 05/09/2024 32.7     RDW 05/09/2024 14.4     Platelets 05/09/2024 247     Glucose 05/09/2024 95     Sodium 05/09/2024 140     Potassium 05/09/2024 3.7     Chloride 05/09/2024 108 (H)     Bicarbonate 05/09/2024 24     Urea Nitrogen 05/09/2024 18     Creatinine 05/09/2024 0.80     eGFR 05/09/2024 >90     Calcium 05/09/2024 9.6     Anion Gap 05/09/2024 8    Orders Only on 05/08/2024   Component Date Value    Ventricular Rate 05/08/2024 149     Atrial Rate 05/08/2024 174     QRS Duration 05/08/2024 122     QT Interval 05/08/2024 324     QTC Calculation(Bazett) 05/08/2024 510     R Avalon 05/08/2024 -45     T Avalon 05/08/2024 6     QRS Count 05/08/2024 24     Q Onset 05/08/2024 199     T Offset 05/08/2024 361     QTC Fredericia 05/08/2024 439    Lab on 03/27/2024   Component Date Value    Prostate Specific Antige* 03/27/2024 4.33 (H)     Glucose 03/27/2024 84     Sodium 03/27/2024 141     Potassium 03/27/2024 4.1     Chloride 03/27/2024 105     Bicarbonate 03/27/2024 29     Anion Gap 03/27/2024 11     Urea Nitrogen 03/27/2024 20     Creatinine 03/27/2024 0.88     eGFR 03/27/2024 90     Calcium 03/27/2024 10.0     Albumin 03/27/2024 3.9      Alkaline Phosphatase 03/27/2024 106     Total Protein 03/27/2024 7.1     AST 03/27/2024 26     Bilirubin, Total 03/27/2024 0.5     ALT 03/27/2024 17     WBC 03/27/2024 8.0     nRBC 03/27/2024 0.0     RBC 03/27/2024 3.97 (L)     Hemoglobin 03/27/2024 13.0 (L)     Hematocrit 03/27/2024 40.6 (L)     MCV 03/27/2024 102 (H)     MCH 03/27/2024 32.7     MCHC 03/27/2024 32.0     RDW 03/27/2024 14.5     Platelets 03/27/2024 329     Neutrophils % 03/27/2024 66.7     Immature Granulocytes %,* 03/27/2024 0.4     Lymphocytes % 03/27/2024 16.7     Monocytes % 03/27/2024 12.0     Eosinophils % 03/27/2024 3.8     Basophils % 03/27/2024 0.4     Neutrophils Absolute 03/27/2024 5.33     Immature Granulocytes Ab* 03/27/2024 0.03     Lymphocytes Absolute 03/27/2024 1.33     Monocytes Absolute 03/27/2024 0.96 (H)     Eosinophils Absolute 03/27/2024 0.30     Basophils Absolute 03/27/2024 0.03     Thyroid Stimulating Horm* 03/27/2024 0.91     Cholesterol 03/27/2024 140     HDL-Cholesterol 03/27/2024 74.9     Cholesterol/HDL Ratio 03/27/2024 1.9     LDL Calculated 03/27/2024 53     VLDL 03/27/2024 12     Triglycerides 03/27/2024 59     Non HDL Cholesterol 03/27/2024 65     Color, Urine 03/27/2024 Yellow     Appearance, Urine 03/27/2024 Hazy (N)     Specific Gravity, Urine 03/27/2024 1.015     pH, Urine 03/27/2024 7.0     Protein, Urine 03/27/2024 NEGATIVE     Glucose, Urine 03/27/2024 NEGATIVE     Blood, Urine 03/27/2024 SMALL (1+) (A)     Ketones, Urine 03/27/2024 NEGATIVE     Bilirubin, Urine 03/27/2024 NEGATIVE     Urobilinogen, Urine 03/27/2024 <2.0     Nitrite, Urine 03/27/2024 NEGATIVE     Leukocyte Esterase, Urine 03/27/2024 LARGE (3+) (A)     Extra Tube 03/27/2024 Hold for add-ons.     WBC, Urine 03/27/2024 >50 (A)     WBC Clumps, Urine 03/27/2024 RARE     RBC, Urine 03/27/2024 11-20 (A)     Bacteria, Urine 03/27/2024 1+ (A)     Amorphous Crystals, Urine 03/27/2024 2+     Urine Culture 03/27/2024 >100,000 Staphylococcus  epidermidis (A)    Office Visit on 03/21/2024   Component Date Value    Ventricular Rate 03/21/2024 59     Atrial Rate 03/21/2024 59     [place] Interval 03/21/2024 190     QRS Duration 03/21/2024 146     QT Interval 03/21/2024 488     QTC Calculation(Bazett) 03/21/2024 483     P Axis 03/21/2024 86     R Axis 03/21/2024 -10     T Axis 03/21/2024 -6     QRS Count 03/21/2024 9     Q Onset 03/21/2024 222     P Onset 03/21/2024 127     P Offset 03/21/2024 166     T Offset 03/21/2024 466     QTC Fredericia 03/21/2024 485         Assessment/Plan         1. Coronary artery disease, status post PCI to the distal RCA 09/10/2019. This patient originally was identified as having low-grade CAD by cardiac catheterization 3/2003 [facility]. He did have a nuclear stress test performed in 1/2009 and again on 11/13/2015 negative for evidence of ischemia. He presented to [facility] system 9/9/ 2019 with central chest discomfort and shortness of breath. It was detected that he was in atrial fibrillation with RVR. He had EKG and enzymatic evidence of a NSTEMI. He had cardiac cath September 10, 2019 with Dr. Mynor Hurst which showed single-vessel CAD in the distal third of the distal right coronary artery about 90% stenosis and distal to the origin of the PDA but prior to the origin of the trifurcating post inferior lateral LV branch. He had successful distal RCA stenting September 10, 2019 with Dr. Aniceto Richardson at [facility]. He will continue metoprolol succinate to 100 mg daily, asa 81 mg daily. Now on metoprolol 25 mg daily. ECG done prior shows sinus tachycardia with PACs RBBB, 139 bpm. Will increase metoprolol to 100 mg daily. ECG done today shows sinus bradycardia.  The patient will be instructed that he should have a repeat pharmacological nuclear stress test performed if lumbar surgery is actually scheduled in the future.     2. Paroxysmal atrial fibrillation. Please see previous office notes for review.  This patient was recently admitted to [facility] from 04/15/2018 to 04/18/2018 with pleuritic-type chest pain along with cough and shortness of breath. The patient was identified by chest x-ray to have a left lower lobe infiltrate suggestive of pneumonia associated with pleuritis. He was also detected to have new onset or recurrent atrial fibrillation with a rapid ventricular rate. The patient was initially placed on an IV Cardizem infusion but ultimately was converted to metoprolol. The patient's ventricular rate remained rapid despite the initiation of metoprolol and as such amiodarone 200 mg daily was added. At the time of discharge on 04/18/2018 he was still in atrial fibrillation with a ventricular rate in the range of 110/m. He did have an echocardiogram performed during the admission on 04/16/2018 that demonstrated an LV ejection fraction of 60-64% with mild left atrial enlargement and trace to mild mitral valve regurgitation. At the time of his next follow-up visit it was found that by both EKG and examination he was back in sinus rhythm with sinus bradycardia. For now he will remain on the amiodarone 200 mg daily plus without will reduce metoprolol by switching metoprolol tartrate 50 mg twice a day to metoprolol succinate 50 mg daily. He will remain on Xarelto 20 mg daily. He presented to [facility] system September 9, 2019 with central chest discomfort and shortness of breath. It was detected that he was in atrial fibrillation with RVR. He converted to sinus rhythm with IV diltiazem infusion. Echocardiogram done at the time showed EF 60-64%, left atrial size mildly dilated, trace MR, trivial to mild TR. He is now back on on amiodarone 200 mg daily as well as Xarelto 20 mg daily which had been discontinued prior to the readmission. Had afib with RVR and failed DCCV. He saw Dr Nassar and had RFA 10/2/2020. ECG today shows likely aflutter. Will plan for DCCV next week. See Dr Nassar in follow up.  Had DCCV 11/12/2020. Went out of rhythm again for a brief period 11/2021. Went out of rhythm for 4 days 01/2022. Patient had RFA done 05/24/2022 with Dr Nassar. ECG done prior shows NSR, RBBB.  The patient is no longer on amiodarone which was discontinued last visit.  He will remain on Xarelto anticoagulation.  He will be stopping 3 days before any type of back injection or lumbar surgery.  Patient will return in 8 weeks for follow-up.  The patient currently remains in sinus rhythm.  He underwent preadmission testing on 2/1/2024 for lumbar surgery EKG shows sinus bradycardia at 49/min occasional PVCs nonspecific ST-T abnormality.  Will reduce the dose of Toprol-XL from 100 mg daily to 50 mg daily.  The patient was recently admitted to [facility] from 5/8/2024 - 5/9/2024 with recurrent atrial fibrillation that converted quickly to sinus rhythm with IV diltiazem.  His metoprolol was increased from 50 mg twice daily to 75 mg twice daily.  He remains on Xarelto anticoagulation.  His high-sensitivity troponins were 126, 163, 218.  EKG today confirms maintenance of sinus bradycardia 56/min occasional PVCs right bundle branch block conduction delay.  Patient scheduled to see electrophysiology for follow-up on 6/12/2024.     3 Xarelto anticoagulation.  Patient may hold Xarelto and aspirin for 5 days prior to lumbar surgery.     4. Hypertension. Blood pressure is adequately controlled today. He will be continued on amlodipine 5 mg daily and lisinopril 20 mg daily. His dose of metoprolol succinate 25 mg a day will be continued.  His systolic blood pressure is elevated today.  Will begin amlodipine 5 mg daily.  His dose of Toprol-XL as noted above will be reduced from 100 to 50 mg daily to avoid prominent bradycardia.     5. Hyperlipidemia. The patient did have lab work performed on 10/08/2019 including a lipid panel with cholesterol 218  HDL 61 triglyceride 154. These results are continue to be disappointing on  his statin agent atorvastatin 80 mg daily. He will be started on supplemental Zetia 10 mg daily. 5/10/2022, Chol 141, LDL 67, HDL 60, trig 70.     6. Sleep apnea. Please see previous office notes. The patient was referred to sleep medicine and was in fact identified as having sleep apnea that was severe. The patient's initial sleep study was done at [facility] on 01/04/2018 and showed severe obstructive sleep apnea and he ultimately had a titration study on 02/22/2018. He was placed on ASV therapy with significant clinical improvement. His compliance has been 74% with no leak.     7. BPH.      8. GERD.     9. Bilateral carpal tunnel release     10. S/P Left hip surgery 2007, right 2021. Plans for LTK 03/2023.     11. S/P Right inguinal hernia repair 4/2014, [facility].     12. Osteoarthritis.      13. Status post left shoulder replacement 11/10/2015 with repeat left shoulder replacement 02/22/2016 because of repetitive dislocation.     14. Lumbar Laminectomy, 10/01/2018 with Dr Cardenas at [facility].  Patient recently has been experiencing and further lower back pain.  His L4-L5 disc is shifted.  He is scheduled for an epidural injection on 12/22/2023 and possible surgery on 2/14/2024.  The patient is in fact scheduled for revision of an L4 laminectomy with excision of synovial cyst the right with robotic pedicle screw and interbody fusion 2/14/2024.  He is cleared from the cardiac standpoint for the procedure may hold Xarelto and aspirin 5 days preoperatively.  Lab work 8/16/2023 included sedimentation rate 4 CRP less than 0.10 hematocrit 36.0 uric acid 5.7 and normal electrolyte panel.  14 a.  Status post repeat lumbar surgery 2/14/2024 at [facility].  Patient is starting therapy for his back and lower extremities within the next week.     15. Prostate Urolift surgery with Dr Werner, fall 2018.     16. Gen. medical. The patient had lab work on 10/08/2019 including glycohemoglobin 5.2%,  TSH 1.38, PSA 1.6 with both CBC and SMA panels being normal.     17. Venous insufficiency.      18. Hx of psoriatic arthritis     19. Hx of covid-19 vaccine #1 and #2.      20. Planned LTK.   Orders:  Orders Placed This Encounter   Procedures    ECG 12 lead (Clinic Performed)      Followup Appts:  Future Appointments   Date Time Provider Department Center   7/2/2024  9:00 AM Laura Medley, PTA GE[facility]   7/5/2024  9:00 AM Laura Medley, PTA [facility]   7/8/2024 10:00 AM Анна Graff, PT [facility]   7/10/2024  9:00 AM Laura Medley, PTA [facility]   7/11/2024  9:30 AM PHARMACY [facility] [id number] [facility]   7/15/2024 10:00 AM Анна Graff, PT [facility]   8/19/2024 11:00 AM Citlaly Shane MD [id number] [facility]   9/30/2024 11:30 AM Ruben Bradford MD [email or web address] [facility]   10/10/2024  3:00 PM Mynor Hurst MD [id number] [facility]   10/16/2024  1:00 PM Pauline Aguilera, APRN-[facility] [id number] [facility]   12/12/2024  1:45 PM Mynor Hurst MD [id number] [facility]   3/24/2025  9:45 AM Ruben Bradford MD [email or web address] [facility]           ____________________________________________________________  Mynor Hurst MD  [facility]  Assistant Clinical Professor, [employer]  [facility]

## 2024-07-02 ENCOUNTER — TREATMENT (OUTPATIENT)
Dept: PHYSICAL THERAPY | Facility: CLINIC | Age: 74
End: 2024-07-02
Payer: MEDICARE

## 2024-07-02 DIAGNOSIS — R53.1 WEAKNESS: Primary | Chronic | ICD-10-CM

## 2024-07-02 PROCEDURE — 97110 THERAPEUTIC EXERCISES: CPT | Mod: GP,CQ

## 2024-07-02 NOTE — PROGRESS NOTES
"Physical Therapy                                                                                  Physical Therapy Treatment       Patient name: Pro Belle Jr.  MRN:   [id number]  Today's Date: 7/2/24     Time Calculation  Start Time: 0905  Stop Time: 0945  Time Calculation (min): 40 min    1. Weakness  Follow Up In Physical Therapy             General  Reason for Referral: Weakness  Referred By: Dr. Ruben Bradford  Past Medical History Relevant to Rehab: PMH: a fib, high BP, ablasion, psoriatic arthritis, back surrgery, L TKA, Bilateral hip replacements, L shoulder replacement  General Comment: visit # 6, [place], NO AUTH ,Onset date 4/26/24    Precautions  Pt has been cleared by PCP to complete PT following hospitalization of a fib     Subjective: Wearing back support felt the extra support helpful when experienced increase pain /stiffness   Response to last session:   good  Performing HEP:yes    Pain   2/10  0/10    Treatment:    Sci fit level 1.5, seat 12  x 10 minutes  TA bracing  20 reps 5\" holds  Bridges 2 sets of 10 reps   TA Bracing with iso ball squeeze hip Adduction 20 5\" holds  PPT 2 sets 10 reps 5\" holds   SAQ 20 reps 5\" holds   Hook lying clamshell  with blue t-band resisted hip Abduction 20 reps     Hip  lifts Abduction , ham curls,MIP, mini squat,heel raise 10 reps L/R , 1- 2 hand // bar  support standing on 4\" block    Assessment:  Benefits from verbal and tactile guidance to improve form, posturing while performing standing exercise. Tolerated session well without increase in pain or symptoms    Plan:     OP PT Plan  Treatment/Interventions: Education/ Instruction, Electrical stimulation, Gait training, Hot pack, Manual therapy, Therapeutic exercises, Therapeutic activities  PT Plan: Skilled PT  PT Frequency: 2 times per week  Duration: 5 weeks then re-check  Education:   Discussed wearing back support when performing chores or when knows will be from  a lot of standing . Support off with exercise " "    Laura Medley, PTA    Active       Balance       STG - Pt will improve Romberg test score to east least 4/6       Start:  06/10/24    Expected End:  09/10/24       I            Mobility       STG - Patient will ambulate without an assistive device independently without LOB       Start:  06/10/24    Expected End:  09/10/24            STG - Patient will ascend and descend 12 stairs with increased alexander and improved eccentric control/strength       Start:  06/10/24    Expected End:  09/10/24            Pt will improve \"LEFS\" score to 50 for increased independence and ease with ADL/IADL's, skills, and work/leisure activities.        Start:  06/10/24    Expected End:  09/10/24            Pt will increase BLE strength by at least 1 mm grade to participate in ADL, IADL, work, and leisure skills.        Start:  06/10/24    Expected End:  09/10/24              "

## 2024-07-04 PROCEDURE — RXMED WILLOW AMBULATORY MEDICATION CHARGE

## 2024-07-05 ENCOUNTER — TREATMENT (OUTPATIENT)
Dept: PHYSICAL THERAPY | Facility: CLINIC | Age: 74
End: 2024-07-05
Payer: MEDICARE

## 2024-07-05 DIAGNOSIS — R53.1 WEAKNESS: Chronic | ICD-10-CM

## 2024-07-05 PROCEDURE — 97110 THERAPEUTIC EXERCISES: CPT | Mod: GP,CQ

## 2024-07-05 NOTE — PROGRESS NOTES
"Physical Therapy                                                                                  Physical Therapy Treatment       Patient name: Pro Belle Jr.  MRN:   [id number]  Today's Date: 7/5/24     Time Calculation  Start Time: 0905  Stop Time: 0950  Time Calculation (min): 45 min      1. Weakness  Follow Up In Physical Therapy             General  Reason for Referral: Weakness  Referred By: Dr. Ruben WAGNER Znidarsic   visit # 7, [place], NO AUTH ,Onset date 4/26/24      Precautions: Pt has been cleared by PCP to complete PT following hospitalization of a fib     Subjective: Pt feels lower back has definitely gotten better since beginning PT. \"I do need to work on standing up straighter \"  Response to last session:  good   Performing HEP: yes    Pain   0/10      Treatment:  Therapeutic Exer 45 minutes  Sci fit level 2.0, seat 12  x 12 minutes  TA bracing  20 reps 5\" holds  Bridges 2 sets of 10 reps   TA Bracing with iso ball squeeze hip Adduction 20 5\" holds  PPT 2 sets 10 reps 5\" holds   SAQ 20 reps 5\" holds   Hook lying clamshell  with blue t-band  20 reps     Standing   Heel raise with focus on posturing 20 reps  Posture against wall x 2 minutes    Assessment:  Pt performed good return demo of exercise added to HEP today. Tolerated session well without increase in pain or symptoms      Plan:    OP PT Plan  Treatment/Interventions: Education/ Instruction, Electrical stimulation, Gait training, Hot pack, Manual therapy, Therapeutic exercises, Therapeutic activities  PT Plan: Skilled PT  PT Frequency: 2 times per week  Duration: 5 weeks then re-check  Onset Date: 04/26/24  Certification Period Start Date: 06/10/24  Certification Period End Date: 09/10/24   Education:  Access Code: [email or web address]  URL: [email or web address]  Date: 07/05/2024  Prepared by: Laura Medley  Provided blue t-band for home use  Exercises  - Supine Hip Adduction Isometric with Ball  - 1 x daily - 7 x weekly - 3 sets - 10 " "reps  - Hooklying Clamshell with Resistance  - 1 x daily - 7 x weekly - 3 sets - 10 reps  - Supine Bridge  - 1 x daily - 7 x weekly - 3 sets - 10 reps    Laura Medley, NEGRITO    Active       Balance       STG - Pt will improve Romberg test score to east least 4/6       Start:  06/10/24    Expected End:  09/10/24       I            Mobility       STG - Patient will ambulate without an assistive device independently without LOB       Start:  06/10/24    Expected End:  09/10/24            STG - Patient will ascend and descend 12 stairs with increased alexander and improved eccentric control/strength       Start:  06/10/24    Expected End:  09/10/24            Pt will improve \"LEFS\" score to 50 for increased independence and ease with ADL/IADL's, skills, and work/leisure activities.        Start:  06/10/24    Expected End:  09/10/24            Pt will increase BLE strength by at least 1 mm grade to participate in ADL, IADL, work, and leisure skills.        Start:  06/10/24    Expected End:  09/10/24              "

## 2024-07-08 ENCOUNTER — APPOINTMENT (OUTPATIENT)
Dept: PHYSICAL THERAPY | Facility: CLINIC | Age: 74
End: 2024-07-08
Payer: MEDICARE

## 2024-07-08 ENCOUNTER — DOCUMENTATION (OUTPATIENT)
Dept: PHYSICAL THERAPY | Facility: CLINIC | Age: 74
End: 2024-07-08
Payer: MEDICARE

## 2024-07-08 NOTE — PROGRESS NOTES
Physical Therapy                 Therapy Communication Note    Patient Name: Pro Belle Jr.  MRN: [id number]  Today's Date: 7/8/2024     Discipline: Physical Therapy    Missed Visit Reason:  Pt left VM for therapist stating he will be canceling today's appointment due to R leg pain and will see his doctor today. Next appointment is Wednesday of this week.    Missed Time: Cancel

## 2024-07-09 ENCOUNTER — PHARMACY VISIT (OUTPATIENT)
Dept: PHARMACY | Facility: CLINIC | Age: 74
End: 2024-07-09
Payer: COMMERCIAL

## 2024-07-10 ENCOUNTER — APPOINTMENT (OUTPATIENT)
Dept: PHYSICAL THERAPY | Facility: CLINIC | Age: 74
End: 2024-07-10
Payer: MEDICARE

## 2024-07-10 ENCOUNTER — DOCUMENTATION (OUTPATIENT)
Dept: PHYSICAL THERAPY | Facility: CLINIC | Age: 74
End: 2024-07-10
Payer: MEDICARE

## 2024-07-10 NOTE — PROGRESS NOTES
Pharmacist Clinic: Cardiology Management    Pro Belle Jr. is a 74 y.o. male was referred to Clinical Pharmacy Team for anticoagulation management.     Referring Provider: Mildred Valencia APRN*    THIS IS A FOLLOW UP PATIENT APPOINTMENT. AT LAST VISIT ON 3/19/2024 WITH PHARMACIST (Jani Clayton).    Appointment was completed by the patient who was reached at .    REVIEW OF PAST APPNT (IF APPLICABLE):   At last pharmacy appointment, patient was screened for and subsequently enrolled in  PAP for Xarelto. Patient reported no issues with bruising or bleeding at last appointment.       No Known Allergies    Past Medical History:   Diagnosis Date    A-fib (Multi)        Current Outpatient Medications on File Prior to Visit   Medication Sig Dispense Refill    acetaminophen (Tylenol 8 HOUR) 650 mg ER tablet Take 1 tablet (650 mg) by mouth every 8 hours if needed for mild pain (1 - 3). Do not crush, chew, or split.      amLODIPine (Norvasc) 5 mg tablet Take 1 tablet (5 mg) by mouth once daily. 30 tablet 11    aspirin 81 mg chewable tablet Chew 1 tablet (81 mg) once every 24 hours.      atorvastatin (Lipitor) 80 mg tablet TAKE 1 TABLET BY MOUTH AT  BEDTIME 90 tablet 3    cholecalciferol (Vitamin D-3) 50 MCG (2000 UT) tablet Take 1 tablet (2,000 Units) by mouth once daily.      cyanocobalamin (Vitamin B-12) 1,000 mcg tablet Take 1 tablet (1,000 mcg) by mouth once daily.      ezetimibe (Zetia) 10 mg tablet TAKE 1 TABLET BY MOUTH AT  BEDTIME 90 tablet 3    folic acid (Folvite) 1 mg tablet Take 1 tablet (1 mg) by mouth once every 24 hours.      gabapentin (Neurontin) 300 mg capsule Take 2 capsules (600 mg) by mouth 2 times a day. 180 capsule 1    lisinopril 40 mg tablet TAKE 1 TABLET BY MOUTH ONCE DAILY 90 tablet 3    metoprolol succinate XL (Toprol XL) 25 mg 24 hr tablet Take 3 tablets (75 mg) by mouth once daily. Do not crush or chew. 270 tablet 3    omeprazole (PriLOSEC) 20 mg DR capsule Take 1 capsule  "(20 mg) by mouth once daily.      rivaroxaban (Xarelto) 20 mg tablet Take 1 tablet (20 mg) by mouth once daily. 90 tablet 3    tamsulosin (Flomax) 0.4 mg 24 hr capsule Take 2 capsules (0.8 mg) by mouth once daily. 180 capsule 3    tofacitinib ER (Xeljanz XR) 11 mg tablet extended release 24 hr Take 1 tablet (11 mg) by mouth once daily. Do not crush, chew or split. Swallow whole.       No current facility-administered medications on file prior to visit.         RELEVANT LAB RESULTS  Lab Results   Component Value Date    BILITOT 0.5 05/08/2024    CALCIUM 9.6 05/09/2024    CO2 24 05/09/2024     (H) 05/09/2024    CREATININE 0.80 05/09/2024    GLUCOSE 95 05/09/2024    ALKPHOS 120 05/08/2024    K 3.7 05/09/2024    PROT 6.6 05/08/2024     05/09/2024    AST 35 05/08/2024    ALT 19 05/08/2024    BUN 18 05/09/2024    ANIONGAP 8 05/09/2024    MG 2.10 05/08/2024    PHOS 2.1 (L) 05/25/2022    ALBUMIN 3.7 05/08/2024    GFRMALE 90 08/16/2023     Lab Results   Component Value Date    TRIG 59 03/27/2024    CHOL 140 03/27/2024    LDLCALC 53 03/27/2024    HDL 74.9 03/27/2024     No results found for: \"BMCBC\", \"CBCDIF\"     PHARMACEUTICAL ASSESSMENT    Medication Documentation Review Audit       Reviewed by Ruben Bradford MD (Physician) on 06/24/24 at 1049      Medication Order Taking? Sig Documenting Provider Last Dose Status   acetaminophen (Tylenol 8 HOUR) 650 mg ER tablet [id number] Yes Take 1 tablet (650 mg) by mouth every 8 hours if needed for mild pain (1 - 3). Do not crush, chew, or split. Historical Provider, MD Taking Active   amLODIPine (Norvasc) 5 mg tablet [id number] Yes Take 1 tablet (5 mg) by mouth once daily. Mynor Hurst MD Taking Active   amoxicillin-pot clavulanate (Augmentin) 875-125 mg tablet [id number] Yes Take 1 tablet by mouth 2 times a day for 10 days. Citlaly Shane MD Taking Active   aspirin 81 mg chewable tablet [id number] Yes Chew 1 tablet (81 mg) once every 24 hours. Historical " Provider, MD Taking Active   atorvastatin (Lipitor) 80 mg tablet [id number] Yes TAKE 1 TABLET BY MOUTH AT  BEDTIME Mynor Hurst MD Taking Active   cholecalciferol (Vitamin D-3) 50 MCG (2000 UT) tablet [id number] Yes Take 1 tablet (2,000 Units) by mouth once daily. Historical Provider, MD Taking Active   cyanocobalamin (Vitamin B-12) 1,000 mcg tablet [id number] Yes Take 1 tablet (1,000 mcg) by mouth once daily. Historical Provider, MD Taking Active   ezetimibe (Zetia) 10 mg tablet [id number] Yes TAKE 1 TABLET BY MOUTH AT  BEDTIME Mynor Hurst MD Taking Active   folic acid (Folvite) 1 mg tablet [id number] Yes Take 1 tablet (1 mg) by mouth once every 24 hours. Historical Provider, MD Taking Active   gabapentin (Neurontin) 300 mg capsule [id number] Yes Take 2 capsules (600 mg) by mouth 2 times a day. Ruben Bradford MD Taking Active     Discontinued 06/24/24 1045   lisinopril 40 mg tablet [id number] Yes TAKE 1 TABLET BY MOUTH ONCE DAILY Ruben Bradford MD Taking Active   metoprolol succinate XL (Toprol XL) 25 mg 24 hr tablet [id number] Yes Take 3 tablets (75 mg) by mouth once daily. Do not crush or chew. Pauline Aguilera APRN-CNP Taking Active   omeprazole (PriLOSEC) 20 mg DR capsule [id number] Yes Take 1 capsule (20 mg) by mouth once daily. Historical Provider, MD Taking Active   rivaroxaban (Xarelto) 20 mg tablet [id number] Yes Take 1 tablet (20 mg) by mouth once daily. Mildred Valencia APRN-CNP Taking Active   tamsulosin (Flomax) 0.4 mg 24 hr capsule [id number] Yes Take 2 capsules (0.8 mg) by mouth once daily. Citlaly Shane MD Taking Active   tofacitinib ER (Xeljanz XR) 11 mg tablet extended release 24 hr [id number] Yes Take 1 tablet (11 mg) by mouth once daily. Do not crush, chew or split. Swallow whole. Historical Provider, MD Taking Active                    DISEASE MANAGEMENT ASSESSMENT:     ANTICOAGULATION ASSESSMENT    The ASCVD Risk score (Alphonso DK, et al., 2019) failed to calculate for the  following reasons:    The patient has a prior MI or stroke diagnosis    DIAGNOSIS: prevention of nonvalvular atrial fibrilliation stroke and systemic embolism  - Patient is projected to be on anticoagulation depending on if in afib  - [id number] Score: [4] (only included if diagnosis is atrial fibrillation)   Age: [<65 (0)] [65-74 (+1)] [> 75 (+2)]: 2  Sex: [Male/Female (+1)]: 0  CHF history: [No/Yes(+1)]: 0  Hypertension history: [No/Yes(+1)]: 1  Stroke/TIA/thromboembolism history: [No/Yes(+2)]: 0  Vascular disease history (prior MI, peripheral artery disease, aortic plaque): [No/Yes(+1)]: 1  Diabetes history: [No/Yes(+1)]: 0    CURRENT PHARMACOTHERAPY:    Xarelto 20 mg every day  Age 74 years  Weight 97.1 kg  Height 172.7 cm  Scr 0.80 mg/dL  CrCl 89 mL/min    RELEVANT PAST MEDICAL HISTORY:   HTN, HLD, A-fib with RVR, prediabetes    Affordability/Accessibility: [facility] for Xarelto  Adherence/Organization: no concerns per patient, states maybe missed 2 doses per month at the most  Adverse Reactions: none, notes having some bruising after hitting arm even lightly, bruises go away quickly. Denies any blood in stool or changes in color of stool.  Recent Hospitalizations: hospitalization 5/8/2024-5/9/2024 for atrial fibrillation, metoprolol was changed to 75 mg daily. No further issues with palpitations per patient  Recent Falls/Trauma: None  Changes in Tobacco or Alcohol Intake:   Tobacco: None  Alcohol: Yes, will drink 1-2 glasses of wine/beer with dinner a few times per week    EDUCATION/COUNSELING:   - Counseled patient on MOA, expectations, duration of therapy, contraindications, administration, and monitoring parameters  - Counseled patient of side effects that are indicative of bleeding such as dark tarry stool, unexplainable bruising, or vomiting up a coffee ground like substance      DISCUSSION/NOTES:   Overall, patient is doing well on anticoagulation therapy with Xarelto. Notes he bruises easier than he did  in the past, but is always able to attribute this to a known cause such as bumping his arm. States bruises go away quickly, denies any blood in stool or change in color of stool. Discussed increased risk of bleeding when drinking alcohol while taking Xarelto. Will follow up with patient in 6 months to ensure continued tolerability of pharmacotherapy.    ASSESSMENT AND PLAN:    Assessment/Plan   Problem List Items Addressed This Visit    None  Visit Diagnoses       Atrial fibrillation, unspecified type (Multi)    -  Primary    Relevant Orders    Follow Up In Clinical Pharmacy              RECOMMENDATIONS/PLAN    Continue: Xarelto 20 mg every day   Follow up: 6 months    Next Cardiology Appointment: 10/10/2024  Clinical Pharmacist follow up: 1/13/2025  VAF/Application Expiration: Yes    Date: 4/11/2025  Type of Encounter: Deepak Ordonez PharmD    Verbal consent to manage patient's drug therapy was obtained from the patient . They were informed they may decline to participate or withdraw from participation in pharmacy services at any time.    Continue all meds under the continuation of care with the referring provider and clinical pharmacy team.

## 2024-07-11 ENCOUNTER — APPOINTMENT (OUTPATIENT)
Dept: PHARMACY | Facility: HOSPITAL | Age: 74
End: 2024-07-11
Payer: MEDICARE

## 2024-07-11 DIAGNOSIS — I48.91 ATRIAL FIBRILLATION, UNSPECIFIED TYPE (MULTI): Primary | ICD-10-CM

## 2024-07-11 NOTE — Clinical Note
Jocelin Levy, I spoke with Pro today regarding his Xarelto. He is doing well, with no abnormal bruising/bleeding to report. Does note that he is bruising easier than before, but this is always due to a known cause and resolves in a reasonable amount of time. No signs/symptoms of bleeding. We will follow up in 6 months. Thank you!

## 2024-07-15 ENCOUNTER — APPOINTMENT (OUTPATIENT)
Dept: PHYSICAL THERAPY | Facility: CLINIC | Age: 74
End: 2024-07-15
Payer: MEDICARE

## 2024-07-17 ENCOUNTER — PATIENT OUTREACH (OUTPATIENT)
Dept: PRIMARY CARE | Facility: CLINIC | Age: 74
End: 2024-07-17
Payer: MEDICARE

## 2024-07-17 DIAGNOSIS — I10 BENIGN ESSENTIAL HYPERTENSION: ICD-10-CM

## 2024-07-17 DIAGNOSIS — M06.9 RHEUMATOID ARTHRITIS, INVOLVING UNSPECIFIED SITE, UNSPECIFIED WHETHER RHEUMATOID FACTOR PRESENT (MULTI): ICD-10-CM

## 2024-07-17 DIAGNOSIS — I48.91 ATRIAL FIBRILLATION WITH RVR (MULTI): ICD-10-CM

## 2024-07-17 NOTE — PROGRESS NOTES
"Monthly CCM call with patient.  Patient stated that he was doing well with the Physical Therapy but had to put the therapy on hold due to increase in back pain.  Said that the pain is on the right side and travels down the right hip, stopping at the knee.  Uses an apple watch to measure pulse/EKG and said that he has not been in Afib for a while.  Is taking a new medication, unable to recall name of it that the sleep doctor put him on.  Patient said that it \"binu has me worried as I can fall asleep easily, just sitting still for a minute\".  Did not sleep well last night, will nap in the afternoon as needed.  Denies any recent injuries or falls.  No refills needed at time of call.  Is in agreement of future follow up calls.   "

## 2024-07-22 DIAGNOSIS — K21.9 GASTROESOPHAGEAL REFLUX DISEASE, UNSPECIFIED WHETHER ESOPHAGITIS PRESENT: Primary | Chronic | ICD-10-CM

## 2024-07-22 RX ORDER — OMEPRAZOLE 20 MG/1
20 CAPSULE, DELAYED RELEASE ORAL DAILY
Qty: 90 CAPSULE | Refills: 1 | Status: SHIPPED | OUTPATIENT
Start: 2024-07-22

## 2024-08-14 ENCOUNTER — PATIENT OUTREACH (OUTPATIENT)
Dept: PRIMARY CARE | Facility: CLINIC | Age: 74
End: 2024-08-14
Payer: MEDICARE

## 2024-08-14 DIAGNOSIS — I48.91 ATRIAL FIBRILLATION WITH RVR (MULTI): ICD-10-CM

## 2024-08-14 DIAGNOSIS — I10 BENIGN ESSENTIAL HYPERTENSION: ICD-10-CM

## 2024-08-14 DIAGNOSIS — M06.9 RHEUMATOID ARTHRITIS, INVOLVING UNSPECIFIED SITE, UNSPECIFIED WHETHER RHEUMATOID FACTOR PRESENT (MULTI): ICD-10-CM

## 2024-08-14 NOTE — PROGRESS NOTES
"Monthly [facility] call with patient.  Patient stated that his back is still giving him issues \"off and on\".  Knows that \"I am full of arthritis\", currently seeing Dr Cage who has him on Xeljan.  Unknown if it is working or not, per patient.  Sleeping off and on, averaging 5-6 hours per night.  Still falling asleep easily, \"when sitting and relaxed I can fall asleep anywhere\".  Pulse has been regular per apple watch, although said it was \"a bit low a few times\".  Denies any recent injuries or falls, no prescription refills needed.  In agreement of future follow up calls.   "

## 2024-08-16 ENCOUNTER — APPOINTMENT (OUTPATIENT)
Dept: NEUROLOGY | Facility: CLINIC | Age: 74
End: 2024-08-16
Payer: MEDICARE

## 2024-08-16 DIAGNOSIS — M79.601 PAIN OF RIGHT UPPER EXTREMITY: Primary | ICD-10-CM

## 2024-08-16 DIAGNOSIS — R20.0 NUMBNESS: ICD-10-CM

## 2024-08-16 PROCEDURE — 99214 OFFICE O/P EST MOD 30 MIN: CPT | Performed by: PSYCHIATRY & NEUROLOGY

## 2024-08-16 PROCEDURE — 1160F RVW MEDS BY RX/DR IN RCRD: CPT | Performed by: PSYCHIATRY & NEUROLOGY

## 2024-08-16 PROCEDURE — 1123F ACP DISCUSS/DSCN MKR DOCD: CPT | Performed by: PSYCHIATRY & NEUROLOGY

## 2024-08-16 PROCEDURE — 1159F MED LIST DOCD IN RCRD: CPT | Performed by: PSYCHIATRY & NEUROLOGY

## 2024-08-16 PROCEDURE — 1036F TOBACCO NON-USER: CPT | Performed by: PSYCHIATRY & NEUROLOGY

## 2024-08-16 NOTE — PROGRESS NOTES
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  PCP Dr. Ruben Bradford.    HPI:    Seen last year.  Ongoing trouble with right upper extremity, particularly the hand.  Fingers get stuck and can't flex/extend them, they lock up.  Works them for awhile and a little better.  Has some numbness in hand as well.  No significant pain, on gabapentin for that and it helps.  Had shingles in that arm last year.  Has spine problems, lower back and cervical, sees Dr. Cardenas for it.  Had low back surgery several months ago.  Has done PT for the right arm, finished up with it sev months ago.  Has putty and electrical stim device that he still uses.  Wanted to come in and have it re-looked at.    Current medications include:  Gabapentin 300 mg takes 1-2    I reviewed the relevant portions of the patient's chart since the last visit with me on 8/28/23.    Neurologic Exam     Mental Status   Oriented to person, place, and time.   Level of consciousness: alert    Cranial Nerves   Cranial nerves II through XII intact.     Motor Exam   Muscle bulk: decreased  Overall muscle tone: normal    Strength   Strength 5/5 except as noted.   Atrophy in right hand, APB, FDI, ADM and also forearm somewhat atrophic.  Has trigger fingers sticking multiple fingers right hand especially index.     Sensory Exam   Right arm light touch: decreased from fingers  Left arm light touch: normal  Right arm vibration: decreased from fingers  Left arm vibration: normal  Right arm proprioception: decreased from fingers  Left arm proprioception: normal    Gait, Coordination, and Reflexes     Coordination   Romberg: negative    Reflexes   Right biceps: 0  Left biceps: 1+  Right triceps: 0  Left triceps: 1+  Right patellar: 0  Left patellar: 0  Right achilles: 0  Left achilles: 0  Right plantar: normal  Left plantar: normal  Gait arthritic.      I reviewed the following data on the patient:  Reviewed my notes from  last year.  EMG also reviewed, cervical radiculopathy.  MRI cervical spine last year showed  spondylosis and foraminal stenosis, no cord compression.    Assessment and Plan:  Suspect ongoing effects of the shingle-related neuropathy/radiculopathy, and also suspect an element of trigger finger symptomatology.  Discussed.  Will update the EMG nerve testing and compare to prior.  Continue the gabapentin.  Will see what the nerve test shows, and will consider referral to hand surgery to address the trigger finger issue.  The patient was advised I will be retiring in September of 2024.   If further neurological care needed I will refer him to one of my colleagues as needed.  [id number]%      Grant Thompson MD

## 2024-08-19 ENCOUNTER — APPOINTMENT (OUTPATIENT)
Dept: UROLOGY | Facility: CLINIC | Age: 74
End: 2024-08-19
Payer: MEDICARE

## 2024-08-19 DIAGNOSIS — N39.0 URINARY TRACT INFECTION WITHOUT HEMATURIA, SITE UNSPECIFIED: ICD-10-CM

## 2024-08-19 DIAGNOSIS — D30.3 BENIGN BLADDER TUMOR: ICD-10-CM

## 2024-08-19 DIAGNOSIS — R35.1 BENIGN PROSTATIC HYPERPLASIA WITH NOCTURIA: Primary | ICD-10-CM

## 2024-08-19 DIAGNOSIS — R39.9 LOWER URINARY TRACT SYMPTOMS (LUTS): ICD-10-CM

## 2024-08-19 DIAGNOSIS — N40.1 BENIGN PROSTATIC HYPERPLASIA WITH NOCTURIA: Primary | ICD-10-CM

## 2024-08-19 LAB
APPEARANCE UR: CLEAR
BILIRUB UR STRIP.AUTO-MCNC: NEGATIVE MG/DL
COLOR UR: YELLOW
GLUCOSE UR STRIP.AUTO-MCNC: NORMAL MG/DL
HYALINE CASTS #/AREA URNS AUTO: ABNORMAL /LPF
KETONES UR STRIP.AUTO-MCNC: NEGATIVE MG/DL
LEUKOCYTE ESTERASE UR QL STRIP.AUTO: ABNORMAL
MUCOUS THREADS #/AREA URNS AUTO: ABNORMAL /LPF
NITRITE UR QL STRIP.AUTO: NEGATIVE
PH UR STRIP.AUTO: 5.5 [PH]
PROT UR STRIP.AUTO-MCNC: ABNORMAL MG/DL
RBC # UR STRIP.AUTO: NEGATIVE /UL
RBC #/AREA URNS AUTO: ABNORMAL /HPF
SP GR UR STRIP.AUTO: 1.02
UROBILINOGEN UR STRIP.AUTO-MCNC: NORMAL MG/DL
WBC #/AREA URNS AUTO: ABNORMAL /HPF

## 2024-08-19 PROCEDURE — G2211 COMPLEX E/M VISIT ADD ON: HCPCS | Performed by: STUDENT IN AN ORGANIZED HEALTH CARE EDUCATION/TRAINING PROGRAM

## 2024-08-19 PROCEDURE — 99214 OFFICE O/P EST MOD 30 MIN: CPT | Performed by: STUDENT IN AN ORGANIZED HEALTH CARE EDUCATION/TRAINING PROGRAM

## 2024-08-19 PROCEDURE — 87086 URINE CULTURE/COLONY COUNT: CPT

## 2024-08-19 PROCEDURE — 1123F ACP DISCUSS/DSCN MKR DOCD: CPT | Performed by: STUDENT IN AN ORGANIZED HEALTH CARE EDUCATION/TRAINING PROGRAM

## 2024-08-19 PROCEDURE — 81001 URINALYSIS AUTO W/SCOPE: CPT

## 2024-08-19 NOTE — PROGRESS NOTES
Subjective   Patient ID: Pro Belle Jr. is a 74 y.o. male.    HPI  75 yo male F/U BPH, on tamsulosin, s/p UroLift 5 years ago, by Dr. Werner with acceptable results. He is s/p cystoscopy with bladder biopsy 9/16/22, urothelial mucosa with amyloid deposition, on path.      On double dose tamsulosin. Has nocturia about twice a night. Some nights he wakes up 4-5 times a night. Better urinary flow. He tried melatonin before. He note this causes drowsiness through the day.     Was on Levaquin 500 mg, prescribed 6/17/2024 due to positive UA prior to cysto in office, finished treatment with no issues. No current urinary sxs.     ____________________________________________________  09/16/22 Surgical pathology: (s/p cystoscopy, bladder biopsy)  -- UROTHELIAL MUCOSA WITH AMYLOID DEPOSITION. SEE NOTE.  Note: The [place] Red special stain is positive for amyloid deposition in the  lamina propria.  Review of Systems    Objective   Physical Exam    Assessment/Plan   75 yo male F/U BPH, on tamsulosin, s/p UroLift 5 years ago, by Dr. Werner with acceptable results. He is s/p cystoscopy with bladder biopsy 9/16/22, urothelial mucosa with amyloid deposition, on path.      On double dose tamsulosin. Has nocturia about twice a night. Some nights he wakes up 4-5 times a night. Better urinary flow. He tried melatonin before. He note this causes drowsiness through the day.     Was on Levaquin 500 mg, prescribed 6/17/2024 due to positive UA prior to cysto in office, finished treatment with no issues. No current urinary sxs.     Will continue on tamsulosin double dose for now, will not add additional medication since risks outweigh the benefits.     Plan:  UA and Ucx  Consider myrbetriq in the future if symptoms worsen  Continue tamsulosin 0.4  mg 2 pills daily at bedtime.  FU cystoscopy in June 2025    Diagnoses and all orders for this visit:  Benign prostatic hyperplasia with nocturia  Benign bladder tumor  Urinary tract infection  without hematuria, site unspecified  Scribe Attestation  By signing my name below, I, Radha Bailey Scribzabrina attest that this documentation has been prepared under the direction and in the presence of Citlaly Shane MD.

## 2024-08-20 LAB — BACTERIA UR CULT: NORMAL

## 2024-08-21 ENCOUNTER — TELEPHONE (OUTPATIENT)
Dept: PRIMARY CARE | Facility: CLINIC | Age: 74
End: 2024-08-21

## 2024-08-21 NOTE — TELEPHONE ENCOUNTER
Patient stopped in our office today stating he was billed for his office visit on 03-22-24 with Dr. Bradford. He is stating Medicare sent him a bill for this visit because they didn't cover it. He is asking if this can be looked into further and given a call back with some information as to why he is receiving a bill for this appointment on 03-22-24. PT# [phone]. Thank You.

## 2024-08-26 ENCOUNTER — DOCUMENTATION (OUTPATIENT)
Dept: PHYSICAL THERAPY | Facility: CLINIC | Age: 74
End: 2024-08-26
Payer: MEDICARE

## 2024-08-26 NOTE — PROGRESS NOTES
Physical Therapy    Discharge Summary    Name: Pro Belle Jr.  MRN: [id number]  : 1950  Date: 24    Discharge Summary: PT    Discharge Information: Date of discharge 2024, Date of last visit 2024, Date of evaluation 06/10/2024, Number of attended visits 7, Referred by Dr. Ruben Bradford, and Referred for Weakness    Therapy Summary: Pt is a 75 yo male who presented to PT with chief complaint of BLE weakness, along with back pain/stiffness. Impairments addressed during PT included impaired posture, impaired mobility, generalized weakness of BLE/core,and impaired balance.     Discharge Status: Goals not met, unable to assess     Rehab Discharge Reason: Failed to schedule and/or keep follow-up appointment(s)

## 2024-08-27 ENCOUNTER — APPOINTMENT (OUTPATIENT)
Dept: NEUROLOGY | Facility: CLINIC | Age: 74
End: 2024-08-27
Payer: MEDICARE

## 2024-08-27 DIAGNOSIS — M65.321 TRIGGER INDEX FINGER OF RIGHT HAND: Primary | ICD-10-CM

## 2024-08-27 DIAGNOSIS — R20.0 NUMBNESS: ICD-10-CM

## 2024-08-27 DIAGNOSIS — M79.601 PAIN OF RIGHT UPPER EXTREMITY: ICD-10-CM

## 2024-08-27 PROCEDURE — 95911 NRV CNDJ TEST 9-10 STUDIES: CPT | Performed by: PSYCHIATRY & NEUROLOGY

## 2024-08-27 PROCEDURE — 95886 MUSC TEST DONE W/N TEST COMP: CPT | Performed by: PSYCHIATRY & NEUROLOGY

## 2024-08-27 NOTE — PROGRESS NOTES
Nerve conduction studies and needle EMG was performed today on this patient.  Full scanned report is available in the Procedure tab in Epic (Chart Review, Procedure tab, double-click the report to enlarge it).  Note:  Dr. Thompson remains available to perform EMG studies until 2024, when he will be retiring.    Impression:  Nerve conduction study and needle examination of the right upper extremity and cervical paraspinals were performed and compared to the previous exam from 23, see details in table.  The patient had an episode of shingles involving the right upper extremity in approximately 2023, and also reports a remote history of bilateral carpal tunnel release surgeries many years ago. Current results were abnormal because of the followin. Prolonged median sensory and median motor latencies.  Absent ulnar sensory response.  Needle exam showing chronic denervation in C8-T1 innervated muscles primarily with limited involvement in potentially C7 given the changes in the pronator teres.  These results are consistent with a right cervical radiculopathy primarily involving C8 and T1 innervated muscles.  They are substantially improved compared to the previous study.  2. We discussed his situation.  I suspect there are several factors involved.  Shingles involving the right arm.  Cervical radiculopathy with significant foraminal stenosis seen on MRI scan of his cervical spine last year.  He also has trigger finger symptomatology in the right hand. The patient would like to pursue the trigger finger issue, and I gave him, therefore, a referral to see Dr. Hensley in hand surgery to assess that.  He will continue to monitor his cervical symptoms, and he may consider consultation with orthopedic surgery regarding his neck down the line, depending on how things go.  He does understand I will be retiring next month, and he has the information to establish care with another  neurologist if the  need should arise.       Grant Thompson MD

## 2024-09-03 NOTE — TELEPHONE ENCOUNTER
Rasta stopped by the office asking for the status on his billing question. He was told by insurance that it was a code that was used. Unsure if you are able to look into this.

## 2024-09-17 ENCOUNTER — APPOINTMENT (OUTPATIENT)
Dept: ORTHOPEDIC SURGERY | Facility: CLINIC | Age: 74
End: 2024-09-17
Payer: MEDICARE

## 2024-09-24 ENCOUNTER — OFFICE VISIT (OUTPATIENT)
Dept: ORTHOPEDIC SURGERY | Facility: CLINIC | Age: 74
End: 2024-09-24
Payer: MEDICARE

## 2024-09-24 DIAGNOSIS — M65.321 TRIGGER INDEX FINGER OF RIGHT HAND: ICD-10-CM

## 2024-09-24 PROCEDURE — 1159F MED LIST DOCD IN RCRD: CPT | Performed by: ORTHOPAEDIC SURGERY

## 2024-09-24 PROCEDURE — 1160F RVW MEDS BY RX/DR IN RCRD: CPT | Performed by: ORTHOPAEDIC SURGERY

## 2024-09-24 PROCEDURE — 99203 OFFICE O/P NEW LOW 30 MIN: CPT | Performed by: ORTHOPAEDIC SURGERY

## 2024-09-24 PROCEDURE — 99213 OFFICE O/P EST LOW 20 MIN: CPT | Performed by: ORTHOPAEDIC SURGERY

## 2024-09-24 PROCEDURE — 1036F TOBACCO NON-USER: CPT | Performed by: ORTHOPAEDIC SURGERY

## 2024-09-24 PROCEDURE — 1123F ACP DISCUSS/DSCN MKR DOCD: CPT | Performed by: ORTHOPAEDIC SURGERY

## 2024-09-24 PROCEDURE — 1125F AMNT PAIN NOTED PAIN PRSNT: CPT | Performed by: ORTHOPAEDIC SURGERY

## 2024-09-24 ASSESSMENT — ENCOUNTER SYMPTOMS
ARTHRALGIAS: 1
CHILLS: 0
WHEEZING: 0
SHORTNESS OF BREATH: 0
FATIGUE: 0
FEVER: 0

## 2024-09-24 ASSESSMENT — PAIN - FUNCTIONAL ASSESSMENT: PAIN_FUNCTIONAL_ASSESSMENT: 0-10

## 2024-09-24 ASSESSMENT — PAIN SCALES - GENERAL: PAINLEVEL_OUTOF10: 2

## 2024-09-24 NOTE — PROGRESS NOTES
Reason for Appointment  Chief Complaint   Patient presents with    Right Hand - Trigger Finger     History of Present Illness  New patient is a 74 y.o. male here today for evaluation of the right hand. Saw Dr. Thompson for a nerve conduction study and needle EMG. Results were a prolonged median sensory and median motor latencies. Absent ulnar sensory response. The needle exam showed chronic denervation in C8-T1 innervated muscles primarily with limited involvement in potentially C7 given the changes in the pronator teres. Dr. Thompsno referred him to our office for the trigger finger. Pt does have a PMHx of an episode of shingles involving the right upper extremity in 2023.  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  He does have triggering in the index finger but this is only occur when he mainly puts it into flexion because he cannot flex that he can still side pinch with the thumb and index and the long finger.  He does have numbness but that is not a major issue for him it is more the motor issue which appears to be complex in nature especially from his EMG and it has been longstanding.    Past Medical History:   Diagnosis Date    A-fib (Multi)        Past Surgical History:   Procedure Laterality Date    BACK SURGERY  02/14/2024    l4-5 fusion    HERNIA REPAIR  06/20/2014    Inguinal Hernia Repair    JOINT REPLACEMENT Bilateral     hip    JOINT REPLACEMENT Left     knee and shoulder    OTHER SURGICAL HISTORY  10/06/2022    Cystoscopy       Medication Documentation Review Audit       Reviewed by Tere Zee MA (Medical Assistant) on 09/24/24 at 0935      Medication Order Taking? Sig Documenting Provider Last Dose Status   acetaminophen (Tylenol 8 HOUR) 650 mg ER tablet [id number] Yes Take 1 tablet (650 mg) by mouth every 8  hours if needed for mild pain (1 - 3). Do not crush, chew, or split. Historical Provider, MD Taking Active   amLODIPine (Norvasc) 5 mg tablet [id number] Yes Take 1 tablet (5 mg) by mouth once daily. Mynor Hurst MD Taking Active   aspirin 81 mg chewable tablet [id number] Yes Chew 1 tablet (81 mg) once every 24 hours. Historical Provider, MD Taking Active   atorvastatin (Lipitor) 80 mg tablet [id number] Yes TAKE 1 TABLET BY MOUTH AT  BEDTIME Mynor Hurst MD Taking Active   cholecalciferol (Vitamin D-3) 50 MCG (2000 UT) tablet [id number] Yes Take 1 tablet (2,000 Units) by mouth once daily. Historical Provider, MD Taking Active   cyanocobalamin (Vitamin B-12) 1,000 mcg tablet [id number] Yes Take 1 tablet (1,000 mcg) by mouth once daily. Historical Provider, MD Taking Active   ezetimibe (Zetia) 10 mg tablet [id number] Yes TAKE 1 TABLET BY MOUTH AT  BEDTIME Mynor Hurst MD Taking Active   folic acid (Folvite) 1 mg tablet [id number] Yes Take 1 tablet (1 mg) by mouth once every 24 hours. Historical Provider, MD Taking Active   gabapentin (Neurontin) 300 mg capsule [id number] Yes Take 2 capsules (600 mg) by mouth 2 times a day. Ruben Bradford MD Taking Active   lisinopril 40 mg tablet [id number] Yes TAKE 1 TABLET BY MOUTH ONCE DAILY Ruben Bradford MD Taking Active   metoprolol succinate XL (Toprol XL) 25 mg 24 hr tablet [id number] Yes Take 3 tablets (75 mg) by mouth once daily. Do not crush or chew. Pauline Aguilera APRN-CNP Taking Active   omeprazole (PriLOSEC) 20 mg DR capsule [id number] Yes Take 1 capsule (20 mg) by mouth once daily. Ruben Bradford MD Taking Active   rivaroxaban (Xarelto) 20 mg tablet [id number] Yes Take 1 tablet (20 mg) by mouth once daily. Mildred Valencia APRN-CNP Taking Active   tamsulosin (Flomax) 0.4 mg 24 hr capsule [id number] Yes Take 2 capsules (0.8 mg) by mouth once daily. Citlaly Shane MD Taking Active   tofacitinib ER (Xeljanz XR) 11 mg tablet extended release 24 hr  [id number] Yes Take 1 tablet (11 mg) by mouth once daily. Do not crush, chew or split. Swallow whole. Historical Provider, MD Taking Active                    No Known Allergies    Review of Systems   Constitutional:  Negative for chills, fatigue and fever.   Respiratory:  Negative for shortness of breath and wheezing.    Cardiovascular:  Negative for chest pain and leg swelling.   Musculoskeletal:  Positive for arthralgias, neck pain and neck stiffness.   Skin: Negative.    Allergic/Immunologic: Negative for immunocompromised state.   Neurological:  Positive for weakness and numbness.       Exam   Exam reveals patient is alert awake in no acute distress oriented person place and time mood is good patient has poor function in his shoulder neck shows stiffness upper arm does show better motion of biceps triceps wrist flexion extension strength but the hand is a complex exam and that there is no IP joint flexion or DIP PIP joint flexion of the index finger mildly positive Tinel's over the median nerve good capillary refill no severe intrinsic atrophy  Assessment   Encounter Diagnosis   Name Primary?    Trigger index finger of right hand    Complex palsy right hand    Plan   The usual scenario for this type of motor finding is an AIN nerve palsy but this seems to be more complex.  These can resolve over time but it is already been quite sometime.  I would not do any treatment on the trigger finger because it is not painful and he cannot actively flex that digit, I am happy to see him back at any point but no intervention is planned on my side at this point      I, Nati Hyman, attest that this documentation has been prepared under the direction and in the presence of Wade Hensley MD. By signing below, I, Wade Hensley MD, personally performed the services described in this documentation. All medical record entries made by the scribe were at my direction and in my presence. I have reviewed the chart and agree that the  record reflects my personal performance and is accurate and complete.

## 2024-09-25 ENCOUNTER — PATIENT OUTREACH (OUTPATIENT)
Dept: PRIMARY CARE | Facility: CLINIC | Age: 74
End: 2024-09-25
Payer: MEDICARE

## 2024-09-25 DIAGNOSIS — I10 BENIGN ESSENTIAL HYPERTENSION: ICD-10-CM

## 2024-09-25 DIAGNOSIS — I48.91 ATRIAL FIBRILLATION WITH RVR (MULTI): ICD-10-CM

## 2024-09-25 DIAGNOSIS — M06.9 RHEUMATOID ARTHRITIS, INVOLVING UNSPECIFIED SITE, UNSPECIFIED WHETHER RHEUMATOID FACTOR PRESENT (MULTI): ICD-10-CM

## 2024-09-25 NOTE — PROGRESS NOTES
"Chart reviewed prior to contacting patient for monthly CCM call.  Patient stated that things have been \"going better\".  Stated that last week he received neck injections, today he will be going to get his \"neck stretched\" and then next week another round of injections.  Feels that it has helped his legs so far.  Is sleeping a bit better.  No new injuries or falls to report.  No prescription refills needed at time of call.  Is in agreement of future follow up calls.   "

## 2024-09-26 ASSESSMENT — ENCOUNTER SYMPTOMS
WEAKNESS: 1
NECK STIFFNESS: 1
NECK PAIN: 1
NUMBNESS: 1

## 2024-09-30 ENCOUNTER — OFFICE VISIT (OUTPATIENT)
Dept: PRIMARY CARE | Facility: CLINIC | Age: 74
End: 2024-09-30
Payer: MEDICARE

## 2024-09-30 VITALS
TEMPERATURE: 97.9 F | OXYGEN SATURATION: 98 % | HEIGHT: 68 IN | DIASTOLIC BLOOD PRESSURE: 81 MMHG | SYSTOLIC BLOOD PRESSURE: 132 MMHG | HEART RATE: 53 BPM | WEIGHT: 215.8 LBS | BODY MASS INDEX: 32.71 KG/M2

## 2024-09-30 DIAGNOSIS — E78.2 COMBINED HYPERLIPIDEMIA: Chronic | ICD-10-CM

## 2024-09-30 DIAGNOSIS — K21.9 GASTROESOPHAGEAL REFLUX DISEASE, UNSPECIFIED WHETHER ESOPHAGITIS PRESENT: Chronic | ICD-10-CM

## 2024-09-30 DIAGNOSIS — I10 BENIGN ESSENTIAL HYPERTENSION: Primary | Chronic | ICD-10-CM

## 2024-09-30 DIAGNOSIS — R73.03 PREDIABETES: Chronic | ICD-10-CM

## 2024-09-30 DIAGNOSIS — I48.91 ATRIAL FIBRILLATION WITH RVR (MULTI): Chronic | ICD-10-CM

## 2024-09-30 PROCEDURE — 1159F MED LIST DOCD IN RCRD: CPT | Performed by: FAMILY MEDICINE

## 2024-09-30 PROCEDURE — 99214 OFFICE O/P EST MOD 30 MIN: CPT | Performed by: FAMILY MEDICINE

## 2024-09-30 PROCEDURE — 1036F TOBACCO NON-USER: CPT | Performed by: FAMILY MEDICINE

## 2024-09-30 PROCEDURE — 1126F AMNT PAIN NOTED NONE PRSNT: CPT | Performed by: FAMILY MEDICINE

## 2024-09-30 PROCEDURE — 3079F DIAST BP 80-89 MM HG: CPT | Performed by: FAMILY MEDICINE

## 2024-09-30 PROCEDURE — 3075F SYST BP GE 130 - 139MM HG: CPT | Performed by: FAMILY MEDICINE

## 2024-09-30 PROCEDURE — 1160F RVW MEDS BY RX/DR IN RCRD: CPT | Performed by: FAMILY MEDICINE

## 2024-09-30 PROCEDURE — 1123F ACP DISCUSS/DSCN MKR DOCD: CPT | Performed by: FAMILY MEDICINE

## 2024-09-30 PROCEDURE — 1170F FXNL STATUS ASSESSED: CPT | Performed by: FAMILY MEDICINE

## 2024-09-30 ASSESSMENT — PAIN SCALES - GENERAL: PAINLEVEL: 0-NO PAIN

## 2024-09-30 ASSESSMENT — ENCOUNTER SYMPTOMS
OCCASIONAL FEELINGS OF UNSTEADINESS: 1
ABDOMINAL DISTENTION: 0
PALPITATIONS: 0
COUGH: 0
SHORTNESS OF BREATH: 0
LOSS OF SENSATION IN FEET: 0
DEPRESSION: 0

## 2024-09-30 ASSESSMENT — ACTIVITIES OF DAILY LIVING (ADL)
DOING_HOUSEWORK: NEEDS ASSISTANCE
TAKING_MEDICATION: INDEPENDENT
GROCERY_SHOPPING: INDEPENDENT
DRESSING: INDEPENDENT
MANAGING_FINANCES: INDEPENDENT
BATHING: INDEPENDENT

## 2024-09-30 ASSESSMENT — PATIENT HEALTH QUESTIONNAIRE - PHQ9
2. FEELING DOWN, DEPRESSED OR HOPELESS: NOT AT ALL
SUM OF ALL RESPONSES TO PHQ9 QUESTIONS 1 AND 2: 0
1. LITTLE INTEREST OR PLEASURE IN DOING THINGS: NOT AT ALL

## 2024-09-30 ASSESSMENT — LIFESTYLE VARIABLES
SKIP TO QUESTIONS 9-10: 0
HOW OFTEN DO YOU HAVE A DRINK CONTAINING ALCOHOL: 2-4 TIMES A MONTH
HOW OFTEN DO YOU HAVE SIX OR MORE DRINKS ON ONE OCCASION: MONTHLY
AUDIT-C TOTAL SCORE: 4
HOW MANY STANDARD DRINKS CONTAINING ALCOHOL DO YOU HAVE ON A TYPICAL DAY: 1 OR 2

## 2024-09-30 NOTE — PROGRESS NOTES
"Subjective   Patient ID: Pro Belle Jr. is a 74 y.o. male who presents for Annual Exam.    HPI   Hypertension :   -Patient is here for follow-up hypertension: chronic , stable  -Tolerating medications without side effects    CAD/ASCVD:          c/o Patient here for F/U. stable, stent to rca.   Hyperlipidemia:          Patient here for F/U. doing well and without complaints, tolerating medicine well.   GERD:          GERD F/U doing well and without complaints, controlled.     A fib stable and controlled on xarelto, asa, b blocker    Bph on tamsulosin 2 tabs 0.4 mg, stable  Review of Systems   Respiratory:  Negative for cough and shortness of breath.    Cardiovascular:  Negative for chest pain and palpitations.   Gastrointestinal:  Negative for abdominal distention.       Objective   /81   Pulse 53   Temp 36.6 °C (97.9 °F)   Ht 1.727 m (5' 8\")   Wt 97.9 kg (215 lb 12.8 oz)   SpO2 98%   BMI 32.81 kg/m²     Physical Exam  Vitals reviewed.   Constitutional:       Appearance: Normal appearance.   Cardiovascular:      Rate and Rhythm: Normal rate and regular rhythm.      Heart sounds: Normal heart sounds. No murmur heard.  Pulmonary:      Breath sounds: Normal breath sounds.   Abdominal:      General: Abdomen is flat. Bowel sounds are normal.      Palpations: Abdomen is soft.   Musculoskeletal:      Right lower leg: Edema present.      Left lower leg: Edema present.   Neurological:      Mental Status: He is alert.         Assessment/Plan   Diagnoses and all orders for this visit:  Benign essential hypertension  -     Comprehensive Metabolic Panel; Future  -     CBC and Auto Differential; Future  Prediabetes  -     Hemoglobin A1C; Future  Atrial fibrillation with RVR (Multi)  Combined hyperlipidemia  Gastroesophageal reflux disease, unspecified whether esophagitis present  Other orders  -     Follow Up In Primary Care - Health Maintenance  -     Follow Up In Primary Care - Medicare Annual; Future    Patient " would like to wean off gabapentin  This was discussed with him here today because it is making him too sleepy  Advised to cut down 1 dose every 3 days until he gets down to his maintenance dose that either controls symptoms or he is off the medication completely  All questions were answered how to wean off of the patient understood      Patient was identified as a fall risk. Risk prevention instructions provided.

## 2024-09-30 NOTE — PATIENT INSTRUCTIONS

## 2024-10-02 PROCEDURE — RXMED WILLOW AMBULATORY MEDICATION CHARGE

## 2024-10-03 ENCOUNTER — PHARMACY VISIT (OUTPATIENT)
Dept: PHARMACY | Facility: CLINIC | Age: 74
End: 2024-10-03
Payer: COMMERCIAL

## 2024-10-05 ENCOUNTER — LAB (OUTPATIENT)
Dept: LAB | Facility: LAB | Age: 74
End: 2024-10-05
Payer: MEDICARE

## 2024-10-05 DIAGNOSIS — R73.03 PREDIABETES: Chronic | ICD-10-CM

## 2024-10-05 DIAGNOSIS — I10 BENIGN ESSENTIAL HYPERTENSION: Chronic | ICD-10-CM

## 2024-10-05 LAB
ALBUMIN SERPL BCP-MCNC: 4.2 G/DL (ref 3.4–5)
ALP SERPL-CCNC: 94 U/L (ref 33–136)
ALT SERPL W P-5'-P-CCNC: 29 U/L (ref 10–52)
ANION GAP SERPL CALC-SCNC: 13 MMOL/L (ref 10–20)
AST SERPL W P-5'-P-CCNC: 25 U/L (ref 9–39)
BASOPHILS # BLD AUTO: 0.03 X10*3/UL (ref 0–0.1)
BASOPHILS NFR BLD AUTO: 0.3 %
BILIRUB SERPL-MCNC: 0.9 MG/DL (ref 0–1.2)
BUN SERPL-MCNC: 24 MG/DL (ref 6–23)
CALCIUM SERPL-MCNC: 10.2 MG/DL (ref 8.6–10.3)
CHLORIDE SERPL-SCNC: 102 MMOL/L (ref 98–107)
CO2 SERPL-SCNC: 27 MMOL/L (ref 21–32)
CREAT SERPL-MCNC: 0.83 MG/DL (ref 0.5–1.3)
EGFRCR SERPLBLD CKD-EPI 2021: >90 ML/MIN/1.73M*2
EOSINOPHIL # BLD AUTO: 0.19 X10*3/UL (ref 0–0.4)
EOSINOPHIL NFR BLD AUTO: 1.9 %
ERYTHROCYTE [DISTWIDTH] IN BLOOD BY AUTOMATED COUNT: 13.3 % (ref 11.5–14.5)
EST. AVERAGE GLUCOSE BLD GHB EST-MCNC: 108 MG/DL
GLUCOSE SERPL-MCNC: 72 MG/DL (ref 74–99)
HBA1C MFR BLD: 5.4 %
HCT VFR BLD AUTO: 44.5 % (ref 41–52)
HGB BLD-MCNC: 14.8 G/DL (ref 13.5–17.5)
IMM GRANULOCYTES # BLD AUTO: 0.05 X10*3/UL (ref 0–0.5)
IMM GRANULOCYTES NFR BLD AUTO: 0.5 % (ref 0–0.9)
LYMPHOCYTES # BLD AUTO: 1.06 X10*3/UL (ref 0.8–3)
LYMPHOCYTES NFR BLD AUTO: 10.5 %
MCH RBC QN AUTO: 33.3 PG (ref 26–34)
MCHC RBC AUTO-ENTMCNC: 33.3 G/DL (ref 32–36)
MCV RBC AUTO: 100 FL (ref 80–100)
MONOCYTES # BLD AUTO: 1.01 X10*3/UL (ref 0.05–0.8)
MONOCYTES NFR BLD AUTO: 10 %
NEUTROPHILS # BLD AUTO: 7.77 X10*3/UL (ref 1.6–5.5)
NEUTROPHILS NFR BLD AUTO: 76.8 %
NRBC BLD-RTO: 0 /100 WBCS (ref 0–0)
PLATELET # BLD AUTO: 240 X10*3/UL (ref 150–450)
POTASSIUM SERPL-SCNC: 4.2 MMOL/L (ref 3.5–5.3)
PROT SERPL-MCNC: 6.8 G/DL (ref 6.4–8.2)
RBC # BLD AUTO: 4.44 X10*6/UL (ref 4.5–5.9)
SODIUM SERPL-SCNC: 138 MMOL/L (ref 136–145)
WBC # BLD AUTO: 10.1 X10*3/UL (ref 4.4–11.3)

## 2024-10-05 PROCEDURE — 83036 HEMOGLOBIN GLYCOSYLATED A1C: CPT

## 2024-10-05 PROCEDURE — 85025 COMPLETE CBC W/AUTO DIFF WBC: CPT

## 2024-10-05 PROCEDURE — 80053 COMPREHEN METABOLIC PANEL: CPT

## 2024-10-09 ENCOUNTER — OFFICE VISIT (OUTPATIENT)
Dept: CARDIOLOGY | Facility: CLINIC | Age: 74
End: 2024-10-09
Payer: MEDICARE

## 2024-10-09 VITALS
HEIGHT: 68 IN | WEIGHT: 209.5 LBS | SYSTOLIC BLOOD PRESSURE: 156 MMHG | DIASTOLIC BLOOD PRESSURE: 79 MMHG | HEART RATE: 50 BPM | BODY MASS INDEX: 31.75 KG/M2 | OXYGEN SATURATION: 99 %

## 2024-10-09 DIAGNOSIS — I48.0 PAF (PAROXYSMAL ATRIAL FIBRILLATION) (MULTI): ICD-10-CM

## 2024-10-09 DIAGNOSIS — I48.91 ATRIAL FIBRILLATION WITH RVR (MULTI): Chronic | ICD-10-CM

## 2024-10-09 LAB
ATRIAL RATE: 52 BPM
P AXIS: 13 DEGREES
P OFFSET: 177 MS
P ONSET: 137 MS
PR INTERVAL: 174 MS
Q ONSET: 224 MS
QRS COUNT: 8 BEATS
QRS DURATION: 140 MS
QT INTERVAL: 460 MS
QTC CALCULATION(BAZETT): 427 MS
QTC FREDERICIA: 438 MS
R AXIS: -31 DEGREES
T AXIS: -12 DEGREES
T OFFSET: 454 MS
VENTRICULAR RATE: 52 BPM

## 2024-10-09 PROCEDURE — 93005 ELECTROCARDIOGRAM TRACING: CPT | Performed by: NURSE PRACTITIONER

## 2024-10-09 PROCEDURE — 3077F SYST BP >= 140 MM HG: CPT | Performed by: NURSE PRACTITIONER

## 2024-10-09 PROCEDURE — 99214 OFFICE O/P EST MOD 30 MIN: CPT | Performed by: NURSE PRACTITIONER

## 2024-10-09 PROCEDURE — 1036F TOBACCO NON-USER: CPT | Performed by: NURSE PRACTITIONER

## 2024-10-09 PROCEDURE — 1123F ACP DISCUSS/DSCN MKR DOCD: CPT | Performed by: NURSE PRACTITIONER

## 2024-10-09 PROCEDURE — 1160F RVW MEDS BY RX/DR IN RCRD: CPT | Performed by: NURSE PRACTITIONER

## 2024-10-09 PROCEDURE — 1159F MED LIST DOCD IN RCRD: CPT | Performed by: NURSE PRACTITIONER

## 2024-10-09 PROCEDURE — 3078F DIAST BP <80 MM HG: CPT | Performed by: NURSE PRACTITIONER

## 2024-10-09 RX ORDER — METOPROLOL SUCCINATE 25 MG/1
50 TABLET, EXTENDED RELEASE ORAL DAILY
Start: 2024-10-09 | End: 2025-10-04

## 2024-10-09 ASSESSMENT — ENCOUNTER SYMPTOMS
ABDOMINAL PAIN: 0
DIARRHEA: 0
COUGH: 0
WEAKNESS: 0
SPUTUM PRODUCTION: 0
ORTHOPNEA: 0
FEVER: 0
BLURRED VISION: 0
PND: 0
SNORING: 0
HEADACHES: 0
DYSPNEA ON EXERTION: 0
LIGHT-HEADEDNESS: 0
DIZZINESS: 0
PALPITATIONS: 0
IRREGULAR HEARTBEAT: 0
NEAR-SYNCOPE: 0
DIAPHORESIS: 0
DOUBLE VISION: 0
FALLS: 0
SORE THROAT: 0
SHORTNESS OF BREATH: 0
VOMITING: 0
SYNCOPE: 0
NAUSEA: 0
MYALGIAS: 0
HEMOPTYSIS: 0

## 2024-10-09 NOTE — PROGRESS NOTES
Subjective   Pro Belle Jr. is a 74 y.o. male.    Chief Complaint:  Follow-up    Pro Belle is a 74 year old with:      1. Coronary artery disease, status post PCI to the distal RCA 09/10/2019.   2. Persistent atrial fibrillation. Index diagnosis at [facility] from 04/15/2018 to 04/18/2018. Discharged with AF on Amiodarone. At the time of his next follow-up visit as an outpatient he was in sinus rhythm. He presented to [facility] system September 9, 2019 in atrial fibrillation with RVR. He converted to sinus rhythm with IV diltiazem infusion. He is on amiodarone 200 mg daily as well as Xarelto 20 mg daily. Recurred with AF. Had successful DCCV at [facility] 07/02/2020. Recurred with AF few days after.  3. Hypertension.  4. Hyperlipidemia.  5. Sleep apnea. The patient's initial sleep study was done at [facility] on 01/04/2018 and showed severe obstructive sleep apnea and he ultimately had a titration study on 02/22/2018. He was placed on ASV therapy with significant clinical improvement. His compliance has been 74% with no leak.  6. BPH.   7. GERD.  8. On 10/02/2020 he had EPS and PVI - his left atrial had some area of scar mainly in the roof (both during AF and NSR mapping) . Post PVI we induced right atrial tachycardia that terminated with RFA at the junction of the IVC and the right atria in the posterior wall. This was a focal tachycardia. He felt dramatically better in NSR. He recurred in November of 2020 with atrial flutter and was cardioverted by Dr Hurst. He did very well but 07/12/2021 presented at [facility] with what sounds to be AF. The episode lasted less than 24 hours and he reverted back into NSR on his own.    Echocardiogram 09/2019 shows EF 60-64%, left atrial size mildly dilated, trace MR, trivial to mild TR     s/p redo PVI and CTI RFA with Dr. Nassar 5/24/2022  Patient unfortunately had a right groin hematoma post procedure that required an overnight stay post  "ablation  ECG 8/24/2022 SB HR 54 bpm, RBBB, left axis deviation QTc 488 ms  ECG 8/23/2023 NSR with occasional PVCs, RBBB, QTc 489 ms    Patient unfortunately had a recurrence of his Afib 5/9/2024.  He went to the ED and he was discharged home on increased metoprolol. ECG 1 week later showed NSR, patient states that he was in Afib a little more than 24 hours.  He denies any symptoms other than some chest tightness and palpitations. He has felt good over the last month.    ECG 6/12/2024 SB with PVCs, RBBB, HR 58 bpm,  ms  ECG 10/9/2024 SB with RBBB/LVH, HR 52 bpm,  ms    TODAY Patient presents for 4 month follow up after Afib recurrence and metoprolol increased.  He is having some back pain, but denies any cardiac symptoms.  He wears a smart watch and his heart rate has been alerting as below 50 bpm frequently, usually at night.  He is worried about his low heart rate.  He denies any Afib alerts on his watch.    /79 (BP Location: Left arm)   Pulse 50   Ht 1.727 m (5' 8\")   Wt 95 kg (209 lb 8 oz)   SpO2 99%   BMI 31.85 kg/m²   Current Outpatient Medications on File Prior to Visit   Medication Sig Dispense Refill    acetaminophen (Tylenol 8 HOUR) 650 mg ER tablet Take 1 tablet (650 mg) by mouth every 8 hours if needed for mild pain (1 - 3). Do not crush, chew, or split.      amLODIPine (Norvasc) 5 mg tablet Take 1 tablet (5 mg) by mouth once daily. 30 tablet 11    aspirin 81 mg chewable tablet Chew 1 tablet (81 mg) once every 24 hours.      atorvastatin (Lipitor) 80 mg tablet TAKE 1 TABLET BY MOUTH AT  BEDTIME 90 tablet 3    cholecalciferol (Vitamin D-3) 50 MCG (2000 UT) tablet Take 1 tablet (2,000 Units) by mouth once daily.      cyanocobalamin (Vitamin B-12) 1,000 mcg tablet Take 1 tablet (1,000 mcg) by mouth once daily.      ezetimibe (Zetia) 10 mg tablet TAKE 1 TABLET BY MOUTH AT  BEDTIME 90 tablet 3    folic acid (Folvite) 1 mg tablet Take 1 tablet (1 mg) by mouth once every 24 hours.      " gabapentin (Neurontin) 300 mg capsule Take 2 capsules (600 mg) by mouth 2 times a day. 180 capsule 1    lisinopril 40 mg tablet TAKE 1 TABLET BY MOUTH ONCE DAILY 90 tablet 3    omeprazole (PriLOSEC) 20 mg DR capsule Take 1 capsule (20 mg) by mouth once daily. 90 capsule 1    rivaroxaban (Xarelto) 20 mg tablet Take 1 tablet (20 mg) by mouth once daily. 90 tablet 3    tamsulosin (Flomax) 0.4 mg 24 hr capsule Take 2 capsules (0.8 mg) by mouth once daily. 180 capsule 3    tofacitinib ER (Xeljanz XR) 11 mg tablet extended release 24 hr Take 1 tablet (11 mg) by mouth once daily. Do not crush, chew or split. Swallow whole.      [DISCONTINUED] metoprolol succinate XL (Toprol XL) 25 mg 24 hr tablet Take 3 tablets (75 mg) by mouth once daily. Do not crush or chew. 270 tablet 3     No current facility-administered medications on file prior to visit.         Review of Systems   Constitutional: Negative for diaphoresis, fever and malaise/fatigue.   HENT:  Negative for congestion and sore throat.    Eyes:  Negative for blurred vision and double vision.   Cardiovascular:  Negative for chest pain, dyspnea on exertion, irregular heartbeat, leg swelling, near-syncope, orthopnea, palpitations, paroxysmal nocturnal dyspnea and syncope.   Respiratory:  Negative for cough, hemoptysis, shortness of breath, snoring and sputum production.    Hematologic/Lymphatic: Negative for bleeding problem.   Skin:  Negative for rash.   Musculoskeletal:  Negative for falls, joint pain and myalgias.   Gastrointestinal:  Negative for abdominal pain, diarrhea, nausea and vomiting.   Neurological:  Negative for dizziness, headaches, light-headedness and weakness.   All other systems reviewed and are negative.      Objective   Constitutional:       Appearance: Healthy appearance. Not in distress.   Eyes:      Conjunctiva/sclera: Conjunctivae normal.   HENT:      Nose: Nose normal.    Mouth/Throat:      Pharynx: Oropharynx is clear.   Pulmonary:      Effort:  Pulmonary effort is normal.      Breath sounds: Normal breath sounds. No wheezing. No rhonchi.   Chest:      Chest wall: Not tender to palpatation.   Cardiovascular:      Bradycardia present. Regular rhythm.      Murmurs: There is no murmur.      No rub.   Pulses:     Intact distal pulses.   Edema:     Ankle: bilateral trace edema of the ankle.     Feet: bilateral trace edema of the feet.  Abdominal:      General: Bowel sounds are normal.      Palpations: Abdomen is soft.   Musculoskeletal: Normal range of motion.      Cervical back: Neck supple. Skin:     General: Skin is warm and dry.   Neurological:      Mental Status: Alert and oriented to person, place and time.      Motor: Motor function is intact.         Lab Review:   Lab Results   Component Value Date     10/05/2024    K 4.2 10/05/2024     10/05/2024    CO2 27 10/05/2024    BUN 24 (H) 10/05/2024    CREATININE 0.83 10/05/2024    GLUCOSE 72 (L) 10/05/2024    CALCIUM 10.2 10/05/2024     Lab Results   Component Value Date    WBC 10.1 10/05/2024    HGB 14.8 10/05/2024    HCT 44.5 10/05/2024     10/05/2024     10/05/2024       Assessment/Plan   Diagnoses of Atrial fibrillation with RVR (Multi) and PAF (paroxysmal atrial fibrillation) (Multi) were pertinent to this visit.  Patient presents for follow up after recurrent episode of Afib.    He is concerned about his bradycardia.  After much discussion, we will reduce his metoprolol dose back down to 50 mg daily.  If he has an Afib recurrence, we will likely discuss repeat ablation.  Continue Xarelto  Follow up with me in 6 months

## 2024-10-10 ENCOUNTER — APPOINTMENT (OUTPATIENT)
Dept: CARDIOLOGY | Facility: CLINIC | Age: 74
End: 2024-10-10
Payer: MEDICARE

## 2024-10-16 ENCOUNTER — APPOINTMENT (OUTPATIENT)
Dept: CARDIOLOGY | Facility: CLINIC | Age: 74
End: 2024-10-16
Payer: MEDICARE

## 2024-10-18 ENCOUNTER — HOSPITAL ENCOUNTER (OUTPATIENT)
Facility: HOSPITAL | Age: 74
Setting detail: OBSERVATION
Discharge: HOME | End: 2024-10-19
Admitting: INTERNAL MEDICINE
Payer: MEDICARE

## 2024-10-18 ENCOUNTER — APPOINTMENT (OUTPATIENT)
Dept: CARDIOLOGY | Facility: HOSPITAL | Age: 74
End: 2024-10-18
Payer: MEDICARE

## 2024-10-18 ENCOUNTER — HOSPITAL ENCOUNTER (OUTPATIENT)
Dept: CARDIOLOGY | Facility: CLINIC | Age: 74
Discharge: HOME | End: 2024-10-18
Payer: MEDICARE

## 2024-10-18 ENCOUNTER — APPOINTMENT (OUTPATIENT)
Dept: RADIOLOGY | Facility: HOSPITAL | Age: 74
End: 2024-10-18
Payer: MEDICARE

## 2024-10-18 DIAGNOSIS — R79.89 ELEVATED TROPONIN: ICD-10-CM

## 2024-10-18 DIAGNOSIS — R94.31 ABNORMAL EKG: Primary | ICD-10-CM

## 2024-10-18 DIAGNOSIS — I48.91 ATRIAL FIBRILLATION, UNSPECIFIED TYPE (MULTI): Primary | ICD-10-CM

## 2024-10-18 PROBLEM — I48.92 ATRIAL FIBRILLATION AND FLUTTER: Status: ACTIVE | Noted: 2024-10-18

## 2024-10-18 LAB
ALBUMIN SERPL BCP-MCNC: 4.2 G/DL (ref 3.4–5)
ALP SERPL-CCNC: 95 U/L (ref 33–136)
ALT SERPL W P-5'-P-CCNC: 23 U/L (ref 10–52)
ANION GAP SERPL CALCULATED.3IONS-SCNC: 12 MMOL/L (ref 10–20)
AST SERPL W P-5'-P-CCNC: 25 U/L (ref 9–39)
BASOPHILS # BLD AUTO: 0.04 X10*3/UL (ref 0–0.1)
BASOPHILS NFR BLD AUTO: 0.4 %
BILIRUB SERPL-MCNC: 0.7 MG/DL (ref 0–1.2)
BUN SERPL-MCNC: 23 MG/DL (ref 6–23)
CALCIUM SERPL-MCNC: 10.4 MG/DL (ref 8.6–10.3)
CARDIAC TROPONIN I PNL SERPL HS: 101 NG/L (ref 0–20)
CARDIAC TROPONIN I PNL SERPL HS: 107 NG/L (ref 0–20)
CHLORIDE SERPL-SCNC: 104 MMOL/L (ref 98–107)
CO2 SERPL-SCNC: 28 MMOL/L (ref 21–32)
CREAT SERPL-MCNC: 0.88 MG/DL (ref 0.5–1.3)
D DIMER PPP FEU-MCNC: 0.47 MG/L FEU (ref 0.19–0.5)
EGFRCR SERPLBLD CKD-EPI 2021: 89 ML/MIN/1.73M*2
EOSINOPHIL # BLD AUTO: 0.14 X10*3/UL (ref 0–0.4)
EOSINOPHIL NFR BLD AUTO: 1.4 %
ERYTHROCYTE [DISTWIDTH] IN BLOOD BY AUTOMATED COUNT: 12.9 % (ref 11.5–14.5)
GLUCOSE SERPL-MCNC: 98 MG/DL (ref 74–99)
HCT VFR BLD AUTO: 42.8 % (ref 41–52)
HGB BLD-MCNC: 14.6 G/DL (ref 13.5–17.5)
IMM GRANULOCYTES # BLD AUTO: 0.04 X10*3/UL (ref 0–0.5)
IMM GRANULOCYTES NFR BLD AUTO: 0.4 % (ref 0–0.9)
INR PPP: 1.5 (ref 0.9–1.2)
LYMPHOCYTES # BLD AUTO: 1.04 X10*3/UL (ref 0.8–3)
LYMPHOCYTES NFR BLD AUTO: 10.3 %
MAGNESIUM SERPL-MCNC: 2.08 MG/DL (ref 1.6–2.4)
MCH RBC QN AUTO: 33.6 PG (ref 26–34)
MCHC RBC AUTO-ENTMCNC: 34.1 G/DL (ref 32–36)
MCV RBC AUTO: 99 FL (ref 80–100)
MONOCYTES # BLD AUTO: 1.17 X10*3/UL (ref 0.05–0.8)
MONOCYTES NFR BLD AUTO: 11.5 %
NEUTROPHILS # BLD AUTO: 7.71 X10*3/UL (ref 1.6–5.5)
NEUTROPHILS NFR BLD AUTO: 76 %
NRBC BLD-RTO: 0 /100 WBCS (ref 0–0)
PLATELET # BLD AUTO: 219 X10*3/UL (ref 150–450)
POTASSIUM SERPL-SCNC: 3.9 MMOL/L (ref 3.5–5.3)
PROT SERPL-MCNC: 6.9 G/DL (ref 6.4–8.2)
PROTHROMBIN TIME: 14.8 SECONDS (ref 9.3–12.7)
Q ONSET: 225 MS
QRS COUNT: 14 BEATS
QRS DURATION: 130 MS
QT INTERVAL: 372 MS
QTC CALCULATION(BAZETT): 455 MS
QTC FREDERICIA: 425 MS
R AXIS: -3 DEGREES
RBC # BLD AUTO: 4.34 X10*6/UL (ref 4.5–5.9)
SODIUM SERPL-SCNC: 140 MMOL/L (ref 136–145)
T AXIS: -9 DEGREES
T OFFSET: 411 MS
TSH SERPL-ACNC: 0.94 MIU/L (ref 0.44–3.98)
VENTRICULAR RATE: 90 BPM
WBC # BLD AUTO: 10.1 X10*3/UL (ref 4.4–11.3)

## 2024-10-18 PROCEDURE — 2500000001 HC RX 250 WO HCPCS SELF ADMINISTERED DRUGS (ALT 637 FOR MEDICARE OP): Performed by: INTERNAL MEDICINE

## 2024-10-18 PROCEDURE — 85300 ANTITHROMBIN III ACTIVITY: CPT | Performed by: CLINICAL NURSE SPECIALIST

## 2024-10-18 PROCEDURE — 93010 ELECTROCARDIOGRAM REPORT: CPT | Performed by: INTERNAL MEDICINE

## 2024-10-18 PROCEDURE — 93005 ELECTROCARDIOGRAM TRACING: CPT

## 2024-10-18 PROCEDURE — 71046 X-RAY EXAM CHEST 2 VIEWS: CPT

## 2024-10-18 PROCEDURE — 80053 COMPREHEN METABOLIC PANEL: CPT | Performed by: CLINICAL NURSE SPECIALIST

## 2024-10-18 PROCEDURE — 36415 COLL VENOUS BLD VENIPUNCTURE: CPT | Performed by: CLINICAL NURSE SPECIALIST

## 2024-10-18 PROCEDURE — 85025 COMPLETE CBC W/AUTO DIFF WBC: CPT | Performed by: CLINICAL NURSE SPECIALIST

## 2024-10-18 PROCEDURE — 71046 X-RAY EXAM CHEST 2 VIEWS: CPT | Performed by: RADIOLOGY

## 2024-10-18 PROCEDURE — 99285 EMERGENCY DEPT VISIT HI MDM: CPT

## 2024-10-18 PROCEDURE — 2500000002 HC RX 250 W HCPCS SELF ADMINISTERED DRUGS (ALT 637 FOR MEDICARE OP, ALT 636 FOR OP/ED): Mod: MUE | Performed by: INTERNAL MEDICINE

## 2024-10-18 PROCEDURE — 99223 1ST HOSP IP/OBS HIGH 75: CPT | Performed by: INTERNAL MEDICINE

## 2024-10-18 PROCEDURE — 84484 ASSAY OF TROPONIN QUANT: CPT | Performed by: CLINICAL NURSE SPECIALIST

## 2024-10-18 PROCEDURE — 85610 PROTHROMBIN TIME: CPT | Performed by: CLINICAL NURSE SPECIALIST

## 2024-10-18 PROCEDURE — 2500000004 HC RX 250 GENERAL PHARMACY W/ HCPCS (ALT 636 FOR OP/ED): Performed by: INTERNAL MEDICINE

## 2024-10-18 PROCEDURE — G0378 HOSPITAL OBSERVATION PER HR: HCPCS

## 2024-10-18 PROCEDURE — 84443 ASSAY THYROID STIM HORMONE: CPT | Performed by: CLINICAL NURSE SPECIALIST

## 2024-10-18 PROCEDURE — 83735 ASSAY OF MAGNESIUM: CPT | Performed by: CLINICAL NURSE SPECIALIST

## 2024-10-18 RX ORDER — ACETAMINOPHEN 325 MG/1
650 TABLET ORAL EVERY 4 HOURS PRN
Status: DISCONTINUED | OUTPATIENT
Start: 2024-10-18 | End: 2024-10-19 | Stop reason: HOSPADM

## 2024-10-18 RX ORDER — NAPROXEN SODIUM 220 MG/1
81 TABLET, FILM COATED ORAL DAILY
Status: DISCONTINUED | OUTPATIENT
Start: 2024-10-19 | End: 2024-10-19 | Stop reason: HOSPADM

## 2024-10-18 RX ORDER — PANTOPRAZOLE SODIUM 40 MG/10ML
40 INJECTION, POWDER, LYOPHILIZED, FOR SOLUTION INTRAVENOUS
Status: DISCONTINUED | OUTPATIENT
Start: 2024-10-19 | End: 2024-10-19 | Stop reason: HOSPADM

## 2024-10-18 RX ORDER — LANOLIN ALCOHOL/MO/W.PET/CERES
1000 CREAM (GRAM) TOPICAL DAILY
Status: DISCONTINUED | OUTPATIENT
Start: 2024-10-19 | End: 2024-10-19 | Stop reason: HOSPADM

## 2024-10-18 RX ORDER — ARMODAFINIL 150 MG/1
150 TABLET ORAL DAILY
COMMUNITY
End: 2024-10-24 | Stop reason: WASHOUT

## 2024-10-18 RX ORDER — EZETIMIBE 10 MG/1
10 TABLET ORAL NIGHTLY
Status: DISCONTINUED | OUTPATIENT
Start: 2024-10-18 | End: 2024-10-19 | Stop reason: HOSPADM

## 2024-10-18 RX ORDER — POLYETHYLENE GLYCOL 3350 17 G/17G
17 POWDER, FOR SOLUTION ORAL DAILY
Status: DISCONTINUED | OUTPATIENT
Start: 2024-10-18 | End: 2024-10-19 | Stop reason: HOSPADM

## 2024-10-18 RX ORDER — LISINOPRIL 40 MG/1
40 TABLET ORAL DAILY
Status: DISCONTINUED | OUTPATIENT
Start: 2024-10-19 | End: 2024-10-19 | Stop reason: HOSPADM

## 2024-10-18 RX ORDER — ACETAMINOPHEN 160 MG/5ML
650 SOLUTION ORAL EVERY 4 HOURS PRN
Status: DISCONTINUED | OUTPATIENT
Start: 2024-10-18 | End: 2024-10-19 | Stop reason: HOSPADM

## 2024-10-18 RX ORDER — ATORVASTATIN CALCIUM 80 MG/1
80 TABLET, FILM COATED ORAL NIGHTLY
Status: DISCONTINUED | OUTPATIENT
Start: 2024-10-18 | End: 2024-10-19 | Stop reason: HOSPADM

## 2024-10-18 RX ORDER — TAMSULOSIN HYDROCHLORIDE 0.4 MG/1
0.8 CAPSULE ORAL DAILY
Status: DISCONTINUED | OUTPATIENT
Start: 2024-10-18 | End: 2024-10-19 | Stop reason: HOSPADM

## 2024-10-18 RX ORDER — PANTOPRAZOLE SODIUM 40 MG/1
40 TABLET, DELAYED RELEASE ORAL
Status: DISCONTINUED | OUTPATIENT
Start: 2024-10-19 | End: 2024-10-19 | Stop reason: HOSPADM

## 2024-10-18 RX ORDER — TRAMADOL HYDROCHLORIDE 50 MG/1
50 TABLET ORAL EVERY 8 HOURS PRN
Status: DISCONTINUED | OUTPATIENT
Start: 2024-10-18 | End: 2024-10-19 | Stop reason: HOSPADM

## 2024-10-18 RX ORDER — FOLIC ACID 1 MG/1
1 TABLET ORAL DAILY
Status: DISCONTINUED | OUTPATIENT
Start: 2024-10-19 | End: 2024-10-19 | Stop reason: HOSPADM

## 2024-10-18 RX ORDER — HYDROCODONE BITARTRATE AND ACETAMINOPHEN 5; 325 MG/1; MG/1
1 TABLET ORAL EVERY 8 HOURS PRN
COMMUNITY
Start: 2024-10-10

## 2024-10-18 RX ORDER — GABAPENTIN 300 MG/1
600 CAPSULE ORAL 2 TIMES DAILY
Status: DISCONTINUED | OUTPATIENT
Start: 2024-10-18 | End: 2024-10-19 | Stop reason: HOSPADM

## 2024-10-18 RX ORDER — ACETAMINOPHEN 650 MG/1
650 SUPPOSITORY RECTAL EVERY 4 HOURS PRN
Status: DISCONTINUED | OUTPATIENT
Start: 2024-10-18 | End: 2024-10-19 | Stop reason: HOSPADM

## 2024-10-18 RX ORDER — METOPROLOL SUCCINATE 50 MG/1
50 TABLET, EXTENDED RELEASE ORAL DAILY
Status: DISCONTINUED | OUTPATIENT
Start: 2024-10-19 | End: 2024-10-19 | Stop reason: HOSPADM

## 2024-10-18 RX ORDER — OXYCODONE AND ACETAMINOPHEN 5; 325 MG/1; MG/1
1 TABLET ORAL EVERY 6 HOURS
COMMUNITY
Start: 2024-10-15 | End: 2024-10-18 | Stop reason: ENTERED-IN-ERROR

## 2024-10-18 RX ORDER — CHOLECALCIFEROL (VITAMIN D3) 50 MCG
2000 TABLET ORAL DAILY
Status: DISCONTINUED | OUTPATIENT
Start: 2024-10-19 | End: 2024-10-19 | Stop reason: HOSPADM

## 2024-10-18 RX ORDER — TRAMADOL HYDROCHLORIDE 50 MG/1
1 TABLET ORAL
COMMUNITY
Start: 2024-10-07

## 2024-10-18 RX ORDER — AMLODIPINE BESYLATE 5 MG/1
5 TABLET ORAL DAILY
Status: DISCONTINUED | OUTPATIENT
Start: 2024-10-19 | End: 2024-10-19 | Stop reason: HOSPADM

## 2024-10-18 RX ADMIN — EZETIMIBE 10 MG: 10 TABLET ORAL at 20:40

## 2024-10-18 RX ADMIN — TAMSULOSIN HYDROCHLORIDE 0.8 MG: 0.4 CAPSULE ORAL at 20:40

## 2024-10-18 RX ADMIN — GABAPENTIN 600 MG: 300 CAPSULE ORAL at 20:40

## 2024-10-18 RX ADMIN — POLYETHYLENE GLYCOL 3350 17 G: 17 POWDER, FOR SOLUTION ORAL at 20:40

## 2024-10-18 RX ADMIN — ACETAMINOPHEN 650 MG: 325 TABLET ORAL at 20:39

## 2024-10-18 SDOH — SOCIAL STABILITY: SOCIAL INSECURITY: DOES ANYONE TRY TO KEEP YOU FROM HAVING/CONTACTING OTHER FRIENDS OR DOING THINGS OUTSIDE YOUR HOME?: NO

## 2024-10-18 SDOH — ECONOMIC STABILITY: HOUSING INSECURITY: IN THE LAST 12 MONTHS, WAS THERE A TIME WHEN YOU WERE NOT ABLE TO PAY THE MORTGAGE OR RENT ON TIME?: NO

## 2024-10-18 SDOH — SOCIAL STABILITY: SOCIAL INSECURITY: WITHIN THE LAST YEAR, HAVE YOU BEEN HUMILIATED OR EMOTIONALLY ABUSED IN OTHER WAYS BY YOUR PARTNER OR EX-PARTNER?: NO

## 2024-10-18 SDOH — SOCIAL STABILITY: SOCIAL INSECURITY: HAVE YOU HAD THOUGHTS OF HARMING ANYONE ELSE?: NO

## 2024-10-18 SDOH — ECONOMIC STABILITY: HOUSING INSECURITY: AT ANY TIME IN THE PAST 12 MONTHS, WERE YOU HOMELESS OR LIVING IN A SHELTER (INCLUDING NOW)?: NO

## 2024-10-18 SDOH — SOCIAL STABILITY: SOCIAL INSECURITY
WITHIN THE LAST YEAR, HAVE YOU BEEN RAPED OR FORCED TO HAVE ANY KIND OF SEXUAL ACTIVITY BY YOUR PARTNER OR EX-PARTNER?: NO

## 2024-10-18 SDOH — ECONOMIC STABILITY: FOOD INSECURITY: WITHIN THE PAST 12 MONTHS, THE FOOD YOU BOUGHT JUST DIDN'T LAST AND YOU DIDN'T HAVE MONEY TO GET MORE.: NEVER TRUE

## 2024-10-18 SDOH — SOCIAL STABILITY: SOCIAL INSECURITY: DO YOU FEEL ANYONE HAS EXPLOITED OR TAKEN ADVANTAGE OF YOU FINANCIALLY OR OF YOUR PERSONAL PROPERTY?: NO

## 2024-10-18 SDOH — SOCIAL STABILITY: SOCIAL INSECURITY: WERE YOU ABLE TO COMPLETE ALL THE BEHAVIORAL HEALTH SCREENINGS?: YES

## 2024-10-18 SDOH — HEALTH STABILITY: PHYSICAL HEALTH: ON AVERAGE, HOW MANY DAYS PER WEEK DO YOU ENGAGE IN MODERATE TO STRENUOUS EXERCISE (LIKE A BRISK WALK)?: 0 DAYS

## 2024-10-18 SDOH — ECONOMIC STABILITY: FOOD INSECURITY: WITHIN THE PAST 12 MONTHS, YOU WORRIED THAT YOUR FOOD WOULD RUN OUT BEFORE YOU GOT THE MONEY TO BUY MORE.: NEVER TRUE

## 2024-10-18 SDOH — ECONOMIC STABILITY: TRANSPORTATION INSECURITY: IN THE PAST 12 MONTHS, HAS LACK OF TRANSPORTATION KEPT YOU FROM MEDICAL APPOINTMENTS OR FROM GETTING MEDICATIONS?: NO

## 2024-10-18 SDOH — ECONOMIC STABILITY: INCOME INSECURITY: IN THE PAST 12 MONTHS HAS THE ELECTRIC, GAS, OIL, OR WATER COMPANY THREATENED TO SHUT OFF SERVICES IN YOUR HOME?: NO

## 2024-10-18 SDOH — SOCIAL STABILITY: SOCIAL INSECURITY: ARE THERE ANY APPARENT SIGNS OF INJURIES/BEHAVIORS THAT COULD BE RELATED TO ABUSE/NEGLECT?: NO

## 2024-10-18 SDOH — SOCIAL STABILITY: SOCIAL INSECURITY: HAVE YOU HAD ANY THOUGHTS OF HARMING ANYONE ELSE?: NO

## 2024-10-18 SDOH — SOCIAL STABILITY: SOCIAL INSECURITY: WITHIN THE LAST YEAR, HAVE YOU BEEN AFRAID OF YOUR PARTNER OR EX-PARTNER?: NO

## 2024-10-18 SDOH — SOCIAL STABILITY: SOCIAL INSECURITY: DO YOU FEEL UNSAFE GOING BACK TO THE PLACE WHERE YOU ARE LIVING?: NO

## 2024-10-18 SDOH — SOCIAL STABILITY: SOCIAL INSECURITY: HAS ANYONE EVER THREATENED TO HURT YOUR FAMILY OR YOUR PETS?: NO

## 2024-10-18 SDOH — SOCIAL STABILITY: SOCIAL INSECURITY: ABUSE: ADULT

## 2024-10-18 SDOH — ECONOMIC STABILITY: FOOD INSECURITY: HOW HARD IS IT FOR YOU TO PAY FOR THE VERY BASICS LIKE FOOD, HOUSING, MEDICAL CARE, AND HEATING?: NOT HARD AT ALL

## 2024-10-18 SDOH — SOCIAL STABILITY: SOCIAL INSECURITY: ARE YOU OR HAVE YOU BEEN THREATENED OR ABUSED PHYSICALLY, EMOTIONALLY, OR SEXUALLY BY ANYONE?: NO

## 2024-10-18 SDOH — ECONOMIC STABILITY: HOUSING INSECURITY: IN THE PAST 12 MONTHS, HOW MANY TIMES HAVE YOU MOVED WHERE YOU WERE LIVING?: 0

## 2024-10-18 SDOH — HEALTH STABILITY: PHYSICAL HEALTH: ON AVERAGE, HOW MANY MINUTES DO YOU ENGAGE IN EXERCISE AT THIS LEVEL?: 0 MIN

## 2024-10-18 ASSESSMENT — PAIN - FUNCTIONAL ASSESSMENT
PAIN_FUNCTIONAL_ASSESSMENT: 0-10

## 2024-10-18 ASSESSMENT — COGNITIVE AND FUNCTIONAL STATUS - GENERAL
MOBILITY SCORE: 24
MOBILITY SCORE: 24
DAILY ACTIVITIY SCORE: 24
PATIENT BASELINE BEDBOUND: NO
DAILY ACTIVITIY SCORE: 24

## 2024-10-18 ASSESSMENT — ENCOUNTER SYMPTOMS
ENDOCRINE NEGATIVE: 1
ALLERGIC/IMMUNOLOGIC NEGATIVE: 1
CONSTITUTIONAL NEGATIVE: 1
PSYCHIATRIC NEGATIVE: 1
MUSCULOSKELETAL NEGATIVE: 1
GASTROINTESTINAL NEGATIVE: 1
EYES NEGATIVE: 1
CARDIOVASCULAR NEGATIVE: 1
HEMATOLOGIC/LYMPHATIC NEGATIVE: 1
RESPIRATORY NEGATIVE: 1
NEUROLOGICAL NEGATIVE: 1

## 2024-10-18 ASSESSMENT — COLUMBIA-SUICIDE SEVERITY RATING SCALE - C-SSRS
1. IN THE PAST MONTH, HAVE YOU WISHED YOU WERE DEAD OR WISHED YOU COULD GO TO SLEEP AND NOT WAKE UP?: NO
6. HAVE YOU EVER DONE ANYTHING, STARTED TO DO ANYTHING, OR PREPARED TO DO ANYTHING TO END YOUR LIFE?: NO
2. HAVE YOU ACTUALLY HAD ANY THOUGHTS OF KILLING YOURSELF?: NO

## 2024-10-18 ASSESSMENT — ACTIVITIES OF DAILY LIVING (ADL)
PATIENT'S MEMORY ADEQUATE TO SAFELY COMPLETE DAILY ACTIVITIES?: YES
WALKS IN HOME: INDEPENDENT
TOILETING: INDEPENDENT
LACK_OF_TRANSPORTATION: NO
BATHING: INDEPENDENT
LACK_OF_TRANSPORTATION: NO
DRESSING YOURSELF: INDEPENDENT
ADEQUATE_TO_COMPLETE_ADL: YES
HEARING - RIGHT EAR: FUNCTIONAL
JUDGMENT_ADEQUATE_SAFELY_COMPLETE_DAILY_ACTIVITIES: YES
GROOMING: INDEPENDENT
FEEDING YOURSELF: INDEPENDENT
HEARING - LEFT EAR: FUNCTIONAL

## 2024-10-18 ASSESSMENT — PAIN SCALES - GENERAL
PAINLEVEL_OUTOF10: 0 - NO PAIN
PAINLEVEL_OUTOF10: 3
PAINLEVEL_OUTOF10: 2

## 2024-10-18 ASSESSMENT — PATIENT HEALTH QUESTIONNAIRE - PHQ9
1. LITTLE INTEREST OR PLEASURE IN DOING THINGS: NOT AT ALL
SUM OF ALL RESPONSES TO PHQ9 QUESTIONS 1 & 2: 0
2. FEELING DOWN, DEPRESSED OR HOPELESS: NOT AT ALL

## 2024-10-18 ASSESSMENT — LIFESTYLE VARIABLES
AUDIT-C TOTAL SCORE: 0
HOW OFTEN DO YOU HAVE A DRINK CONTAINING ALCOHOL: NEVER
SKIP TO QUESTIONS 9-10: 1
HOW OFTEN DO YOU HAVE 6 OR MORE DRINKS ON ONE OCCASION: NEVER
AUDIT-C TOTAL SCORE: 0
HOW MANY STANDARD DRINKS CONTAINING ALCOHOL DO YOU HAVE ON A TYPICAL DAY: PATIENT DOES NOT DRINK

## 2024-10-18 ASSESSMENT — PAIN DESCRIPTION - LOCATION: LOCATION: BACK

## 2024-10-18 ASSESSMENT — PAIN DESCRIPTION - ORIENTATION: ORIENTATION: LOWER

## 2024-10-18 NOTE — ED PROVIDER NOTES
Department of Emergency Medicine   ED  Provider Note  Admit Date/RoomTime: 10/18/2024  1:26 PM  ED Room: [facility]        History of Present Illness:  Chief Complaint   Patient presents with    Atrial Fibrillation     Patient sent from primary for possible afib. Patient denies sob, cp, or dizziness. Patient vss and nad. Patient ambulatory on arrival.         Pro Belle Jr. is a 74 y.o. male history of atrial for with RVR and proximal atrial fib currently on Xarelto was seen evaluated by his cardiologist team on 10/9/2024 was having issues with bradycardia his metoprolol was reduced to 50 mg/day patient presented to the office today for an EKG for evaluation of atrial fibrillation was referred to the emergency department.  Patient presents to the emergency department for the recommendation of his cardiologist office.  He has noticed that he was in A-fib by his watch.  Reports his heart rate was anywhere from low 70s to 114.  That was yesterday today he has been in the 80s and 90s irregular.  He denies any chest pain shortness of breath nausea vomiting sweating lightheadedness or dizziness.  Reports he has chronic swelling in his legs has improved from his baseline usually wears compression stockings does not have them on today.  Patient had discussed with his cardiologist plan of care for his paroxysmal atrial fibrillation advised to follow-up with his electrophysiologist for ablation.  Patient reports he has had an ablation 2 other times in the past most recent was a year and a half ago where he ended up with a hematoma in his right leg.  He has an appointment scheduled on the 30th Dr. Nassar   Patient reports he did take his normal dose of metoprolol today of 75 mg.      Review of Systems:   Pertinent positives and negatives are stated within HPI, all other systems reviewed and are negative.        --------------------------------------------- PAST HISTORY ---------------------------------------------  Past  Medical History:  has a past medical history of A-fib (Multi) and Sleep apnea.  Past Surgical History:  has a past surgical history that includes Hernia repair (06/20/2014); Other surgical history (10/06/2022); Back surgery (02/14/2024); Joint replacement (Bilateral); Joint replacement (Left); Coronary stent placement; Spine surgery; Eye surgery; Vasectomy; and Ablation of dysrhythmic focus.  Social History:  reports that he has quit smoking. His smoking use included cigarettes. He has a 4.5 pack-year smoking history. He has never used smokeless tobacco. He reports current alcohol use of about 8.0 standard drinks of alcohol per week. He reports that he does not use drugs.  Family History: family history includes Alcohol abuse in his mother; Arthritis in his father; Cancer in his mother and sister; Coronary artery disease in his brother; Heart attack in his father; Heart disease in his father.. Unless otherwise noted, family history is non contributory  The patient’s home medications have been reviewed.  Allergies: Patient has no known allergies.        ---------------------------------------------------PHYSICAL EXAM--------------------------------------    GENERAL APPEARANCE: Awake and alert.   VITAL SIGNS: As per the nurses' triage record.   HEENT: Normocephalic, atraumatic. Extraocular muscles are intact. Pupils equal round and reactive to light. Conjunctiva are pink. Negative scleral icterus. Mucous membranes are moist. Tongue in the midline. Pharynx was without erythema or exudates, uvula midline  NECK: Soft Nontender and supple, full gross ROM, no meningeal signs.  CHEST: Nontender to palpation. Clear to auscultation bilaterally. No rales, rhonchi, or wheezing.   HEART: S1, S2. Regular rate and rhythm. No murmurs, gallops or rubs.  Strong and equal pulses in the extremities.   ABDOMEN: Soft, nontender, nondistended, positive bowel sounds, no palpable masses.  MUSCULCSKELETAL: The calves are nontender to  "palpation. Full gross active range of motion. Ambulating on own with no acute difficulties  NEUROLOGICAL: Awake, alert and oriented x 3. Power intact in the upper and lower extremities. Sensation is intact to light touch in the upper and lower extremities.   IMMUNOLOGICAL: No lymphatic streaking noted   DERM: No petechiae, rashes, or ecchymoses.          ------------------------- NURSING NOTES AND VITALS REVIEWED ---------------------------  The nursing notes within the ED encounter and vital signs as below have been reviewed by myself  /81 (BP Location: Right arm, Patient Position: Sitting)   Pulse 86   Temp 36.3 °C (97.3 °F) (Temporal)   Resp 18   Ht 1.727 m (5' 8\")   Wt 95.7 kg (210 lb 15.7 oz)   SpO2 97%   BMI 32.08 kg/m²     Oxygen Saturation Interpretation: 97% room air    The cardiac monitor revealed atrial fibrillation with a heart rate in the 60s as interpreted by me. The cardiac monitor was ordered secondary to the patient's heart rate and to monitor the patient for dysrhythmia.       The patient’s available past medical records and past encounters were reviewed.          -----------------------DIAGNOSTIC RESULTS------------------------  LABS:    Labs Reviewed   CBC WITH AUTO DIFFERENTIAL - Abnormal       Result Value    WBC 10.1      nRBC 0.0      RBC 4.34 (*)     Hemoglobin 14.6      Hematocrit 42.8      MCV 99      MCH 33.6      MCHC 34.1      RDW 12.9      Platelets 219      Neutrophils % 76.0      Immature Granulocytes %, Automated 0.4      Lymphocytes % 10.3      Monocytes % 11.5      Eosinophils % 1.4      Basophils % 0.4      Neutrophils Absolute 7.71 (*)     Immature Granulocytes Absolute, Automated 0.04      Lymphocytes Absolute 1.04      Monocytes Absolute 1.17 (*)     Eosinophils Absolute 0.14      Basophils Absolute 0.04     COMPREHENSIVE METABOLIC PANEL - Abnormal    Glucose 98      Sodium 140      Potassium 3.9      Chloride 104      Bicarbonate 28      Anion Gap 12      Urea " Nitrogen 23      Creatinine 0.88      eGFR 89      Calcium 10.4 (*)     Albumin 4.2      Alkaline Phosphatase 95      Total Protein 6.9      AST 25      Bilirubin, Total 0.7      ALT 23     PROTIME-INR - Abnormal    Protime 14.8 (*)     INR 1.5 (*)     Narrative:     INR Therapeutic Range: 2.0-3.5   SERIAL TROPONIN-INITIAL - Abnormal    Troponin I, High Sensitivity 107 (*)     Narrative:     Less than 99th percentile of normal range cutoff-  Female and children under 18 years old <14 ng/L; Male <21 ng/L: Negative  Repeat testing should be performed if clinically indicated.     Female and children under 18 years old 14-50 ng/L; Male 21-50 ng/L:  Consistent with possible cardiac damage and possible increased clinical   risk. Serial measurements may help to assess extent of myocardial damage.     >50 ng/L: Consistent with cardiac damage, increased clinical risk and  myocardial infarction. Serial measurements may help assess extent of   myocardial damage.      NOTE: Children less than 1 year old may have higher baseline troponin   levels and results should be interpreted in conjunction with the overall   clinical context.     NOTE: Troponin I testing is performed using a different   testing methodology at [facility] ([facility] than at other   [facility]. Direct result comparisons should only   be made within the same method.   TROPONIN I, HIGH SENSITIVITY - Abnormal    Troponin I, High Sensitivity 101 (*)     Narrative:     Less than 99th percentile of normal range cutoff-  Female and children under 18 years old <14 ng/L; Male <21 ng/L: Negative  Repeat testing should be performed if clinically indicated.     Female and children under 18 years old 14-50 ng/L; Male 21-50 ng/L:  Consistent with possible cardiac damage and possible increased clinical   risk. Serial measurements may help to assess extent of myocardial damage.     >50 ng/L: Consistent with cardiac damage, increased clinical risk and  myocardial  infarction. Serial measurements may help assess extent of   myocardial damage.      NOTE: Children less than 1 year old may have higher baseline troponin   levels and results should be interpreted in conjunction with the overall   clinical context.     NOTE: Troponin I testing is performed using a different   testing methodology at [facility] than at other   [facility]. Direct result comparisons should only   be made within the same method.   TSH WITH REFLEX TO FREE T4 IF ABNORMAL - Normal    Thyroid Stimulating Hormone 0.94      Narrative:     TSH testing is performed using different testing methodology at [facility] than at other [facility]. Direct result comparisons should only be made within the same method.     MAGNESIUM - Normal    Magnesium 2.08     D-DIMER, NON VTE - Normal    D-Dimer Non VTE, Quant (mg/L FEU) 0.47      Narrative:     THROMBOEMBOLIC EVENTS CANNOT BE EXCLUDED SOLELY ON THE BASIS OF THE D-DIMER LEVEL BEING WITHIN THE NORMAL REFERENCE RANGE. D-DIMER LEVELS LESS THAN 0.5 MG/L FEU IN CONJUNCTION WITH A LOW CLINICAL PROBABILITY HAVE AN EXCELLENT NEGATIVE PREDICTIVE VALUE IN EXCLUDING A DIAGNOSIS OF PULMONARY EMBOLUS (PE) OR DEEP VEIN THROMBOSIS (DVT). ELEVATED D-DIMER LEVELS ARE NOT SPECIFIC TO PE OR DVT, AND MAY BE SEEN IN PATIENTS WITH DIC, ADVANCED AGE, PREGNANCY, MALIGNANCY, LIVER DISEASE, INFECTION, AND INFLAMMATORY CONDITIONS AMONG OTHERS. D-DIMER LEVELS MAY BE DECREASED IN PATIENTS RECEIVING ANTI-COAGULATION THERAPY.   TROPONIN SERIES- (INITIAL, 1 HR)    Narrative:     The following orders were created for panel order Troponin I Series, High Sensitivity (0, 1 HR).  Procedure                               Abnormality         Status                     ---------                               -----------         ------                     Troponin I, High Sensiti...[[id number]]  Abnormal            Final result                 Please view results for  these tests on the individual orders.   HEMOGLOBIN A1C   MAGNESIUM   URINALYSIS WITH REFLEX MICROSCOPIC   COMPREHENSIVE METABOLIC PANEL   CBC WITH AUTO DIFFERENTIAL   TSH WITH REFLEX TO FREE T4 IF ABNORMAL   TROPONIN SERIES- (INITIAL, 1 HR)    Narrative:     The following orders were created for panel order Troponin I Series, High Sensitivity (0, 1 HR).  Procedure                               Abnormality         Status                     ---------                               -----------         ------                     Troponin I, High Sensiti...[[id number]]                                                   Please view results for these tests on the individual orders.   SERIAL TROPONIN-INITIAL       As interpreted by me, the above displayed labs are abnormal. All other labs obtained during this visit were within normal range or not returned as of this dictation.      EKG Interpretation    E  1320 EKG interpreted by myself independently, EKG shows a rate controlled A-fib with a rate of 90 bpm, , , QTc 455, patient does have a right bundle branch block, no ST elevations or depressions, negative for acute MI [CHANTE]           XR chest 2 views   Final Result   No evidence of acute intrathoracic abnormality.        Signed by: Kev Chow 10/18/2024 2:19 PM   Dictation workstation:   [id number]              XR chest 2 views   Final Result   No evidence of acute intrathoracic abnormality.        Signed by: Kev Chow 10/18/2024 2:19 PM   Dictation workstation:   [id number]              ------------------------------ ED COURSE/MEDICAL DECISION MAKING----------------------  Medical Decision Making:   Exam: A medically appropriate exam performed, outlined above, given the known history and presentation.    History obtained from: Review of medical record nursing notes patient patient family      Social Determinants of Health considered during this visit: Lives at home with family      PAST MEDICAL  HISTORY/Chronic Conditions Affecting Care     has a past medical history of A-fib (Multi) and Sleep apnea.       CC/HPI Summary, Social Determinants of health, Records Reviewed, DDx, testing done/not done, ED Course, Reassessment, disposition considerations/shared decision making with patient, consults, disposition:   Presents with atrial fibs   Plan  EKG  Chest x-ray No evidence of acute intrathoracic abnormality.   CBC  CMP  Magnesium  PT/INR  TSH  Medical Decision Making/Differential Diagnosis:  Differentials include but not limited to electrolyte abnormality versus abnormal thyroid versus arrhythmia versus dehydration  Review  Glucose 98  Electrolytes within normal limits except calcium mildly elevated at 10.4  Normal renal function  Normal LFTs  Magnesium 2.08  White blood cell count 10.1  Hemoglobin 14.6  TSH 0.94  Coag panel reviewed  Troponin  Patient presented to the emergency department complaints of atrial fibrillation.  EKG showed rate controlled atrial fibrillation of 90.  No ST elevation or arrhythmia no complaints of chest pain shortness of breath nausea vomiting lightheadedness dizziness or sweating had heartburn yesterday resolved with Tums.  Initial troponin 107 and repeat troponin 101.  Had discussed case with cardiology patient is history of elevated troponins.    EKG per attending note atrial fibrillation rate is controlled.  Patient is still asymptomatic.  Discussed case with cardiology patient has adjusted his medications on his own at home increase his metoprolol to 75 from 50.  Had epigastric pain yesterday improved with Tums.  Recommend admission for observation and cardiology to see in consult.  Case was discussed with hospitalist who is agreed to admit the patient under their service for further evaluation and treatment electrolytes within normal limits except for calcium slightly elevated normal renal function normal LFTs.  No elevation white blood cell count patient is not anemic  magnesium normal TSH normal coag panel reviewed  Patient seen and evaluated with attending physician Dr. Rosas  Patient amenable to plan amenable to admission  PROCEDURES  Unless otherwise noted below, none      CONSULTS:   IP CONSULT TO CARDIOLOGY      ED Course as of 10/18/24 2209   Fri Oct 18, 2024   1320 EKG interpreted by myself independently, EKG shows a rate controlled A-fib with a rate of 90 bpm, , , QTc 455, patient does have a right bundle branch block, no ST elevations or depressions, negative for acute MI [CHANTE]   1532 No complaints of chest pain troponin 107 [TB]   1540 Discussed case with cardiology Dr. Carcamo advised to continue with repeat troponin.  If elevated recommend admission for observation if stays the same can discharge home with follow-up with his cardiologist team.  Patient remains asymptomatic at this time.  If patient remains in the hospital will see in consult [TB]   1555 Troponin I, High Sensitivity(!!): 107 [TB]   1558 Repeat troponin 101 no complaints of pain [TB]   1606 Discussed laboratory data and test results with patient.  Due to the elevated troponins and epigastric discomfort yesterday patient adjusting his medications at home on his own would recommend admission to the hospital for further evaluation patient is amenable to plan amenable to admission will discuss case with covering physician for admission team [TB]   1726 Spoke with hospitalist team request recommendations by the cardiology team before he will accept the patient notify Dr. Carcamo advised admit the patient for observation and he will see the patient in consult tomorrow [TB]   1757 Case discussed with hospitalist team who agreed to admit to their service for further evaluation and treatment observation [TB]      ED Course User Index  [CHANTE] Lopez Rosas DO  [TB] Darlyn Gomez, APRN-CNP         Diagnoses as of 10/18/24 2209   Atrial fibrillation, unspecified type (Multi)   Elevated troponin         This  patient has remained hemodynamically stable during their ED course.      Critical Care: none        Counseling:  The emergency provider has spoken with the patient family and discussed today’s results, in addition to providing specific details for the plan of care and counseling regarding the diagnosis and prognosis.  Questions are answered at this time and they are agreeable with the plan.         --------------------------------- IMPRESSION AND DISPOSITION ---------------------------------    IMPRESSION  1. Atrial fibrillation, unspecified type (Multi)    2. Elevated troponin        DISPOSITION  Disposition: Admit hospitalist service  Patient condition is stable        NOTE: This report was transcribed using voice recognition software. Every effort was made to ensure accuracy; however, inadvertent computerized transcription errors may be present      Darlyn Gomez, MARGAUX-SATHISH  10/18/24 [id number]

## 2024-10-18 NOTE — ED PROVIDER NOTES
THIS IS MY DORIAN SUPERVISORY AND SHARED VISIT NOTE:    I personally saw the patient and made/approved the management plan and take responsibility for the patient management.    History: Patient is a 74-year-old male who presents today with a chief complaint of atrial fibrillation.  Patient has a history of A-fib, patient recently had a medication adjustment by his cardiology team due to bradycardia, he felt like he was in A-fib yesterday evening, went to his PCPs office today, they performed an EKG which was concerning for potential A-fib with RVR, he says his watch also he was in A-fib, states his heart rate is anywhere from 70s to 114, denies any nausea or vomiting, denies any lightheadedness or dizziness, denies any chest pain or shortness of breath.  Patient states he is supposed to potentially follow-up in electrophysiology if DrLolita For ablation.    Exam: GENERAL APPEARANCE: Awake and alert.     HEENT: Normocephalic, atraumatic. Extraocular muscles are intact. Pupils equal round and reactive to light.  CHEST: Nontender to palpation. Clear to auscultation bilaterally. No rales, rhonchi, or wheezing.   HEART: S1, S2.  Irregularly irregular. No murmurs, gallops or rubs.  Strong and equal pulses in the extremities.   ABDOMEN: Soft,.  non-tender.  No rebound or guarding, bowel sounds normal x 4 quadrants  NEUROLOGICAL: Awake, alert and oriented x 3.       MDM: Patient seen and evaluated at bedside, patient is in no acute distress.  I will order a CBC, CMP, magnesium EKG, troponin, TSH,. Differential diagnosis includes but is not limited to A-fib with RVR, electrolyte abnormality, ACS.  Patient does appear to be in a rate controlled A-fib around 90, patient did have initial troponin of 107, repeat troponin 101, the patient's elevated troponin and change in the 2 troponins, I do believe the patient benefit from inpatient mission, this was discussed with the cardiology team as well as the patient, patient be admitted to  the general medicine service for further evaluation and treatment.    Diagnosis: A-fib, elevated troponin  XR chest 2 views   Final Result   No evidence of acute intrathoracic abnormality.        Signed by: Kev Chow 10/18/2024 2:19 PM   Dictation workstation:   [id number]        Results for orders placed or performed during the hospital encounter of 10/18/24   CBC and Auto Differential    Collection Time: 10/18/24  1:57 PM   Result Value Ref Range    WBC 10.1 4.4 - 11.3 x10*3/uL    nRBC 0.0 0.0 - 0.0 /100 WBCs    RBC 4.34 (L) 4.50 - 5.90 x10*6/uL    Hemoglobin 14.6 13.5 - 17.5 g/dL    Hematocrit 42.8 41.0 - 52.0 %    MCV 99 80 - 100 fL    MCH 33.6 26.0 - 34.0 pg    MCHC 34.1 32.0 - 36.0 g/dL    RDW 12.9 11.5 - 14.5 %    Platelets 219 150 - 450 x10*3/uL    Neutrophils % 76.0 40.0 - 80.0 %    Immature Granulocytes %, Automated 0.4 0.0 - 0.9 %    Lymphocytes % 10.3 13.0 - 44.0 %    Monocytes % 11.5 2.0 - 10.0 %    Eosinophils % 1.4 0.0 - 6.0 %    Basophils % 0.4 0.0 - 2.0 %    Neutrophils Absolute 7.71 (H) 1.60 - 5.50 x10*3/uL    Immature Granulocytes Absolute, Automated 0.04 0.00 - 0.50 x10*3/uL    Lymphocytes Absolute 1.04 0.80 - 3.00 x10*3/uL    Monocytes Absolute 1.17 (H) 0.05 - 0.80 x10*3/uL    Eosinophils Absolute 0.14 0.00 - 0.40 x10*3/uL    Basophils Absolute 0.04 0.00 - 0.10 x10*3/uL   Comprehensive metabolic panel    Collection Time: 10/18/24  1:57 PM   Result Value Ref Range    Glucose 98 74 - 99 mg/dL    Sodium 140 136 - 145 mmol/L    Potassium 3.9 3.5 - 5.3 mmol/L    Chloride 104 98 - 107 mmol/L    Bicarbonate 28 21 - 32 mmol/L    Anion Gap 12 10 - 20 mmol/L    Urea Nitrogen 23 6 - 23 mg/dL    Creatinine 0.88 0.50 - 1.30 mg/dL    eGFR 89 >60 mL/min/1.73m*2    Calcium 10.4 (H) 8.6 - 10.3 mg/dL    Albumin 4.2 3.4 - 5.0 g/dL    Alkaline Phosphatase 95 33 - 136 U/L    Total Protein 6.9 6.4 - 8.2 g/dL    AST 25 9 - 39 U/L    Bilirubin, Total 0.7 0.0 - 1.2 mg/dL    ALT 23 10 - 52 U/L   TSH with reflex to  Free T4 if abnormal    Collection Time: 10/18/24  1:57 PM   Result Value Ref Range    Thyroid Stimulating Hormone 0.94 0.44 - 3.98 mIU/L   Magnesium    Collection Time: 10/18/24  1:57 PM   Result Value Ref Range    Magnesium 2.08 1.60 - 2.40 mg/dL   Protime-INR    Collection Time: 10/18/24  1:57 PM   Result Value Ref Range    Protime 14.8 (H) 9.3 - 12.7 seconds    INR 1.5 (H) 0.9 - 1.2   Troponin I, High Sensitivity, Initial    Collection Time: 10/18/24  1:57 PM   Result Value Ref Range    Troponin I, High Sensitivity 107 (HH) 0 - 20 ng/L   D-dimer, quantitative    Collection Time: 10/18/24  1:57 PM   Result Value Ref Range    D-Dimer Non VTE, Quant (mg/L FEU) 0.47 0.19 - 0.50 mg/L FEU   Troponin I, High Sensitivity    Collection Time: 10/18/24  3:12 PM   Result Value Ref Range    Troponin I, High Sensitivity 101 (HH) 0 - 20 ng/L   Urinalysis with Reflex Microscopic    Collection Time: 10/19/24  3:39 AM   Result Value Ref Range    Color, Urine Yellow Light-Yellow, Yellow, Dark-Yellow    Appearance, Urine Clear Clear    Specific Gravity, Urine 1.024 1.005 - 1.035    pH, Urine 6.0 5.0, 5.5, 6.0, 6.5, 7.0, 7.5, 8.0    Protein, Urine 20 (TRACE) NEGATIVE, 10 (TRACE), 20 (TRACE) mg/dL    Glucose, Urine Normal Normal mg/dL    Blood, Urine NEGATIVE NEGATIVE    Ketones, Urine NEGATIVE NEGATIVE mg/dL    Bilirubin, Urine NEGATIVE NEGATIVE    Urobilinogen, Urine Normal Normal mg/dL    Nitrite, Urine NEGATIVE NEGATIVE    Leukocyte Esterase, Urine 250 Nieves/µL (A) NEGATIVE   Microscopic Only, Urine    Collection Time: 10/19/24  3:39 AM   Result Value Ref Range    WBC, Urine 21-50 (A) 1-5, NONE /HPF    RBC, Urine 3-5 NONE, 1-2, 3-5 /HPF    Mucus, Urine FEW Reference range not established. /LPF   Hemoglobin A1C    Collection Time: 10/19/24  4:15 AM   Result Value Ref Range    Hemoglobin A1C 5.3 See comment %    Estimated Average Glucose 105 Not Established mg/dL   Magnesium    Collection Time: 10/19/24  4:15 AM   Result Value Ref  Range    Magnesium 2.11 1.60 - 2.40 mg/dL   Comprehensive metabolic panel    Collection Time: 10/19/24  4:15 AM   Result Value Ref Range    Glucose 93 74 - 99 mg/dL    Sodium 140 136 - 145 mmol/L    Potassium 3.6 3.5 - 5.3 mmol/L    Chloride 107 98 - 107 mmol/L    Bicarbonate 26 21 - 32 mmol/L    Anion Gap 11 10 - 20 mmol/L    Urea Nitrogen 25 (H) 6 - 23 mg/dL    Creatinine 0.88 0.50 - 1.30 mg/dL    eGFR 89 >60 mL/min/1.73m*2    Calcium 9.8 8.6 - 10.3 mg/dL    Albumin 3.6 3.4 - 5.0 g/dL    Alkaline Phosphatase 82 33 - 136 U/L    Total Protein 6.0 (L) 6.4 - 8.2 g/dL    AST 21 9 - 39 U/L    Bilirubin, Total 0.6 0.0 - 1.2 mg/dL    ALT 19 10 - 52 U/L   CBC and Auto Differential    Collection Time: 10/19/24  4:15 AM   Result Value Ref Range    WBC 9.2 4.4 - 11.3 x10*3/uL    nRBC 0.0 0.0 - 0.0 /100 WBCs    RBC 4.02 (L) 4.50 - 5.90 x10*6/uL    Hemoglobin 13.4 (L) 13.5 - 17.5 g/dL    Hematocrit 39.9 (L) 41.0 - 52.0 %    MCV 99 80 - 100 fL    MCH 33.3 26.0 - 34.0 pg    MCHC 33.6 32.0 - 36.0 g/dL    RDW 12.9 11.5 - 14.5 %    Platelets 211 150 - 450 x10*3/uL    Neutrophils % 75.1 40.0 - 80.0 %    Immature Granulocytes %, Automated 0.4 0.0 - 0.9 %    Lymphocytes % 11.4 13.0 - 44.0 %    Monocytes % 10.8 2.0 - 10.0 %    Eosinophils % 2.0 0.0 - 6.0 %    Basophils % 0.3 0.0 - 2.0 %    Neutrophils Absolute 6.93 (H) 1.60 - 5.50 x10*3/uL    Immature Granulocytes Absolute, Automated 0.04 0.00 - 0.50 x10*3/uL    Lymphocytes Absolute 1.05 0.80 - 3.00 x10*3/uL    Monocytes Absolute 1.00 (H) 0.05 - 0.80 x10*3/uL    Eosinophils Absolute 0.18 0.00 - 0.40 x10*3/uL    Basophils Absolute 0.03 0.00 - 0.10 x10*3/uL   TSH with reflex to Free T4 if abnormal    Collection Time: 10/19/24  4:15 AM   Result Value Ref Range    Thyroid Stimulating Hormone 1.05 0.44 - 3.98 mIU/L   Troponin I, High Sensitivity, Initial    Collection Time: 10/19/24  4:15 AM   Result Value Ref Range    Troponin I, High Sensitivity 57 (HH) 0 - 20 ng/L   Troponin, High  Sensitivity, 1 Hour    Collection Time: 10/19/24  5:41 AM   Result Value Ref Range    Troponin I, High Sensitivity 57 (HH) 0 - 20 ng/L     *Note: Due to a large number of results and/or encounters for the requested time period, some results have not been displayed. A complete set of results can be found in Results Review.         Please see DORIAN note for further details    Sections of this report were created using voice-to-text technology and may contain errors in translation    Lopez Rosas DO  Emergency Medicine       Lopez Rosas DO  10/19/24 4604

## 2024-10-18 NOTE — PROGRESS NOTES
Pharmacy Medication History Review    Pro Belle Jr. is a 74 y.o. male admitted for  Pharmacy reviewed the patient's [id number] medications and allergies for accuracy.    The list below reflectives the updated PTA list. Please review each medication in order reconciliation for additional clarification and justification.  Prior to Admission Medications   Prescriptions Last Dose Informant Patient Reported? Taking?   HYDROcodone-acetaminophen (Norco) 5-325 mg tablet Past Week  Yes Yes   Sig: Take 1 tablet by mouth every 8 hours if needed for pain.   acetaminophen (Tylenol 8 HOUR) 650 mg ER tablet   Yes No   Sig: Take 1 tablet (650 mg) by mouth every 8 hours if needed for mild pain (1 - 3). Do not crush, chew, or split.   amLODIPine (Norvasc) 5 mg tablet 10/18/2024 Morning  No Yes   Sig: Take 1 tablet (5 mg) by mouth once daily.   armodafinil (Nuvigil) 150 mg tablet   Yes No   Sig: Take 1 tablet (150 mg) by mouth once daily.   aspirin 81 mg chewable tablet 10/18/2024 Morning  Yes Yes   Sig: Chew 1 tablet (81 mg) once every 24 hours.   atorvastatin (Lipitor) 80 mg tablet 10/17/2024 Evening  No Yes   Sig: TAKE 1 TABLET BY MOUTH AT  BEDTIME   cholecalciferol (Vitamin D-3) 50 MCG (2000 UT) tablet 10/18/2024 Morning  Yes Yes   Sig: Take 1 tablet (2,000 Units) by mouth once daily.   cyanocobalamin (Vitamin B-12) 1,000 mcg tablet 10/18/2024 Morning  Yes Yes   Sig: Take 1 tablet (1,000 mcg) by mouth once daily.   ezetimibe (Zetia) 10 mg tablet 10/17/2024 Evening  No Yes   Sig: TAKE 1 TABLET BY MOUTH AT  BEDTIME   folic acid (Folvite) 1 mg tablet 10/18/2024 Morning  Yes Yes   Sig: Take 1 tablet (1 mg) by mouth once every 24 hours.   gabapentin (Neurontin) 300 mg capsule 10/18/2024 Morning  No Yes   Sig: Take 2 capsules (600 mg) by mouth 2 times a day.   lisinopril 40 mg tablet 10/18/2024 Morning  No Yes   Sig: TAKE 1 TABLET BY MOUTH ONCE DAILY   metoprolol succinate XL (Toprol XL) 25 mg 24 hr tablet 10/18/2024  Morning  No Yes   Sig: Take 2 tablets (50 mg) by mouth once daily. Do not crush or chew.   omeprazole (PriLOSEC) 20 mg DR capsule 10/18/2024 Morning  No Yes   Sig: Take 1 capsule (20 mg) by mouth once daily.   rivaroxaban (Xarelto) 20 mg tablet 10/18/2024 Morning  No Yes   Sig: Take 1 tablet (20 mg) by mouth once daily.   tamsulosin (Flomax) 0.4 mg 24 hr capsule 10/17/2024 Evening  No Yes   Sig: Take 2 capsules (0.8 mg) by mouth once daily.   tofacitinib ER (Xeljanz XR) 11 mg tablet extended release 24 hr 10/18/2024 Morning  Yes Yes   Sig: Take 1 tablet (11 mg) by mouth once daily. Do not crush, chew or split. Swallow whole.   traMADol (Ultram) 50 mg tablet 10/17/2024 Bedtime  Yes Yes   Sig: Take 1 tablet (50 mg) by mouth 3 times a day.      Facility-Administered Medications: None          The list below reflectives the updated allergy list. Please review each documented allergy for additional clarification and justification.  Allergies  Reviewed by Monica Godoy CPhT on 10/18/2024   No Known Allergies         Below are additional concerns with the patient's PTA list.  - pt stated he is prescribed Armodafinil 150 mg with directions of taking 1 tablet once daily, pt stated he spoke with MD yesterday and they decided to increase his dose, he has not yet filled prescription to start taking new dose.   - pt also states he has concerns regarding his Metoprolol dose being lowered recently, he believes he needs to be on a higher dose.  MONICA GODOY CPhT

## 2024-10-18 NOTE — H&P
History Of Present Illness  Pro Belle Jr. is a 74 y.o. male presenting with atrial fibrillation.  This patient with now intermittent atrial fibrillation follows up with Dr. Hurst from cardiology.  Apparently his watch alerted him that he has been A-fib.  This happened yesterday.  He is set up to go to Dr. Hurst's office this morning when EKG was done and from there he was sent to the emergency room.  He denied ever having any chest pain.  He admitted however yesterday for quite a few hours to have had some heartburn which is fairly unusual for him.  All the symptoms have resolved.  Here workup was negative initial troponin was 100 and Case was discussed with cardiology who recommended to be discharged if flat trend.  Repeat troponin was 100 as well but this time after discussion with cardiology recommendation was for the patient to stay.  I can see patient who is completely asymptomatic denies any chest pain any heartburn any palpitations and shortness of breath.  He does have difficulty moving around which is related to chronic back issues.     Past Medical History  He has a past medical history of A-fib (Multi) and Sleep apnea.  He has had multiple cardioversions.  One of the cases was complicated by hematoma throughout his right leg which has resulted in some venous stasis ulcers.  He denies ever having a heart attack but tells me that his cardiologist is planning a stress test.  He denies having a stroke diabetes admits for hypertension  Surgical History  He has a past surgical history that includes Hernia repair (06/20/2014); Other surgical history (10/06/2022); Back surgery (02/14/2024); Joint replacement (Bilateral); Joint replacement (Left); Coronary stent placement; Spine surgery; Eye surgery; Vasectomy; and Ablation of dysrhythmic focus.  Reviewed  Social History  He reports that he has quit smoking. His smoking use included cigarettes. He has a 4.5 pack-year smoking history. He has never used  smokeless tobacco. He reports current alcohol use of about 8.0 standard drinks of alcohol per week. He reports that he does not use drugs.  Reviewed  Family History  Family History   Problem Relation Name Age of Onset    Cancer Mother Selena     Alcohol abuse Mother Selena     Heart disease Father Pro Sr.     Arthritis Father Pro Sr.     Heart attack Father Pro Sr.     Cancer Sister      Coronary artery disease Brother          Allergies  Patient has no known allergies.    ROS  Review of Systems   Constitutional: Negative.    HENT: Negative.     Eyes: Negative.    Respiratory: Negative.     Cardiovascular: Negative.    Gastrointestinal: Negative.    Endocrine: Negative.    Genitourinary: Negative.    Musculoskeletal: Negative.    Skin: Negative.    Allergic/Immunologic: Negative.    Neurological: Negative.    Hematological: Negative.    Psychiatric/Behavioral: Negative.     All other systems reviewed and are negative.    Heartburn yesterday now resolved  Last Recorded Vitals  BP (!) 135/93 (BP Location: Right arm, Patient Position: Sitting)   Pulse 69   Temp 36.5 °C (97.7 °F) (Oral)   Resp 20   Wt 95.7 kg (210 lb 15.7 oz)   SpO2 96%     Physical Exam  Alert oriented x 3 cooperative  male no acute distress very pleasant  Normocephalic/atraumatic EOMI PERRLA  Neck supple  Chest clear  Heart irregularly irregular distant heart sounds  Abdomen soft nontender very benign exam no rebound tenderness no guarding  Extremities no peripheral edema good pedal pulses there is venous stasis changes of the right ankle which are mild  Neurologic exam gross motor sensor nonfocal  Psych no psychosis  Skin no acute rash    Relevant Results  Is an EKG was reviewed    ASSESSMENT/PLAN  Assessment/Plan   Principal Problem:    Atrial fibrillation and flutter  Active Problems:    Atrial fibrillation (Multi)    Elevated troponin    October 18    A-fib intermittent currently he is in A-fib but rate controlled we will continuing  Xarelto continue metoprolol  Detectable troponin with a flat trend will continue to trend he has no chest pain.  1 may speculate if episodes of heartburn yesterday was actually angina.  Will defer this spiculation to be further worked up by cardiology.  At this point I have very low suspicion for ongoing angina or NSTEMI and will continue Xarelto and aspirin  Hypertension  Chronic back pain check PT OT yanna Ibanez MD

## 2024-10-19 VITALS
WEIGHT: 209 LBS | OXYGEN SATURATION: 99 % | HEART RATE: 80 BPM | SYSTOLIC BLOOD PRESSURE: 108 MMHG | TEMPERATURE: 97.9 F | HEIGHT: 68 IN | BODY MASS INDEX: 31.67 KG/M2 | DIASTOLIC BLOOD PRESSURE: 73 MMHG | RESPIRATION RATE: 17 BRPM

## 2024-10-19 LAB
ALBUMIN SERPL BCP-MCNC: 3.6 G/DL (ref 3.4–5)
ALP SERPL-CCNC: 82 U/L (ref 33–136)
ALT SERPL W P-5'-P-CCNC: 19 U/L (ref 10–52)
ANION GAP SERPL CALCULATED.3IONS-SCNC: 11 MMOL/L (ref 10–20)
APPEARANCE UR: CLEAR
AST SERPL W P-5'-P-CCNC: 21 U/L (ref 9–39)
BASOPHILS # BLD AUTO: 0.03 X10*3/UL (ref 0–0.1)
BASOPHILS NFR BLD AUTO: 0.3 %
BILIRUB SERPL-MCNC: 0.6 MG/DL (ref 0–1.2)
BILIRUB UR STRIP.AUTO-MCNC: NEGATIVE MG/DL
BUN SERPL-MCNC: 25 MG/DL (ref 6–23)
CALCIUM SERPL-MCNC: 9.8 MG/DL (ref 8.6–10.3)
CARDIAC TROPONIN I PNL SERPL HS: 57 NG/L (ref 0–20)
CARDIAC TROPONIN I PNL SERPL HS: 57 NG/L (ref 0–20)
CHLORIDE SERPL-SCNC: 107 MMOL/L (ref 98–107)
CO2 SERPL-SCNC: 26 MMOL/L (ref 21–32)
COLOR UR: YELLOW
CREAT SERPL-MCNC: 0.88 MG/DL (ref 0.5–1.3)
EGFRCR SERPLBLD CKD-EPI 2021: 89 ML/MIN/1.73M*2
EOSINOPHIL # BLD AUTO: 0.18 X10*3/UL (ref 0–0.4)
EOSINOPHIL NFR BLD AUTO: 2 %
ERYTHROCYTE [DISTWIDTH] IN BLOOD BY AUTOMATED COUNT: 12.9 % (ref 11.5–14.5)
EST. AVERAGE GLUCOSE BLD GHB EST-MCNC: 105 MG/DL
GLUCOSE SERPL-MCNC: 93 MG/DL (ref 74–99)
GLUCOSE UR STRIP.AUTO-MCNC: NORMAL MG/DL
HBA1C MFR BLD: 5.3 %
HCT VFR BLD AUTO: 39.9 % (ref 41–52)
HGB BLD-MCNC: 13.4 G/DL (ref 13.5–17.5)
IMM GRANULOCYTES # BLD AUTO: 0.04 X10*3/UL (ref 0–0.5)
IMM GRANULOCYTES NFR BLD AUTO: 0.4 % (ref 0–0.9)
KETONES UR STRIP.AUTO-MCNC: NEGATIVE MG/DL
LEUKOCYTE ESTERASE UR QL STRIP.AUTO: ABNORMAL
LYMPHOCYTES # BLD AUTO: 1.05 X10*3/UL (ref 0.8–3)
LYMPHOCYTES NFR BLD AUTO: 11.4 %
MAGNESIUM SERPL-MCNC: 2.11 MG/DL (ref 1.6–2.4)
MCH RBC QN AUTO: 33.3 PG (ref 26–34)
MCHC RBC AUTO-ENTMCNC: 33.6 G/DL (ref 32–36)
MCV RBC AUTO: 99 FL (ref 80–100)
MONOCYTES # BLD AUTO: 1 X10*3/UL (ref 0.05–0.8)
MONOCYTES NFR BLD AUTO: 10.8 %
MUCOUS THREADS #/AREA URNS AUTO: ABNORMAL /LPF
NEUTROPHILS # BLD AUTO: 6.93 X10*3/UL (ref 1.6–5.5)
NEUTROPHILS NFR BLD AUTO: 75.1 %
NITRITE UR QL STRIP.AUTO: NEGATIVE
NRBC BLD-RTO: 0 /100 WBCS (ref 0–0)
PH UR STRIP.AUTO: 6 [PH]
PLATELET # BLD AUTO: 211 X10*3/UL (ref 150–450)
POTASSIUM SERPL-SCNC: 3.6 MMOL/L (ref 3.5–5.3)
PROT SERPL-MCNC: 6 G/DL (ref 6.4–8.2)
PROT UR STRIP.AUTO-MCNC: ABNORMAL MG/DL
RBC # BLD AUTO: 4.02 X10*6/UL (ref 4.5–5.9)
RBC # UR STRIP.AUTO: NEGATIVE /UL
RBC #/AREA URNS AUTO: ABNORMAL /HPF
SODIUM SERPL-SCNC: 140 MMOL/L (ref 136–145)
SP GR UR STRIP.AUTO: 1.02
TSH SERPL-ACNC: 1.05 MIU/L (ref 0.44–3.98)
UROBILINOGEN UR STRIP.AUTO-MCNC: NORMAL MG/DL
WBC # BLD AUTO: 9.2 X10*3/UL (ref 4.4–11.3)
WBC #/AREA URNS AUTO: ABNORMAL /HPF

## 2024-10-19 PROCEDURE — 36415 COLL VENOUS BLD VENIPUNCTURE: CPT | Performed by: INTERNAL MEDICINE

## 2024-10-19 PROCEDURE — 81003 URINALYSIS AUTO W/O SCOPE: CPT | Performed by: INTERNAL MEDICINE

## 2024-10-19 PROCEDURE — 84460 ALANINE AMINO (ALT) (SGPT): CPT | Performed by: INTERNAL MEDICINE

## 2024-10-19 PROCEDURE — 84443 ASSAY THYROID STIM HORMONE: CPT | Performed by: INTERNAL MEDICINE

## 2024-10-19 PROCEDURE — 85025 COMPLETE CBC W/AUTO DIFF WBC: CPT | Performed by: INTERNAL MEDICINE

## 2024-10-19 PROCEDURE — 2500000001 HC RX 250 WO HCPCS SELF ADMINISTERED DRUGS (ALT 637 FOR MEDICARE OP): Performed by: INTERNAL MEDICINE

## 2024-10-19 PROCEDURE — 2500000002 HC RX 250 W HCPCS SELF ADMINISTERED DRUGS (ALT 637 FOR MEDICARE OP, ALT 636 FOR OP/ED): Mod: MUE | Performed by: INTERNAL MEDICINE

## 2024-10-19 PROCEDURE — 99222 1ST HOSP IP/OBS MODERATE 55: CPT | Performed by: INTERNAL MEDICINE

## 2024-10-19 PROCEDURE — 84484 ASSAY OF TROPONIN QUANT: CPT | Performed by: INTERNAL MEDICINE

## 2024-10-19 PROCEDURE — 83036 HEMOGLOBIN GLYCOSYLATED A1C: CPT | Mod: TRILAB,WESLAB | Performed by: INTERNAL MEDICINE

## 2024-10-19 PROCEDURE — 2500000004 HC RX 250 GENERAL PHARMACY W/ HCPCS (ALT 636 FOR OP/ED): Performed by: INTERNAL MEDICINE

## 2024-10-19 PROCEDURE — 83735 ASSAY OF MAGNESIUM: CPT | Performed by: INTERNAL MEDICINE

## 2024-10-19 PROCEDURE — 99239 HOSP IP/OBS DSCHRG MGMT >30: CPT | Performed by: INTERNAL MEDICINE

## 2024-10-19 PROCEDURE — G0378 HOSPITAL OBSERVATION PER HR: HCPCS

## 2024-10-19 PROCEDURE — 97161 PT EVAL LOW COMPLEX 20 MIN: CPT | Mod: GP

## 2024-10-19 RX ADMIN — FOLIC ACID 1 MG: 1 TABLET ORAL at 08:23

## 2024-10-19 RX ADMIN — Medication 2000 UNITS: at 08:23

## 2024-10-19 RX ADMIN — TAMSULOSIN HYDROCHLORIDE 0.8 MG: 0.4 CAPSULE ORAL at 08:23

## 2024-10-19 RX ADMIN — LISINOPRIL 40 MG: 40 TABLET ORAL at 08:24

## 2024-10-19 RX ADMIN — PANTOPRAZOLE SODIUM 40 MG: 40 TABLET, DELAYED RELEASE ORAL at 06:22

## 2024-10-19 RX ADMIN — AMLODIPINE BESYLATE 5 MG: 5 TABLET ORAL at 08:23

## 2024-10-19 RX ADMIN — GABAPENTIN 600 MG: 300 CAPSULE ORAL at 08:23

## 2024-10-19 RX ADMIN — TRAMADOL HYDROCHLORIDE 50 MG: 50 TABLET ORAL at 08:40

## 2024-10-19 RX ADMIN — CYANOCOBALAMIN TAB 1000 MCG 1000 MCG: 1000 TAB at 08:23

## 2024-10-19 RX ADMIN — RIVAROXABAN 20 MG: 20 TABLET, FILM COATED ORAL at 08:23

## 2024-10-19 RX ADMIN — POLYETHYLENE GLYCOL 3350 17 G: 17 POWDER, FOR SOLUTION ORAL at 08:23

## 2024-10-19 RX ADMIN — ASPIRIN 81 MG: 81 TABLET, CHEWABLE ORAL at 08:23

## 2024-10-19 RX ADMIN — METOPROLOL SUCCINATE 50 MG: 50 TABLET, EXTENDED RELEASE ORAL at 08:23

## 2024-10-19 ASSESSMENT — PAIN - FUNCTIONAL ASSESSMENT
PAIN_FUNCTIONAL_ASSESSMENT: 0-10
PAIN_FUNCTIONAL_ASSESSMENT: 0-10

## 2024-10-19 ASSESSMENT — ACTIVITIES OF DAILY LIVING (ADL): ADL_ASSISTANCE: INDEPENDENT

## 2024-10-19 ASSESSMENT — COGNITIVE AND FUNCTIONAL STATUS - GENERAL
CLIMB 3 TO 5 STEPS WITH RAILING: A LITTLE
DAILY ACTIVITIY SCORE: 24
MOBILITY SCORE: 23
MOBILITY SCORE: 24

## 2024-10-19 ASSESSMENT — PAIN SCALES - GENERAL
PAINLEVEL_OUTOF10: 0 - NO PAIN
PAINLEVEL_OUTOF10: 3
PAINLEVEL_OUTOF10: 3

## 2024-10-19 NOTE — DISCHARGE SUMMARY
Discharge Diagnosis  Patient Active Problem List   Diagnosis    Post herpetic neuralgia    Neuropathy of upper extremity, right    Atherosclerotic heart disease of native coronary artery with other forms of angina pectoris    Benign essential hypertension    Bilateral hearing loss    BPH with obstruction/lower urinary tract symptoms    Primary central sleep apnea    Cervical radiculopathy    Chronic constipation    DDD (degenerative disc disease), lumbar    Gastroesophageal reflux disease    History of bilateral hip replacements    Combined hyperlipidemia    Prediabetes    Psoriasis with arthropathy (Multi)    Rheumatoid arthritis    Varicose veins of unspecified lower extremity with ulcer of ankle (CODE)    Median neuropathy    Carpal tunnel syndrome    Atrial fibrillation (Multi)    Weakness    Elevated troponin    Atrial fibrillation and flutter         Issues Requiring Follow-Up  Follow-up with Dr. Hurst and electrophysiologist as well as instructed    Discharge Meds     Your medication list        CONTINUE taking these medications        Instructions Last Dose Given Next Dose Due   acetaminophen 650 mg ER tablet  Commonly known as: Tylenol 8 HOUR           amLODIPine 5 mg tablet  Commonly known as: Norvasc      Take 1 tablet (5 mg) by mouth once daily.       armodafinil 150 mg tablet  Commonly known as: Nuvigil           aspirin 81 mg chewable tablet           atorvastatin 80 mg tablet  Commonly known as: Lipitor      TAKE 1 TABLET BY MOUTH AT  BEDTIME       cholecalciferol 50 MCG (2000 UT) tablet  Commonly known as: Vitamin D-3           cyanocobalamin 1,000 mcg tablet  Commonly known as: Vitamin B-12           ezetimibe 10 mg tablet  Commonly known as: Zetia      TAKE 1 TABLET BY MOUTH AT  BEDTIME       folic acid 1 mg tablet  Commonly known as: Folvite           gabapentin 300 mg capsule  Commonly known as: Neurontin      Take 2 capsules (600 mg) by mouth 2 times a day.       HYDROcodone-acetaminophen 5-325  mg tablet  Commonly known as: Norco           lisinopril 40 mg tablet      TAKE 1 TABLET BY MOUTH ONCE DAILY       metoprolol succinate XL 25 mg 24 hr tablet  Commonly known as: Toprol XL      Take 2 tablets (50 mg) by mouth once daily. Do not crush or chew.       omeprazole 20 mg DR capsule  Commonly known as: PriLOSEC      Take 1 capsule (20 mg) by mouth once daily.       tamsulosin 0.4 mg 24 hr capsule  Commonly known as: Flomax      Take 2 capsules (0.8 mg) by mouth once daily.       traMADol 50 mg tablet  Commonly known as: Ultram           Xarelto 20 mg tablet  Generic drug: rivaroxaban      Take 1 tablet (20 mg) by mouth once daily.       Xeljanz XR 11 mg tablet extended release 24 hr  Generic drug: tofacitinib ER                    Test Results Pending At Discharge  Pending Labs       Order Current Status    Hemoglobin A1C In process            Hospital Course  Patient admitted by me yesterday after he presented to the emergency room directed by his own cardiologist Dr. Hurst's office.  Patient had no symptoms but went to seek advice after his watch alerted him on recurrent A-fib.  Troponins were checked and they were detectable downtrending.  Case discussed with cardiology by the ER physician and observation was advised.  During this observation the troponins down trended patient had no symptoms.  This morning cardiology saw the patient cleared him for discharge.  No medication change    Pertinent Physical Exam At Time of Discharge  See my note from earlier today    Outpatient Follow-Up  Per chart    Time spent on discharge arrangement:  45 minutes    Aundrea Ibanez MD

## 2024-10-19 NOTE — PROGRESS NOTES
Pro Belle Jr. is a 74 y.o. male on day 0 of admission presenting with Atrial fibrillation and flutter.      Subjective   He continues to have no symptoms.  He wants to go home       Objective     Last Recorded Vitals  BP (!) 136/94 (BP Location: Right arm, Patient Position: Lying)   Pulse 93   Temp 36.6 °C (97.9 °F) (Temporal)   Resp 20   Wt 94.8 kg (208 lb 15.9 oz)   SpO2 100%   Intake/Output last 3 Shifts:  No intake or output data in the 24 hours ending 10/19/24 0901    Physical Exam  Alert oriented x 3 cooperative on room air sitting up in a chair  Normocephalic/atraumatic EOMI PERRLA  Neck supple  Chest clear  Heart irregular  Abdomen soft nontender  Extremities no edema  Neurologic exam nonfocal  Relevant Results  Downtrending troponin  Assessment/Plan     Principal Problem:    Atrial fibrillation and flutter  Active Problems:    Atrial fibrillation (Multi)    Elevated troponin      October 19:    A-fib intermittent currently he is in A-fib but rate controlled we will continuing Xarelto continue metoprolol  Detectable troponin with a flat trend will continue to trend he has no chest pain.  1 may speculate if episodes of heartburn yesterday was actually angina.  Will defer this spiculation to be further worked up by cardiology.  At this point I have very low suspicion for ongoing angina or NSTEMI and will continue Xarelto and aspirin  Hypertension  Chronic back pain check PT OT eval    Anticipate cardiology to clear him for discharge             Aundrea Ibanez MD

## 2024-10-19 NOTE — CARE PLAN
The patient's goals for the shift include  Discharge home    The clinical goals for the shift include Discharge home and follow up with cadiology output    Over the shift, the patient did make progress toward the following goals. Barriers to progression include none. Recommendations to address these barriers include follow up with cardiologist as outpatient.

## 2024-10-19 NOTE — PROGRESS NOTES
Physical Therapy    Physical Therapy Evaluation    Patient Name: Pro Belle Jr.  MRN: [id number]  Department:   Room: [address] [place] [address]  Today's Date: 10/19/2024   Time Calculation  Start Time: 1031  Stop Time: 1051  Time Calculation (min): 20 min    Assessment/Plan   PT Assessment  Evaluation/Treatment Tolerance: Patient tolerated treatment well  Medical Staff Made Aware: Yes  End of Session Communication: Bedside nurse  End of Session Patient Position: Up in chair, Alarm off, not on at start of session      Assessment Comment: 75 y/o male who presented to ED with A-fib. Pt is indep at baseline with use of cane for ambulation. Pt reportedly receives outpatient PT services for back and neck. Is going to [facility] with outpatient services upon dc. no further PT needs, as pt appears at/near funcitonal baseline. Anticipate pt will return home and function at/near baseline. Pt agreeable.        IP OR SWING BED PT PLAN  Inpatient or Swing Bed: Inpatient  PT Plan  PT Plan: PT Eval only  PT Eval Only Reason: No acute PT needs identified  PT Frequency: PT eval only  PT Discharge Recommendations: Other (comment) (family assist as needed)  PT Recommended Transfer Status: Assistive device  PT - OK to Discharge: Yes    Subjective   General Visit Information:  General  Reason for Referral: impaired mobility; A-fib/flutter  Past Medical History Relevant to Rehab: chronic back issues, A-fib, sleep apnea  Family/Caregiver Present: No  Prior to Session Communication: Bedside nurse  Patient Position Received: Up in chair, Alarm off, not on at start of session  Preferred Learning Style: verbal, visual  General Comment: 75 y/o male who presented with A-fib. pt up in chair upon arrival. Agreeable to participate.  Home Living:  Home Living  Type of Home: House  Lives With: Spouse  Home Adaptive Equipment: Cane  Home Layout: Two level, Bed/bath upstairs  Home Access: Stairs to enter with rails  Entrance Stairs-Number of Steps: 2  Home Living  "Comments: Second floor bedroom, main bathroom  Prior Level of Function:  Prior Function Per Pt/Caregiver Report  Level of [place]: Independent with ADLs and functional transfers, Independent with homemaking with ambulation  Receives Help From: Family  ADL Assistance: Independent  Homemaking Assistance: Independent  Ambulatory Assistance: Independent  Prior Function Comments: Indep with cane. goes up/down stairs \"slowly\".  Goes to outpatient PT for neck and back issues.  Precautions:  Precautions  Medical Precautions: No known precautions/limitation          Objective   Pain:  Pain Assessment  Pain Assessment: 0-10  0-10 (Numeric) Pain Score: 0 - No pain  Cognition:  Cognition  Overall Cognitive Status: Within Functional Limits    General Assessments:  General Observation  General Observation: pleasant/cooperative     Activity Tolerance  Endurance: Endurance does not limit participation in activity    Sensation  Light Touch: No apparent deficits    Strength  Strength Comments: No major funcitonal strength deficits observed       Postural Control  Posture Comment: rounded shoulders    Static Sitting Balance  Static Sitting-Level of Assistance: Independent  Dynamic Sitting Balance  Dynamic Sitting-Comments: Donned shoes, sitting in chair, without LOB    Static Standing Balance  Static Standing-Level of Assistance: Modified independent  Static Standing-Comment/Number of Minutes: cane  Dynamic Standing Balance  Dynamic Standing-Comments: No major LOB with ambulation; cane. Able to complete 180 degree turns and manuever around obstacles without LOB.  Functional Assessments:  Bed Mobility  Bed Mobility: No (up in chair)    Transfers  Transfer: Yes  Transfer 1  Technique 1: Sit to stand, Stand to sit  Transfer Device 1: Cane  Transfer Level of Assistance 1: Modified independent    Ambulation/Gait Training  Ambulation/Gait Training Performed: Yes  Ambulation/Gait Training 1  Surface 1: Level tile  Device 1: Single point " "cane  Assistance 1: Modified independent  Quality of Gait 1:  (Pt reported gait pattern is \"normal,\" and is slow at baseline.)  Comments/Distance (ft) 1: about 300 ft    Stairs  Stairs:  (did not assess; pt denied concerns with navigating stairs upon return home.)  Extremity/Trunk Assessments:  RLE   RLE : Within Functional Limits  LLE   LLE : Within Functional Limits  Outcome Measures:  [facility] Basic Mobility  Turning from your back to your side while in a flat bed without using bedrails: None  Moving from lying on your back to sitting on the side of a flat bed without using bedrails: None  Moving to and from bed to chair (including a wheelchair): None  Standing up from a chair using your arms (e.g. wheelchair or bedside chair): None  To walk in hospital room: None  Climbing 3-5 steps with railing: A little  Basic Mobility - Total Score: 23    Encounter Problems       Encounter Problems (Resolved)       Pain - Adult              Education Documentation  No documentation found.  Education Comments  No comments found.            "

## 2024-10-19 NOTE — CONSULTS
Inpatient consult to Cardiology  Consult performed by: Rena Carcamo MD  Consult ordered by: Aundrea Ibanez MD  Reason for consult: Elevated troponin, atrial fibrillation        History Of Present Illness:    Pro Belle Jr. is a 74 y.o. male presenting with atrial fibrillation.  Patient with history of coronary disease status post PCI to RCA in 2019 by Dr. Richardson, history of atrial fibrillation status post ablation twice by Dr. Babin, hypertension, hyperlipidemia.  Patient was at usual state of health until yesterday when his Apple Watch showed him that he is in atrial fibrillation.  Went to Dr. Hurst's office to have EKG done and his EKG in the office showed atrial fibrillation patient was instructed to go to the emergency room.  Patient denies having any chest pain shortness of breath palpitations dizziness lightheadedness or syncope.  In the emergency room initial troponin was elevated at 100 07 then 100 01 and then 57.  EKG showed right bundle branch morphology, atrial fibrillation, no ST-T wave abnormality suggestive of ischemia.    Review of systems.  10 point review of systems otherwise negative    Last Recorded Vitals:  Vitals:    10/18/24 2309 10/19/24 0342 10/19/24 0759 10/19/24 1118   BP: 130/86 (!) 141/95 (!) 136/94 108/73   BP Location: Left arm Right arm Right arm Right arm   Patient Position: Lying Lying Lying Sitting   Pulse: 78 84 93 80   Resp: 18 18 20 17   Temp:  36.3 °C (97.3 °F) 36.6 °C (97.9 °F) 36.6 °C (97.9 °F)   TempSrc: Temporal Temporal Temporal Temporal   SpO2: 93% 96% 100% 99%   Weight:  94.8 kg (208 lb 15.9 oz)     Height:           Last Labs:  CBC - 10/19/2024:  4:15 AM  9.2 13.4 211    39.9      CMP - 10/19/2024:  4:15 AM  9.8 6.0 21 --- 0.6   _ 3.6 19 82      PTT - No results in last year.  1.5   14.8 _     Troponin I, High Sensitivity   Date/Time Value Ref Range Status   10/19/2024 05:41 AM 57 (HH) 0 - 20 ng/L Final     Comment:     Previous result verified on  10/18/2024 1526 on specimen/case [id number] called with component TRPHS for procedure Troponin I, High Sensitivity, Initial with value 107 ng/L.   10/19/2024 04:15 AM 57 (HH) 0 - 20 ng/L Final     Comment:     Previous result verified on 10/18/2024 1526 on specimen/case [id number] called with component TRPHS for procedure Troponin I, High Sensitivity, Initial with value 107 ng/L.   10/18/2024 03:12  (HH) 0 - 20 ng/L Final     Comment:     Previous result verified on 10/18/2024 1526 on specimen/case [id number] called with component TRPHS for procedure Troponin I, High Sensitivity, Initial with value 107 ng/L.     Hemoglobin A1C   Date/Time Value Ref Range Status   10/05/2024 09:49 AM 5.4 See comment % Final   04/11/2023 04:00 PM 5.2 % Final     Comment:          Diagnosis of Diabetes-Adults   Non-Diabetic: < or = 5.6%   Increased risk for developing diabetes: 5.7-6.4%   Diagnostic of diabetes: > or = 6.5%  .       Monitoring of Diabetes                Age (y)     Therapeutic Goal (%)   Adults:          >18           <7.0   Pediatrics:    13-18           <7.5                   7-12           <8.0                   0- 6            7.5-8.5   American Diabetes Association. Diabetes Care 33(S1), Jan 2010.     10/12/2022 09:22 AM 5.2 % Final     Comment:          Diagnosis of Diabetes-Adults   Non-Diabetic: < or = 5.6%   Increased risk for developing diabetes: 5.7-6.4%   Diagnostic of diabetes: > or = 6.5%  .       Monitoring of Diabetes                Age (y)     Therapeutic Goal (%)   Adults:          >18           <7.0   Pediatrics:    13-18           <7.5                   7-12           <8.0                   0- 6            7.5-8.5   American Diabetes Association. Diabetes Care 33(S1), Jan 2010.       LDL Calculated   Date/Time Value Ref Range Status   03/27/2024 08:44 AM 53 <=99 mg/dL Final     Comment:                                 Near   Borderline      AGE      Desirable  Optimal    High     " High     Very High     0-19 Y     0 - 109     ---    110-129   >/= 130     ----    20-24 Y     0 - 119     ---    120-159   >/= 160     ----      >24 Y     0 -  99   100-129  130-159   160-189     >/=190     07/13/2021 04:27 AM 52 (L) 65 - 130 MG/DL Final   05/27/2021 03:56 PM 86 65 - 130 MG/DL Final   05/12/2020 12:09 PM 60 (L) 65 - 130 MG/DL Final     VLDL   Date/Time Value Ref Range Status   03/27/2024 08:44 AM 12 0 - 40 mg/dL Final   04/11/2023 04:00 PM 23 0 - 40 mg/dL Final   10/12/2022 09:22 AM 14 0 - 40 mg/dL Final   03/26/2021 08:07 AM 30 0 - 40 mg/dL Final      Last I/O:  No intake/output data recorded.    Past Cardiology Tests (Last 3 Years):  EKG:  ECG 12 lead (Clinic Performed) 10/18/2024 (Preliminary)      ECG 12 lead (Clinic Performed) 10/09/2024 (Preliminary)      ECG 12 lead (Clinic Performed) 06/12/2024      ECG 12 lead (Clinic Performed) 06/06/2024      ECG 12 lead 05/08/2024      ECG 12 lead (Clinic Performed) 05/08/2024      ECG 12 lead (Clinic Performed) 03/21/2024    Echo:  No results found for this or any previous visit from the past 1095 days.    Ejection Fractions:  No results found for: \"EF\"  Cath:  No results found for this or any previous visit from the past 1095 days.    Stress Test:  No results found for this or any previous visit from the past 1095 days.    Cardiac Imaging:  No results found for this or any previous visit from the past 1095 days.      Past Medical History:  He has a past medical history of A-fib (Multi) and Sleep apnea.    Past Surgical History:  He has a past surgical history that includes Hernia repair (06/20/2014); Other surgical history (10/06/2022); Back surgery (02/14/2024); Joint replacement (Bilateral); Joint replacement (Left); Coronary stent placement; Spine surgery; Eye surgery; Vasectomy; and Ablation of dysrhythmic focus.      Social History:  He reports that he has quit smoking. His smoking use included cigarettes. He has a 4.5 pack-year smoking history. " He has never used smokeless tobacco. He reports current alcohol use of about 8.0 standard drinks of alcohol per week. He reports that he does not use drugs.    Family History:  Family History   Problem Relation Name Age of Onset    Cancer Mother Selena     Alcohol abuse Mother Selena     Heart disease Father Pro Sr.     Arthritis Father Pro Sr.     Heart attack Father Pro Sr.     Cancer Sister      Coronary artery disease Brother          Allergies:  Patient has no known allergies.    Inpatient Medications:  Scheduled medications   Medication Dose Route Frequency    amLODIPine  5 mg oral Daily    aspirin  81 mg oral Daily    atorvastatin  80 mg oral Nightly    cholecalciferol  2,000 Units oral Daily    cyanocobalamin  1,000 mcg oral Daily    ezetimibe  10 mg oral Nightly    folic acid  1 mg oral Daily    gabapentin  600 mg oral BID    lisinopril  40 mg oral Daily    metoprolol succinate XL  50 mg oral Daily    pantoprazole  40 mg oral Daily before breakfast    Or    pantoprazole  40 mg intravenous Daily before breakfast    polyethylene glycol  17 g oral Daily    rivaroxaban  20 mg oral Daily    tamsulosin  0.8 mg oral Daily     PRN medications   Medication    acetaminophen    Or    acetaminophen    Or    acetaminophen    traMADol     Continuous Medications   Medication Dose Last Rate     Outpatient Medications:  Current Outpatient Medications   Medication Instructions    acetaminophen (TYLENOL 8 HOUR) 650 mg, Every 8 hours PRN    amLODIPine (NORVASC) 5 mg, oral, Daily    armodafinil (NUVIGIL) 150 mg, oral, Daily    aspirin 81 mg, Every 24 hours    atorvastatin (LIPITOR) 80 mg, oral, Nightly    cholecalciferol (Vitamin D-3) 50 MCG (2000 UT) tablet 1 tablet, Daily    cyanocobalamin (VITAMIN B-12) 1,000 mcg, Daily RT    ezetimibe (ZETIA) 10 mg, oral, Nightly    folic acid (FOLVITE) 1 mg, Every 24 hours    gabapentin (NEURONTIN) 600 mg, oral, 2 times daily    HYDROcodone-acetaminophen (Norco) 5-325 mg tablet 1 tablet,  Every 8 hours PRN    lisinopril 40 mg, oral, Daily    metoprolol succinate XL (TOPROL XL) 50 mg, oral, Daily, Do not crush or chew.    omeprazole (PRILOSEC) 20 mg, oral, Daily    tamsulosin (FLOMAX) 0.8 mg, oral, Daily    traMADol (Ultram) 50 mg tablet 1 tablet, 3 times daily (0900,1400,1900)    Xarelto 20 mg, oral, Daily    Xeljanz XR 11 mg, Daily       Physical Exam:  General: Patient is in no acute distress.  HEENT: atraumatic normocephalic.  Neck: is supple jugular venous pressure within normal limits no thyromegaly.  Cardiovascular irregular rate and rhythm normal heart sounds no murmurs rubs or gallops.  Lungs: clear to auscultation bilaterally.  Abdomen: is soft nontender.  Extremities warm to touch no edema.  Neurologic examination: patient is awake alert oriented to person, place, date/time.  Psychiatric examination: patient has good insight denies feeling suicidal and depressed.  Pulses 2+ intact bilaterally     Assessment/Plan   #1 atrial fibrillation.  Patient with history of paroxysmal atrial fibrillation status post ablation.  Currently in atrial fibrillation.  Troponin mildly elevated likely secondary to atrial fibrillation since patient has no chest pain and EKG does not suggest ischemia.  My recommendation is to follow-up with Dr. Hurst for outpatient stress test as well as with Dr. Babin for atrial fibrillation management.  Stable from cardiac standpoint continue current medications.  Continue oral anticoagulation.  Follow-up as an outpatient.    2.  Elevated troponin.  Mildly elevated troponin in the setting of atrial fibrillation.  No chest pain.  Patient asymptomatic.  EKG does not suggest ischemia . recommend outpatient follow-up    #3 hypertension blood pressure and heart rate controlled continue home medications.    4.  Hyperlipidemia continue high-dose statin.    Thank you for the consult  Peripheral IV 10/18/24 20 G Left Antecubital (Active)   Site Assessment Clean;Dry;Intact 10/19/24  0900   Dressing Status Clean;Dry;Occlusive 10/19/24 0900   Number of days: 1       Code Status:  Full Code      Rena Carcamo MD

## 2024-10-19 NOTE — PROGRESS NOTES
Evaluation    Patient Name: Pro Belle Jr.  MRN: [id number]  Department: [facility]  Room: [address] [place], [place] [address]  Today's Date: 10/19/2024       General:  General  Missed Visit: Yes  Missed Visit Reason: Other (Comment) (Pt is up ad luis independent with ADLs and declines need for OT services.  Will sign off.)

## 2024-10-21 ENCOUNTER — TELEPHONE (OUTPATIENT)
Dept: PRIMARY CARE | Facility: CLINIC | Age: 74
End: 2024-10-21
Payer: MEDICARE

## 2024-10-21 ENCOUNTER — DOCUMENTATION (OUTPATIENT)
Dept: PRIMARY CARE | Facility: CLINIC | Age: 74
End: 2024-10-21
Payer: MEDICARE

## 2024-10-21 ENCOUNTER — DOCUMENTATION WITH CHARGES (OUTPATIENT)
Dept: PRIMARY CARE | Facility: CLINIC | Age: 74
End: 2024-10-21
Payer: MEDICARE

## 2024-10-21 LAB
ATRIAL RATE: 394 BPM
Q ONSET: 225 MS
QRS COUNT: 14 BEATS
QRS DURATION: 136 MS
QT INTERVAL: 376 MS
QTC CALCULATION(BAZETT): 452 MS
QTC FREDERICIA: 425 MS
R AXIS: -16 DEGREES
T AXIS: 9 DEGREES
T OFFSET: 413 MS
VENTRICULAR RATE: 87 BPM

## 2024-10-21 NOTE — TELEPHONE ENCOUNTER
Pro called in to schedule a follow up from being in [facility] for AFIB. I was able to schedule him in for 10/29. Please advise if he should be seen sooner.

## 2024-10-21 NOTE — PROGRESS NOTES
Discharge facility: [facility]  Discharge diagnosis: Afib  Admission date: 10/18/2024  Discharge date: 10/19/2024    PCP Appointment Date:   Specialist Appointment Date: 10/30/2024  Hospital Encounter and Summary: Linked   See Discharge assessment below for further details      Discharge Diagnosis  Problem List       Patient Active Problem List   Diagnosis    Post herpetic neuralgia    Neuropathy of upper extremity, right    Atherosclerotic heart disease of native coronary artery with other forms of angina pectoris    Benign essential hypertension    Bilateral hearing loss    BPH with obstruction/lower urinary tract symptoms    Primary central sleep apnea    Cervical radiculopathy    Chronic constipation    DDD (degenerative disc disease), lumbar    Gastroesophageal reflux disease    History of bilateral hip replacements    Combined hyperlipidemia    Prediabetes    Psoriasis with arthropathy (Multi)    Rheumatoid arthritis    Varicose veins of unspecified lower extremity with ulcer of ankle (CODE)    Median neuropathy    Carpal tunnel syndrome    Atrial fibrillation (Multi)    Weakness    Elevated troponin    Atrial fibrillation and flutter               Issues Requiring Follow-Up  Follow-up with Dr. Hurst and electrophysiologist as well as instructed     Discharge Meds      Your medication list          CONTINUE taking these medications         Instructions Last Dose Given Next Dose Due   acetaminophen 650 mg ER tablet  Commonly known as: Tylenol 8 HOUR               amLODIPine 5 mg tablet  Commonly known as: Norvasc       Take 1 tablet (5 mg) by mouth once daily.          armodafinil 150 mg tablet  Commonly known as: Nuvigil               aspirin 81 mg chewable tablet               atorvastatin 80 mg tablet  Commonly known as: Lipitor       TAKE 1 TABLET BY MOUTH AT  BEDTIME          cholecalciferol 50 MCG (2000 UT) tablet  Commonly known as: Vitamin D-3               cyanocobalamin 1,000 mcg  Subjective:     Lindsay Horton is a 53 y.o. female with hypertension and hypercholesterolemia. She has severe shoulder pains and she is scheduled to have a shoulder replacement on 1/27/2017. An ECG reveled larger inferior Q waves and the QRS is widened. She denies any chest pain or exertional dyspnea. She walks with a walker due to a bad back. No palpitations or weak spells. Referred for evaluation.    Hypertension   This is a chronic problem. The current episode started more than 1 year ago. The problem is unchanged. The problem is controlled (usually 110-140/60-80 mmhg at home). Associated symptoms include anxiety and neck pain. Pertinent negatives include no blurred vision, chest pain, headaches, malaise/fatigue, orthopnea, palpitations, peripheral edema, PND, shortness of breath or sweats. There is no history of chronic renal disease.   Hyperlipidemia   The current episode started more than 1 year ago. The problem is uncontrolled. Exacerbating diseases include obesity. She has no history of chronic renal disease, diabetes, hypothyroidism, liver disease or nephrotic syndrome. Pertinent negatives include no chest pain, focal weakness, myalgias or shortness of breath.       Review of Systems   Constitution: Negative for chills, fever, weakness and malaise/fatigue.   HENT: Negative for headaches and nosebleeds.    Eyes: Negative for blurred vision, double vision, vision loss in left eye and vision loss in right eye.   Cardiovascular: Negative for chest pain, claudication, dyspnea on exertion, irregular heartbeat, leg swelling, near-syncope, orthopnea, palpitations, paroxysmal nocturnal dyspnea and syncope.   Respiratory: Negative for cough, hemoptysis, shortness of breath and wheezing.    Endocrine: Negative for cold intolerance and heat intolerance.   Hematologic/Lymphatic: Negative for bleeding problem. Does not bruise/bleed easily.   Skin: Negative for color change and rash.   Musculoskeletal: Positive for  arthritis, back pain, falls, joint pain (shoulders and hips), muscle weakness and neck pain. Negative for myalgias.   Gastrointestinal: Negative for heartburn, hematemesis, hematochezia, hemorrhoids, jaundice, melena, nausea and vomiting.   Genitourinary: Negative for dysuria, hematuria and menorrhagia.   Neurological: Positive for loss of balance, numbness and paresthesias. Negative for dizziness, focal weakness, light-headedness and vertigo.   Psychiatric/Behavioral: Negative for altered mental status, depression and memory loss. The patient is nervous/anxious.    Allergic/Immunologic: Negative for hives and persistent infections.       Current Outpatient Prescriptions on File Prior to Visit   Medication Sig Dispense Refill    alprazolam (XANAX) 2 MG Tab   1    amlodipine (NORVASC) 5 MG tablet Take 5 mg by mouth once daily.       duloxetine (CYMBALTA) 60 MG capsule 2 (two) times daily.       folic acid (FOLVITE) 1 MG tablet       levothyroxine (SYNTHROID) 50 MCG tablet Take 50 mcg by mouth before breakfast.       lithium (LITHOTAB) 300 mg tablet Take 300 mg by mouth 3 (three) times daily.      methylphenidate (RITALIN) 10 MG tablet TK 1 T PO QID  0    potassium chloride SA (K-DUR,KLOR-CON) 20 MEQ tablet Take 20 mEq by mouth 3 (three) times daily.       promethazine (PHENERGAN) 25 MG tablet Take 1 tablet (25 mg total) by mouth every 6 (six) hours as needed for Nausea (use sparringly). 30 tablet 3    risperidone (RISPERDAL) 3 MG Tab Take 3 mg by mouth once daily.      triamterene-hydrochlorothiazide 37.5-25 mg (MAXZIDE-25) 37.5-25 mg per tablet Take 1 tablet by mouth once daily.        No current facility-administered medications on file prior to visit.        Visit Vitals    /73    Pulse 77    Ht 5' (1.524 m)    Wt 85.3 kg (188 lb)    BMI 36.72 kg/m2       Objective:     Physical Exam   Constitutional: She is oriented to person, place, and time. She appears well-developed and well-nourished.   tablet  Commonly known as: Vitamin B-12               ezetimibe 10 mg tablet  Commonly known as: Zetia       TAKE 1 TABLET BY MOUTH AT  BEDTIME          folic acid 1 mg tablet  Commonly known as: Folvite               gabapentin 300 mg capsule  Commonly known as: Neurontin       Take 2 capsules (600 mg) by mouth 2 times a day.          HYDROcodone-acetaminophen 5-325 mg tablet  Commonly known as: Norco               lisinopril 40 mg tablet       TAKE 1 TABLET BY MOUTH ONCE DAILY          metoprolol succinate XL 25 mg 24 hr tablet  Commonly known as: Toprol XL       Take 2 tablets (50 mg) by mouth once daily. Do not crush or chew.          omeprazole 20 mg DR capsule  Commonly known as: PriLOSEC       Take 1 capsule (20 mg) by mouth once daily.          tamsulosin 0.4 mg 24 hr capsule  Commonly known as: Flomax       Take 2 capsules (0.8 mg) by mouth once daily.          traMADol 50 mg tablet  Commonly known as: Ultram               Xarelto 20 mg tablet  Generic drug: rivaroxaban       Take 1 tablet (20 mg) by mouth once daily.          Xeljanz XR 11 mg tablet extended release 24 hr  Generic drug: tofacitinib ER                            Test Results Pending At Discharge  Pending Labs         Order Current Status     Hemoglobin A1C In process                Hospital Course  Patient admitted by me yesterday after he presented to the emergency room directed by his own cardiologist Dr. Hurst's office.  Patient had no symptoms but went to seek advice after his watch alerted him on recurrent A-fib.  Troponins were checked and they were detectable downtrending.  Case discussed with cardiology by the ER physician and observation was advised.  During this observation the troponins down trended patient had no symptoms.  This morning cardiology saw the patient cleared him for discharge.  No medication change     Pertinent Physical Exam At Time of Discharge  See my note from earlier today     Outpatient Follow-Up  Per chart      Time spent on discharge arrangement:  45 minutes     Aundrea Ibanez MD               Electronically signed by Aundrea Ibanez MD at 10/19/2024 12:18 PM         ED to Hosp-Admission (Discharged) on 10/18/2024          Note shared with patient  Additional Orders and Documentation      Results  Imaging         Meds           Orders  Procedures         Flowsheets      Encounter Info: History,     Allergies,     Education,     Care Plan     Hospital Problem List      Atrial fibrillation (Multi)    Elevated troponin    Atrial fibrillation and flutter  Care Timeline    10/18   Admitted from ED (Observation) 1853   10/19   Discharged 1309     Discharge      Home       AVS    MEDICARE PATIENTS - BPCI Document (Printed 10/19/2024)  AVS - Discharge to Home (Printed 10/19/2024)      Follow-Ups    Follow up with Ruben Bradford MD (Family Medicine); Follow-up in 2 days for reevaluation  Follow up with Mynor Hurst MD (Cardiology); Follow-up next week for reevaluation  Follow up with Kelechi Nassar MD (Cardiology); Call office and notify of her visit to the emergency department today  Medication List at Discharge      acetaminophen 650 mg Every 8 hours PRN    amlodipine besylate 5 mg oral Daily    armodafinil 150 mg oral Daily    aspirin 81 mg Every 24 hours    atorvastatin calcium 80 mg oral Nightly    cholecalciferol (vitamin D3) 50 MCG (2000 UT) 1 tablet Daily    cyanocobalamin (vitamin B-12) 1,000 mcg Daily RT    ezetimibe 10 mg oral Nightly    folic acid 1 mg Every 24 hours    gabapentin 600 mg oral 2 times daily    hydrocodone/acetaminophen 5-325 mg 1 tablet Every 8 hours PRN    lisinopril 40 mg oral Daily    metoprolol succinate 50 mg oral Daily, Do not crush or chew.    omeprazole 20 mg oral Daily    rivaroxaban 20 mg oral Daily    tamsulosin HCl 0.8 mg oral Daily    tofacitinib citrate 11 mg Daily    tramadol HCl 50 mg 1 tablet 3 times daily (0900,1400,1900)   Non-toxic appearance. No distress.   HENT:   Head: Normocephalic and atraumatic.   Nose: Nose normal.   Eyes: Right eye exhibits no discharge. Left eye exhibits no discharge. Right conjunctiva is not injected. Left conjunctiva is not injected. Right pupil is round. Left pupil is round. Pupils are equal.   Neck: Neck supple. No JVD present. Carotid bruit is not present. No thyromegaly present.   Cardiovascular: Normal rate, regular rhythm, S1 normal and S2 normal.   No extrasystoles are present. PMI is not displaced.  Exam reveals no gallop.    No murmur heard.  Pulses:       Radial pulses are 2+ on the right side, and 2+ on the left side.        Femoral pulses are 2+ on the right side, and 2+ on the left side.       Dorsalis pedis pulses are 2+ on the right side, and 2+ on the left side.        Posterior tibial pulses are 2+ on the right side, and 2+ on the left side.   Pulmonary/Chest: Effort normal and breath sounds normal.   Abdominal: Soft. Normal appearance. There is no hepatosplenomegaly. There is no tenderness.   Musculoskeletal:        Right ankle: She exhibits no swelling, no ecchymosis and no deformity.        Left ankle: She exhibits no swelling, no ecchymosis and no deformity.   Lymphadenopathy:        Head (right side): No submandibular adenopathy present.        Head (left side): No submandibular adenopathy present.     She has no cervical adenopathy.   Neurological: She is alert and oriented to person, place, and time. She is not disoriented. No cranial nerve deficit.   Skin: Skin is warm, dry and intact. No rash noted. She is not diaphoretic. No cyanosis. Nails show no clubbing.   Psychiatric: She has a normal mood and affect. Her speech is normal and behavior is normal. Judgment and thought content normal. Cognition and memory are normal.       Assessment:     1. Abnormal ECG    2. Essential hypertension    3. Hypercholesterolemia    4. Moderate obesity    5. Pre-operative cardiovascular examination         Plan:     1. Abnormal ECG   1/24/2017: ECG: Inferior Q waves.   Needs evaluation prior to surgery.   Echo & Regadenoson MPI.    2. Hypertension   2010: Diagnosed.   On amlodipine 5 mg Q24, triamterene 37.5 mg Q24, hctz 25 mg Q24 and KCl 20 mEq Q8.   Needs labs.    3. Hypercholesterolemia   2016: Diagnosed.   Do lipid panel.    4. Moderate Obesity   1/25/2017: Weight 85 kg. BMI 37.   Encouraged to loose.    5. Pre Operative Cardiovascular Evaluation   1/27/2017: Plan is shoulder surgery.   Advised to hol;d off until cardiac evaluation completed.   Dr. Claude Williams, IV..    6. Primary Care   Dr. Ministerio Zapien.    F/u 3 weeks.    Jana Easley M.D.    Copy:    Dr. Claude Williams, IV.    Dr. Chato Roy.        2/5/2017 5:16 PM, Addendum:    2/1/2017: Regadenoson MPI: Fixed inferior defect. Moderate systolic dysfunction. EF 43%.    I discussed the above test result and the implications of the findings over the phone.    Jana Easley M.D.      2/12/2017 3:13 PM, Addendum:    2/8/2017: Echo: Normal left ventricular size with inferior WMA. Mild systolic dysfunction. EF 40-45%. Moderate diastolic dysfunction.    Would favor cath.    I discussed the above test result and the implications of the findings over the phone.    Jana Easley M.D.

## 2024-10-22 ENCOUNTER — OFFICE VISIT (OUTPATIENT)
Dept: PRIMARY CARE | Facility: CLINIC | Age: 74
End: 2024-10-22
Payer: MEDICARE

## 2024-10-22 VITALS
OXYGEN SATURATION: 98 % | WEIGHT: 211.2 LBS | BODY MASS INDEX: 32.01 KG/M2 | HEIGHT: 68 IN | HEART RATE: 64 BPM | DIASTOLIC BLOOD PRESSURE: 70 MMHG | TEMPERATURE: 97.7 F | SYSTOLIC BLOOD PRESSURE: 112 MMHG

## 2024-10-22 DIAGNOSIS — Z09 HOSPITAL DISCHARGE FOLLOW-UP: Primary | ICD-10-CM

## 2024-10-22 DIAGNOSIS — I48.11 LONGSTANDING PERSISTENT ATRIAL FIBRILLATION (MULTI): Chronic | ICD-10-CM

## 2024-10-22 PROCEDURE — 1159F MED LIST DOCD IN RCRD: CPT | Performed by: FAMILY MEDICINE

## 2024-10-22 PROCEDURE — 1123F ACP DISCUSS/DSCN MKR DOCD: CPT | Performed by: FAMILY MEDICINE

## 2024-10-22 PROCEDURE — 3078F DIAST BP <80 MM HG: CPT | Performed by: FAMILY MEDICINE

## 2024-10-22 PROCEDURE — 99495 TRANSJ CARE MGMT MOD F2F 14D: CPT | Performed by: FAMILY MEDICINE

## 2024-10-22 PROCEDURE — 1036F TOBACCO NON-USER: CPT | Performed by: FAMILY MEDICINE

## 2024-10-22 PROCEDURE — 1160F RVW MEDS BY RX/DR IN RCRD: CPT | Performed by: FAMILY MEDICINE

## 2024-10-22 PROCEDURE — 3074F SYST BP LT 130 MM HG: CPT | Performed by: FAMILY MEDICINE

## 2024-10-22 ASSESSMENT — ENCOUNTER SYMPTOMS
OCCASIONAL FEELINGS OF UNSTEADINESS: 0
SHORTNESS OF BREATH: 0
ABDOMINAL DISTENTION: 0
PALPITATIONS: 0
COUGH: 0
DEPRESSION: 0
LOSS OF SENSATION IN FEET: 0

## 2024-10-22 ASSESSMENT — LIFESTYLE VARIABLES
HOW MANY STANDARD DRINKS CONTAINING ALCOHOL DO YOU HAVE ON A TYPICAL DAY: 1 OR 2
HOW OFTEN DO YOU HAVE A DRINK CONTAINING ALCOHOL: MONTHLY OR LESS
HOW OFTEN DO YOU HAVE SIX OR MORE DRINKS ON ONE OCCASION: LESS THAN MONTHLY
SKIP TO QUESTIONS 9-10: 0
AUDIT-C TOTAL SCORE: 2

## 2024-10-22 NOTE — PROGRESS NOTES
"Patient: Pro Belle Jr.  : 1950  PCP: Ruben Bradford MD  MRN: [id number]  Program: Miscellaneous Non-Core  Status: Under Review  Start Date: 4/10/2024  Responsible Staff: Nita Birmingham  Social Drivers to be Addressed: No information to display    Chronic Care Management (CMS)  Status: Enrolled  Effective Dates: 2024 - present  Responsible Staff: Susan Price  Social Drivers to be Addressed: No information to display    Transitional Care Management  Status: Enrolled  Effective Dates: 10/21/2024 - present  Responsible Staff: Susan Price  Social Drivers to be Addressed: Physical Activity, Tobacco Use         Pro Belle Jr. is a 74 y.o. male presenting today for follow-up after being discharged from the hospital 4 days ago. The main problem requiring admission was a fib. The discharge summary and/or Transitional Care Management documentation was reviewed. Medication reconciliation was performed as indicated via the \"Andrea as Reviewed\" timestamp.     Pro Belle Jr. was contacted by Transitional Care Management services two days after his discharge. This encounter and supporting documentation was reviewed.    Review of Systems   Respiratory:  Negative for cough and shortness of breath.    Cardiovascular:  Negative for chest pain and palpitations.   Gastrointestinal:  Negative for abdominal distention.       /70   Pulse 64   Temp 36.5 °C (97.7 °F)   Ht 1.727 m (5' 8\")   Wt 95.8 kg (211 lb 3.2 oz)   SpO2 98%   BMI 32.11 kg/m²     Physical Exam  Vitals reviewed.   Constitutional:       Appearance: Normal appearance.   Cardiovascular:      Rate and Rhythm: Normal rate. Rhythm irregularly irregular.      Heart sounds: Normal heart sounds. No murmur heard.  Pulmonary:      Breath sounds: Normal breath sounds.   Abdominal:      General: Abdomen is flat. Bowel sounds are normal.      Palpations: Abdomen is soft.   Musculoskeletal:         General: No swelling.   Neurological:      " Mental Status: He is alert.         The complexity of medical decision making for this patient's transitional care is moderate.    Assessment/Plan   Diagnoses and all orders for this visit:  Hospital discharge follow-up  Longstanding persistent atrial fibrillation (Multi)    Seeing eps, likely going for an ablation  Cont meds  Ed report reviewed and discussed with pt

## 2024-10-22 NOTE — PROGRESS NOTES
Emergency room him Subjective   Patient ID: Pro Belle Jr. is a 74 y.o. male who presents for No chief complaint on file..    HPI     Review of Systems    Objective   There were no vitals taken for this visit.    Physical Exam    Assessment/Plan

## 2024-10-23 LAB
Q ONSET: 225 MS
QRS COUNT: 14 BEATS
QRS DURATION: 130 MS
QT INTERVAL: 372 MS
QTC CALCULATION(BAZETT): 455 MS
QTC FREDERICIA: 425 MS
R AXIS: -3 DEGREES
T AXIS: -9 DEGREES
T OFFSET: 411 MS
VENTRICULAR RATE: 90 BPM

## 2024-10-24 ENCOUNTER — OFFICE VISIT (OUTPATIENT)
Dept: CARDIOLOGY | Facility: CLINIC | Age: 74
End: 2024-10-24
Payer: MEDICARE

## 2024-10-24 ENCOUNTER — PATIENT OUTREACH (OUTPATIENT)
Dept: PRIMARY CARE | Facility: CLINIC | Age: 74
End: 2024-10-24
Payer: MEDICARE

## 2024-10-24 VITALS
DIASTOLIC BLOOD PRESSURE: 93 MMHG | OXYGEN SATURATION: 97 % | WEIGHT: 212 LBS | SYSTOLIC BLOOD PRESSURE: 123 MMHG | BODY MASS INDEX: 32.13 KG/M2 | HEIGHT: 68 IN | HEART RATE: 100 BPM

## 2024-10-24 DIAGNOSIS — I48.11 LONGSTANDING PERSISTENT ATRIAL FIBRILLATION (MULTI): Primary | ICD-10-CM

## 2024-10-24 DIAGNOSIS — I48.0 PAF (PAROXYSMAL ATRIAL FIBRILLATION) (MULTI): ICD-10-CM

## 2024-10-24 PROCEDURE — 99214 OFFICE O/P EST MOD 30 MIN: CPT | Performed by: NURSE PRACTITIONER

## 2024-10-24 PROCEDURE — 3080F DIAST BP >= 90 MM HG: CPT | Performed by: NURSE PRACTITIONER

## 2024-10-24 PROCEDURE — 1036F TOBACCO NON-USER: CPT | Performed by: NURSE PRACTITIONER

## 2024-10-24 PROCEDURE — 1159F MED LIST DOCD IN RCRD: CPT | Performed by: NURSE PRACTITIONER

## 2024-10-24 PROCEDURE — 1123F ACP DISCUSS/DSCN MKR DOCD: CPT | Performed by: NURSE PRACTITIONER

## 2024-10-24 PROCEDURE — 3074F SYST BP LT 130 MM HG: CPT | Performed by: NURSE PRACTITIONER

## 2024-10-24 ASSESSMENT — ENCOUNTER SYMPTOMS
DEPRESSION: 0
RESPIRATORY NEGATIVE: 1
CARDIOVASCULAR NEGATIVE: 1
GASTROINTESTINAL NEGATIVE: 1
NEUROLOGICAL NEGATIVE: 1
EYES NEGATIVE: 1

## 2024-10-24 NOTE — PROGRESS NOTES
"Chief Complaint:   Follow-up and Atrial Fibrillation    History Of Present Illness:    .Mr. Belle presents to clinic for a follow-up visit. Last week he was alerted on his apple watch that he was in afib so he went to the ED and was discharged the following day, as his rate was controlled and he was on the appropriate medications. He denies feeling symptomatic prior going to the ED, he was just concerned about being in afib for an extended period of time. He states in the past he usually went back into sinus rhythm in about 12-18 hours. Today, he admits to feeling slightly fatigued but otherwise is feeling well. He denies chest pain, SOB, palpitations, dizziness, and leg swelling.         Last Recorded Vitals:  Blood pressure (!) 123/93, pulse 100, height 1.727 m (5' 8\"), weight 96.2 kg (212 lb), SpO2 97%.     Past Medical History:  Past Medical History:   Diagnosis Date    A-fib (Multi)     Sleep apnea         Past Surgical History:  Past Surgical History:   Procedure Laterality Date    ABLATION OF DYSRHYTHMIC FOCUS      BACK SURGERY  02/14/2024    l4-5 fusion    CORONARY STENT PLACEMENT      EYE SURGERY      HERNIA REPAIR  06/20/2014    Inguinal Hernia Repair    JOINT REPLACEMENT Bilateral     hip    JOINT REPLACEMENT Left     knee and shoulder    OTHER SURGICAL HISTORY  10/06/2022    Cystoscopy    SPINE SURGERY      VASECTOMY         Social History:  Social History     Socioeconomic History    Marital status:    Tobacco Use    Smoking status: Former     Current packs/day: 1.50     Average packs/day: 1.5 packs/day for 3.0 years (4.5 ttl pk-yrs)     Types: Cigarettes    Smokeless tobacco: Never   Vaping Use    Vaping status: Never Used   Substance and Sexual Activity    Alcohol use: Yes     Alcohol/week: 8.0 standard drinks of alcohol     Types: 4 Glasses of wine, 4 Shots of liquor per week    Drug use: Never    Sexual activity: Not Currently     Partners: Female     Birth control/protection: Male " Sterilization     Social Drivers of Health     Financial Resource Strain: Low Risk  (10/18/2024)    Overall Financial Resource Strain (CARDIA)     Difficulty of Paying Living Expenses: Not hard at all   Food Insecurity: No Food Insecurity (10/18/2024)    Hunger Vital Sign     Worried About Running Out of Food in the Last Year: Never true     Ran Out of Food in the Last Year: Never true   Transportation Needs: No Transportation Needs (10/18/2024)    PRAPARE - Transportation     Lack of Transportation (Medical): No     Lack of Transportation (Non-Medical): No   Physical Activity: Inactive (10/18/2024)    Exercise Vital Sign     Days of Exercise per Week: 0 days     Minutes of Exercise per Session: 0 min   Stress: No Stress Concern Present (5/8/2024)    [employer] - Occupational Stress Questionnaire     Feeling of Stress : Not at all   Social Connections: Moderately Integrated (5/8/2024)    Social Connection and Isolation Panel [NHANES]     Frequency of Communication with Friends and Family: More than three times a week     Frequency of Social Gatherings with Friends and Family: Three times a week     Attends Mosque Services: 1 to 4 times per year     Active Member of Clubs or Organizations: No     Attends Club or Organization Meetings: Never     Marital Status:    Intimate Partner Violence: Not At Risk (10/18/2024)    Humiliation, Afraid, Rape, and Kick questionnaire     Fear of Current or Ex-Partner: No     Emotionally Abused: No     Physically Abused: No     Sexually Abused: No   Housing Stability: Low Risk  (10/18/2024)    Housing Stability Vital Sign     Unable to Pay for Housing in the Last Year: No     Number of Times Moved in the Last Year: 0     Homeless in the Last Year: No       Family History:  Family History   Problem Relation Name Age of Onset    Cancer Mother Selena     Alcohol abuse Mother Selena     Heart disease Father Pro Sr.     Arthritis Father Pro Sr.      Heart attack Father Pro Van.     Cancer Sister      Coronary artery disease Brother           Allergies:  Patient has no known allergies.    Outpatient Medications:  Current Outpatient Medications   Medication Sig Dispense Refill    acetaminophen (Tylenol 8 HOUR) 650 mg ER tablet Take 1 tablet (650 mg) by mouth every 8 hours if needed for mild pain (1 - 3). Do not crush, chew, or split.      amLODIPine (Norvasc) 5 mg tablet Take 1 tablet (5 mg) by mouth once daily. 30 tablet 11    aspirin 81 mg chewable tablet Chew 1 tablet (81 mg) once every 24 hours.      atorvastatin (Lipitor) 80 mg tablet TAKE 1 TABLET BY MOUTH AT  BEDTIME 90 tablet 3    cholecalciferol (Vitamin D-3) 50 MCG (2000 UT) tablet Take 1 tablet (2,000 Units) by mouth once daily.      cyanocobalamin (Vitamin B-12) 1,000 mcg tablet Take 1 tablet (1,000 mcg) by mouth once daily.      ezetimibe (Zetia) 10 mg tablet TAKE 1 TABLET BY MOUTH AT  BEDTIME 90 tablet 3    folic acid (Folvite) 1 mg tablet Take 1 tablet (1 mg) by mouth once every 24 hours.      gabapentin (Neurontin) 300 mg capsule Take 2 capsules (600 mg) by mouth 2 times a day. 180 capsule 1    lisinopril 40 mg tablet TAKE 1 TABLET BY MOUTH ONCE DAILY 90 tablet 3    metoprolol succinate XL (Toprol XL) 25 mg 24 hr tablet Take 2 tablets (50 mg) by mouth once daily. Do not crush or chew.      omeprazole (PriLOSEC) 20 mg DR capsule Take 1 capsule (20 mg) by mouth once daily. 90 capsule 1    rivaroxaban (Xarelto) 20 mg tablet Take 1 tablet (20 mg) by mouth once daily. 90 tablet 3    tamsulosin (Flomax) 0.4 mg 24 hr capsule Take 2 capsules (0.8 mg) by mouth once daily. 180 capsule 3    tofacitinib ER (Xeljanz XR) 11 mg tablet extended release 24 hr Take 1 tablet (11 mg) by mouth once daily. Do not crush, chew or split. Swallow whole.      traMADol (Ultram) 50 mg tablet Take 1 tablet (50 mg) by mouth 3 times a day.      HYDROcodone-acetaminophen (Norco) 5-325 mg tablet Take 1 tablet by mouth every 8  hours if needed for pain. (Patient not taking: Reported on 10/24/2024)       No current facility-administered medications for this visit.        Physical Exam:  Cardiovascular:      PMI at left midclavicular line. Normal rate. Irregularly irregular rhythm. Normal S1. Normal S2.       Murmurs: There is no murmur.      No gallop.  No click. No rub.   Pulses:     Intact distal pulses.   Edema:     Peripheral edema absent.         ROS:  Review of Systems   Constitutional: Positive for malaise/fatigue.   HENT: Negative.     Eyes: Negative.    Cardiovascular: Negative.    Respiratory: Negative.     Gastrointestinal: Negative.    Genitourinary: Negative.    Neurological: Negative.           Last Labs:  CBC -  Lab Results   Component Value Date    WBC 9.2 10/19/2024    HGB 13.4 (L) 10/19/2024    HCT 39.9 (L) 10/19/2024    MCV 99 10/19/2024     10/19/2024       CMP -  Lab Results   Component Value Date    CALCIUM 9.8 10/19/2024    PHOS 2.1 (L) 05/25/2022    PROT 6.0 (L) 10/19/2024    ALBUMIN 3.6 10/19/2024    AST 21 10/19/2024    ALT 19 10/19/2024    ALKPHOS 82 10/19/2024    BILITOT 0.6 10/19/2024       LIPID PANEL -   Lab Results   Component Value Date    CHOL 140 03/27/2024    TRIG 59 03/27/2024    HDL 74.9 03/27/2024    CHHDL 1.9 03/27/2024    LDLF 57 04/11/2023    VLDL 12 03/27/2024    NHDL 65 03/27/2024       RENAL FUNCTION PANEL -   Lab Results   Component Value Date    GLUCOSE 93 10/19/2024     10/19/2024    K 3.6 10/19/2024     10/19/2024    CO2 26 10/19/2024    ANIONGAP 11 10/19/2024    BUN 25 (H) 10/19/2024    CREATININE 0.88 10/19/2024    GFRMALE 90 08/16/2023    CALCIUM 9.8 10/19/2024    PHOS 2.1 (L) 05/25/2022    ALBUMIN 3.6 10/19/2024        Lab Results   Component Value Date    HGBA1C 5.3 10/19/2024         Assessment/Plan     1. Coronary artery disease, status post PCI to the distal RCA 09/10/2019. This patient originally was identified as having low-grade CAD by cardiac catheterization  3/2003 [facility]. He did have a nuclear stress test performed in 1/2009 and again on 11/13/2015 negative for evidence of ischemia. He presented to [facility] system 9/9/ 2019 with central chest discomfort and shortness of breath. It was detected that he was in atrial fibrillation with RVR. He had EKG and enzymatic evidence of a NSTEMI. He had cardiac cath September 10, 2019 with Dr. Mynor Hurst which showed single-vessel CAD in the distal third of the distal right coronary artery about 90% stenosis and distal to the origin of the PDA but prior to the origin of the trifurcating post inferior lateral LV branch. He had successful distal RCA stenting September 10, 2019 with Dr. Aniceto Richardson at [facility]. Continue aspirin 81 mg daily.     2. Paroxysmal atrial fibrillation. Please see previous office notes for review. This patient was recently admitted to [facility] from 04/15/2018 to 04/18/2018 with pleuritic-type chest pain along with cough and shortness of breath. The patient was identified by chest x-ray to have a left lower lobe infiltrate suggestive of pneumonia associated with pleuritis. He was also detected to have new onset or recurrent atrial fibrillation with a rapid ventricular rate. The patient was initially placed on an IV Cardizem infusion but ultimately was converted to metoprolol. The patient's ventricular rate remained rapid despite the initiation of metoprolol and as such amiodarone 200 mg daily was added. At the time of discharge on 04/18/2018 he was still in atrial fibrillation with a ventricular rate in the range of 110/m. He did have an echocardiogram performed during the admission on 04/16/2018 that demonstrated an LV ejection fraction of 60-64% with mild left atrial enlargement and trace to mild mitral valve regurgitation. At the time of his next follow-up visit it was found that by both EKG and examination he was back in sinus rhythm with sinus bradycardia.  For now he will remain on the amiodarone 200 mg daily plus without will reduce metoprolol by switching metoprolol tartrate 50 mg twice a day to metoprolol succinate 50 mg daily. He will remain on Xarelto 20 mg daily. He presented to [facility] system September 9, 2019 with central chest discomfort and shortness of breath. It was detected that he was in atrial fibrillation with RVR. He converted to sinus rhythm with IV diltiazem infusion. Echocardiogram done at the time showed EF 60-64%, left atrial size mildly dilated, trace MR, trivial to mild TR. He is now back on on amiodarone 200 mg daily as well as Xarelto 20 mg daily which had been discontinued prior to the readmission. Had afib with RVR and failed DCCV. He saw Dr Nassar and had RFA 10/2/2020. ECG today shows likely aflutter. Will plan for DCCV next week. See Dr Nassar in follow up. Had DCCV 11/12/2020. Went out of rhythm again for a brief period 11/2021. Went out of rhythm for 4 days 01/2022. Patient had RFA done 05/24/2022 with Dr Nassar. ECG done prior shows NSR, RBBB.  The patient is no longer on amiodarone which was discontinued last visit.  He will remain on Xarelto anticoagulation.  He will be stopping 3 days before any type of back injection or lumbar surgery.  Patient will return in 8 weeks for follow-up.  The patient currently remains in sinus rhythm.  He underwent preadmission testing on 2/1/2024 for lumbar surgery EKG shows sinus bradycardia at 49/min occasional PVCs nonspecific ST-T abnormality.  Will reduce the dose of Toprol-XL from 100 mg daily to 50 mg daily.  The patient was recently admitted to [facility] from 5/8/2024 - 5/9/2024 with recurrent atrial fibrillation that converted quickly to sinus rhythm with IV diltiazem.  His metoprolol was increased from 50 mg twice daily to 75 mg twice daily.  He remains on Xarelto anticoagulation.  His high-sensitivity troponins were 126, 163, 218.  EKG today confirms maintenance of sinus  bradycardia 56/min occasional PVCs right bundle branch block conduction delay.  Patient scheduled to see electrophysiology for follow-up on 6/12/2024. At most recent EP visit it was decided to hold off on ablation at this time and the patient would continue to monitor for worsening symptoms. On 10/19 patient went to the ED for concerns of being back in afib, but was discharged as his rate was controlled and he was already on metoprolol and Xarelto. Today, the patient expresses concerns about being in afib for an extended period of time, as he usually goes back in sinus rhythm after 12-18 hours. The patient is scheduled to see EP next Wednesday. He was educated on the incidence of persistent afib and he was reassured that he is on the appropriate medications. Metoprolol was increased to 100 mg daily as his pulse was 100 in office.     3 Xarelto anticoagulation. Patient had spine injections scheduled for tomorrow but canceled appointment due to the need for interrupted anticoagulation if he is a candidate for another ablation.     4. Hypertension. Blood pressure is adequately controlled today. He will be continued on amlodipine 5 mg daily and lisinopril 20 mg daily. His dose of metoprolol was increased to 100 mg daily due to pulse of 100 at today's visit.     5. Hyperlipidemia. The patient did have lab work performed on 10/08/2019 including a lipid panel with cholesterol 218  HDL 61 triglyceride 154. These results are continue to be disappointing on his statin agent atorvastatin 80 mg daily. He will be started on supplemental Zetia 10 mg daily. 5/10/2022, Chol 141, LDL 67, HDL 60, trig 70. Most recent lipid panel on 3/24/2024 chol 140, HDL 74.9, LDL 53, trig 59.      6. Sleep apnea. Please see previous office notes. The patient was referred to sleep medicine and was in fact identified as having sleep apnea that was severe. The patient's initial sleep study was done at [facility] on 01/04/2018 and  showed severe obstructive sleep apnea and he ultimately had a titration study on 02/22/2018. He was placed on ASV therapy with significant clinical improvement. His compliance has been 74% with no leak.     7. BPH.      8. GERD.     9. Bilateral carpal tunnel release     10. S/P Left hip surgery 2007, right 2021. Plans for LTK 03/2023.     11. S/P Right inguinal hernia repair 4/2014, [facility].     12. Osteoarthritis.      13. Status post left shoulder replacement 11/10/2015 with repeat left shoulder replacement 02/22/2016 because of repetitive dislocation.     14. Lumbar Laminectomy, 10/01/2018 with Dr Cardenas at [facility].  Patient recently has been experiencing and further lower back pain.  His L4-L5 disc is shifted.  He is scheduled for an epidural injection on 12/22/2023 and possible surgery on 2/14/2024.  The patient is in fact scheduled for revision of an L4 laminectomy with excision of synovial cyst the right with robotic pedicle screw and interbody fusion 2/14/2024.  He is cleared from the cardiac standpoint for the procedure may hold Xarelto and aspirin 5 days preoperatively.  Lab work 8/16/2023 included sedimentation rate 4 CRP less than 0.10 hematocrit 36.0 uric acid 5.7 and normal electrolyte panel.  14 a.  Status post repeat lumbar surgery 2/14/2024 at [facility].  Patient is starting therapy for his back and lower extremities within the next week.     15. Prostate Urolift surgery with Dr Werner, fall 2018.     16. Gen. medical. The patient had lab work on 10/08/2019 including glycohemoglobin 5.2%, TSH 1.38, PSA 1.6 with both CBC and SMA panels being normal.     17. Venous insufficiency.      18. Hx of psoriatic arthritis     19. Hx of covid-19 vaccine #1 and #2.      20. Planned LTK.     Follow-up in 6 weeks.    Mildred Valencia, APRN-CNP

## 2024-10-25 ENCOUNTER — PATIENT OUTREACH (OUTPATIENT)
Dept: PRIMARY CARE | Facility: CLINIC | Age: 74
End: 2024-10-25
Payer: MEDICARE

## 2024-10-25 DIAGNOSIS — I10 BENIGN ESSENTIAL HYPERTENSION: ICD-10-CM

## 2024-10-25 DIAGNOSIS — M06.9 RHEUMATOID ARTHRITIS, INVOLVING UNSPECIFIED SITE, UNSPECIFIED WHETHER RHEUMATOID FACTOR PRESENT (MULTI): ICD-10-CM

## 2024-10-25 NOTE — PROGRESS NOTES
Chart review completed. CCM call skipped for the month of October, as the patient is a TCM patient currently.

## 2024-10-29 ENCOUNTER — APPOINTMENT (OUTPATIENT)
Dept: PRIMARY CARE | Facility: CLINIC | Age: 74
End: 2024-10-29
Payer: MEDICARE

## 2024-10-30 ENCOUNTER — OFFICE VISIT (OUTPATIENT)
Dept: CARDIOLOGY | Facility: CLINIC | Age: 74
End: 2024-10-30
Payer: MEDICARE

## 2024-10-30 VITALS
DIASTOLIC BLOOD PRESSURE: 81 MMHG | WEIGHT: 213 LBS | HEIGHT: 68 IN | HEART RATE: 103 BPM | BODY MASS INDEX: 32.28 KG/M2 | SYSTOLIC BLOOD PRESSURE: 122 MMHG | OXYGEN SATURATION: 97 %

## 2024-10-30 DIAGNOSIS — I48.0 PAF (PAROXYSMAL ATRIAL FIBRILLATION) (MULTI): Primary | ICD-10-CM

## 2024-10-30 PROCEDURE — 1159F MED LIST DOCD IN RCRD: CPT | Performed by: NURSE PRACTITIONER

## 2024-10-30 PROCEDURE — 1160F RVW MEDS BY RX/DR IN RCRD: CPT | Performed by: NURSE PRACTITIONER

## 2024-10-30 PROCEDURE — 3074F SYST BP LT 130 MM HG: CPT | Performed by: NURSE PRACTITIONER

## 2024-10-30 PROCEDURE — 1036F TOBACCO NON-USER: CPT | Performed by: NURSE PRACTITIONER

## 2024-10-30 PROCEDURE — 99214 OFFICE O/P EST MOD 30 MIN: CPT | Performed by: NURSE PRACTITIONER

## 2024-10-30 PROCEDURE — 1123F ACP DISCUSS/DSCN MKR DOCD: CPT | Performed by: NURSE PRACTITIONER

## 2024-10-30 PROCEDURE — 3079F DIAST BP 80-89 MM HG: CPT | Performed by: NURSE PRACTITIONER

## 2024-10-30 ASSESSMENT — ENCOUNTER SYMPTOMS: DEPRESSION: 0

## 2024-11-05 ASSESSMENT — ENCOUNTER SYMPTOMS
COUGH: 0
IRREGULAR HEARTBEAT: 1
DYSPNEA ON EXERTION: 1
FALLS: 0
DIARRHEA: 0
NAUSEA: 0
FEVER: 0
SYNCOPE: 0
ABDOMINAL PAIN: 0
ORTHOPNEA: 0
DIAPHORESIS: 0
VOMITING: 0
HEADACHES: 0
WEAKNESS: 0
SHORTNESS OF BREATH: 1
NEAR-SYNCOPE: 0
PND: 0
PALPITATIONS: 0
LIGHT-HEADEDNESS: 0
MYALGIAS: 0
SPUTUM PRODUCTION: 0
SNORING: 0
SORE THROAT: 0
HEMOPTYSIS: 0
DIZZINESS: 0
BLURRED VISION: 0
DOUBLE VISION: 0

## 2024-11-05 NOTE — PROGRESS NOTES
Subjective   Pro Belle Jr. is a 74 y.o. male.    Chief Complaint:  Follow-up and Atrial Fibrillation    Pro Belle is a 74 year old with:      1. Coronary artery disease, status post PCI to the distal RCA 09/10/2019.   2. Persistent atrial fibrillation. Index diagnosis at [facility] from 04/15/2018 to 04/18/2018. Discharged with AF on Amiodarone. At the time of his next follow-up visit as an outpatient he was in sinus rhythm. He presented to [facility] system September 9, 2019 in atrial fibrillation with RVR. He converted to sinus rhythm with IV diltiazem infusion. He is on amiodarone 200 mg daily as well as Xarelto 20 mg daily. Recurred with AF. Had successful DCCV at [facility] 07/02/2020. Recurred with AF few days after.  3. Hypertension.  4. Hyperlipidemia.  5. Sleep apnea. The patient's initial sleep study was done at [facility] on 01/04/2018 and showed severe obstructive sleep apnea and he ultimately had a titration study on 02/22/2018. He was placed on ASV therapy with significant clinical improvement. His compliance has been 74% with no leak.  6. BPH.   7. GERD.  8. On 10/02/2020 he had EPS and PVI - his left atrial had some area of scar mainly in the roof (both during AF and NSR mapping) . Post PVI we induced right atrial tachycardia that terminated with RFA at the junction of the IVC and the right atria in the posterior wall. This was a focal tachycardia. He felt dramatically better in NSR. He recurred in November of 2020 with atrial flutter and was cardioverted by Dr Hurst. He did very well but 07/12/2021 presented at [facility] with what sounds to be AF. The episode lasted less than 24 hours and he reverted back into NSR on his own.    Echocardiogram 09/2019 shows EF 60-64%, left atrial size mildly dilated, trace MR, trivial to mild TR     s/p redo PVI and CTI RFA with Dr. Nassar 5/24/2022  Patient unfortunately had a right groin hematoma post procedure that required an  "overnight stay post ablation  ECG 8/24/2022 SB HR 54 bpm, RBBB, left axis deviation QTc 488 ms  ECG 8/23/2023 NSR with occasional PVCs, RBBB, QTc 489 ms    Patient unfortunately had a recurrence of his Afib 5/9/2024.  He went to the ED and he was discharged home on increased metoprolol. ECG 1 week later showed NSR, patient states that he was in Afib a little more than 24 hours.  He denies any symptoms other than some chest tightness and palpitations. He has felt good over the last month.    ECG 6/12/2024 SB with PVCs, RBBB, HR 58 bpm,  ms  ECG 10/9/2024 SB with RBBB/LVH, HR 52 bpm,  ms We decreased his metoprolol due to dizziness.   ECG 10/18/2024 Afib HR 90 bpm    TODAY Patient presents today for follow up Afib. Since our last appointment, he unfortunately went back into Afib.  He is having a lot of fatigue, ARELLANO, and loss of  activity tolerance.      /81 (BP Location: Right arm, Patient Position: Sitting, BP Cuff Size: Large adult)   Pulse 103   Ht 1.727 m (5' 8\")   Wt 96.6 kg (213 lb)   SpO2 97%   BMI 32.39 kg/m²   Current Outpatient Medications on File Prior to Visit   Medication Sig Dispense Refill    acetaminophen (Tylenol 8 HOUR) 650 mg ER tablet Take 1 tablet (650 mg) by mouth every 8 hours if needed for mild pain (1 - 3). Do not crush, chew, or split.      amLODIPine (Norvasc) 5 mg tablet Take 1 tablet (5 mg) by mouth once daily. 30 tablet 11    aspirin 81 mg chewable tablet Chew 1 tablet (81 mg) once every 24 hours.      atorvastatin (Lipitor) 80 mg tablet TAKE 1 TABLET BY MOUTH AT  BEDTIME 90 tablet 3    cholecalciferol (Vitamin D-3) 50 MCG (2000 UT) tablet Take 1 tablet (2,000 Units) by mouth once daily.      cyanocobalamin (Vitamin B-12) 1,000 mcg tablet Take 1 tablet (1,000 mcg) by mouth once daily.      ezetimibe (Zetia) 10 mg tablet TAKE 1 TABLET BY MOUTH AT  BEDTIME 90 tablet 3    folic acid (Folvite) 1 mg tablet Take 1 tablet (1 mg) by mouth once every 24 hours.      " gabapentin (Neurontin) 300 mg capsule Take 2 capsules (600 mg) by mouth 2 times a day. 180 capsule 1    lisinopril 40 mg tablet TAKE 1 TABLET BY MOUTH ONCE DAILY 90 tablet 3    metoprolol succinate XL (Toprol XL) 25 mg 24 hr tablet Take 2 tablets (50 mg) by mouth once daily. Do not crush or chew.      omeprazole (PriLOSEC) 20 mg DR capsule Take 1 capsule (20 mg) by mouth once daily. 90 capsule 1    rivaroxaban (Xarelto) 20 mg tablet Take 1 tablet (20 mg) by mouth once daily. 90 tablet 3    tamsulosin (Flomax) 0.4 mg 24 hr capsule Take 2 capsules (0.8 mg) by mouth once daily. 180 capsule 3    tofacitinib ER (Xeljanz XR) 11 mg tablet extended release 24 hr Take 1 tablet (11 mg) by mouth once daily. Do not crush, chew or split. Swallow whole.      traMADol (Ultram) 50 mg tablet Take 1 tablet (50 mg) by mouth 3 times a day.      HYDROcodone-acetaminophen (Norco) 5-325 mg tablet Take 1 tablet by mouth every 8 hours if needed for pain. (Patient not taking: Reported on 10/30/2024)       No current facility-administered medications on file prior to visit.         Review of Systems   Constitutional: Positive for malaise/fatigue. Negative for diaphoresis and fever.   HENT:  Negative for congestion and sore throat.    Eyes:  Negative for blurred vision and double vision.   Cardiovascular:  Positive for dyspnea on exertion and irregular heartbeat. Negative for chest pain, leg swelling, near-syncope, orthopnea, palpitations, paroxysmal nocturnal dyspnea and syncope.   Respiratory:  Positive for shortness of breath. Negative for cough, hemoptysis, snoring and sputum production.    Hematologic/Lymphatic: Negative for bleeding problem.   Skin:  Negative for rash.   Musculoskeletal:  Negative for falls, joint pain and myalgias.   Gastrointestinal:  Negative for abdominal pain, diarrhea, nausea and vomiting.   Neurological:  Negative for dizziness, headaches, light-headedness and weakness.   All other systems reviewed and are  negative.      Objective   Constitutional:       Appearance: Healthy appearance. Not in distress.   Eyes:      Conjunctiva/sclera: Conjunctivae normal.   HENT:      Nose: Nose normal.    Mouth/Throat:      Pharynx: Oropharynx is clear.   Pulmonary:      Effort: Pulmonary effort is normal.      Breath sounds: Normal breath sounds. No wheezing. No rhonchi.   Chest:      Chest wall: Not tender to palpatation.   Cardiovascular:      Normal rate. Irregularly irregular rhythm.      Murmurs: There is no murmur.      No rub.   Pulses:     Intact distal pulses.   Edema:     Ankle: bilateral trace edema of the ankle.     Feet: bilateral trace edema of the feet.  Abdominal:      General: Bowel sounds are normal.      Palpations: Abdomen is soft.   Musculoskeletal: Normal range of motion.      Cervical back: Neck supple. Skin:     General: Skin is warm and dry.   Neurological:      Mental Status: Alert and oriented to person, place and time.      Motor: Motor function is intact.         Lab Review:   Lab Results   Component Value Date     10/19/2024    K 3.6 10/19/2024     10/19/2024    CO2 26 10/19/2024    BUN 25 (H) 10/19/2024    CREATININE 0.88 10/19/2024    GLUCOSE 93 10/19/2024    CALCIUM 9.8 10/19/2024     Lab Results   Component Value Date    WBC 9.2 10/19/2024    HGB 13.4 (L) 10/19/2024    HCT 39.9 (L) 10/19/2024    MCV 99 10/19/2024     10/19/2024       Assessment/Plan   There were no encounter diagnoses.  Patient presents for follow up after recurrent episode of Afib.      I reviewed the case with Dr. Nassar, and we will pursue Dofetilide initiation.  Patient is agreeable and will call his insurance to make sure it is covered.  He understands that it's a 3-4 day admission at [facility]

## 2024-11-07 ENCOUNTER — TELEPHONE (OUTPATIENT)
Dept: CARDIOLOGY | Facility: HOSPITAL | Age: 74
End: 2024-11-07
Payer: MEDICARE

## 2024-11-07 PROBLEM — Z79.899 ENCOUNTER FOR MONITORING DOFETILIDE THERAPY: Status: ACTIVE | Noted: 2024-11-07

## 2024-11-07 PROBLEM — Z51.81 ENCOUNTER FOR MONITORING DOFETILIDE THERAPY: Status: ACTIVE | Noted: 2024-11-07

## 2024-11-07 NOTE — HOSPITAL COURSE
73yo M with HTN, HLD, REGINALDO on CPAP, 1v obstructive CAD, NSTEMI s/p PCI dRCA 9/2019 (on ASA 81), normal EF by Echo 11/19/24, cRBBB, atrial arrhythmias (AT, AFL, AF) s/p PVI + right AT ablation 10/2020, redo PVI + typical AFL ablation 5/2022 now with recurrent symptomatic persistent AF on Xarelto who presented for Tikosyn initiation. Admission CrCl 83ml/min thus Tikosyn 500mcg q12h started 11/20 PM after K 3.5 repletion.

## 2024-11-07 NOTE — TELEPHONE ENCOUNTER
PHONE COMMUNICATION:    Pt is for elective Tikosyn initiation admission per Dr Nassar for AF. Records reviewed. Spoke to pt today. Explained to pt the need for minimum 3 night inpatient stay for tele/QTc monitoring, and that there is a rare risk of Torsades during Tikosyn initiation.  Pt is aware DCCV may be required during this admission if doesn't convert pharmacologically and that a ride home from hospital is necessary due to potential for DCCV with use of conscious sedation.    OAC is Xarelto; when asked, pt states he was off Xarelto for 2 days (10/15-10/16; resumed it 10/17) when he was to have a back injection. States injection was canceled for AF. Pt was advised on need for 30 days of uninterrupted Xarelto prior to Tikosyn admission. Pt is agreeable to Tikosyn admission on Wed 11/20/24 and was advised that admit date is dependent on bed availability at [facility]. Pt is aware I will call him mid-morning on 11/20 with bed status update.     Pt has not yet called his insurance company to assure generic dofetilide co-pay is affordable but states she will do that soon and agrees to inform us either way. Pt is aware to inform us of any missed Xarelto doses going forward as this could affect admission date.     Pt was not at home today thus we will go over home meds another time prior to admit. Pt is aware to call Dr Nassar's office if any concerns or changes in status in the interim.     30 minute phone call with pt today 11/7    TOM Ball, PAANTHONY, [facility]  Inpatient Cardiac Electrophysiology

## 2024-11-11 DIAGNOSIS — E78.5 HYPERLIPIDEMIA, UNSPECIFIED HYPERLIPIDEMIA TYPE: ICD-10-CM

## 2024-11-11 DIAGNOSIS — I10 PRIMARY HYPERTENSION: ICD-10-CM

## 2024-11-11 RX ORDER — ATORVASTATIN CALCIUM 80 MG/1
80 TABLET, FILM COATED ORAL NIGHTLY
Qty: 90 TABLET | Refills: 3 | Status: SHIPPED | OUTPATIENT
Start: 2024-11-11

## 2024-11-11 RX ORDER — LISINOPRIL 40 MG/1
40 TABLET ORAL DAILY
Qty: 90 TABLET | Refills: 3 | Status: SHIPPED | OUTPATIENT
Start: 2024-11-11

## 2024-11-12 ENCOUNTER — TELEPHONE (OUTPATIENT)
Dept: CARDIOLOGY | Facility: HOSPITAL | Age: 74
End: 2024-11-12
Payer: MEDICARE

## 2024-11-12 RX ORDER — ARMODAFINIL 250 MG/1
250 TABLET ORAL EVERY MORNING
COMMUNITY

## 2024-11-12 RX ORDER — DOCUSATE SODIUM 100 MG/1
100 CAPSULE, LIQUID FILLED ORAL DAILY
COMMUNITY

## 2024-11-12 RX ORDER — GABAPENTIN 600 MG/1
600 TABLET ORAL 2 TIMES DAILY
COMMUNITY

## 2024-11-12 NOTE — TELEPHONE ENCOUNTER
PHONE COMMUNICATION:     Spoke to pt again today to do med rec prior to planned Tikosyn admission Wed 11/20/24 (see also phone note from 11/7/24). Pt states he called insurance company and states generic dofetilide if affordable for him (though he will look into [facility] for potentially less cost). Pt was reminded to bring CPAP equipment for admission.    Pt was reminded to continue Xarelto daily with a meal and agrees to inform us of any missed doses going forward, prior to admission as this could affect admit date. Pt is aware to call Dr Nassar's office if concerns or changes in status in the interim.    Current meds were reviewed w/ pt via phone today 11/12/24: new med is armodafinil for apparent narcolepsy; per Lexicomp and MicroMedex there are no interactions with this + Tikosyn. Of the other meds, there are no absolute contraindications with Tikosyn.    15 minute phone call w/ pt today 11/12/24.    TOM Ball, PA-C, [facility]  Inpatient Cardiac Electrophysiology  Personal pager: [phone] (Tues-Fri)

## 2024-11-19 ENCOUNTER — HOSPITAL ENCOUNTER (OUTPATIENT)
Dept: CARDIOLOGY | Facility: HOSPITAL | Age: 74
Discharge: HOME | End: 2024-11-19
Payer: MEDICARE

## 2024-11-19 DIAGNOSIS — I48.11 LONGSTANDING PERSISTENT ATRIAL FIBRILLATION (MULTI): ICD-10-CM

## 2024-11-19 LAB
AORTIC VALVE MEAN GRADIENT: 3 MMHG
AORTIC VALVE PEAK VELOCITY: 1.08 M/S
AV PEAK GRADIENT: 5 MMHG
AVA (PEAK VEL): 2.69 CM2
AVA (VTI): 3.03 CM2
EJECTION FRACTION APICAL 4 CHAMBER: 53.2
EJECTION FRACTION: 55 %
LEFT ATRIUM VOLUME AREA LENGTH INDEX BSA: 43.9 ML/M2
LEFT VENTRICLE INTERNAL DIMENSION DIASTOLE: 5.26 CM (ref 3.5–6)
LEFT VENTRICULAR OUTFLOW TRACT DIAMETER: 2 CM
RIGHT VENTRICLE FREE WALL PEAK S': 12.2 CM/S
RIGHT VENTRICLE PEAK SYSTOLIC PRESSURE: 37 MMHG
TRICUSPID ANNULAR PLANE SYSTOLIC EXCURSION: 1.6 CM

## 2024-11-19 PROCEDURE — 2500000004 HC RX 250 GENERAL PHARMACY W/ HCPCS (ALT 636 FOR OP/ED): Performed by: INTERNAL MEDICINE

## 2024-11-19 PROCEDURE — C8929 TTE W OR WO FOL WCON,DOPPLER: HCPCS

## 2024-11-19 PROCEDURE — 93306 TTE W/DOPPLER COMPLETE: CPT | Performed by: INTERNAL MEDICINE

## 2024-11-20 ENCOUNTER — APPOINTMENT (OUTPATIENT)
Dept: CARDIOLOGY | Facility: HOSPITAL | Age: 74
DRG: 310 | End: 2024-11-20
Payer: MEDICARE

## 2024-11-20 ENCOUNTER — HOSPITAL ENCOUNTER (INPATIENT)
Facility: HOSPITAL | Age: 74
LOS: 4 days | Discharge: HOME | DRG: 310 | End: 2024-11-24
Attending: INTERNAL MEDICINE | Admitting: INTERNAL MEDICINE
Payer: MEDICARE

## 2024-11-20 DIAGNOSIS — Z51.81 ENCOUNTER FOR MONITORING DOFETILIDE THERAPY: Primary | ICD-10-CM

## 2024-11-20 DIAGNOSIS — I48.91 A-FIB (MULTI): ICD-10-CM

## 2024-11-20 DIAGNOSIS — I48.21 PERMANENT ATRIAL FIBRILLATION (MULTI): ICD-10-CM

## 2024-11-20 DIAGNOSIS — I48.91 ATRIAL FIBRILLATION (MULTI): ICD-10-CM

## 2024-11-20 DIAGNOSIS — Z79.899 ENCOUNTER FOR MONITORING DOFETILIDE THERAPY: Primary | ICD-10-CM

## 2024-11-20 DIAGNOSIS — I48.92 ATRIAL FIBRILLATION AND FLUTTER: ICD-10-CM

## 2024-11-20 DIAGNOSIS — I48.91 ATRIAL FIBRILLATION AND FLUTTER: ICD-10-CM

## 2024-11-20 LAB
ANION GAP SERPL CALC-SCNC: 15 MMOL/L (ref 10–20)
BUN SERPL-MCNC: 21 MG/DL (ref 6–23)
CALCIUM SERPL-MCNC: 10 MG/DL (ref 8.6–10.6)
CHLORIDE SERPL-SCNC: 103 MMOL/L (ref 98–107)
CO2 SERPL-SCNC: 25 MMOL/L (ref 21–32)
CREAT SERPL-MCNC: 0.86 MG/DL (ref 0.5–1.3)
EGFRCR SERPLBLD CKD-EPI 2021: 90 ML/MIN/1.73M*2
ERYTHROCYTE [DISTWIDTH] IN BLOOD BY AUTOMATED COUNT: 13.1 % (ref 11.5–14.5)
GLUCOSE SERPL-MCNC: 114 MG/DL (ref 74–99)
HCT VFR BLD AUTO: 41.2 % (ref 41–52)
HGB BLD-MCNC: 14 G/DL (ref 13.5–17.5)
MAGNESIUM SERPL-MCNC: 2.09 MG/DL (ref 1.6–2.4)
MCH RBC QN AUTO: 34.4 PG (ref 26–34)
MCHC RBC AUTO-ENTMCNC: 34 G/DL (ref 32–36)
MCV RBC AUTO: 101 FL (ref 80–100)
NRBC BLD-RTO: 0 /100 WBCS (ref 0–0)
PLATELET # BLD AUTO: 251 X10*3/UL (ref 150–450)
POTASSIUM SERPL-SCNC: 3.5 MMOL/L (ref 3.5–5.3)
RBC # BLD AUTO: 4.07 X10*6/UL (ref 4.5–5.9)
SODIUM SERPL-SCNC: 139 MMOL/L (ref 136–145)
WBC # BLD AUTO: 7.3 X10*3/UL (ref 4.4–11.3)

## 2024-11-20 PROCEDURE — 82374 ASSAY BLOOD CARBON DIOXIDE: CPT | Performed by: PHYSICIAN ASSISTANT

## 2024-11-20 PROCEDURE — 2500000002 HC RX 250 W HCPCS SELF ADMINISTERED DRUGS (ALT 637 FOR MEDICARE OP, ALT 636 FOR OP/ED): Performed by: STUDENT IN AN ORGANIZED HEALTH CARE EDUCATION/TRAINING PROGRAM

## 2024-11-20 PROCEDURE — 93005 ELECTROCARDIOGRAM TRACING: CPT

## 2024-11-20 PROCEDURE — 85027 COMPLETE CBC AUTOMATED: CPT | Performed by: PHYSICIAN ASSISTANT

## 2024-11-20 PROCEDURE — 93010 ELECTROCARDIOGRAM REPORT: CPT | Performed by: INTERNAL MEDICINE

## 2024-11-20 PROCEDURE — 83735 ASSAY OF MAGNESIUM: CPT | Performed by: PHYSICIAN ASSISTANT

## 2024-11-20 PROCEDURE — 1200000002 HC GENERAL ROOM WITH TELEMETRY DAILY

## 2024-11-20 PROCEDURE — 36415 COLL VENOUS BLD VENIPUNCTURE: CPT | Performed by: PHYSICIAN ASSISTANT

## 2024-11-20 PROCEDURE — 2500000001 HC RX 250 WO HCPCS SELF ADMINISTERED DRUGS (ALT 637 FOR MEDICARE OP): Performed by: STUDENT IN AN ORGANIZED HEALTH CARE EDUCATION/TRAINING PROGRAM

## 2024-11-20 RX ORDER — ARMODAFINIL 250 MG/1
250 TABLET ORAL EVERY MORNING
Status: DISCONTINUED | OUTPATIENT
Start: 2024-11-21 | End: 2024-11-24 | Stop reason: HOSPADM

## 2024-11-20 RX ORDER — ATORVASTATIN CALCIUM 80 MG/1
80 TABLET, FILM COATED ORAL NIGHTLY
Status: DISCONTINUED | OUTPATIENT
Start: 2024-11-20 | End: 2024-11-24 | Stop reason: HOSPADM

## 2024-11-20 RX ORDER — ASPIRIN 81 MG/1
81 TABLET ORAL DAILY
Status: DISCONTINUED | OUTPATIENT
Start: 2024-11-20 | End: 2024-11-24 | Stop reason: HOSPADM

## 2024-11-20 RX ORDER — TAMSULOSIN HYDROCHLORIDE 0.4 MG/1
0.8 CAPSULE ORAL NIGHTLY
Status: DISCONTINUED | OUTPATIENT
Start: 2024-11-20 | End: 2024-11-24 | Stop reason: HOSPADM

## 2024-11-20 RX ORDER — ACETAMINOPHEN 325 MG/1
650 TABLET ORAL EVERY 4 HOURS PRN
Status: DISCONTINUED | OUTPATIENT
Start: 2024-11-20 | End: 2024-11-20

## 2024-11-20 RX ORDER — POTASSIUM CHLORIDE 20 MEQ/1
20 TABLET, EXTENDED RELEASE ORAL DAILY
Status: DISCONTINUED | OUTPATIENT
Start: 2024-11-21 | End: 2024-11-24 | Stop reason: HOSPADM

## 2024-11-20 RX ORDER — POTASSIUM CHLORIDE 20 MEQ/1
40 TABLET, EXTENDED RELEASE ORAL ONCE
Status: COMPLETED | OUTPATIENT
Start: 2024-11-20 | End: 2024-11-20

## 2024-11-20 RX ORDER — DOFETILIDE 0.25 MG/1
500 CAPSULE ORAL EVERY 12 HOURS SCHEDULED
Status: COMPLETED | OUTPATIENT
Start: 2024-11-20 | End: 2024-11-20

## 2024-11-20 RX ORDER — POTASSIUM CHLORIDE 20 MEQ/1
40 TABLET, EXTENDED RELEASE ORAL
Status: DISPENSED | OUTPATIENT
Start: 2024-11-20 | End: 2024-11-21

## 2024-11-20 RX ORDER — CHOLECALCIFEROL (VITAMIN D3) 25 MCG
2000 TABLET ORAL DAILY
Status: DISCONTINUED | OUTPATIENT
Start: 2024-11-20 | End: 2024-11-24 | Stop reason: HOSPADM

## 2024-11-20 RX ORDER — LISINOPRIL 20 MG/1
40 TABLET ORAL DAILY
Status: DISCONTINUED | OUTPATIENT
Start: 2024-11-21 | End: 2024-11-24 | Stop reason: HOSPADM

## 2024-11-20 RX ORDER — LANOLIN ALCOHOL/MO/W.PET/CERES
1000 CREAM (GRAM) TOPICAL
Status: DISCONTINUED | OUTPATIENT
Start: 2024-11-21 | End: 2024-11-24 | Stop reason: HOSPADM

## 2024-11-20 RX ORDER — POLYETHYLENE GLYCOL 3350 17 G/17G
17 POWDER, FOR SOLUTION ORAL DAILY
Status: DISCONTINUED | OUTPATIENT
Start: 2024-11-20 | End: 2024-11-24 | Stop reason: HOSPADM

## 2024-11-20 RX ORDER — DOCUSATE SODIUM 100 MG/1
100 CAPSULE, LIQUID FILLED ORAL DAILY
Status: DISCONTINUED | OUTPATIENT
Start: 2024-11-20 | End: 2024-11-24 | Stop reason: HOSPADM

## 2024-11-20 RX ORDER — DOFETILIDE 0.25 MG/1
500 CAPSULE ORAL EVERY 12 HOURS SCHEDULED
Status: DISCONTINUED | OUTPATIENT
Start: 2024-11-21 | End: 2024-11-21

## 2024-11-20 RX ORDER — EZETIMIBE 10 MG/1
10 TABLET ORAL NIGHTLY
Status: DISCONTINUED | OUTPATIENT
Start: 2024-11-20 | End: 2024-11-24 | Stop reason: HOSPADM

## 2024-11-20 RX ORDER — PANTOPRAZOLE SODIUM 40 MG/1
40 TABLET, DELAYED RELEASE ORAL
Status: DISCONTINUED | OUTPATIENT
Start: 2024-11-21 | End: 2024-11-24 | Stop reason: HOSPADM

## 2024-11-20 RX ORDER — ACETAMINOPHEN 325 MG/1
650 TABLET ORAL EVERY 6 HOURS PRN
Status: DISCONTINUED | OUTPATIENT
Start: 2024-11-20 | End: 2024-11-24 | Stop reason: HOSPADM

## 2024-11-20 RX ORDER — GABAPENTIN 300 MG/1
600 CAPSULE ORAL 2 TIMES DAILY
Status: DISCONTINUED | OUTPATIENT
Start: 2024-11-20 | End: 2024-11-24 | Stop reason: HOSPADM

## 2024-11-20 RX ORDER — TRAMADOL HYDROCHLORIDE 50 MG/1
50 TABLET ORAL 3 TIMES DAILY PRN
Status: DISCONTINUED | OUTPATIENT
Start: 2024-11-20 | End: 2024-11-24 | Stop reason: HOSPADM

## 2024-11-20 RX ORDER — FOLIC ACID 1 MG/1
1 TABLET ORAL DAILY
Status: DISCONTINUED | OUTPATIENT
Start: 2024-11-20 | End: 2024-11-24 | Stop reason: HOSPADM

## 2024-11-20 RX ORDER — AMLODIPINE BESYLATE 5 MG/1
5 TABLET ORAL DAILY
Status: DISCONTINUED | OUTPATIENT
Start: 2024-11-21 | End: 2024-11-24 | Stop reason: HOSPADM

## 2024-11-20 RX ORDER — METOPROLOL SUCCINATE 100 MG/1
100 TABLET, EXTENDED RELEASE ORAL DAILY
Status: DISCONTINUED | OUTPATIENT
Start: 2024-11-20 | End: 2024-11-21

## 2024-11-20 SDOH — SOCIAL STABILITY: SOCIAL INSECURITY: HAS ANYONE EVER THREATENED TO HURT YOUR FAMILY OR YOUR PETS?: NO

## 2024-11-20 SDOH — SOCIAL STABILITY: SOCIAL INSECURITY: HAVE YOU HAD THOUGHTS OF HARMING ANYONE ELSE?: NO

## 2024-11-20 SDOH — SOCIAL STABILITY: SOCIAL INSECURITY: HAVE YOU HAD ANY THOUGHTS OF HARMING ANYONE ELSE?: NO

## 2024-11-20 SDOH — SOCIAL STABILITY: SOCIAL INSECURITY: WERE YOU ABLE TO COMPLETE ALL THE BEHAVIORAL HEALTH SCREENINGS?: YES

## 2024-11-20 SDOH — SOCIAL STABILITY: SOCIAL INSECURITY: DO YOU FEEL ANYONE HAS EXPLOITED OR TAKEN ADVANTAGE OF YOU FINANCIALLY OR OF YOUR PERSONAL PROPERTY?: NO

## 2024-11-20 SDOH — SOCIAL STABILITY: SOCIAL INSECURITY: ARE YOU OR HAVE YOU BEEN THREATENED OR ABUSED PHYSICALLY, EMOTIONALLY, OR SEXUALLY BY ANYONE?: NO

## 2024-11-20 SDOH — SOCIAL STABILITY: SOCIAL INSECURITY: ARE THERE ANY APPARENT SIGNS OF INJURIES/BEHAVIORS THAT COULD BE RELATED TO ABUSE/NEGLECT?: NO

## 2024-11-20 SDOH — SOCIAL STABILITY: SOCIAL INSECURITY: DOES ANYONE TRY TO KEEP YOU FROM HAVING/CONTACTING OTHER FRIENDS OR DOING THINGS OUTSIDE YOUR HOME?: NO

## 2024-11-20 SDOH — SOCIAL STABILITY: SOCIAL INSECURITY: DO YOU FEEL UNSAFE GOING BACK TO THE PLACE WHERE YOU ARE LIVING?: NO

## 2024-11-20 SDOH — SOCIAL STABILITY: SOCIAL INSECURITY: ABUSE: ADULT

## 2024-11-20 ASSESSMENT — LIFESTYLE VARIABLES
AUDIT-C TOTAL SCORE: 2
AUDIT-C TOTAL SCORE: 2
HOW OFTEN DO YOU HAVE 6 OR MORE DRINKS ON ONE OCCASION: LESS THAN MONTHLY
HOW MANY STANDARD DRINKS CONTAINING ALCOHOL DO YOU HAVE ON A TYPICAL DAY: 1 OR 2
HOW OFTEN DO YOU HAVE A DRINK CONTAINING ALCOHOL: MONTHLY OR LESS
SKIP TO QUESTIONS 9-10: 0

## 2024-11-20 ASSESSMENT — PATIENT HEALTH QUESTIONNAIRE - PHQ9
2. FEELING DOWN, DEPRESSED OR HOPELESS: NOT AT ALL
SUM OF ALL RESPONSES TO PHQ9 QUESTIONS 1 & 2: 0
1. LITTLE INTEREST OR PLEASURE IN DOING THINGS: NOT AT ALL

## 2024-11-20 ASSESSMENT — PAIN SCALES - GENERAL
PAINLEVEL_OUTOF10: 0 - NO PAIN
PAINLEVEL_OUTOF10: 0 - NO PAIN

## 2024-11-20 ASSESSMENT — COGNITIVE AND FUNCTIONAL STATUS - GENERAL
PATIENT BASELINE BEDBOUND: NO
MOBILITY SCORE: 24
DAILY ACTIVITIY SCORE: 24
MOBILITY SCORE: 24
DAILY ACTIVITIY SCORE: 24

## 2024-11-20 ASSESSMENT — COLUMBIA-SUICIDE SEVERITY RATING SCALE - C-SSRS
6. HAVE YOU EVER DONE ANYTHING, STARTED TO DO ANYTHING, OR PREPARED TO DO ANYTHING TO END YOUR LIFE?: NO
2. HAVE YOU ACTUALLY HAD ANY THOUGHTS OF KILLING YOURSELF?: NO
1. IN THE PAST MONTH, HAVE YOU WISHED YOU WERE DEAD OR WISHED YOU COULD GO TO SLEEP AND NOT WAKE UP?: NO

## 2024-11-20 ASSESSMENT — PAIN - FUNCTIONAL ASSESSMENT
PAIN_FUNCTIONAL_ASSESSMENT: 0-10
PAIN_FUNCTIONAL_ASSESSMENT: 0-10

## 2024-11-21 ENCOUNTER — APPOINTMENT (OUTPATIENT)
Dept: CARDIOLOGY | Facility: HOSPITAL | Age: 74
DRG: 310 | End: 2024-11-21
Payer: MEDICARE

## 2024-11-21 ENCOUNTER — DOCUMENTATION (OUTPATIENT)
Dept: PRIMARY CARE | Facility: CLINIC | Age: 74
End: 2024-11-21
Payer: MEDICARE

## 2024-11-21 PROBLEM — I48.91 A-FIB (MULTI): Status: ACTIVE | Noted: 2024-11-07

## 2024-11-21 LAB
ANION GAP SERPL CALC-SCNC: 11 MMOL/L (ref 10–20)
ATRIAL RATE: 102 BPM
ATRIAL RATE: 90 BPM
BUN SERPL-MCNC: 18 MG/DL (ref 6–23)
CALCIUM SERPL-MCNC: 9.5 MG/DL (ref 8.6–10.6)
CHLORIDE SERPL-SCNC: 107 MMOL/L (ref 98–107)
CO2 SERPL-SCNC: 25 MMOL/L (ref 21–32)
CREAT SERPL-MCNC: 0.85 MG/DL (ref 0.5–1.3)
EGFRCR SERPLBLD CKD-EPI 2021: 90 ML/MIN/1.73M*2
GLUCOSE SERPL-MCNC: 97 MG/DL (ref 74–99)
MAGNESIUM SERPL-MCNC: 2.06 MG/DL (ref 1.6–2.4)
POTASSIUM SERPL-SCNC: 4.4 MMOL/L (ref 3.5–5.3)
Q ONSET: 224 MS
Q ONSET: 225 MS
QRS COUNT: 15 BEATS
QRS COUNT: 17 BEATS
QRS DURATION: 132 MS
QRS DURATION: 140 MS
QT INTERVAL: 372 MS
QT INTERVAL: 418 MS
QTC CALCULATION(BAZETT): 500 MS
QTC CALCULATION(BAZETT): 516 MS
QTC FREDERICIA: 453 MS
QTC FREDERICIA: 482 MS
R AXIS: -18 DEGREES
R AXIS: -20 DEGREES
SODIUM SERPL-SCNC: 139 MMOL/L (ref 136–145)
T AXIS: -43 DEGREES
T AXIS: -48 DEGREES
T OFFSET: 410 MS
T OFFSET: 434 MS
VENTRICULAR RATE: 109 BPM
VENTRICULAR RATE: 92 BPM

## 2024-11-21 PROCEDURE — 2500000001 HC RX 250 WO HCPCS SELF ADMINISTERED DRUGS (ALT 637 FOR MEDICARE OP): Performed by: PHYSICIAN ASSISTANT

## 2024-11-21 PROCEDURE — 93005 ELECTROCARDIOGRAM TRACING: CPT

## 2024-11-21 PROCEDURE — 2500000002 HC RX 250 W HCPCS SELF ADMINISTERED DRUGS (ALT 637 FOR MEDICARE OP, ALT 636 FOR OP/ED): Performed by: STUDENT IN AN ORGANIZED HEALTH CARE EDUCATION/TRAINING PROGRAM

## 2024-11-21 PROCEDURE — 2500000001 HC RX 250 WO HCPCS SELF ADMINISTERED DRUGS (ALT 637 FOR MEDICARE OP): Performed by: STUDENT IN AN ORGANIZED HEALTH CARE EDUCATION/TRAINING PROGRAM

## 2024-11-21 PROCEDURE — 1200000002 HC GENERAL ROOM WITH TELEMETRY DAILY

## 2024-11-21 PROCEDURE — 80048 BASIC METABOLIC PNL TOTAL CA: CPT | Performed by: PHYSICIAN ASSISTANT

## 2024-11-21 PROCEDURE — 83735 ASSAY OF MAGNESIUM: CPT | Performed by: PHYSICIAN ASSISTANT

## 2024-11-21 PROCEDURE — 36415 COLL VENOUS BLD VENIPUNCTURE: CPT | Performed by: PHYSICIAN ASSISTANT

## 2024-11-21 PROCEDURE — 93010 ELECTROCARDIOGRAM REPORT: CPT | Performed by: INTERNAL MEDICINE

## 2024-11-21 PROCEDURE — 99233 SBSQ HOSP IP/OBS HIGH 50: CPT | Performed by: PHYSICIAN ASSISTANT

## 2024-11-21 RX ORDER — METOPROLOL TARTRATE 25 MG/1
12.5 TABLET, FILM COATED ORAL 2 TIMES DAILY
Status: DISCONTINUED | OUTPATIENT
Start: 2024-11-21 | End: 2024-11-24 | Stop reason: HOSPADM

## 2024-11-21 RX ORDER — METOPROLOL TARTRATE 50 MG/1
50 TABLET ORAL 2 TIMES DAILY
Status: DISCONTINUED | OUTPATIENT
Start: 2024-11-21 | End: 2024-11-21

## 2024-11-21 RX ORDER — DOFETILIDE 0.25 MG/1
250 CAPSULE ORAL EVERY 12 HOURS SCHEDULED
Status: DISCONTINUED | OUTPATIENT
Start: 2024-11-22 | End: 2024-11-24 | Stop reason: HOSPADM

## 2024-11-21 ASSESSMENT — COGNITIVE AND FUNCTIONAL STATUS - GENERAL
DAILY ACTIVITIY SCORE: 24
MOBILITY SCORE: 24

## 2024-11-21 ASSESSMENT — PAIN SCALES - GENERAL: PAINLEVEL_OUTOF10: 0 - NO PAIN

## 2024-11-21 ASSESSMENT — ACTIVITIES OF DAILY LIVING (ADL): LACK_OF_TRANSPORTATION: NO

## 2024-11-21 NOTE — H&P
History Of Present Illness:    Pro Belle Jr. is a 74 y.o. male presenting with palpitations.    74M hx CAD with prior NSTEMI 2019 (PCI to RCA), RBBB, persistent symptomatic AF on rivaroxaban here for dofetilide initiation for persistent AF.    PCP: Dr Ruben Bradford / CARDS: Dr Edmond Hurst  EP: Dr Nassar    75yo M with HTN, HLD, REGINALDO on CPAP, 1v obstructive CAD, NSTEMI s/p PCI dRCA 9/2019, cRBBB, and recurrent symptomatic persistent AF on Xarelto presenting for Tikosyn initiation. AF diagnosed 2018 at [facility] and was discharged on Amio. Had AF RVR 7/2020 and had DCCV at [facility] then underwent PVI + Right AT ablation 10/2020 by Dr Nassar. Pt had several AF recurrences 11/2020 and 7/2021. Pt underwent re-do PVI + typical AFL ablation (CTI line) 5/2022 and was in sinus at F/u 8/2022. AF recurred 5/9/24 and pt went to ED where he was discharged home on increased metop dose. 1 week later EKG w/ NSR. Was [facility] 10/18-10/19/24 for AF after presenting at Dr Hurst's office for EKG.  Troponin was mildly elevated but pt had no symptoms and EKG was reported to be without ischemia; OP follow up with Dr Hurst was recommended. No med changes were made as pt was on Xarelto and rate controlled on metop. Pt saw cards NP KRISTY Valencia 10/24/24 who increased metop for HR 100bpm and ordered Echo (done yesterday 11/19). At EP F/u with JENNIFER Aguilera NP 10/30/24, pt was in controlled AF by EKG and reported fatigue, ARELLANO, and activity intolerance. Dr Nassar recommended Tikosyn initiation.       Today:  -no anginal or ADHF symptoms  -palpitations are bothersome  -feels like has more energy when in NSR  -has been taking rivaroxaban religiously x30 days (interrupted ~35 days ago for a back injection)    *Echo 11/19/24: EF 55%, no WMA, DD, NL RV size/fxn, mod LAE/NL RA, mild MR/TR/NV, rvsp 37mm, no effusion  *[facility] 9/10/19 for NSTEMI: 95% dRCA s/p ELENA; mid-dist LM calcification, MLI of D1, LCx; LVg w/EF 60-65%  *Echo 9/2019: EF  "60-64%, no WMA, NL RV size/fxn, mild LAE/NL RA, trivial to mild TR, rvsp 27mm    PMH/PSH: as above +  -BPH, GERD, OA, psoriatic arthritis, apparent narcolepsy on Nuvigil  -S/p hernia repair, back surgery, bilat RADHA, L shoulder replacement with re-do for dislocation, lumbar lami, eye surgery, Urolift surgery, vasectomy     Last Recorded Vitals:  Vitals:    11/20/24 1821 11/20/24 1835 11/20/24 1844   BP:  (!) 149/101 (!) 149/101   BP Location:   Right arm   Patient Position:   Sitting   Pulse:  92 92   Resp:  18 18   Temp:  36.3 °C (97.3 °F) 36.3 °C (97.3 °F)   TempSrc:   Temporal   SpO2:  96%    Weight: 99.2 kg (218 lb 11.1 oz)  99.2 kg (218 lb 11.1 oz)   Height:   1.727 m (5' 8\")       Last Labs:  CBC - 11/20/2024:  6:38 PM  7.3 14.0 251    41.2      CMP - 10/19/2024:  4:15 AM  9.8 6.0 21 --- 0.6   _ 3.6 19 82      PTT - No results in last year.  1.5   14.8 _     Troponin I, High Sensitivity   Date/Time Value Ref Range Status   10/19/2024 05:41 AM 57 (HH) 0 - 20 ng/L Final     Comment:     Previous result verified on 10/18/2024 1526 on specimen/case [id number] called with component TRPHS for procedure Troponin I, High Sensitivity, Initial with value 107 ng/L.   10/19/2024 04:15 AM 57 (HH) 0 - 20 ng/L Final     Comment:     Previous result verified on 10/18/2024 1526 on specimen/case [id number] called with component TRPHS for procedure Troponin I, High Sensitivity, Initial with value 107 ng/L.   10/18/2024 03:12  (HH) 0 - 20 ng/L Final     Comment:     Previous result verified on 10/18/2024 1526 on specimen/case [id number] called with component TRPHS for procedure Troponin I, High Sensitivity, Initial with value 107 ng/L.     Hemoglobin A1C   Date/Time Value Ref Range Status   10/19/2024 04:15 AM 5.3 See comment % Final   10/05/2024 09:49 AM 5.4 See comment % Final     LDL Calculated   Date/Time Value Ref Range Status   03/27/2024 08:44 AM 53 <=99 mg/dL Final     Comment:                          "        Near   Borderline      AGE      Desirable  Optimal    High     High     Very High     0-19 Y     0 - 109     ---    110-129   >/= 130     ----    20-24 Y     0 - 119     ---    120-159   >/= 160     ----      >24 Y     0 -  99   100-129  130-159   160-189     >/=190     07/13/2021 04:27 AM 52 (L) 65 - 130 MG/DL Final   05/27/2021 03:56 PM 86 65 - 130 MG/DL Final   05/12/2020 12:09 PM 60 (L) 65 - 130 MG/DL Final     VLDL   Date/Time Value Ref Range Status   03/27/2024 08:44 AM 12 0 - 40 mg/dL Final   04/11/2023 04:00 PM 23 0 - 40 mg/dL Final   10/12/2022 09:22 AM 14 0 - 40 mg/dL Final   03/26/2021 08:07 AM 30 0 - 40 mg/dL Final      Last I/O:  No intake/output data recorded.    Past Cardiology Tests (Last 3 Years):  EKG:  ECG 12 lead 10/18/2024      ECG 12 lead (Clinic Performed) 10/18/2024      ECG 12 lead (Clinic Performed) 10/09/2024 (Preliminary)      ECG 12 lead (Clinic Performed) 06/12/2024      ECG 12 lead (Clinic Performed) 06/06/2024      ECG 12 lead 05/08/2024      ECG 12 lead (Clinic Performed) 05/08/2024      ECG 12 lead (Clinic Performed) 03/21/2024    Echo:  Transthoracic Echo (TTE) Complete 11/19/2024    Ejection Fractions:  EF   Date/Time Value Ref Range Status   11/19/2024 01:59 PM 55 %      Cath:  No results found for this or any previous visit from the past 1095 days.    Stress Test:  No results found for this or any previous visit from the past 1095 days.    Cardiac Imaging:  No results found for this or any previous visit from the past 1095 days.      Past Medical History:  He has a past medical history of A-fib (Multi) and Sleep apnea.    Past Surgical History:  He has a past surgical history that includes Hernia repair (06/20/2014); Other surgical history (10/06/2022); Back surgery (02/14/2024); Joint replacement (Bilateral); Joint replacement (Left); Coronary stent placement; Spine surgery; Eye surgery; Vasectomy; and Ablation of dysrhythmic focus.      Social History:  He reports that  he has quit smoking. His smoking use included cigarettes. He has a 4.5 pack-year smoking history. He has never used smokeless tobacco. He reports current alcohol use of about 8.0 standard drinks of alcohol per week. He reports that he does not use drugs.    Family History:  Family History   Problem Relation Name Age of Onset    Cancer Mother Selena     Alcohol abuse Mother Selena     Heart disease Father Pro Sr.     Arthritis Father Pro Sr.     Heart attack Father Pro Sr.     Cancer Sister      Coronary artery disease Brother          Allergies:  Patient has no known allergies.    Inpatient Medications:  Scheduled medications   Medication Dose Route Frequency    [START ON 11/21/2024] amLODIPine  5 mg oral Daily    [START ON 11/21/2024] armodafinil  250 mg oral q AM    aspirin  81 mg oral Daily    atorvastatin  80 mg oral Nightly    cholecalciferol  2,000 Units oral Daily    [START ON 11/21/2024] cyanocobalamin  1,000 mcg oral Daily    docusate sodium  50 mg oral Daily    ezetimibe  10 mg oral Nightly    folic acid  1 mg oral Daily    gabapentin  600 mg oral BID    [START ON 11/21/2024] lisinopril  40 mg oral Daily    metoprolol succinate XL  100 mg oral Daily    [START ON 11/21/2024] pantoprazole  40 mg oral Daily before breakfast    rivaroxaban  20 mg oral Daily    tamsulosin  0.8 mg oral Nightly    [START ON 11/21/2024] tofacitinib ER  11 mg oral Daily     PRN medications   Medication    acetaminophen    traMADol     Continuous Medications   Medication Dose Last Rate     Outpatient Medications:  Current Outpatient Medications   Medication Instructions    acetaminophen (Tylenol) 500 mg tablet 3 tablets, oral, Daily PRN, Do not crush, chew, or split.    amLODIPine (NORVASC) 5 mg, oral, Daily    armodafinil (NUVIGIL) 250 mg, oral, Every morning    aspirin 81 mg, oral, Daily    atorvastatin (LIPITOR) 80 mg, oral, Nightly    cholecalciferol (Vitamin D-3) 50 MCG (2000 UT) tablet 1 tablet, Daily    cyanocobalamin  (VITAMIN B-12) 1,000 mcg, Daily RT    docusate sodium (COLACE) 50 mg, oral, Daily    ezetimibe (ZETIA) 10 mg, oral, Nightly    folic acid (FOLVITE) 1 mg, oral, Daily    gabapentin (NEURONTIN) 600 mg, oral, 2 times daily    lisinopril 40 mg, oral, Daily    metoprolol succinate XL (TOPROL XL) 50 mg, oral, Daily, Do not crush or chew.    omeprazole (PRILOSEC) 20 mg, oral, Daily    tamsulosin (FLOMAX) 0.8 mg, oral, Daily    traMADol (Ultram) 50 mg tablet 1 tablet, oral, 3 times daily PRN    Xarelto 20 mg, oral, Daily    Xeljanz XR 11 mg, Daily       Physical Exam:      Constitutional: well appearing, no distress  Eyes: no conjunctival injection  ENT: moist mucous membranes, no apparent injury  Respiratory/Thorax: normal work of breathing on room air  Cardiovascular: somewhat fast rate, irreg irreg rhythm, extremities warm, JVP not elevated, strong radial pulse  Extremities: warm, intact      Assessment/Plan         74M hx CAD with prior NSTEMI 2019 (PCI to RCA), RBBB, persistent symptomatic AF on rivaroxaban here for dofetilide initiation for persistent AF.    #AF (CV -5  age x2, CAD, HTN, prev on amio)  Symptomatic on BB, will start dofetilide for rhythm control given high symptom burden. Has had appropriate AC wit hno projected interruptions in short term.  -check BMP, Mg, ECG (reviewed - acceptable for dofetilide)  -if labs ok, start dofetilide (500 q12h appropriate if Cr <1.3 per CG)  -K>4 and Mg>2 and ECG checks (has RBBB)  -rivaroxaban 20  -home metoprolol, may need to decrease AM of cardioversion (reported sinus 50-60s)    #HTN - controlled on home meds  -amlo 5  -lisinopril 40    #CAD - hx ELENA to RCA 2019 for NSTEMI  No anginal symptoms  -ASA81  -atorva 80  -ezetimibe 10    #chronic back pain  At baseline  -gabapentin  -tramadol    #GERD  At baseline  -PPI    #PsA  At baseline  -home tofacitinib (OK with dofetilide)    #narcolepsy  At baseline  -home armodafinil (OK with dofetilide), patient brought in home  supply    Fen/GI - regular diet, +psylium or miralax  Ppx - on PPI and DOAC  Dispo - admit to EP dofetilide service  Peripheral IV 11/20/24 20 G Distal;Left Forearm (Active)   Site Assessment Clean;Dry;Intact 11/20/24 1850   Dressing Type Transparent 11/20/24 1850   Line Status Flushed 11/20/24 1850   Dressing Status Clean;Dry;Occlusive 11/20/24 1850   Number of days: 0       Code Status:  Full Code    I spent 35 minutes in the professional and overall care of this patient.        Low Reyez MD

## 2024-11-21 NOTE — PROGRESS NOTES
Pharmacy Medication History Review    Pro Belle Jr. is a 74 y.o. male admitted for Encounter for monitoring dofetilide therapy. Pharmacy reviewed the patient's [id number] medications and allergies for accuracy.    Medications ADDED:  None   Medications CHANGED:  Docusate 50 mg to 100 mg   Folic acid 1 mg to 800 mcg  Medications REMOVED:   None      The list below reflects the updated PTA list.   Prior to Admission Medications   Prescriptions Last Dose Informant   acetaminophen (Tylenol) 500 mg tablet  Self   Sig: Take 3 tablets (1,500 mg) by mouth once daily as needed (back pain). Do not crush, chew, or split.   amLODIPine (Norvasc) 5 mg tablet  Self   Sig: Take 1 tablet (5 mg) by mouth once daily.   armodafinil (Nuvigil) 250 mg tablet  Self   Sig: Take 1 tablet (250 mg) by mouth once daily in the morning.   aspirin 81 mg EC tablet  Self   Sig: Take 1 tablet (81 mg) by mouth once daily.   atorvastatin (Lipitor) 80 mg tablet  Self   Sig: TAKE 1 TABLET BY MOUTH AT  BEDTIME   cholecalciferol (Vitamin D-3) 50 MCG (2000 UT) tablet  Self   Sig: Take 1 tablet (2,000 Units) by mouth once daily.   cyanocobalamin (Vitamin B-12) 1,000 mcg tablet  Self   Sig: Take 1 tablet (1,000 mcg) by mouth once daily.   docusate sodium (Colace) 100 mg capsule  Self   Sig: Take 1 capsule (100 mg) by mouth once daily.   ezetimibe (Zetia) 10 mg tablet  Self   Sig: TAKE 1 TABLET BY MOUTH AT  BEDTIME   folic acid (Folvite) 800 mcg tablet  Self   Sig: Take 1 mg by mouth once daily.   gabapentin (Neurontin) 600 mg tablet  Self   Sig: Take 1 tablet (600 mg) by mouth 2 times a day.   lisinopril 40 mg tablet  Self   Sig: TAKE 1 TABLET BY MOUTH ONCE  DAILY   metoprolol succinate XL (Toprol XL) 25 mg 24 hr tablet  Self   Sig: Take 2 tablets (50 mg) by mouth once daily. Do not crush or chew.   Patient taking differently: Take 4 tablets (100 mg) by mouth once daily. Do not crush or chew.   omeprazole (PriLOSEC) 20 mg DR capsule  Self   Sig:  "Take 1 capsule (20 mg) by mouth once daily.   Patient taking differently: Take 1 capsule (20 mg) by mouth once daily at bedtime.   rivaroxaban (Xarelto) 20 mg tablet  Self   Sig: Take 1 tablet (20 mg) by mouth once daily.   Patient taking differently: Take 1 tablet (20 mg) by mouth once daily. With a meal   tamsulosin (Flomax) 0.4 mg 24 hr capsule  Self   Sig: Take 2 capsules (0.8 mg) by mouth once daily.   Patient taking differently: Take 2 capsules (0.8 mg) by mouth once daily at bedtime.   tofacitinib ER (Xeljanz XR) 11 mg tablet extended release 24 hr  Self   Sig: Take 1 tablet (11 mg) by mouth once daily. Do not crush, chew or split. Swallow whole.   traMADol (Ultram) 50 mg tablet  Self   Sig: Take 1 tablet (50 mg) by mouth 3 times a day as needed for moderate pain (4 - 6).      Facility-Administered Medications: None        The list below reflects the updated allergy list. Please review each documented allergy for additional clarification and justification.  Allergies  Reviewed by Low Reyez MD on 11/20/2024   No Known Allergies         Patient accepts M2B at discharge.   Local pharmacy: [facility] (working on switching everything over from [facility])     Sources:   Patient interview - knows medications well, has self made list on his laptop.   Dispense hx  OARRS     Additional Comments:    Pharmacy verified the cost of the following medications at discharge.   Medication: dofetilide 500 mcg capsule   Quantity 60 / Day Supply 30 = $11.84  Quantity 180 / Day Supply 90 = $35.52    Eitan Wagner, PharmD  Transitions of Care Pharmacist  11/21/24 9:54 AM     Secure Chat preferred   If no response call [phone] or [employer] \"Med Rec\"   "

## 2024-11-21 NOTE — PROGRESS NOTES
"CARDIAC ELECTROPHYSIOLOGY NOTE  Subjective Data:  -Pt was seen/examined at 9:05AM and tele reviewed on LT5  -Denies chest discomfort, dyspnea, lightheadedness or palps; slept poorly \"as usual when not at home\"  -1st dose Tikosyn 500mcg 11pm last night (to allow for K repletion per protocol)    Objective Data:  Vitals:    11/21/24 0052 11/21/24 0410 11/21/24 0700 11/21/24 1136   BP: (!) 136/93 129/83 (!) 156/106 119/69   BP Location:   Left arm Right arm   Patient Position: Lying Lying Sitting Sitting   Pulse: 94 108 101 54   Resp: 18 18 18 18   Temp: 36.2 °C (97.2 °F) 36.2 °C (97.2 °F) 36.2 °C (97.2 °F) 35.8 °C (96.4 °F)   TempSrc:   Temporal Temporal   SpO2: 94% 94% 97% 94%   Weight:  97.1 kg (214 lb 1.1 oz)     Height:           Last labs:  Results from last 7 days   Lab Units 11/20/24  1838   WBC AUTO x10*3/uL 7.3   HEMOGLOBIN g/dL 14.0   HEMATOCRIT % 41.2   PLATELETS AUTO x10*3/uL 251     Results from last 7 days   Lab Units 11/21/24  0412 11/20/24  1838   SODIUM mmol/L 139 139   POTASSIUM mmol/L 4.4 3.5   CHLORIDE mmol/L 107 103   CO2 mmol/L 25 25   BUN mg/dL 18 21   CREATININE mg/dL 0.85 0.86   GLUCOSE mg/dL 97 114*   CALCIUM mg/dL 9.5 10.0     Results from last 7 days   Lab Units 11/21/24  0412 11/20/24  1838   MAGNESIUM mg/dL 2.06 2.09     Personally reviewed the following:    Tele reviewed: AF 90s-110s w/ cRBBB, rare PVCs noted PRIOR to 1st Tikosyn dose; afternoon tele review shows pt converted to SB 45-48 ~10AM (just prior to 2nd dose), currently  SB 50    (Baseline EKG from 11/20/24 was reviewed)    EKG post 1st dose 11/21/24 1:02AM--AF 90s, /cRBBB, 0.5mm STD V4-V5 (present on baseline EKG also), absolute QT 420ms hand measured (QTc inaccurate due to irreg R-R interval)    EKG post 2nd dose 11/21/24: SB 49, , /RBBB, QT 540ms excluding U waves. QTc by formulas other than [place] due to HR<60:  -[place]: QTc 505ms  -[place]: QTc 505ms  -[place]: QTc 521ms    Above EKGs were reviewed " by Dr Nassar also      Scheduled medications   Medication Dose Route Frequency    amLODIPine  5 mg oral Daily    armodafinil  250 mg oral q AM    aspirin  81 mg oral Daily    atorvastatin  80 mg oral Nightly    cholecalciferol  2,000 Units oral Daily    cyanocobalamin  1,000 mcg oral Daily    docusate sodium  100 mg oral Daily    dofetilide  500 mcg oral q12h JULIUS    ezetimibe  10 mg oral Nightly    folic acid  1 mg oral Daily    gabapentin  600 mg oral BID    lisinopril  40 mg oral Daily    metoprolol tartrate  50 mg oral BID    pantoprazole  40 mg oral Daily before breakfast    polyethylene glycol  17 g oral Daily    potassium chloride CR  20 mEq oral Daily    psyllium  1 packet oral Daily    rivaroxaban  20 mg oral Daily    tamsulosin  0.8 mg oral Nightly    tofacitinib ER  11 mg oral Daily      PRN medications   Medication    acetaminophen    traMADol       Physical Exam:  Gen-A+O x 3  Skin-W+D  Lungs-few scattered rales bases o/w CTAB  CV-irreg irreg, no M/G/R  Abd-obese, oft, NT/ND, +BS  Extrem-1+ RLE edema, no LLE edema (chronic per pt); no calf tenderness to palp bilat  Neuro-XAVIER  Psych-appropriate mood and affect    Assessment/Plan   73yo M with HTN, HLD, REGINALDO on CPAP, 1v obstructive CAD, NSTEMI s/p PCI dRCA 9/2019 (on ASA 81), cRBBB, AT, AFL, and AF s/p PVI + right AT ablation 10/2020, redo PVI + typical AFL ablation 5/2022 now with recurrent symptomatic persistent AF on Xarelto who presented for Tikosyn initiation. Admission CrCl 83ml/min thus Tikosyn 500mcg q12h started 11/20 PM after K 3.5 repletion; KCL 20 QD started also.    Case was d/w Dr Nassar who reviewed EKGs remotely-->pt converted to SB 45 just prior to 2nd Tikosyn dose this AM; QTc is near 550ms if including U waves thus Dr Nassar wants to HOLD Tikosyn dose tonight 11/21 PM and decrease to 250mcg q12h starting Fri 11/22 AM. Metop decreased 50>12.5 BID with HOLD parameters for HR<60bpm.    1-Symptomatic persistent AF s/p PVI x 2-->Xarelto,  metop; Tikosyn initiation  2-CAD/NSTEMI s/p ELENA RCA 2019-->ASA 81  3-HTN-->lisinopril 40, amlo 5  4-HLD-->atorva 80, zetia 10  5-Chronic back pain-->gabapentin (also for PHN of R arm/hand), PRN tramadol  6-Psoriatic arthritis-->on tofacitinib (Xeljanz); ordered here but apparently is not on formulary; pt's wife may bring from home depending on weather today (no interaction w/ Tikosyn per Lexicomp or MicroMedex)  7-Apparent narcolepsy-->recently started armodafinil; using own med from home (no interaction w/ Tikosyn per Lexicomp or MicroMedex)    -Continue Tikosyn 500mcg q12h  -Continuous tele / QTc monitoring  -EKG 2-3 hrs post each dose  -Daily BMP/Mg; Replete lytes PRN (K>4, Mg>2)  -Cont Xarelto 20mg daily w/ evening meal  -Cont rest of home meds except metop succinate switched to tartrate while IP  -Pt read 3-page Tikosyn education guide which we reviewed at length today; all questions answered  -Anticipate dc home Sun 11/24 AM as 6th dose will be Sat night  -EP F/u 1 month Pauline Aguilera CNP 12/30/24 at [place] office    *EP fellow + HVI cards extended duty hour fellow covers overnight 5:30p-7:30a--Pager [phone]; signout will be given. EP fellow will see pt Fri and weekend in my absence.    TOM Ball, PA-C, [facility]  Inpatient Cardiac Electrophysiology  Personal pager (Tues-Fri): [phone]     Code Status:  Full Code    I spent 65 minutes in the professional and overall care of this patient.

## 2024-11-21 NOTE — DISCHARGE INSTRUCTIONS
IF YOU MISS A DOSE OF TIKOSYN (dofetilide), DO NOT TRY TO MAKE UP THE DOSE.  NEVER TAKE 2 DOSES AT THE SAME TIME.    *You should have bloodwork to check kidney function and electrolytes every 3-6 months with your primary provider or primary cardiologist while on Tikosyn. You will also need an EKG at least every 6 months while on Tikosyn.    *DO NOT take any herbs or supplements without first discussing with your healthcare provider or pharmacist. DO NOT eat grapefruit or drink grapefruit juice with Tikosyn (other citrus fruit is okay).  DO NOT break, chew, or open the capsules.    *You must tell any provider that treats you that you are on Tikosyn (due to potential for drug-drug interactions).  Keep an up-to-date medication list in your wallet (in addition to any list you keep in your phone).    * A website for lists of medications that can prolong the QT interval (what we measure on EKG during Tikosyn admission) is: [place]    * If you feel as though you are out of rhythm again, please call Dr Nassar's office at [phone] or, if it's an emergency, please call 911 or have someone drive you to the nearest emergency room.    * Be careful not to run out of Tikosyn; get it refilled a week before you will run out. If you need more refills, please call Dr Nassar's office or send them a message via Thumb.    * Continue to follow up with your primary cardiologist, primary care physician, and any other specialists you normally see.    FOLLOW UP:  1) Pauline Aguilera CNP ( Electrophysiology) in 1 month as scheduled

## 2024-11-21 NOTE — PROGRESS NOTES
TCC met with patient at bedside to discuss discharge planning. Patient drives self (or spouse) to medicall appts. Pharmacy- [facility] ([place]), DME- CPAP. Current discharge recommendations are pending. TCC to follow for discharge planning.     11/21/24 1327   Discharge Planning   Living Arrangements Spouse/significant other   Support Systems Spouse/significant other   Assistance Needed TBD   Type of Residence Private residence   Number of Stairs to Enter Residence 2   Number of Stairs Within Residence 0   Do you have animals or pets at home? Yes   Type of Animals or Pets 2 cats   Who is requesting discharge planning? Provider   Home or Post Acute Services None   Expected Discharge Disposition Home   Does the patient need discharge transport arranged? No   Financial Resource Strain   How hard is it for you to pay for the very basics like food, housing, medical care, and heating? Not hard   Housing Stability   In the last 12 months, was there a time when you were not able to pay the mortgage or rent on time? N   At any time in the past 12 months, were you homeless or living in a shelter (including now)? N   Transportation Needs   In the past 12 months, has lack of transportation kept you from medical appointments or from getting medications? no   In the past 12 months, has lack of transportation kept you from meetings, work, or from getting things needed for daily living? No       MARY Munoz, RN  [facility], [facility]  Transitional Care Coordinator, Mon-Fri  Cell: [phone], Office: [phone]  Email: [email or web address]

## 2024-11-22 ENCOUNTER — HOSPITAL ENCOUNTER (OUTPATIENT)
Dept: CARDIOLOGY | Facility: HOSPITAL | Age: 74
Discharge: HOME | DRG: 310 | End: 2024-11-22
Payer: MEDICARE

## 2024-11-22 ENCOUNTER — APPOINTMENT (OUTPATIENT)
Dept: CARDIOLOGY | Facility: HOSPITAL | Age: 74
DRG: 310 | End: 2024-11-22
Payer: MEDICARE

## 2024-11-22 LAB
ANION GAP SERPL CALC-SCNC: 13 MMOL/L (ref 10–20)
ATRIAL RATE: 49 BPM
BUN SERPL-MCNC: 27 MG/DL (ref 6–23)
CALCIUM SERPL-MCNC: 9.8 MG/DL (ref 8.6–10.6)
CHLORIDE SERPL-SCNC: 103 MMOL/L (ref 98–107)
CO2 SERPL-SCNC: 25 MMOL/L (ref 21–32)
CREAT SERPL-MCNC: 0.94 MG/DL (ref 0.5–1.3)
EGFRCR SERPLBLD CKD-EPI 2021: 84 ML/MIN/1.73M*2
GLUCOSE SERPL-MCNC: 99 MG/DL (ref 74–99)
MAGNESIUM SERPL-MCNC: 2.16 MG/DL (ref 1.6–2.4)
P AXIS: 75 DEGREES
P OFFSET: 155 MS
P ONSET: 121 MS
POTASSIUM SERPL-SCNC: 4.2 MMOL/L (ref 3.5–5.3)
PR INTERVAL: 206 MS
Q ONSET: 224 MS
QRS COUNT: 8 BEATS
QRS DURATION: 136 MS
QT INTERVAL: 526 MS
QTC CALCULATION(BAZETT): 475 MS
QTC FREDERICIA: 491 MS
R AXIS: -13 DEGREES
SODIUM SERPL-SCNC: 137 MMOL/L (ref 136–145)
T AXIS: -45 DEGREES
T OFFSET: 487 MS
VENTRICULAR RATE: 49 BPM

## 2024-11-22 PROCEDURE — 83735 ASSAY OF MAGNESIUM: CPT | Performed by: PHYSICIAN ASSISTANT

## 2024-11-22 PROCEDURE — 93005 ELECTROCARDIOGRAM TRACING: CPT

## 2024-11-22 PROCEDURE — 1200000002 HC GENERAL ROOM WITH TELEMETRY DAILY

## 2024-11-22 PROCEDURE — 93010 ELECTROCARDIOGRAM REPORT: CPT | Performed by: INTERNAL MEDICINE

## 2024-11-22 PROCEDURE — 2500000002 HC RX 250 W HCPCS SELF ADMINISTERED DRUGS (ALT 637 FOR MEDICARE OP, ALT 636 FOR OP/ED): Performed by: STUDENT IN AN ORGANIZED HEALTH CARE EDUCATION/TRAINING PROGRAM

## 2024-11-22 PROCEDURE — 99233 SBSQ HOSP IP/OBS HIGH 50: CPT | Performed by: STUDENT IN AN ORGANIZED HEALTH CARE EDUCATION/TRAINING PROGRAM

## 2024-11-22 PROCEDURE — 2500000001 HC RX 250 WO HCPCS SELF ADMINISTERED DRUGS (ALT 637 FOR MEDICARE OP): Performed by: PHYSICIAN ASSISTANT

## 2024-11-22 PROCEDURE — 80048 BASIC METABOLIC PNL TOTAL CA: CPT | Performed by: PHYSICIAN ASSISTANT

## 2024-11-22 PROCEDURE — 36415 COLL VENOUS BLD VENIPUNCTURE: CPT | Performed by: PHYSICIAN ASSISTANT

## 2024-11-22 PROCEDURE — 2500000001 HC RX 250 WO HCPCS SELF ADMINISTERED DRUGS (ALT 637 FOR MEDICARE OP): Performed by: STUDENT IN AN ORGANIZED HEALTH CARE EDUCATION/TRAINING PROGRAM

## 2024-11-22 ASSESSMENT — COGNITIVE AND FUNCTIONAL STATUS - GENERAL
DAILY ACTIVITIY SCORE: 24
MOBILITY SCORE: 24

## 2024-11-22 ASSESSMENT — PAIN SCALES - GENERAL: PAINLEVEL_OUTOF10: 0 - NO PAIN

## 2024-11-22 NOTE — PROGRESS NOTES
Subjective Data:  Feels ok, no palpitations, feels better in NSR       Objective Data:  Last Recorded Vitals:  Vitals:    11/22/24 0421 11/22/24 0654 11/22/24 0830 11/22/24 1130   BP: (!) 156/94  (!) 149/95 134/74   Patient Position: Lying      Pulse: 57  52 56   Resp: 18  18 18   Temp: 36.1 °C (97 °F)  36.2 °C (97.2 °F) 36.2 °C (97.2 °F)   TempSrc:   Temporal    SpO2: 96%  96% 99%   Weight:  97.9 kg (215 lb 13.3 oz)     Height:           Last Labs:  CBC - 11/20/2024:  6:38 PM  7.3 14.0 251    41.2      CMP - 11/22/2024:  4:10 AM  9.8 6.0 21 --- 0.6   _ 3.6 19 82      PTT - No results in last year.  1.5   14.8 _     TROPHS   Date/Time Value Ref Range Status   10/19/2024 05:41 AM 57 0 - 20 ng/L Final     Comment:     Previous result verified on 10/18/2024 1526 on specimen/case [id number] called with component TRPHS for procedure Troponin I, High Sensitivity, Initial with value 107 ng/L.   10/19/2024 04:15 AM 57 0 - 20 ng/L Final     Comment:     Previous result verified on 10/18/2024 1526 on specimen/case [id number] called with component TRPHS for procedure Troponin I, High Sensitivity, Initial with value 107 ng/L.   10/18/2024 03:12  0 - 20 ng/L Final     Comment:     Previous result verified on 10/18/2024 1526 on specimen/case [id number] called with component TRPHS for procedure Troponin I, High Sensitivity, Initial with value 107 ng/L.     HGBA1C   Date/Time Value Ref Range Status   10/19/2024 04:15 AM 5.3 See comment % Final   10/05/2024 09:49 AM 5.4 See comment % Final     LDLCALC   Date/Time Value Ref Range Status   03/27/2024 08:44 AM 53 <=99 mg/dL Final     Comment:                                 Near   Borderline      AGE      Desirable  Optimal    High     High     Very High     0-19 Y     0 - 109     ---    110-129   >/= 130     ----    20-24 Y     0 - 119     ---    120-159   >/= 160     ----      >24 Y     0 -  99   100-129  130-159   160-189     >/=190     07/13/2021 04:27 AM 52 65  - 130 MG/DL Final   05/27/2021 03:56 PM 86 65 - 130 MG/DL Final   05/12/2020 12:09 PM 60 65 - 130 MG/DL Final     VLDL   Date/Time Value Ref Range Status   03/27/2024 08:44 AM 12 0 - 40 mg/dL Final   04/11/2023 04:00 PM 23 0 - 40 mg/dL Final   10/12/2022 09:22 AM 14 0 - 40 mg/dL Final   03/26/2021 08:07 AM 30 0 - 40 mg/dL Final      Last I/O:  I/O last 3 completed shifts:  In: 240 (2.5 mL/kg) [P.O.:240]  Out: 150 (1.5 mL/kg) [Urine:150 (0 mL/kg/hr)]  Weight: 97.9 kg     Past Cardiology Tests (Last 3 Years):  EKG:  Electrocardiogram 12 Lead 11/21/2024 (Preliminary)      Electrocardiogram 12 Lead 11/21/2024 (Preliminary)      ECG 12 lead 11/20/2024      ECG 12 lead 10/18/2024      ECG 12 lead (Clinic Performed) 10/18/2024      ECG 12 lead (Clinic Performed) 10/09/2024 (Preliminary)      ECG 12 lead (Clinic Performed) 06/12/2024      ECG 12 lead (Clinic Performed) 06/06/2024      ECG 12 lead 05/08/2024      ECG 12 lead (Clinic Performed) 05/08/2024      ECG 12 lead (Clinic Performed) 03/21/2024    Echo:  Transthoracic Echo (TTE) Complete 11/19/2024    Ejection Fractions:  EF   Date/Time Value Ref Range Status   11/19/2024 01:59 PM 55 %      Cath:  No results found for this or any previous visit from the past 1095 days.    Stress Test:  No results found for this or any previous visit from the past 1095 days.    Cardiac Imaging:  No results found for this or any previous visit from the past 1095 days.      Inpatient Medications:  Scheduled medications   Medication Dose Route Frequency    amLODIPine  5 mg oral Daily    armodafinil  250 mg oral q AM    aspirin  81 mg oral Daily    atorvastatin  80 mg oral Nightly    cholecalciferol  2,000 Units oral Daily    cyanocobalamin  1,000 mcg oral Daily    docusate sodium  100 mg oral Daily    dofetilide  250 mcg oral q12h JULIUS    ezetimibe  10 mg oral Nightly    folic acid  1 mg oral Daily    gabapentin  600 mg oral BID    lisinopril  40 mg oral Daily    metoprolol tartrate  12.5  mg oral BID    pantoprazole  40 mg oral Daily before breakfast    polyethylene glycol  17 g oral Daily    potassium chloride CR  20 mEq oral Daily    psyllium  1 packet oral Daily    rivaroxaban  20 mg oral Daily    tamsulosin  0.8 mg oral Nightly    tofacitinib ER  11 mg oral Daily     PRN medications   Medication    acetaminophen    traMADol     Continuous Medications   Medication Dose Last Rate       Physical Exam:    Constitutional: well appearing, no distress  Eyes: no conjunctival injection  ENT: moist mucous membranes, no apparent injury  Respiratory/Thorax: normal work of breathing on room air  Cardiovascular: normal rate, regular rhythm, extremities warm, JVP not elevated  Extremities: warm, intact      Assessment/Plan       75yo M with HTN, HLD, REGINALDO on CPAP, 1v obstructive CAD, NSTEMI s/p PCI dRCA 9/2019 (on ASA 81), cRBBB, AT, AFL, and AF s/p PVI + right AT ablation 10/2020, redo PVI + typical AFL ablation 5/2022 now with recurrent symptomatic persistent AF on Xarelto who presented for Tikosyn initiation. Admission CrCl 83ml/min thus Tikosyn 500mcg q12h started 11/20 PM after K 3.5 repletion; KCL 20 QD started also.     Case was d/w Dr Nassar who reviewed EKGs remotely-->pt converted to SB 45 just prior to 2nd Tikosyn dose this AM; QTc is near 550ms if including U waves thus Dr Nassar wants to HOLD Tikosyn dose tonight 11/21 PM and decrease to 250mcg q12h starting Fri 11/22 AM. Metop decreased 50>12.5 BID with HOLD parameters for HR<60bpm.    11/22 - ECG and labs OK for dofetilide 250 bid, tentative discharge 11/24 11/23 - ECG and labs OK for dofetilide 250 bid, tentative discharge 11/24     1-Symptomatic persistent AF s/p PVI x 2-->Xarelto, metop; Tikosyn initiation  2-CAD/NSTEMI s/p ELENA RCA 2019-->ASA 81  3-HTN-->lisinopril 40, amlo 5  4-HLD-->atorva 80, zetia 10  5-Chronic back pain-->gabapentin (also for PHN of R arm/hand), PRN tramadol  6-Psoriatic arthritis-->on tofacitinib (Xeljanz); ordered  here but is not on formulary; pt's wife may bring from home depending on weather (no interaction w/ Tikosyn per [employer] or [employer])  7-narcolepsy-->recently started armodafinil; using own med from home (no interaction w/ Tikosyn per [employer] or [employer])     -Continue Tikosyn 250mcg q12h (11/22 - )  -discontinue metoprolol given perisistent HR 50s overnight in SR off metoprolol x72h (holding for HR <60; last dose was tartrate 50 11/21 AM), anticipate 25 daily or none on d/c  -Continuous tele / QTc monitoring  -EKG 2-3 hrs post each dose  -Daily BMP/Mg; Replete lytes PRN (K>4, Mg>2)  -Cont Xarelto 20mg daily w/ evening meal  -Cont rest of home meds except metop succinate switched to tartrate while IP  -Pt read 3-page Tikosyn education guide which Clif Nava reviewed length 11/21; all questions answered  -Anticipate dc home Sun 11/24 AM as 6th dose will be Sat night  -EP F/u 1 month Pauline Aguilera CNP 12/30/24 at [place] office     *EP fellow Low Reyez taking care of patient 7AM-7PM (reachable by [employer] Secure Chat), EDH service is taking care of patient 7PM - 7AM    Reviewed with Dr Monzon      Peripheral IV 11/20/24 20 G Distal;Left Forearm (Active)   Site Assessment Clean;Dry;Intact 11/22/24 0949   Dressing Type Transparent 11/22/24 0949   Line Status Flushed 11/22/24 0949   Dressing Status Clean;Dry;Occlusive 11/22/24 0949   Number of days: 2       Code Status:  Full Code    I spent 25 minutes in the professional and overall care of this patient.        Low Reyez MD

## 2024-11-22 NOTE — CARE PLAN
The patient's goals for the shift include      The clinical goals for the shift include patient remains stable    Problem: Pain - Adult  Goal: Verbalizes/displays adequate comfort level or baseline comfort level  Outcome: Progressing     Problem: Safety - Adult  Goal: Free from fall injury  Outcome: Progressing     Problem: Discharge Planning  Goal: Discharge to home or other facility with appropriate resources  Outcome: Progressing     Problem: Chronic Conditions and Co-morbidities  Goal: Patient's chronic conditions and co-morbidity symptoms are monitored and maintained or improved  Outcome: Progressing     Problem: ACS/CP/NSTEMI/STEMI  Goal: Chest pain managed (free from pain or at acceptable level)  Outcome: Progressing  Goal: Lab values return to normal range  Outcome: Progressing  Goal: Promote self management  Outcome: Progressing  Goal: Serial ECG will return to baseline  Outcome: Progressing  Goal: Verbalize understanding of procedures/devices  Outcome: Progressing  Goal: Wean vasopressors/achieve hemodynamic stability  Outcome: Progressing     Problem: Arrythmia/Dysrhythmia  Goal: Lab values return to normal range  Outcome: Progressing  Goal: No evidence of post procedure complications  Outcome: Progressing  Goal: Promote self management  Outcome: Progressing  Goal: Serial ECG will return to baseline  Outcome: Progressing  Goal: Verbalize understanding of procedures/devices  Outcome: Progressing  Goal: Vital signs return to baseline  Outcome: Progressing  Goal: Care and maintenance of device (specify)  Outcome: Progressing     Problem: Cardiac catheterization  Goal: Free from dysrhythmias  Outcome: Progressing  Goal: Free from pain  Outcome: Progressing  Goal: No evidence of post procedure complications  Outcome: Progressing  Goal: Promote self management  Outcome: Progressing  Goal: Verbalize understanding of procedure  Outcome: Progressing  Goal: Care and maintenance of device (specify)  Outcome:  Progressing     Problem: Hypertensive Emergency/Crisis  Goal: Blood pressure gradually reduced to goal range  Outcome: Progressing  Goal: Free from signs of organ damage  Outcome: Progressing  Goal: Lab values return to normal range  Outcome: Progressing  Goal: Promote self management  Outcome: Progressing

## 2024-11-23 ENCOUNTER — APPOINTMENT (OUTPATIENT)
Dept: CARDIOLOGY | Facility: HOSPITAL | Age: 74
DRG: 310 | End: 2024-11-23
Payer: MEDICARE

## 2024-11-23 ENCOUNTER — HOSPITAL ENCOUNTER (OUTPATIENT)
Dept: CARDIOLOGY | Facility: HOSPITAL | Age: 74
Discharge: HOME | DRG: 310 | End: 2024-11-23
Payer: MEDICARE

## 2024-11-23 LAB
ANION GAP SERPL CALC-SCNC: 11 MMOL/L (ref 10–20)
BUN SERPL-MCNC: 21 MG/DL (ref 6–23)
CALCIUM SERPL-MCNC: 9.8 MG/DL (ref 8.6–10.6)
CHLORIDE SERPL-SCNC: 108 MMOL/L (ref 98–107)
CO2 SERPL-SCNC: 25 MMOL/L (ref 21–32)
CREAT SERPL-MCNC: 0.77 MG/DL (ref 0.5–1.3)
EGFRCR SERPLBLD CKD-EPI 2021: >90 ML/MIN/1.73M*2
GLUCOSE SERPL-MCNC: 87 MG/DL (ref 74–99)
MAGNESIUM SERPL-MCNC: 2.17 MG/DL (ref 1.6–2.4)
POTASSIUM SERPL-SCNC: 4.2 MMOL/L (ref 3.5–5.3)
SODIUM SERPL-SCNC: 140 MMOL/L (ref 136–145)

## 2024-11-23 PROCEDURE — 93005 ELECTROCARDIOGRAM TRACING: CPT

## 2024-11-23 PROCEDURE — 93010 ELECTROCARDIOGRAM REPORT: CPT | Performed by: INTERNAL MEDICINE

## 2024-11-23 PROCEDURE — 2500000002 HC RX 250 W HCPCS SELF ADMINISTERED DRUGS (ALT 637 FOR MEDICARE OP, ALT 636 FOR OP/ED): Performed by: STUDENT IN AN ORGANIZED HEALTH CARE EDUCATION/TRAINING PROGRAM

## 2024-11-23 PROCEDURE — 2500000001 HC RX 250 WO HCPCS SELF ADMINISTERED DRUGS (ALT 637 FOR MEDICARE OP): Performed by: PHYSICIAN ASSISTANT

## 2024-11-23 PROCEDURE — 83735 ASSAY OF MAGNESIUM: CPT | Performed by: PHYSICIAN ASSISTANT

## 2024-11-23 PROCEDURE — 80048 BASIC METABOLIC PNL TOTAL CA: CPT | Performed by: PHYSICIAN ASSISTANT

## 2024-11-23 PROCEDURE — 2500000001 HC RX 250 WO HCPCS SELF ADMINISTERED DRUGS (ALT 637 FOR MEDICARE OP): Performed by: STUDENT IN AN ORGANIZED HEALTH CARE EDUCATION/TRAINING PROGRAM

## 2024-11-23 PROCEDURE — 1200000002 HC GENERAL ROOM WITH TELEMETRY DAILY

## 2024-11-23 PROCEDURE — RXMED WILLOW AMBULATORY MEDICATION CHARGE

## 2024-11-23 PROCEDURE — 36415 COLL VENOUS BLD VENIPUNCTURE: CPT | Performed by: PHYSICIAN ASSISTANT

## 2024-11-23 RX ORDER — DOFETILIDE 0.25 MG/1
250 CAPSULE ORAL EVERY 12 HOURS
Qty: 180 CAPSULE | Refills: 3 | Status: SHIPPED | OUTPATIENT
Start: 2024-12-20 | End: 2025-12-15

## 2024-11-23 RX ORDER — DOFETILIDE 0.25 MG/1
250 CAPSULE ORAL EVERY 12 HOURS SCHEDULED
Qty: 60 CAPSULE | Refills: 0 | Status: SHIPPED | OUTPATIENT
Start: 2024-11-23 | End: 2024-12-24

## 2024-11-23 RX ORDER — ACETAMINOPHEN 500 MG
1000 TABLET ORAL DAILY PRN
Start: 2024-11-23

## 2024-11-23 ASSESSMENT — COGNITIVE AND FUNCTIONAL STATUS - GENERAL
MOBILITY SCORE: 24
DAILY ACTIVITIY SCORE: 24
MOBILITY SCORE: 24
DAILY ACTIVITIY SCORE: 24

## 2024-11-23 ASSESSMENT — PAIN - FUNCTIONAL ASSESSMENT: PAIN_FUNCTIONAL_ASSESSMENT: 0-10

## 2024-11-23 ASSESSMENT — PAIN SCALES - GENERAL
PAINLEVEL_OUTOF10: 0 - NO PAIN
PAINLEVEL_OUTOF10: 0 - NO PAIN
PAINLEVEL_OUTOF10: 7
PAINLEVEL_OUTOF10: 2

## 2024-11-23 NOTE — CARE PLAN
Tikosyn dose 5 given today. Patient was up and ambulating, reported some chronic back pain x1 which was treated with medication. Plan for dose 6 tonight

## 2024-11-24 ENCOUNTER — PHARMACY VISIT (OUTPATIENT)
Dept: PHARMACY | Facility: CLINIC | Age: 74
End: 2024-11-24
Payer: COMMERCIAL

## 2024-11-24 ENCOUNTER — APPOINTMENT (OUTPATIENT)
Dept: CARDIOLOGY | Facility: HOSPITAL | Age: 74
DRG: 310 | End: 2024-11-24
Payer: MEDICARE

## 2024-11-24 VITALS
OXYGEN SATURATION: 96 % | HEART RATE: 69 BPM | TEMPERATURE: 97.7 F | HEIGHT: 68 IN | DIASTOLIC BLOOD PRESSURE: 88 MMHG | BODY MASS INDEX: 32.88 KG/M2 | RESPIRATION RATE: 18 BRPM | WEIGHT: 216.93 LBS | SYSTOLIC BLOOD PRESSURE: 156 MMHG

## 2024-11-24 LAB
ANION GAP SERPL CALC-SCNC: 13 MMOL/L (ref 10–20)
BUN SERPL-MCNC: 17 MG/DL (ref 6–23)
CALCIUM SERPL-MCNC: 9.4 MG/DL (ref 8.6–10.6)
CHLORIDE SERPL-SCNC: 108 MMOL/L (ref 98–107)
CO2 SERPL-SCNC: 26 MMOL/L (ref 21–32)
CREAT SERPL-MCNC: 0.74 MG/DL (ref 0.5–1.3)
EGFRCR SERPLBLD CKD-EPI 2021: >90 ML/MIN/1.73M*2
GLUCOSE SERPL-MCNC: 88 MG/DL (ref 74–99)
MAGNESIUM SERPL-MCNC: 2.26 MG/DL (ref 1.6–2.4)
POTASSIUM SERPL-SCNC: 4 MMOL/L (ref 3.5–5.3)
SODIUM SERPL-SCNC: 143 MMOL/L (ref 136–145)

## 2024-11-24 PROCEDURE — 2500000002 HC RX 250 W HCPCS SELF ADMINISTERED DRUGS (ALT 637 FOR MEDICARE OP, ALT 636 FOR OP/ED): Performed by: STUDENT IN AN ORGANIZED HEALTH CARE EDUCATION/TRAINING PROGRAM

## 2024-11-24 PROCEDURE — 83735 ASSAY OF MAGNESIUM: CPT | Performed by: PHYSICIAN ASSISTANT

## 2024-11-24 PROCEDURE — 36415 COLL VENOUS BLD VENIPUNCTURE: CPT | Performed by: PHYSICIAN ASSISTANT

## 2024-11-24 PROCEDURE — 5A09357 ASSISTANCE WITH RESPIRATORY VENTILATION, LESS THAN 24 CONSECUTIVE HOURS, CONTINUOUS POSITIVE AIRWAY PRESSURE: ICD-10-PCS | Performed by: STUDENT IN AN ORGANIZED HEALTH CARE EDUCATION/TRAINING PROGRAM

## 2024-11-24 PROCEDURE — 93005 ELECTROCARDIOGRAM TRACING: CPT

## 2024-11-24 PROCEDURE — 80048 BASIC METABOLIC PNL TOTAL CA: CPT | Performed by: PHYSICIAN ASSISTANT

## 2024-11-24 PROCEDURE — 2500000001 HC RX 250 WO HCPCS SELF ADMINISTERED DRUGS (ALT 637 FOR MEDICARE OP): Performed by: PHYSICIAN ASSISTANT

## 2024-11-24 PROCEDURE — 2500000001 HC RX 250 WO HCPCS SELF ADMINISTERED DRUGS (ALT 637 FOR MEDICARE OP): Performed by: STUDENT IN AN ORGANIZED HEALTH CARE EDUCATION/TRAINING PROGRAM

## 2024-11-24 PROCEDURE — 99238 HOSP IP/OBS DSCHRG MGMT 30/<: CPT | Performed by: STUDENT IN AN ORGANIZED HEALTH CARE EDUCATION/TRAINING PROGRAM

## 2024-11-24 ASSESSMENT — PAIN - FUNCTIONAL ASSESSMENT: PAIN_FUNCTIONAL_ASSESSMENT: 0-10

## 2024-11-24 ASSESSMENT — COGNITIVE AND FUNCTIONAL STATUS - GENERAL
MOBILITY SCORE: 24
DAILY ACTIVITIY SCORE: 24

## 2024-11-24 ASSESSMENT — PAIN SCALES - GENERAL
PAINLEVEL_OUTOF10: 8
PAINLEVEL_OUTOF10: 1

## 2024-11-24 NOTE — NURSING NOTE
Patient and family received discharge paperwork. Paperwork was reviewed and all parties agreed to understanding the instructions. All questions were answered. Patients belongings all gathered up and with the patient. Telemetry leads removed and IV removed with tip intact.

## 2024-11-24 NOTE — DISCHARGE SUMMARY
Discharge Diagnosis  Encounter for monitoring dofetilide therapy    Issues Requiring Follow-Up  Routine arrhythmia management    Test Results Pending At Discharge  Pending Labs       No current pending labs.            Hospital Course  75yo M with HTN, HLD, REGINALDO on CPAP, 1v obstructive CAD, NSTEMI s/p PCI dRCA 9/2019 (on ASA 81), normal EF by Echo 11/19/24, cRBBB, atrial arrhythmias (AT, AFL, AF) s/p PVI + right AT ablation 10/2020, redo PVI + typical AFL ablation 5/2022 now with recurrent symptomatic persistent AF on Xarelto who presented for Tikosyn initiation. Admission CrCl 83ml/min thus Tikosyn 500mcg q12h started 11/20 PM after K 3.5 repletion.             He overall tolerated dofetilide initiation well. Given conversion to sinus bradycardia after 1 dose and reverse-use dependence, dose decreased to 250 bid which he tolerated well and metoprolol discontinued. No other issues this hospital stay and he feels much better in sinus rhythm.    Medication changes:  -start dofetilide 250 bid (1 month meds-to-beds and 3 months to preferred outpt pharmacy [facility])  -stop metoprolol    Pertinent Physical Exam At Time of Discharge  Physical Exam    Constitutional: well appearing, no distress  Eyes: no conjunctival injection  ENT: moist mucous membranes, no apparent injury  Respiratory/Thorax: normal work of breathing on room air  Cardiovascular: somewhat slow rate, regular rhythm, extremities warm, JVP not elevated  Extremities: warm, intact   Home Medications     Medication List      START taking these medications     * dofetilide 250 mcg capsule; Commonly known as: Tikosyn; Take 1 capsule   (250 mcg) by mouth every 12 hours.   * dofetilide 250 mcg capsule; Commonly known as: Tikosyn; Take 1 capsule   (250 mcg) by mouth every 12 hours. Do not fill before December 20, 2024.;   Start taking on: December 20, 2024  * This list has 2 medication(s) that are the same as other medications   prescribed for you. Read the  directions carefully, and ask your doctor or   other care provider to review them with you.     CHANGE how you take these medications     acetaminophen 500 mg tablet; Commonly known as: Tylenol; Take 2 tablets   (1,000 mg) by mouth once daily as needed (back pain). Do not crush, chew,   or split.; What changed: how much to take   rivaroxaban 20 mg tablet; Commonly known as: Xarelto; Take 1 tablet (20   mg) by mouth once daily in the evening. Take with meals. With a meal; What   changed: when to take this, additional instructions     CONTINUE taking these medications     amLODIPine 5 mg tablet; Commonly known as: Norvasc; Take 1 tablet (5 mg)   by mouth once daily.   armodafinil 250 mg tablet; Commonly known as: Nuvigil   aspirin 81 mg EC tablet   atorvastatin 80 mg tablet; Commonly known as: Lipitor; TAKE 1 TABLET BY   MOUTH AT  BEDTIME   cholecalciferol 50 MCG (2000 UT) tablet; Commonly known as: Vitamin D-3   cyanocobalamin 1,000 mcg tablet; Commonly known as: Vitamin B-12   docusate sodium 100 mg capsule; Commonly known as: Colace   ezetimibe 10 mg tablet; Commonly known as: Zetia; TAKE 1 TABLET BY MOUTH   AT  BEDTIME   folic acid 800 mcg tablet; Commonly known as: Folvite   gabapentin 600 mg tablet; Commonly known as: Neurontin   lisinopril 40 mg tablet; TAKE 1 TABLET BY MOUTH ONCE  DAILY   omeprazole 20 mg DR capsule; Commonly known as: PriLOSEC; Take 1 capsule   (20 mg) by mouth once daily.   tamsulosin 0.4 mg 24 hr capsule; Commonly known as: Flomax; Take 2   capsules (0.8 mg) by mouth once daily.   traMADol 50 mg tablet; Commonly known as: Ultram   Xeljanz XR 11 mg tablet extended release 24 hr; Generic drug:   tofacitinib ER     STOP taking these medications     metoprolol succinate XL 25 mg 24 hr tablet; Commonly known as: Toprol XL       Outpatient Follow-Up  Future Appointments   Date Time Provider Department [place]   12/12/2024  1:45 PM MARGAUX Thacker-CNP [id number] [facility]   12/30/2024  8:30 AM  Pauline Aguilera, APRN-CNP [id number] [place]   1/13/2025  9:30 AM Eva Ordonez, PharmD [id number] [facility]   3/24/2025  9:45 AM Ruben Bradford MD [id number] [facility]   3/25/2025  9:30 AM Wade Hensley MD [id number] [facility]   4/7/2025 11:30 AM Ruben Bradford MD [id number] [facility]   4/9/2025 11:30 AM Pauline Aguilera, APRN-CNP [id number] [facility]   6/16/2025 11:00 AM Citlaly Shane MD [id number]       Low Reyez MD

## 2024-11-25 LAB
ATRIAL RATE: 144 BPM
ATRIAL RATE: 50 BPM
ATRIAL RATE: 51 BPM
ATRIAL RATE: 57 BPM
P AXIS: 58 DEGREES
P AXIS: 63 DEGREES
P AXIS: 83 DEGREES
P OFFSET: 160 MS
P OFFSET: 167 MS
P OFFSET: 178 MS
P ONSET: 119 MS
P ONSET: 123 MS
P ONSET: 127 MS
PR INTERVAL: 200 MS
PR INTERVAL: 200 MS
PR INTERVAL: 206 MS
Q ONSET: 222 MS
Q ONSET: 223 MS
Q ONSET: 223 MS
Q ONSET: 227 MS
QRS COUNT: 12 BEATS
QRS COUNT: 8 BEATS
QRS COUNT: 8 BEATS
QRS COUNT: 9 BEATS
QRS DURATION: 136 MS
QRS DURATION: 140 MS
QRS DURATION: 142 MS
QRS DURATION: 144 MS
QT INTERVAL: 296 MS
QT INTERVAL: 508 MS
QT INTERVAL: 512 MS
QT INTERVAL: 522 MS
QTC CALCULATION(BAZETT): 326 MS
QTC CALCULATION(BAZETT): 471 MS
QTC CALCULATION(BAZETT): 475 MS
QTC CALCULATION(BAZETT): 494 MS
QTC FREDERICIA: 316 MS
QTC FREDERICIA: 485 MS
QTC FREDERICIA: 491 MS
QTC FREDERICIA: 499 MS
R AXIS: -12 DEGREES
R AXIS: -14 DEGREES
R AXIS: -21 DEGREES
R AXIS: 4 DEGREES
T AXIS: -24 DEGREES
T AXIS: -25 DEGREES
T AXIS: -27 DEGREES
T AXIS: 238 DEGREES
T OFFSET: 371 MS
T OFFSET: 477 MS
T OFFSET: 478 MS
T OFFSET: 488 MS
VENTRICULAR RATE: 50 BPM
VENTRICULAR RATE: 51 BPM
VENTRICULAR RATE: 57 BPM
VENTRICULAR RATE: 73 BPM

## 2024-11-27 ENCOUNTER — PATIENT OUTREACH (OUTPATIENT)
Dept: PRIMARY CARE | Facility: CLINIC | Age: 74
End: 2024-11-27
Payer: MEDICARE

## 2024-11-27 DIAGNOSIS — I10 BENIGN ESSENTIAL HYPERTENSION: ICD-10-CM

## 2024-11-27 DIAGNOSIS — M06.9 RHEUMATOID ARTHRITIS, INVOLVING UNSPECIFIED SITE, UNSPECIFIED WHETHER RHEUMATOID FACTOR PRESENT (MULTI): ICD-10-CM

## 2024-11-27 PROCEDURE — 99490 CHRNC CARE MGMT STAFF 1ST 20: CPT | Performed by: FAMILY MEDICINE

## 2024-11-27 NOTE — PROGRESS NOTES
Chart review completed prior to outreach.  Monthly CCM call completed with patient.  Patient stated that he was in hospital for 3 days for monitor check and adjustment as well as Afib.  Said that they made medication changes, and was released in sinus rhythm.  Still in SR per patient.  Said that his dyspnea with exertion is also getting better since going back into sinus rhythm.  No issues with sleeping, averaging 8 hours per night, only waking to use bathroom and is able to fall back to sleep quickly.  Denies any recent falls or injuries.  In agreement to future follow up calls.

## 2024-12-03 DIAGNOSIS — E78.5 HYPERLIPIDEMIA, UNSPECIFIED HYPERLIPIDEMIA TYPE: ICD-10-CM

## 2024-12-03 LAB
ATRIAL RATE: 50 BPM
ATRIAL RATE: 51 BPM
P AXIS: 58 DEGREES
P AXIS: 63 DEGREES
P OFFSET: 167 MS
P OFFSET: 178 MS
P ONSET: 119 MS
P ONSET: 127 MS
PR INTERVAL: 200 MS
PR INTERVAL: 206 MS
Q ONSET: 222 MS
Q ONSET: 227 MS
QRS COUNT: 8 BEATS
QRS COUNT: 8 BEATS
QRS DURATION: 136 MS
QRS DURATION: 142 MS
QT INTERVAL: 512 MS
QT INTERVAL: 522 MS
QTC CALCULATION(BAZETT): 471 MS
QTC CALCULATION(BAZETT): 475 MS
QTC FREDERICIA: 485 MS
QTC FREDERICIA: 491 MS
R AXIS: -12 DEGREES
R AXIS: -14 DEGREES
T AXIS: -24 DEGREES
T AXIS: -27 DEGREES
T OFFSET: 478 MS
T OFFSET: 488 MS
VENTRICULAR RATE: 50 BPM
VENTRICULAR RATE: 51 BPM

## 2024-12-03 RX ORDER — EZETIMIBE 10 MG/1
10 TABLET ORAL NIGHTLY
Qty: 90 TABLET | Refills: 3 | Status: SHIPPED | OUTPATIENT
Start: 2024-12-03

## 2024-12-06 LAB
ATRIAL RATE: 144 BPM
ATRIAL RATE: 57 BPM
ATRIAL RATE: 62 BPM
ATRIAL RATE: 90 BPM
P AXIS: 83 DEGREES
P OFFSET: 160 MS
P ONSET: 123 MS
PR INTERVAL: 200 MS
Q ONSET: 223 MS
Q ONSET: 223 MS
Q ONSET: 224 MS
Q ONSET: 225 MS
QRS COUNT: 10 BEATS
QRS COUNT: 12 BEATS
QRS COUNT: 15 BEATS
QRS COUNT: 9 BEATS
QRS DURATION: 132 MS
QRS DURATION: 140 MS
QRS DURATION: 142 MS
QRS DURATION: 144 MS
QT INTERVAL: 296 MS
QT INTERVAL: 418 MS
QT INTERVAL: 508 MS
QT INTERVAL: 510 MS
QTC CALCULATION(BAZETT): 326 MS
QTC CALCULATION(BAZETT): 494 MS
QTC CALCULATION(BAZETT): 516 MS
QTC CALCULATION(BAZETT): 521 MS
QTC FREDERICIA: 316 MS
QTC FREDERICIA: 482 MS
QTC FREDERICIA: 499 MS
QTC FREDERICIA: 518 MS
R AXIS: -20 DEGREES
R AXIS: -21 DEGREES
R AXIS: -32 DEGREES
R AXIS: 4 DEGREES
T AXIS: -25 DEGREES
T AXIS: -38 DEGREES
T AXIS: -43 DEGREES
T AXIS: 238 DEGREES
T OFFSET: 371 MS
T OFFSET: 434 MS
T OFFSET: 477 MS
T OFFSET: 479 MS
VENTRICULAR RATE: 57 BPM
VENTRICULAR RATE: 63 BPM
VENTRICULAR RATE: 73 BPM
VENTRICULAR RATE: 92 BPM

## 2024-12-12 ENCOUNTER — OFFICE VISIT (OUTPATIENT)
Dept: CARDIOLOGY | Facility: CLINIC | Age: 74
End: 2024-12-12
Payer: MEDICARE

## 2024-12-12 VITALS
HEART RATE: 61 BPM | WEIGHT: 220 LBS | HEIGHT: 68 IN | BODY MASS INDEX: 33.34 KG/M2 | SYSTOLIC BLOOD PRESSURE: 125 MMHG | DIASTOLIC BLOOD PRESSURE: 72 MMHG | OXYGEN SATURATION: 100 %

## 2024-12-12 DIAGNOSIS — I48.91 ATRIAL FIBRILLATION, UNSPECIFIED TYPE (MULTI): ICD-10-CM

## 2024-12-12 DIAGNOSIS — R94.31 ABNORMAL EKG: Primary | ICD-10-CM

## 2024-12-12 PROCEDURE — 1036F TOBACCO NON-USER: CPT | Performed by: NURSE PRACTITIONER

## 2024-12-12 PROCEDURE — 1159F MED LIST DOCD IN RCRD: CPT | Performed by: NURSE PRACTITIONER

## 2024-12-12 PROCEDURE — 1157F ADVNC CARE PLAN IN RCRD: CPT | Performed by: NURSE PRACTITIONER

## 2024-12-12 PROCEDURE — 1111F DSCHRG MED/CURRENT MED MERGE: CPT | Performed by: NURSE PRACTITIONER

## 2024-12-12 PROCEDURE — 3078F DIAST BP <80 MM HG: CPT | Performed by: NURSE PRACTITIONER

## 2024-12-12 PROCEDURE — 3074F SYST BP LT 130 MM HG: CPT | Performed by: NURSE PRACTITIONER

## 2024-12-12 PROCEDURE — 99214 OFFICE O/P EST MOD 30 MIN: CPT | Performed by: NURSE PRACTITIONER

## 2024-12-12 PROCEDURE — 1160F RVW MEDS BY RX/DR IN RCRD: CPT | Performed by: NURSE PRACTITIONER

## 2024-12-12 PROCEDURE — 1123F ACP DISCUSS/DSCN MKR DOCD: CPT | Performed by: NURSE PRACTITIONER

## 2024-12-12 PROCEDURE — G2211 COMPLEX E/M VISIT ADD ON: HCPCS | Performed by: NURSE PRACTITIONER

## 2024-12-12 PROCEDURE — 93010 ELECTROCARDIOGRAM REPORT: CPT | Performed by: INTERNAL MEDICINE

## 2024-12-12 PROCEDURE — 93005 ELECTROCARDIOGRAM TRACING: CPT | Performed by: NURSE PRACTITIONER

## 2024-12-12 RX ORDER — METOPROLOL SUCCINATE 25 MG/1
25 TABLET, EXTENDED RELEASE ORAL 2 TIMES DAILY
COMMUNITY

## 2024-12-12 ASSESSMENT — ENCOUNTER SYMPTOMS
MUSCULOSKELETAL NEGATIVE: 1
GASTROINTESTINAL NEGATIVE: 1
DEPRESSION: 0
NEUROLOGICAL NEGATIVE: 1
RESPIRATORY NEGATIVE: 1
CARDIOVASCULAR NEGATIVE: 1

## 2024-12-12 NOTE — PROGRESS NOTES
"Chief Complaint:   Follow up    History Of Present Illness:    .Mr Belle had Tikosyn loading last month.  Did notice some breakthru afib and was again put on metoprolol by EP.  Denies chest pain, sob, palpitations or pedal edema.  Is fatigued, but takes Tramadol by day for back pain.         Last Recorded Vitals:  Blood pressure 125/72, pulse 61, height 1.727 m (5' 8\"), weight 99.8 kg (220 lb), SpO2 100%.     Past Medical History:  Past Medical History:   Diagnosis Date    A-fib (Multi)     Sleep apnea         Past Surgical History:  Past Surgical History:   Procedure Laterality Date    ABLATION OF DYSRHYTHMIC FOCUS      BACK SURGERY  02/14/2024    l4-5 fusion    CORONARY STENT PLACEMENT      EYE SURGERY      HERNIA REPAIR  06/20/2014    Inguinal Hernia Repair    JOINT REPLACEMENT Bilateral     hip    JOINT REPLACEMENT Left     knee and shoulder    OTHER SURGICAL HISTORY  10/06/2022    Cystoscopy    SPINE SURGERY      VASECTOMY         Social History:  Social History     Socioeconomic History    Marital status:    Tobacco Use    Smoking status: Former     Current packs/day: 1.50     Average packs/day: 1.5 packs/day for 3.0 years (4.5 ttl pk-yrs)     Types: Cigarettes    Smokeless tobacco: Never   Vaping Use    Vaping status: Never Used   Substance and Sexual Activity    Alcohol use: Yes     Alcohol/week: 8.0 standard drinks of alcohol     Types: 4 Glasses of wine, 4 Shots of liquor per week    Drug use: Never    Sexual activity: Not Currently     Partners: Female     Birth control/protection: Male Sterilization     Social Drivers of Health     Financial Resource Strain: Low Risk  (11/21/2024)    Overall Financial Resource Strain (CARDIA)     Difficulty of Paying Living Expenses: Not hard at all   Food Insecurity: No Food Insecurity (10/18/2024)    Hunger Vital Sign     Worried About Running Out of Food in the Last Year: Never true     Ran Out of Food in the Last Year: Never true   Transportation Needs: No " Transportation Needs (11/21/2024)    PRAPARE - Transportation     Lack of Transportation (Medical): No     Lack of Transportation (Non-Medical): No   Physical Activity: Inactive (10/18/2024)    Exercise Vital Sign     Days of Exercise per Week: 0 days     Minutes of Exercise per Session: 0 min   Stress: No Stress Concern Present (5/8/2024)    [employer] [place] [employer] - Occupational Stress Questionnaire     Feeling of Stress : Not at all   Social Connections: Moderately Integrated (5/8/2024)    Social Connection and Isolation Panel [NHANES]     Frequency of Communication with Friends and Family: More than three times a week     Frequency of Social Gatherings with Friends and Family: Three times a week     Attends [employer] Services: 1 to 4 times per year     Active Member of Clubs or Organizations: No     Attends Club or Organization Meetings: Never     Marital Status:    Intimate Partner Violence: Not At Risk (10/18/2024)    Humiliation, Afraid, Rape, and Kick questionnaire     Fear of Current or Ex-Partner: No     Emotionally Abused: No     Physically Abused: No     Sexually Abused: No   Housing Stability: Low Risk  (11/21/2024)    Housing Stability Vital Sign     Unable to Pay for Housing in the Last Year: No     Number of Times Moved in the Last Year: 0     Homeless in the Last Year: No       Family History:  Family History   Problem Relation Name Age of Onset    Cancer Mother Selena     Alcohol abuse Mother Selena     Heart disease Father Pro Sr.     Arthritis Father Pro Sr.     Heart attack Father Pro Sr.     Cancer Sister      Coronary artery disease Brother           Allergies:  Patient has no known allergies.    Outpatient Medications:  Current Outpatient Medications   Medication Sig Dispense Refill    acetaminophen (Tylenol) 500 mg tablet Take 2 tablets (1,000 mg) by mouth once daily as needed (back pain). Do not crush, chew, or split.      amLODIPine (Norvasc) 5 mg tablet Take 1  tablet (5 mg) by mouth once daily. 30 tablet 11    armodafinil (Nuvigil) 250 mg tablet Take 1 tablet (250 mg) by mouth once daily in the morning.      aspirin 81 mg EC tablet Take 1 tablet (81 mg) by mouth once daily.      atorvastatin (Lipitor) 80 mg tablet TAKE 1 TABLET BY MOUTH AT  BEDTIME 90 tablet 3    cholecalciferol (Vitamin D-3) 50 MCG (2000 UT) tablet Take 1 tablet (2,000 Units) by mouth once daily.      cyanocobalamin (Vitamin B-12) 1,000 mcg tablet Take 1 tablet (1,000 mcg) by mouth once daily.      docusate sodium (Colace) 100 mg capsule Take 1 capsule (100 mg) by mouth once daily.      dofetilide (Tikosyn) 250 mcg capsule Take 1 capsule (250 mcg) by mouth every 12 hours. 60 capsule 0    [START ON 12/20/2024] dofetilide (Tikosyn) 250 mcg capsule Take 1 capsule (250 mcg) by mouth every 12 hours. Do not fill before December 20, 2024. 180 capsule 3    ezetimibe (Zetia) 10 mg tablet TAKE 1 TABLET BY MOUTH AT  BEDTIME 90 tablet 3    folic acid (Folvite) 800 mcg tablet Take 1 mg by mouth once daily.      gabapentin (Neurontin) 600 mg tablet Take 1 tablet (600 mg) by mouth 2 times a day.      lisinopril 40 mg tablet TAKE 1 TABLET BY MOUTH ONCE  DAILY 90 tablet 3    omeprazole (PriLOSEC) 20 mg DR capsule Take 1 capsule (20 mg) by mouth once daily. 90 capsule 1    rivaroxaban (Xarelto) 20 mg tablet Take 1 tablet (20 mg) by mouth once daily in the evening. Take with meals. With a meal      tamsulosin (Flomax) 0.4 mg 24 hr capsule Take 2 capsules (0.8 mg) by mouth once daily. 180 capsule 3    tofacitinib ER (Xeljanz XR) 11 mg tablet extended release 24 hr Take 1 tablet (11 mg) by mouth once daily. Do not crush, chew or split. Swallow whole.      traMADol (Ultram) 50 mg tablet Take 1 tablet (50 mg) by mouth 3 times a day as needed for moderate pain (4 - 6).       No current facility-administered medications for this visit.        Physical Exam:  Cardiovascular:      PMI at left midclavicular line. Normal rate.  Regular rhythm. Normal S1. Normal S2.       Murmurs: There is no murmur.      No gallop.  No click. No rub.   Pulses:     Intact distal pulses.   Edema:     Peripheral edema absent.         ROS:  Review of Systems   Constitutional: Positive for malaise/fatigue.   Cardiovascular: Negative.    Respiratory: Negative.     Skin: Negative.    Musculoskeletal: Negative.    Gastrointestinal: Negative.    Genitourinary: Negative.    Neurological: Negative.           Last Labs:  CBC -  Lab Results   Component Value Date    WBC 7.3 11/20/2024    HGB 14.0 11/20/2024    HCT 41.2 11/20/2024     (H) 11/20/2024     11/20/2024       CMP -  Lab Results   Component Value Date    CALCIUM 9.4 11/24/2024    PHOS 2.1 (L) 05/25/2022    PROT 6.0 (L) 10/19/2024    ALBUMIN 3.6 10/19/2024    AST 21 10/19/2024    ALT 19 10/19/2024    ALKPHOS 82 10/19/2024    BILITOT 0.6 10/19/2024       LIPID PANEL -   Lab Results   Component Value Date    CHOL 140 03/27/2024    TRIG 59 03/27/2024    HDL 74.9 03/27/2024    CHHDL 1.9 03/27/2024    LDLF 57 04/11/2023    VLDL 12 03/27/2024    NHDL 65 03/27/2024       RENAL FUNCTION PANEL -   Lab Results   Component Value Date    GLUCOSE 88 11/24/2024     11/24/2024    K 4.0 11/24/2024     (H) 11/24/2024    CO2 26 11/24/2024    ANIONGAP 13 11/24/2024    BUN 17 11/24/2024    CREATININE 0.74 11/24/2024    GFRMALE 90 08/16/2023    CALCIUM 9.4 11/24/2024    PHOS 2.1 (L) 05/25/2022    ALBUMIN 3.6 10/19/2024        Lab Results   Component Value Date    HGBA1C 5.3 10/19/2024         Assessment/Plan   Problem List Items Addressed This Visit    None    1. Coronary artery disease, status post PCI to the distal RCA 09/10/2019. This patient originally was identified as having low-grade CAD by cardiac catheterization 3/2003 [facility]. He did have a nuclear stress test performed in 1/2009 and again on 11/13/2015 negative for evidence of ischemia. He presented to [facility] 9/9/ 2019  with central chest discomfort and shortness of breath. It was detected that he was in atrial fibrillation with RVR. He had EKG and enzymatic evidence of a NSTEMI. He had cardiac cath September 10, 2019 with Dr. Mynor Hurst which showed single-vessel CAD in the distal third of the distal right coronary artery about 90% stenosis and distal to the origin of the PDA but prior to the origin of the trifurcating post inferior lateral LV branch. He had successful distal RCA stenting September 10, 2019 with Dr. Aniceto Richardson at [facility]. Continue aspirin 81 mg daily.     2. Paroxysmal atrial fibrillation. Please see previous office notes for review. This patient was recently admitted to [facility] from 04/15/2018 to 04/18/2018 with pleuritic-type chest pain along with cough and shortness of breath. The patient was identified by chest x-ray to have a left lower lobe infiltrate suggestive of pneumonia associated with pleuritis. He was also detected to have new onset or recurrent atrial fibrillation with a rapid ventricular rate. The patient was initially placed on an IV Cardizem infusion but ultimately was converted to metoprolol. The patient's ventricular rate remained rapid despite the initiation of metoprolol and as such amiodarone 200 mg daily was added. At the time of discharge on 04/18/2018 he was still in atrial fibrillation with a ventricular rate in the range of 110/m. He did have an echocardiogram performed during the admission on 04/16/2018 that demonstrated an LV ejection fraction of 60-64% with mild left atrial enlargement and trace to mild mitral valve regurgitation. At the time of his next follow-up visit it was found that by both EKG and examination he was back in sinus rhythm with sinus bradycardia. For now he will remain on the amiodarone 200 mg daily plus without will reduce metoprolol by switching metoprolol tartrate 50 mg twice a day to metoprolol succinate 50 mg daily. He will  remain on Xarelto 20 mg daily. He presented to [facility] September 9, 2019 with central chest discomfort and shortness of breath. It was detected that he was in atrial fibrillation with RVR. He converted to sinus rhythm with IV diltiazem infusion. Echocardiogram done at the time showed EF 60-64%, left atrial size mildly dilated, trace MR, trivial to mild TR. He is now back on on amiodarone 200 mg daily as well as Xarelto 20 mg daily which had been discontinued prior to the readmission. Had afib with RVR and failed DCCV. He saw Dr Nassar and had RFA 10/2/2020. ECG today shows likely aflutter. Will plan for DCCV next week. See Dr Nassar in follow up. Had DCCV 11/12/2020. Went out of rhythm again for a brief period 11/2021. Went out of rhythm for 4 days 01/2022. Patient had RFA done 05/24/2022 with Dr Nassar. ECG done prior shows NSR, RBBB.  The patient is no longer on amiodarone which was discontinued last visit.  He will remain on Xarelto anticoagulation.  He will be stopping 3 days before any type of back injection or lumbar surgery.  Patient will return in 8 weeks for follow-up.  The patient currently remains in sinus rhythm.  He underwent preadmission testing on 2/1/2024 for lumbar surgery EKG shows sinus bradycardia at 49/min occasional PVCs nonspecific ST-T abnormality.  Will reduce the dose of Toprol-XL from 100 mg daily to 50 mg daily.  The patient was recently admitted to [facility] from 5/8/2024 - 5/9/2024 with recurrent atrial fibrillation that converted quickly to sinus rhythm with IV diltiazem.  His metoprolol was increased from 50 mg twice daily to 75 mg twice daily.  He remains on Xarelto anticoagulation.  His high-sensitivity troponins were 126, 163, 218.  EKG today confirms maintenance of sinus bradycardia 56/min occasional PVCs right bundle branch block conduction delay.  Patient scheduled to see electrophysiology for follow-up on 6/12/2024. At most recent EP visit it was decided  to hold off on ablation at this time and the patient would continue to monitor for worsening symptoms. On 10/19 patient went to the ED for concerns of being back in afib, but was discharged as his rate was controlled and he was already on metoprolol and Xarelto. Today, the patient expresses concerns about being in afib for an extended period of time, as he usually goes back in sinus rhythm after 12-18 hours. The patient is scheduled to see EP next Wednesday. He was educated on the incidence of persistent afib and he was reassured that he is on the appropriate medications. Metoprolol was increased to 100 mg daily as his pulse was 100 in office.     3 Xarelto anticoagulation. Patient had spine injections scheduled for tomorrow but canceled appointment due to the need for interrupted anticoagulation if he is a candidate for another ablation.     4. Hypertension. Blood pressure is adequately controlled today. He will be continued on amlodipine 5 mg daily and lisinopril 20 mg daily. His dose of metoprolol was increased to 100 mg daily due to pulse of 100 at today's visit.     5. Hyperlipidemia. The patient did have lab work performed on 10/08/2019 including a lipid panel with cholesterol 218  HDL 61 triglyceride 154. These results are continue to be disappointing on his statin agent atorvastatin 80 mg daily. He will be started on supplemental Zetia 10 mg daily. 5/10/2022, Chol 141, LDL 67, HDL 60, trig 70. Most recent lipid panel on 3/24/2024 chol 140, HDL 74.9, LDL 53, trig 59.      6. Sleep apnea. Please see previous office notes. The patient was referred to sleep medicine and was in fact identified as having sleep apnea that was severe. The patient's initial sleep study was done at [facility] on 01/04/2018 and showed severe obstructive sleep apnea and he ultimately had a titration study on 02/22/2018. He was placed on ASV therapy with significant clinical improvement. His compliance has been 74% with  no leak.     7. BPH.      8. GERD.     9. Bilateral carpal tunnel release     10. S/P Left hip surgery 2007, right 2021. Plans for LTK 03/2023.     11. S/P Right inguinal hernia repair 4/2014, [facility].     12. Osteoarthritis.      13. Status post left shoulder replacement 11/10/2015 with repeat left shoulder replacement 02/22/2016 because of repetitive dislocation.     14. Lumbar Laminectomy, 10/01/2018 with Dr Cardenas at [facility].  Patient recently has been experiencing and further lower back pain.  His L4-L5 disc is shifted.  He is scheduled for an epidural injection on 12/22/2023 and possible surgery on 2/14/2024.  The patient is in fact scheduled for revision of an L4 laminectomy with excision of synovial cyst the right with robotic pedicle screw and interbody fusion 2/14/2024.  He is cleared from the cardiac standpoint for the procedure may hold Xarelto and aspirin 5 days preoperatively.  Lab work 8/16/2023 included sedimentation rate 4 CRP less than 0.10 hematocrit 36.0 uric acid 5.7 and normal electrolyte panel.  14 a.  Status post repeat lumbar surgery 2/14/2024 at L[facility].  Patient is starting therapy for his back and lower extremities within the next week.     15. Prostate Urolift surgery with Dr Werner, fall 2018.     16. Gen. medical. The patient had lab work on 10/08/2019 including glycohemoglobin 5.2%, TSH 1.38, PSA 1.6 with both CBC and SMA panels being normal.     17. Venous insufficiency.      18. Hx of psoriatic arthritis     19. Hx of covid-19 vaccine #1 and #2.      20. Planned LTK.          Mildred Valencia, APRN-CNP

## 2024-12-13 LAB
ATRIAL RATE: 62 BPM
P AXIS: 56 DEGREES
P OFFSET: 196 MS
P ONSET: 128 MS
PR INTERVAL: 188 MS
Q ONSET: 222 MS
QRS COUNT: 10 BEATS
QRS DURATION: 144 MS
QT INTERVAL: 484 MS
QTC CALCULATION(BAZETT): 491 MS
QTC FREDERICIA: 489 MS
R AXIS: -26 DEGREES
T AXIS: -14 DEGREES
T OFFSET: 464 MS
VENTRICULAR RATE: 62 BPM

## 2024-12-20 PROCEDURE — RXMED WILLOW AMBULATORY MEDICATION CHARGE

## 2024-12-23 ENCOUNTER — PHARMACY VISIT (OUTPATIENT)
Dept: PHARMACY | Facility: CLINIC | Age: 74
End: 2024-12-23
Payer: COMMERCIAL

## 2024-12-30 ENCOUNTER — APPOINTMENT (OUTPATIENT)
Dept: CARDIOLOGY | Facility: CLINIC | Age: 74
End: 2024-12-30
Payer: MEDICARE

## 2024-12-31 ENCOUNTER — PATIENT OUTREACH (OUTPATIENT)
Dept: PRIMARY CARE | Facility: CLINIC | Age: 74
End: 2024-12-31
Payer: MEDICARE

## 2024-12-31 DIAGNOSIS — M06.9 RHEUMATOID ARTHRITIS, INVOLVING UNSPECIFIED SITE, UNSPECIFIED WHETHER RHEUMATOID FACTOR PRESENT (MULTI): ICD-10-CM

## 2024-12-31 DIAGNOSIS — I10 BENIGN ESSENTIAL HYPERTENSION: ICD-10-CM

## 2024-12-31 PROCEDURE — 99490 CHRNC CARE MGMT STAFF 1ST 20: CPT | Performed by: FAMILY MEDICINE

## 2024-12-31 NOTE — PROGRESS NOTES
"Chart review completed prior to out reach.  Monthly CCM call completed with patient.  Patient states that he is doing well, out of A-fib.  Cardiologist adjusted his medications and has added a new medication that he has to take every 12 hours.  Reporting his heart rate is back to normal, feels much better.  Patient did say that he \"let himself go the past few years and is now committed to working on getting himself back into some sort of condition\".  Discussed various low impact exercises as well as dietary choices with patient.  Said he is sleeping well, not napping as much during the day.  Sleep doctor did put the patient on medication to decrease daytime sleepiness, and he feels that it is working as he is not napping throughout the day.  He is using a cane to assist with ambulation at this time.  Denies any recent falls or injuries, but states at times he does feel unsteady.  Medications reviewed no refills needed at time of call.  In agreement to future follow-up calls.  "

## 2025-01-06 ENCOUNTER — OFFICE VISIT (OUTPATIENT)
Dept: CARDIOLOGY | Facility: CLINIC | Age: 75
End: 2025-01-06
Payer: MEDICARE

## 2025-01-06 ENCOUNTER — TELEPHONE (OUTPATIENT)
Dept: SCHEDULING | Age: 75
End: 2025-01-06
Payer: MEDICARE

## 2025-01-06 VITALS
BODY MASS INDEX: 33.65 KG/M2 | HEART RATE: 52 BPM | WEIGHT: 222 LBS | HEIGHT: 68 IN | OXYGEN SATURATION: 95 % | SYSTOLIC BLOOD PRESSURE: 115 MMHG | DIASTOLIC BLOOD PRESSURE: 56 MMHG

## 2025-01-06 DIAGNOSIS — I48.91 ATRIAL FIBRILLATION, UNSPECIFIED TYPE (MULTI): Primary | ICD-10-CM

## 2025-01-06 DIAGNOSIS — G89.29 CHRONIC LOW BACK PAIN, UNSPECIFIED BACK PAIN LATERALITY, UNSPECIFIED WHETHER SCIATICA PRESENT: ICD-10-CM

## 2025-01-06 DIAGNOSIS — M54.50 CHRONIC LOW BACK PAIN, UNSPECIFIED BACK PAIN LATERALITY, UNSPECIFIED WHETHER SCIATICA PRESENT: ICD-10-CM

## 2025-01-06 PROCEDURE — 1159F MED LIST DOCD IN RCRD: CPT | Performed by: NURSE PRACTITIONER

## 2025-01-06 PROCEDURE — 93005 ELECTROCARDIOGRAM TRACING: CPT | Performed by: NURSE PRACTITIONER

## 2025-01-06 PROCEDURE — 99214 OFFICE O/P EST MOD 30 MIN: CPT | Performed by: NURSE PRACTITIONER

## 2025-01-06 PROCEDURE — 3078F DIAST BP <80 MM HG: CPT | Performed by: NURSE PRACTITIONER

## 2025-01-06 PROCEDURE — 1157F ADVNC CARE PLAN IN RCRD: CPT | Performed by: NURSE PRACTITIONER

## 2025-01-06 PROCEDURE — 1123F ACP DISCUSS/DSCN MKR DOCD: CPT | Performed by: NURSE PRACTITIONER

## 2025-01-06 PROCEDURE — 1160F RVW MEDS BY RX/DR IN RCRD: CPT | Performed by: NURSE PRACTITIONER

## 2025-01-06 PROCEDURE — 3074F SYST BP LT 130 MM HG: CPT | Performed by: NURSE PRACTITIONER

## 2025-01-06 PROCEDURE — 1036F TOBACCO NON-USER: CPT | Performed by: NURSE PRACTITIONER

## 2025-01-06 ASSESSMENT — PATIENT HEALTH QUESTIONNAIRE - PHQ9
SUM OF ALL RESPONSES TO PHQ9 QUESTIONS 1 AND 2: 0
1. LITTLE INTEREST OR PLEASURE IN DOING THINGS: NOT AT ALL
2. FEELING DOWN, DEPRESSED OR HOPELESS: NOT AT ALL

## 2025-01-07 LAB
ATRIAL RATE: 52 BPM
ATRIAL RATE: 56 BPM
P AXIS: 13 DEGREES
P AXIS: 67 DEGREES
P OFFSET: 168 MS
P OFFSET: 177 MS
P ONSET: 125 MS
P ONSET: 137 MS
PR INTERVAL: 174 MS
PR INTERVAL: 196 MS
Q ONSET: 223 MS
Q ONSET: 224 MS
QRS COUNT: 8 BEATS
QRS COUNT: 9 BEATS
QRS DURATION: 134 MS
QRS DURATION: 140 MS
QT INTERVAL: 460 MS
QT INTERVAL: 516 MS
QTC CALCULATION(BAZETT): 427 MS
QTC CALCULATION(BAZETT): 497 MS
QTC FREDERICIA: 438 MS
QTC FREDERICIA: 504 MS
R AXIS: -14 DEGREES
R AXIS: -31 DEGREES
T AXIS: -12 DEGREES
T AXIS: -7 DEGREES
T OFFSET: 454 MS
T OFFSET: 481 MS
VENTRICULAR RATE: 52 BPM
VENTRICULAR RATE: 56 BPM

## 2025-01-09 ASSESSMENT — ENCOUNTER SYMPTOMS
PND: 0
FALLS: 0
DIZZINESS: 0
SHORTNESS OF BREATH: 0
DYSPNEA ON EXERTION: 0
NEAR-SYNCOPE: 0
VOMITING: 0
SYNCOPE: 0
DIARRHEA: 0
SORE THROAT: 0
COUGH: 0
SNORING: 0
DIAPHORESIS: 0
FEVER: 0
DOUBLE VISION: 0
PALPITATIONS: 0
LIGHT-HEADEDNESS: 0
NAUSEA: 0
ORTHOPNEA: 0
ABDOMINAL PAIN: 0
IRREGULAR HEARTBEAT: 0
SPUTUM PRODUCTION: 0
MYALGIAS: 0
HEMOPTYSIS: 0
WEAKNESS: 0
HEADACHES: 0
BLURRED VISION: 0

## 2025-01-10 NOTE — PROGRESS NOTES
Subjective   Pro Belle Jr. is a 74 y.o. male.    Chief Complaint:  Follow-up    Pro Belle is a 74 year old with:      1. Coronary artery disease, status post PCI to the distal RCA 09/10/2019.   2. Persistent atrial fibrillation. Index diagnosis at [facility] from 04/15/2018 to 04/18/2018. Discharged with AF on Amiodarone. At the time of his next follow-up visit as an outpatient he was in sinus rhythm. He presented to [facility] system September 9, 2019 in atrial fibrillation with RVR. He converted to sinus rhythm with IV diltiazem infusion. He is on amiodarone 200 mg daily as well as Xarelto 20 mg daily. Recurred with AF. Had successful DCCV at [facility] 07/02/2020. Recurred with AF few days after.  3. Hypertension.  4. Hyperlipidemia.  5. Sleep apnea. The patient's initial sleep study was done at [facility] on 01/04/2018 and showed severe obstructive sleep apnea and he ultimately had a titration study on 02/22/2018. He was placed on ASV therapy with significant clinical improvement. His compliance has been 74% with no leak.  6. BPH.   7. GERD.  8. On 10/02/2020 he had EPS and PVI - his left atrial had some area of scar mainly in the roof (both during AF and NSR mapping) . Post PVI we induced right atrial tachycardia that terminated with RFA at the junction of the IVC and the right atria in the posterior wall. This was a focal tachycardia. He felt dramatically better in NSR. He recurred in November of 2020 with atrial flutter and was cardioverted by Dr Hurst. He did very well but 07/12/2021 presented at [facility] with what sounds to be AF. The episode lasted less than 24 hours and he reverted back into NSR on his own.    Echocardiogram 09/2019 shows EF 60-64%, left atrial size mildly dilated, trace MR, trivial to mild TR     s/p redo PVI and CTI RFA with Dr. Nassar 5/24/2022    Now s/p Dofetilide initiation 11/20-11/24/2024  He was discharged on 250 mcg BID and metoprolol was  "stopped due to bradycardia  Patient unfortunately had multple episodes of Afib/Aflutter lasting 3-6 hours post discharge.  Metoprolol was restarted and symptoms resolved  ECG 1/6/2025: SB with PAC/PVC, HR 56 bpm, RBBB,  ms    TODAY Patient is 1 month post Dofetilide initiation.  Patient has felt much better since resuming low dose metoprolol.  He denies any side effects from the meds or any issues with taking it.    /56 (BP Location: Left arm, Patient Position: Sitting, BP Cuff Size: Large adult)   Pulse 52   Ht 1.727 m (5' 8\")   Wt 101 kg (222 lb)   SpO2 95%   BMI 33.75 kg/m²   Current Outpatient Medications on File Prior to Visit   Medication Sig Dispense Refill    acetaminophen (Tylenol) 500 mg tablet Take 2 tablets (1,000 mg) by mouth once daily as needed (back pain). Do not crush, chew, or split.      amLODIPine (Norvasc) 5 mg tablet Take 1 tablet (5 mg) by mouth once daily. 30 tablet 11    armodafinil (Nuvigil) 250 mg tablet Take 1 tablet (250 mg) by mouth once daily in the morning.      aspirin 81 mg EC tablet Take 1 tablet (81 mg) by mouth once daily.      atorvastatin (Lipitor) 80 mg tablet TAKE 1 TABLET BY MOUTH AT  BEDTIME 90 tablet 3    cholecalciferol (Vitamin D-3) 50 MCG (2000 UT) tablet Take 1 tablet (2,000 Units) by mouth once daily.      cyanocobalamin (Vitamin B-12) 1,000 mcg tablet Take 1 tablet (1,000 mcg) by mouth once daily.      docusate sodium (Colace) 100 mg capsule Take 1 capsule (100 mg) by mouth once daily.      dofetilide (Tikosyn) 250 mcg capsule Take 1 capsule (250 mcg) by mouth every 12 hours. Do not fill before December 20, 2024. 180 capsule 3    ezetimibe (Zetia) 10 mg tablet TAKE 1 TABLET BY MOUTH AT  BEDTIME 90 tablet 3    folic acid (Folvite) 800 mcg tablet Take 1 mg by mouth once daily.      gabapentin (Neurontin) 600 mg tablet Take 1 tablet (600 mg) by mouth 2 times a day.      lisinopril 40 mg tablet TAKE 1 TABLET BY MOUTH ONCE  DAILY 90 tablet 3    " metoprolol succinate XL (Toprol-XL) 25 mg 24 hr tablet Take 1 tablet (25 mg) by mouth 2 times a day. Do not crush or chew.      omeprazole (PriLOSEC) 20 mg DR capsule Take 1 capsule (20 mg) by mouth once daily. 90 capsule 1    rivaroxaban (Xarelto) 20 mg tablet Take 1 tablet (20 mg) by mouth once daily in the evening. Take with meals. With a meal      tamsulosin (Flomax) 0.4 mg 24 hr capsule Take 2 capsules (0.8 mg) by mouth once daily. 180 capsule 3    tofacitinib ER (Xeljanz XR) 11 mg tablet extended release 24 hr Take 1 tablet (11 mg) by mouth once daily. Do not crush, chew or split. Swallow whole.      traMADol (Ultram) 50 mg tablet Take 1 tablet (50 mg) by mouth 3 times a day as needed for moderate pain (4 - 6).      [DISCONTINUED] dofetilide (Tikosyn) 250 mcg capsule Take 1 capsule (250 mcg) by mouth every 12 hours. 60 capsule 0     No current facility-administered medications on file prior to visit.         Review of Systems   Constitutional: Negative for diaphoresis, fever and malaise/fatigue.   HENT:  Negative for congestion and sore throat.    Eyes:  Negative for blurred vision and double vision.   Cardiovascular:  Negative for chest pain, dyspnea on exertion, irregular heartbeat, leg swelling, near-syncope, orthopnea, palpitations, paroxysmal nocturnal dyspnea and syncope.   Respiratory:  Negative for cough, hemoptysis, shortness of breath, snoring and sputum production.    Hematologic/Lymphatic: Negative for bleeding problem.   Skin:  Negative for rash.   Musculoskeletal:  Negative for falls, joint pain and myalgias.   Gastrointestinal:  Negative for abdominal pain, diarrhea, nausea and vomiting.   Neurological:  Negative for dizziness, headaches, light-headedness and weakness.   All other systems reviewed and are negative.      Objective   Constitutional:       Appearance: Healthy appearance. Not in distress.   Eyes:      Conjunctiva/sclera: Conjunctivae normal.   HENT:      Nose: Nose normal.     Mouth/Throat:      Pharynx: Oropharynx is clear.   Pulmonary:      Effort: Pulmonary effort is normal.      Breath sounds: Normal breath sounds. No wheezing. No rhonchi.   Chest:      Chest wall: Not tender to palpatation.   Cardiovascular:      Bradycardia present. Regular rhythm.      Murmurs: There is no murmur.      No rub.   Pulses:     Intact distal pulses.   Edema:     Peripheral edema absent.   Abdominal:      General: Bowel sounds are normal.      Palpations: Abdomen is soft.   Musculoskeletal: Normal range of motion.      Cervical back: Neck supple. Skin:     General: Skin is warm and dry.   Neurological:      Mental Status: Alert and oriented to person, place and time.      Motor: Motor function is intact.         Lab Review:   Lab Results   Component Value Date     11/24/2024    K 4.0 11/24/2024     (H) 11/24/2024    CO2 26 11/24/2024    BUN 17 11/24/2024    CREATININE 0.74 11/24/2024    GLUCOSE 88 11/24/2024    CALCIUM 9.4 11/24/2024     Lab Results   Component Value Date    WBC 7.3 11/20/2024    HGB 14.0 11/20/2024    HCT 41.2 11/20/2024     (H) 11/20/2024     11/20/2024       Assessment/Plan   The encounter diagnosis was Atrial fibrillation, unspecified type (Multi).  Patient maintaining NSR on low dose metoprolol and Dofetilide.  He has been feeling good.   Tikosyn education reinforced.  Patient encouraged to take it every 12 hours. Do not double dose if a dose it missed, just take the next dose.  If 3 or more doses are missed in a row, patient will have to be reinitiated on the medication in the hospital. Patient encouraged to tell all prescribers that he is on Dofetilide due to potential drug interactions.  ECG recommended every 6 months, along with BMP/Magnesium levels  All questions asked and answered  Continue metoprolol, xarelto, and Dofetilide    Follow up with me in 6 months or sooner as needed

## 2025-01-10 NOTE — PROGRESS NOTES
Pharmacist Clinic: Cardiology Management    Pro Belle Jr. is a 74 y.o. male was referred to Clinical Pharmacy Team for anticoagulation management.     Referring Provider: Mildred Valencia APRN*    THIS IS A FOLLOW UP PATIENT APPOINTMENT. AT LAST VISIT ON 7/11/2024 WITH PHARMACIST (Eva Ordonez).    Appointment was completed by the patient who was reached at .    REVIEW OF PAST APPNT (IF APPLICABLE):   Overall, patient is doing well on anticoagulation therapy with Xarelto. Notes he bruises easier than he did in the past, but is always able to attribute this to a known cause such as bumping his arm. States bruises go away quickly, denies any blood in stool or change in color of stool. Discussed increased risk of bleeding when drinking alcohol while taking Xarelto. Will follow up with patient in 6 months to ensure continued tolerability of pharmacotherapy.    No Known Allergies    Past Medical History:   Diagnosis Date    A-fib (Multi)     Abnormal ECG     Sleep apnea        Current Outpatient Medications on File Prior to Visit   Medication Sig Dispense Refill    acetaminophen (Tylenol) 500 mg tablet Take 2 tablets (1,000 mg) by mouth once daily as needed (back pain). Do not crush, chew, or split.      amLODIPine (Norvasc) 5 mg tablet Take 1 tablet (5 mg) by mouth once daily. 30 tablet 11    armodafinil (Nuvigil) 250 mg tablet Take 1 tablet (250 mg) by mouth once daily in the morning.      aspirin 81 mg EC tablet Take 1 tablet (81 mg) by mouth once daily.      atorvastatin (Lipitor) 80 mg tablet TAKE 1 TABLET BY MOUTH AT  BEDTIME 90 tablet 3    cholecalciferol (Vitamin D-3) 50 MCG (2000 UT) tablet Take 1 tablet (2,000 Units) by mouth once daily.      cyanocobalamin (Vitamin B-12) 1,000 mcg tablet Take 1 tablet (1,000 mcg) by mouth once daily.      docusate sodium (Colace) 100 mg capsule Take 1 capsule (100 mg) by mouth once daily.      dofetilide (Tikosyn) 250 mcg capsule Take 1 capsule (250 mcg) by  "mouth every 12 hours. Do not fill before December 20, 2024. 180 capsule 3    ezetimibe (Zetia) 10 mg tablet TAKE 1 TABLET BY MOUTH AT  BEDTIME 90 tablet 3    folic acid (Folvite) 800 mcg tablet Take 1 mg by mouth once daily.      gabapentin (Neurontin) 600 mg tablet Take 1 tablet (600 mg) by mouth 2 times a day.      lisinopril 40 mg tablet TAKE 1 TABLET BY MOUTH ONCE  DAILY 90 tablet 3    metoprolol succinate XL (Toprol-XL) 25 mg 24 hr tablet Take 1 tablet (25 mg) by mouth 2 times a day. Do not crush or chew.      omeprazole (PriLOSEC) 20 mg DR capsule Take 1 capsule (20 mg) by mouth once daily. 90 capsule 1    rivaroxaban (Xarelto) 20 mg tablet Take 1 tablet (20 mg) by mouth once daily in the evening. Take with meals. With a meal      tamsulosin (Flomax) 0.4 mg 24 hr capsule Take 2 capsules (0.8 mg) by mouth once daily. 180 capsule 3    tofacitinib ER (Xeljanz XR) 11 mg tablet extended release 24 hr Take 1 tablet (11 mg) by mouth once daily. Do not crush, chew or split. Swallow whole.      traMADol (Ultram) 50 mg tablet Take 1 tablet (50 mg) by mouth 3 times a day as needed for moderate pain (4 - 6).       No current facility-administered medications on file prior to visit.         RELEVANT LAB RESULTS  Lab Results   Component Value Date    BILITOT 0.6 10/19/2024    CALCIUM 9.4 11/24/2024    CO2 26 11/24/2024     (H) 11/24/2024    CREATININE 0.74 11/24/2024    GLUCOSE 88 11/24/2024    ALKPHOS 82 10/19/2024    K 4.0 11/24/2024    PROT 6.0 (L) 10/19/2024     11/24/2024    AST 21 10/19/2024    ALT 19 10/19/2024    BUN 17 11/24/2024    ANIONGAP 13 11/24/2024    MG 2.26 11/24/2024    PHOS 2.1 (L) 05/25/2022    ALBUMIN 3.6 10/19/2024    GFRMALE 90 08/16/2023     Lab Results   Component Value Date    TRIG 59 03/27/2024    CHOL 140 03/27/2024    LDLCALC 53 03/27/2024    HDL 74.9 03/27/2024     No results found for: \"BMCBC\", \"CBCDIF\"     PHARMACEUTICAL ASSESSMENT    MEDICATION RECONCILIATION    Drug " Interactions? No clinically significant drug interactions requiring change in therapy found at the time of this visit.     Medication Documentation Review Audit       Reviewed by ANA Hdez (Nurse Practitioner) on 01/09/25 at 2113      Medication Order Taking? Sig Documenting Provider Last Dose Status   acetaminophen (Tylenol) 500 mg tablet [id number] Yes Take 2 tablets (1,000 mg) by mouth once daily as needed (back pain). Do not crush, chew, or split. Low Reyez MD  Active   amLODIPine (Norvasc) 5 mg tablet [id number] Yes Take 1 tablet (5 mg) by mouth once daily. Mynor Hurst MD  Active   armodafinil (Nuvigil) 250 mg tablet [id number] Yes Take 1 tablet (250 mg) by mouth once daily in the morning. Historical Provider, MD  Active   aspirin 81 mg EC tablet [id number] Yes Take 1 tablet (81 mg) by mouth once daily. Historical Provider, MD  Active   atorvastatin (Lipitor) 80 mg tablet [id number] Yes TAKE 1 TABLET BY MOUTH AT  BEDTIME Mynor Hurst MD  Active   cholecalciferol (Vitamin D-3) 50 MCG (2000 UT) tablet [id number] Yes Take 1 tablet (2,000 Units) by mouth once daily. Historical Provider, MD  Active   cyanocobalamin (Vitamin B-12) 1,000 mcg tablet [id number] Yes Take 1 tablet (1,000 mcg) by mouth once daily. Historical Provider, MD  Active   docusate sodium (Colace) 100 mg capsule [id number] Yes Take 1 capsule (100 mg) by mouth once daily. Historical Provider, MD  Active     Discontinued 01/06/25 1432   dofetilide (Tikosyn) 250 mcg capsule [id number] Yes Take 1 capsule (250 mcg) by mouth every 12 hours. Do not fill before December 20, 2024. Kelechi Nassar MD  Active   ezetimibe (Zetia) 10 mg tablet [id number] Yes TAKE 1 TABLET BY MOUTH AT  BEDTIME Mynor Hurst MD  Active   folic acid (Folvite) 800 mcg tablet [id number] Yes Take 1 mg by mouth once daily. Historical Provider, MD  Active   gabapentin (Neurontin) 600 mg tablet [id number] Yes Take 1 tablet (600 mg) by mouth 2 times a day. Historical  Provider, MD  Active   lisinopril 40 mg tablet [id number] Yes TAKE 1 TABLET BY MOUTH ONCE  DAILY Ruben Bradford MD  Active   metoprolol succinate XL (Toprol-XL) 25 mg 24 hr tablet [id number] Yes Take 1 tablet (25 mg) by mouth 2 times a day. Do not crush or chew. Historical Provider, MD  Active   omeprazole (PriLOSEC) 20 mg DR capsule [id number] Yes Take 1 capsule (20 mg) by mouth once daily. Ruben Bradford MD  Active   rivaroxaban (Xarelto) 20 mg tablet [id number] Yes Take 1 tablet (20 mg) by mouth once daily in the evening. Take with meals. With a meal Low Reyez MD  Active   tamsulosin (Flomax) 0.4 mg 24 hr capsule [id number] Yes Take 2 capsules (0.8 mg) by mouth once daily. Citlaly Shane MD  Active   tofacitinib ER (Xeljanz XR) 11 mg tablet extended release 24 hr [id number] Yes Take 1 tablet (11 mg) by mouth once daily. Do not crush, chew or split. Swallow whole. Historical Provider, MD  Active   traMADol (Ultram) 50 mg tablet [id number] Yes Take 1 tablet (50 mg) by mouth 3 times a day as needed for moderate pain (4 - 6). Historical Provider, MD  Active                    DISEASE MANAGEMENT ASSESSMENT:     ANTICOAGULATION ASSESSMENT    The ASCVD Risk score (Alphonso ORELLANA, et al., 2019) failed to calculate for the following reasons:    Risk score cannot be calculated because patient has a medical history suggesting prior/existing ASCVD    DIAGNOSIS: prevention of nonvalvular atrial fibrilliation stroke and systemic embolism  - Patient is projected to be on anticoagulation depending on if in afib  - [id number] Score: [4] (only included if diagnosis is atrial fibrillation)   Age: [<65 (0)] [65-74 (+1)] [> 75 (+2)]: 2  Sex: [Male/Female (+1)]: 0  CHF history: [No/Yes(+1)]: 0  Hypertension history: [No/Yes(+1)]: 1  Stroke/TIA/thromboembolism history: [No/Yes(+2)]: 0  Vascular disease history (prior MI, peripheral artery disease, aortic plaque): [No/Yes(+1)]: 1  Diabetes history: [No/Yes(+1)]: 0    CURRENT  PHARMACOTHERAPY:    Xarelto 20 mg every day  CrCl 121 mL/min  Age 74 years  Weight 101 kg  Scr 0.74 mg/dL (11/24/2024)    RELEVANT PAST MEDICAL HISTORY:   HTN, HLD, A-fib with RVR, prediabetes    Affordability/Accessibility:  PAP for Xarelto  Adherence/Organization: confirms taking Xarelto daily at 8AM with food; denies any missed doses   Adverse Reactions: no abnormal bruising/bleeding; no dark stools or hematemesis  Recent Hospitalizations: Yes, 11/20-11/24/2024 for Tikosyn initiation  Recent Falls/Trauma: None, ensures he takes steps carefully   Changes in Tobacco or Alcohol Intake:   Tobacco: None  Alcohol: Yes, will drink 1-2 glasses of wine/beer with dinner a few times per week    EDUCATION/COUNSELING:   - Counseled patient on MOA, expectations, duration of therapy, contraindications, administration, and monitoring parameters  - Counseled patient of side effects that are indicative of bleeding such as dark tarry stool, unexplainable bruising, or vomiting up a coffee ground like substance      DISCUSSION/NOTES:   Today's appointment was 6 month follow up regarding anticoagulation with Xarelto. Pro is doing well, reports no abnormal bruising or bleeding. He confirms daily adherence and denies any missed doses. Discussed that Xarelto is recommended to be taken with food with patient confirms he has been doing.   Discussed  PAP renewal, which will be due prior to 4/11/2025 and will be completed at next appointment. Patient is aware to have documents available at that time.    ASSESSMENT AND PLAN:    Assessment/Plan   Problem List Items Addressed This Visit       Atrial fibrillation (Multi)     Pro continues on anticoagulation with Xarelto. No dosage adjustment needed for renal function.         Relevant Orders    Referral to Clinical Pharmacy     RECOMMENDATIONS/PLAN    Continue: Xarelto 20 mg every day   Follow up: 2 months for  PAP renewal    Last Appointment with Referring Provider: 10/24/2024  Next  Appointment with Referring Provider: 2/19/2025  Clinical Pharmacist follow up: 3/11/2025  VAF/Application Expiration: Yes    Date: 4/11/2025  Type of Encounter: Deepak Ordonez PharmD    Verbal consent to manage patient's drug therapy was obtained from the patient . They were informed they may decline to participate or withdraw from participation in pharmacy services at any time.    Continue all meds under the continuation of care with the referring provider and clinical pharmacy team.

## 2025-01-13 ENCOUNTER — APPOINTMENT (OUTPATIENT)
Dept: PHARMACY | Facility: HOSPITAL | Age: 75
End: 2025-01-13
Payer: MEDICARE

## 2025-01-13 DIAGNOSIS — I48.91 ATRIAL FIBRILLATION, UNSPECIFIED TYPE (MULTI): ICD-10-CM

## 2025-01-13 NOTE — Clinical Note
Jocelin Levy, Today's appointment was 6 month follow up regarding anticoagulation with Xarelto. Pro is doing well, reports no abnormal bruising or bleeding. He confirms daily adherence and denies any missed doses. Will follow up in 2 months for  PAP renewal.

## 2025-01-16 DIAGNOSIS — K21.9 GASTROESOPHAGEAL REFLUX DISEASE, UNSPECIFIED WHETHER ESOPHAGITIS PRESENT: Chronic | ICD-10-CM

## 2025-01-16 RX ORDER — OMEPRAZOLE 20 MG/1
20 CAPSULE, DELAYED RELEASE ORAL DAILY
Qty: 90 CAPSULE | Refills: 3 | Status: SHIPPED | OUTPATIENT
Start: 2025-01-16

## 2025-01-24 LAB
ATRIAL RATE: 56 BPM
P AXIS: 67 DEGREES
P OFFSET: 168 MS
P ONSET: 125 MS
PR INTERVAL: 196 MS
Q ONSET: 223 MS
QRS COUNT: 9 BEATS
QRS DURATION: 134 MS
QT INTERVAL: 516 MS
QTC CALCULATION(BAZETT): 497 MS
QTC FREDERICIA: 504 MS
R AXIS: -14 DEGREES
T AXIS: -7 DEGREES
T OFFSET: 481 MS
VENTRICULAR RATE: 56 BPM

## 2025-01-27 ENCOUNTER — PATIENT OUTREACH (OUTPATIENT)
Dept: PRIMARY CARE | Facility: CLINIC | Age: 75
End: 2025-01-27
Payer: MEDICARE

## 2025-01-27 DIAGNOSIS — M06.9 RHEUMATOID ARTHRITIS, INVOLVING UNSPECIFIED SITE, UNSPECIFIED WHETHER RHEUMATOID FACTOR PRESENT (MULTI): ICD-10-CM

## 2025-01-27 DIAGNOSIS — I10 BENIGN ESSENTIAL HYPERTENSION: ICD-10-CM

## 2025-01-27 PROCEDURE — 99490 CHRNC CARE MGMT STAFF 1ST 20: CPT | Performed by: FAMILY MEDICINE

## 2025-01-27 NOTE — PROGRESS NOTES
"Chart review completed prior to outreach.  Monthly CCM call completed with patient.  Patient stated that things have been going good, no new issues to report.  Said that he has his first appointment tomorrow with pain management to see if they are able to help him manage his pain.  Reporting that last night he slept for the first time completely through the night not waking up 2-3 times as he normally does.  Discussed with patient changes that he has made, but may have helped him sleep through the night.  Patient states stated this was the first year that he is ever \"suspect a reaction to the cold\".  Says that if he is outside for a day while, the next day he has \"reaction to the cold\".  Medications reviewed, patient stated that he does have 1 medication he needs a refill on that he is unable to remember the name of that medication.  Says that he was contacted either this  or the office with the refill request.  In agreement to future follow-up calls.  "

## 2025-01-29 ENCOUNTER — OFFICE VISIT (OUTPATIENT)
Dept: PAIN MEDICINE | Facility: CLINIC | Age: 75
End: 2025-01-29
Payer: MEDICARE

## 2025-01-29 ENCOUNTER — PREP FOR PROCEDURE (OUTPATIENT)
Dept: PAIN MEDICINE | Facility: CLINIC | Age: 75
End: 2025-01-29
Payer: MEDICARE

## 2025-01-29 ENCOUNTER — HOSPITAL ENCOUNTER (OUTPATIENT)
Dept: RADIOLOGY | Facility: CLINIC | Age: 75
Discharge: HOME | End: 2025-01-29
Payer: MEDICARE

## 2025-01-29 VITALS
BODY MASS INDEX: 33.65 KG/M2 | HEART RATE: 66 BPM | WEIGHT: 222 LBS | HEIGHT: 68 IN | OXYGEN SATURATION: 97 % | SYSTOLIC BLOOD PRESSURE: 130 MMHG | RESPIRATION RATE: 22 BRPM | DIASTOLIC BLOOD PRESSURE: 70 MMHG

## 2025-01-29 DIAGNOSIS — M54.16 LUMBAR RADICULOPATHY: Primary | ICD-10-CM

## 2025-01-29 DIAGNOSIS — G89.29 CHRONIC LOW BACK PAIN, UNSPECIFIED BACK PAIN LATERALITY, UNSPECIFIED WHETHER SCIATICA PRESENT: ICD-10-CM

## 2025-01-29 DIAGNOSIS — Z79.891 ENCOUNTER FOR LONG-TERM OPIATE ANALGESIC USE: ICD-10-CM

## 2025-01-29 DIAGNOSIS — M54.50 CHRONIC LOW BACK PAIN, UNSPECIFIED BACK PAIN LATERALITY, UNSPECIFIED WHETHER SCIATICA PRESENT: ICD-10-CM

## 2025-01-29 DIAGNOSIS — M54.16 LUMBAR RADICULOPATHY: ICD-10-CM

## 2025-01-29 PROCEDURE — 1123F ACP DISCUSS/DSCN MKR DOCD: CPT | Performed by: ANESTHESIOLOGY

## 2025-01-29 PROCEDURE — 99204 OFFICE O/P NEW MOD 45 MIN: CPT | Performed by: ANESTHESIOLOGY

## 2025-01-29 PROCEDURE — 1157F ADVNC CARE PLAN IN RCRD: CPT | Performed by: ANESTHESIOLOGY

## 2025-01-29 PROCEDURE — G2211 COMPLEX E/M VISIT ADD ON: HCPCS | Performed by: ANESTHESIOLOGY

## 2025-01-29 PROCEDURE — 99214 OFFICE O/P EST MOD 30 MIN: CPT | Performed by: ANESTHESIOLOGY

## 2025-01-29 PROCEDURE — 1159F MED LIST DOCD IN RCRD: CPT | Performed by: ANESTHESIOLOGY

## 2025-01-29 PROCEDURE — 1036F TOBACCO NON-USER: CPT | Performed by: ANESTHESIOLOGY

## 2025-01-29 PROCEDURE — 72100 X-RAY EXAM L-S SPINE 2/3 VWS: CPT

## 2025-01-29 PROCEDURE — 3078F DIAST BP <80 MM HG: CPT | Performed by: ANESTHESIOLOGY

## 2025-01-29 PROCEDURE — 1125F AMNT PAIN NOTED PAIN PRSNT: CPT | Performed by: ANESTHESIOLOGY

## 2025-01-29 PROCEDURE — 3075F SYST BP GE 130 - 139MM HG: CPT | Performed by: ANESTHESIOLOGY

## 2025-01-29 RX ORDER — TRAMADOL HYDROCHLORIDE 50 MG/1
50 TABLET ORAL EVERY 8 HOURS PRN
Qty: 90 TABLET | Refills: 2 | Status: SHIPPED | OUTPATIENT
Start: 2025-01-29 | End: 2025-04-29

## 2025-01-29 ASSESSMENT — ENCOUNTER SYMPTOMS
ARTHRALGIAS: 1
NECK STIFFNESS: 1
BACK PAIN: 1
ACTIVITY CHANGE: 1
NECK PAIN: 1

## 2025-01-29 ASSESSMENT — LIFESTYLE VARIABLES
HOW OFTEN DO YOU HAVE A DRINK CONTAINING ALCOHOL: 2-3 TIMES A WEEK
AUDIT-C TOTAL SCORE: 3
HOW MANY STANDARD DRINKS CONTAINING ALCOHOL DO YOU HAVE ON A TYPICAL DAY: 1 OR 2
HOW OFTEN DO YOU HAVE SIX OR MORE DRINKS ON ONE OCCASION: NEVER
SKIP TO QUESTIONS 9-10: 1

## 2025-01-29 ASSESSMENT — PATIENT HEALTH QUESTIONNAIRE - PHQ9
SUM OF ALL RESPONSES TO PHQ9 QUESTIONS 1 & 2: 0
1. LITTLE INTEREST OR PLEASURE IN DOING THINGS: NOT AT ALL
2. FEELING DOWN, DEPRESSED OR HOPELESS: NOT AT ALL

## 2025-01-29 ASSESSMENT — PAIN - FUNCTIONAL ASSESSMENT: PAIN_FUNCTIONAL_ASSESSMENT: 0-10

## 2025-01-29 ASSESSMENT — PAIN DESCRIPTION - DESCRIPTORS: DESCRIPTORS: ACHING

## 2025-01-29 ASSESSMENT — PAIN SCALES - GENERAL
PAINLEVEL_OUTOF10: 6
PAINLEVEL_OUTOF10: 6

## 2025-01-29 NOTE — PROGRESS NOTES
The patient is a 74-year-old male with low back and right leg pain.  He has had spinal pain for many years.  The pain is constant but worse with activity.  He gets some relief with rest.  His quality of life is unacceptable.  His pain is considerably improved after his L4-L5 laminectomy surgery and his follow-up fusion surgery.  The low back pain is the predominant issue currently.  He does experience pain that radiates down the anterolateral aspect of the right leg to the knee.  He also describes mostly neck pain.  Sometimes radiates into his arms.  He would like to have that addressed in the future as well.  He has benefited from tramadol in the past.  He requests a refill.    Review of Systems   Constitutional:  Positive for activity change.   Musculoskeletal:  Positive for arthralgias, back pain, gait problem, neck pain and neck stiffness.   All other systems reviewed and are negative.    GENERAL: alert and appropriate, in no distress, well-hydrated, well nourished, interactive         SKIN: no rash noted, surgical scars well-healed         HEAD: normocephalic, no abnormality or lesion noted         EYES: no injection and visual acuity is grossly normal         EARS: external ears normal, no mastoid tenderness         NOSE: external nose normal without rhinorrhea         OROPHARYNX: moist mucus membranes, no tonsillar hypertrophy/exudate, uvula midline and pharynx non-erythematous, lips, teeth and gums are without obvious lesion         NECK: Reduced ROM, no cervical LNs noted         RESPIRATORY: breathing non-labored and no grunting/flaring/retractions         CHEST: equal chest rise with normal respiratory effort         ABDOMEN: soft and non-tender         BACK: back normal in appearance, cervical and lumbar spine with reduced ROM         EXTREMITIES: strength intact         NEUROLOGIC: gait antalgic, SLR positive, Micah sign negative, Spurling sign reproduced pain, sensation grossly intact    Assessment  and Plan    -Chronicity--chronic spinal pain    -Diagnostics--I would like the patient to have new plain films of the lumbar spine    -Pharmacologic--the patient may have a refill of the tramadol.  An opioid agreement was reviewed and signed with the patient. A copy was given to the patient. [place]AR[place] was reviewed and was consistent with the history. A urine or saliva specimen was obtained for toxicology screening. The patient and I discussed the nature of this medication and its side effects. We discussed tolerance, physical dependence, psychological dependence, addiction and opioid-induced hyperalgesia. We discussed the potential need to wean from this medication. We discussed the availability of programs that can help with this process if necessary. We discussed safety issues related to opioids including safe storage. We discussed the fact that the patient should not drive an automobile or operate heavy machinery while taking this medication. A prescription for naloxone was offered to the patient. The patient will be reevaluated for the need to continue opioid therapy in 30-90 days.    -Psychologic--no need for psychologic intervention from my standpoint.  There are no mental health issues of which I am aware that are contributing to the patient's pain.  There are no substance abuse or alcohol abuse issues of which I am aware that are contributing to the patient's pain.    -Physical--we discussed the importance of physical therapy and exercise.  We discussed avoidance and modification techniques.    -Intervention--the patient is a candidate for a caudal epidural steroid injection.  I explained the risks benefits and alternatives of the procedure to the patient.  The patient wishes to proceed.  We will consider both a cervical epidural steroid injection as well as    I spent time educating the patient on the condition including the treatment and the prognosis.  I invited the patient to call at anytime with any  questions.

## 2025-02-05 DIAGNOSIS — I48.21 PERMANENT ATRIAL FIBRILLATION (MULTI): ICD-10-CM

## 2025-02-05 PROCEDURE — RXMED WILLOW AMBULATORY MEDICATION CHARGE

## 2025-02-07 ENCOUNTER — PHARMACY VISIT (OUTPATIENT)
Dept: PHARMACY | Facility: CLINIC | Age: 75
End: 2025-02-07
Payer: COMMERCIAL

## 2025-02-10 ENCOUNTER — PHARMACY VISIT (OUTPATIENT)
Dept: PHARMACY | Facility: CLINIC | Age: 75
End: 2025-02-10
Payer: COMMERCIAL

## 2025-02-10 PROCEDURE — RXMED WILLOW AMBULATORY MEDICATION CHARGE

## 2025-02-11 ENCOUNTER — TELEPHONE (OUTPATIENT)
Dept: PAIN MEDICINE | Facility: CLINIC | Age: 75
End: 2025-02-11
Payer: MEDICARE

## 2025-02-11 NOTE — TELEPHONE ENCOUNTER
I CALLED DR. KIDD'S OFFICE TO CHECK ON WHEN I MIGHT RECEIVE THE COAG CLEARANCE REQUEST I SENT 1/29/25 .  THE MESSAGE WENT RIGHT TO VOICE MAIL..

## 2025-02-19 ENCOUNTER — OFFICE VISIT (OUTPATIENT)
Dept: CARDIOLOGY | Facility: CLINIC | Age: 75
End: 2025-02-19
Payer: MEDICARE

## 2025-02-19 VITALS
WEIGHT: 223 LBS | HEART RATE: 54 BPM | DIASTOLIC BLOOD PRESSURE: 78 MMHG | SYSTOLIC BLOOD PRESSURE: 138 MMHG | OXYGEN SATURATION: 96 % | BODY MASS INDEX: 33.91 KG/M2

## 2025-02-19 DIAGNOSIS — I10 BENIGN ESSENTIAL HYPERTENSION: Chronic | ICD-10-CM

## 2025-02-19 DIAGNOSIS — I48.91 ATRIAL FIBRILLATION, UNSPECIFIED TYPE (MULTI): Primary | ICD-10-CM

## 2025-02-19 PROCEDURE — 3078F DIAST BP <80 MM HG: CPT | Performed by: NURSE PRACTITIONER

## 2025-02-19 PROCEDURE — 99214 OFFICE O/P EST MOD 30 MIN: CPT | Performed by: NURSE PRACTITIONER

## 2025-02-19 PROCEDURE — 1036F TOBACCO NON-USER: CPT | Performed by: NURSE PRACTITIONER

## 2025-02-19 PROCEDURE — 1123F ACP DISCUSS/DSCN MKR DOCD: CPT | Performed by: NURSE PRACTITIONER

## 2025-02-19 PROCEDURE — 3075F SYST BP GE 130 - 139MM HG: CPT | Performed by: NURSE PRACTITIONER

## 2025-02-19 PROCEDURE — 1157F ADVNC CARE PLAN IN RCRD: CPT | Performed by: NURSE PRACTITIONER

## 2025-02-19 PROCEDURE — 1159F MED LIST DOCD IN RCRD: CPT | Performed by: NURSE PRACTITIONER

## 2025-02-19 PROCEDURE — 1160F RVW MEDS BY RX/DR IN RCRD: CPT | Performed by: NURSE PRACTITIONER

## 2025-02-19 PROCEDURE — G2211 COMPLEX E/M VISIT ADD ON: HCPCS | Performed by: NURSE PRACTITIONER

## 2025-02-19 ASSESSMENT — ENCOUNTER SYMPTOMS
RESPIRATORY NEGATIVE: 1
MUSCULOSKELETAL NEGATIVE: 1
CONSTITUTIONAL NEGATIVE: 1
CARDIOVASCULAR NEGATIVE: 1
GASTROINTESTINAL NEGATIVE: 1
NEUROLOGICAL NEGATIVE: 1

## 2025-02-19 NOTE — PROGRESS NOTES
Chief Complaint:   Follow-up (4 wk.)    History Of Present Illness:    .Mr Belle returns in follow up.  Denies chest pain, sob, palpitations or pedal edema.  He thought he had an appointment in June with Dr Hurst and would like to make another one to discuss repeat stress testing.              Last Recorded Vitals:  Blood pressure 138/78, pulse 54, weight 101 kg (223 lb), SpO2 96%.     Past Medical History:  Past Medical History:   Diagnosis Date    A-fib (Multi)     Abnormal ECG     Sleep apnea         Past Surgical History:  Past Surgical History:   Procedure Laterality Date    ABLATION OF DYSRHYTHMIC FOCUS      BACK SURGERY  02/14/2024    l4-5 fusion    CORONARY STENT PLACEMENT      EYE SURGERY      HERNIA REPAIR  06/20/2014    Inguinal Hernia Repair    JOINT REPLACEMENT Bilateral     hip    JOINT REPLACEMENT Left     knee and shoulder    OTHER SURGICAL HISTORY  10/06/2022    Cystoscopy    SPINE SURGERY      VASECTOMY         Social History:  Social History     Socioeconomic History    Marital status:    Tobacco Use    Smoking status: Former     Current packs/day: 1.50     Average packs/day: 1.5 packs/day for 3.0 years (4.5 ttl pk-yrs)     Types: Cigarettes    Smokeless tobacco: Never   Vaping Use    Vaping status: Never Used   Substance and Sexual Activity    Alcohol use: Yes     Alcohol/week: 8.0 standard drinks of alcohol     Types: 4 Glasses of wine, 4 Shots of liquor per week    Drug use: Never    Sexual activity: Not Currently     Partners: Female     Birth control/protection: Male Sterilization     Social Drivers of Health     Financial Resource Strain: Low Risk  (11/21/2024)    Overall Financial Resource Strain (CARDIA)     Difficulty of Paying Living Expenses: Not hard at all   Food Insecurity: No Food Insecurity (10/18/2024)    Hunger Vital Sign     Worried About Running Out of Food in the Last Year: Never true     Ran Out of Food in the Last Year: Never true   Transportation Needs: No  Transportation Needs (11/21/2024)    PRAPARE - Transportation     Lack of Transportation (Medical): No     Lack of Transportation (Non-Medical): No   Physical Activity: Inactive (10/18/2024)    Exercise Vital Sign     Days of Exercise per Week: 0 days     Minutes of Exercise per Session: 0 min   Stress: No Stress Concern Present (5/8/2024)    [place] [employer] - Occupational Stress Questionnaire     Feeling of Stress : Not at all   Social Connections: Moderately Integrated (5/8/2024)    Social Connection and Isolation Panel [NHANES]     Frequency of Communication with Friends and Family: More than three times a week     Frequency of Social Gatherings with Friends and Family: Three times a week     Attends Hindu Services: 1 to 4 times per year     Active Member of Clubs or Organizations: No     Attends Club or Organization Meetings: Never     Marital Status:    Intimate Partner Violence: Not At Risk (10/18/2024)    Humiliation, Afraid, Rape, and Kick questionnaire     Fear of Current or Ex-Partner: No     Emotionally Abused: No     Physically Abused: No     Sexually Abused: No   Housing Stability: Low Risk  (11/21/2024)    Housing Stability Vital Sign     Unable to Pay for Housing in the Last Year: No     Number of Times Moved in the Last Year: 0     Homeless in the Last Year: No       Family History:  Family History   Problem Relation Name Age of Onset    Cancer Mother Selena     Alcohol abuse Mother Selena     Heart disease Father Pro Sr.     Arthritis Father Pro Sr.     Heart attack Father Pro Sr.     Cancer Sister      Coronary artery disease Brother      Atrial fibrillation Brother Fredy Belle     Atrial fibrillation Brother Fredy Belle          Allergies:  Patient has no known allergies.    Outpatient Medications:  Current Outpatient Medications   Medication Sig Dispense Refill    acetaminophen (Tylenol) 500 mg tablet Take 2 tablets (1,000 mg) by mouth once daily as  needed (back pain). Do not crush, chew, or split.      amLODIPine (Norvasc) 5 mg tablet Take 1 tablet (5 mg) by mouth once daily. 30 tablet 11    armodafinil (Nuvigil) 250 mg tablet Take 1 tablet (250 mg) by mouth once daily in the morning.      aspirin 81 mg EC tablet Take 1 tablet (81 mg) by mouth once daily.      atorvastatin (Lipitor) 80 mg tablet TAKE 1 TABLET BY MOUTH AT  BEDTIME 90 tablet 3    cholecalciferol (Vitamin D-3) 50 MCG (2000 UT) tablet Take 1 tablet (2,000 Units) by mouth once daily.      cyanocobalamin (Vitamin B-12) 1,000 mcg tablet Take 1 tablet (1,000 mcg) by mouth once daily.      docusate sodium (Colace) 100 mg capsule Take 1 capsule (100 mg) by mouth once daily.      dofetilide (Tikosyn) 250 mcg capsule Take 1 capsule (250 mcg) by mouth every 12 hours. Do not fill before December 20, 2024. 180 capsule 3    ezetimibe (Zetia) 10 mg tablet TAKE 1 TABLET BY MOUTH AT  BEDTIME 90 tablet 3    folic acid (Folvite) 800 mcg tablet Take 1 mg by mouth once daily.      gabapentin (Neurontin) 600 mg tablet Take 1 tablet (600 mg) by mouth 2 times a day.      lisinopril 40 mg tablet TAKE 1 TABLET BY MOUTH ONCE  DAILY 90 tablet 3    metoprolol succinate XL (Toprol-XL) 25 mg 24 hr tablet Take 1 tablet (25 mg) by mouth 2 times a day. Do not crush or chew.      omeprazole (PriLOSEC) 20 mg DR capsule TAKE 1 CAPSULE BY MOUTH ONCE  DAILY 90 capsule 3    rivaroxaban (Xarelto) 20 mg tablet Take 1 tablet (20 mg) by mouth once daily in the evening. Take with meals. With a meal      rivaroxaban (Xarelto) 20 mg tablet Take 1 tablet (20 mg) by mouth once daily. 90 tablet 3    tamsulosin (Flomax) 0.4 mg 24 hr capsule Take 2 capsules (0.8 mg) by mouth once daily. 180 capsule 3    tofacitinib ER (Xeljanz XR) 11 mg tablet extended release 24 hr Take 1 tablet (11 mg) by mouth once daily. Do not crush, chew or split. Swallow whole.      traMADol (Ultram) 50 mg tablet Take 1 tablet (50 mg) by mouth every 8 hours if needed for  severe pain (7 - 10). 90 tablet 2     No current facility-administered medications for this visit.        Physical Exam:  Cardiovascular:      PMI at left midclavicular line. Normal rate. Regular rhythm. Normal S1. Normal S2.       Murmurs: There is no murmur.      No gallop.  No click. No rub.   Pulses:     Intact distal pulses.   Edema:     Peripheral edema absent.         ROS:  Review of Systems   Constitutional: Negative.   Cardiovascular: Negative.    Respiratory: Negative.     Skin: Negative.    Musculoskeletal: Negative.    Gastrointestinal: Negative.    Genitourinary: Negative.    Neurological: Negative.           Last Labs:  CBC -  Lab Results   Component Value Date    WBC 7.3 11/20/2024    HGB 14.0 11/20/2024    HCT 41.2 11/20/2024     (H) 11/20/2024     11/20/2024       CMP -  Lab Results   Component Value Date    CALCIUM 9.4 11/24/2024    PHOS 2.1 (L) 05/25/2022    PROT 6.0 (L) 10/19/2024    ALBUMIN 3.6 10/19/2024    AST 21 10/19/2024    ALT 19 10/19/2024    ALKPHOS 82 10/19/2024    BILITOT 0.6 10/19/2024       LIPID PANEL -   Lab Results   Component Value Date    CHOL 140 03/27/2024    TRIG 59 03/27/2024    HDL 74.9 03/27/2024    CHHDL 1.9 03/27/2024    LDLF 57 04/11/2023    VLDL 12 03/27/2024    NHDL 65 03/27/2024       RENAL FUNCTION PANEL -   Lab Results   Component Value Date    GLUCOSE 88 11/24/2024     11/24/2024    K 4.0 11/24/2024     (H) 11/24/2024    CO2 26 11/24/2024    ANIONGAP 13 11/24/2024    BUN 17 11/24/2024    CREATININE 0.74 11/24/2024    GFRMALE 90 08/16/2023    CALCIUM 9.4 11/24/2024    PHOS 2.1 (L) 05/25/2022    ALBUMIN 3.6 10/19/2024        Lab Results   Component Value Date    HGBA1C 5.3 10/19/2024         Assessment/Plan   Problem List Items Addressed This Visit    None    1. Coronary artery disease, status post PCI to the distal RCA 09/10/2019. This patient originally was identified as having low-grade CAD by cardiac catheterization 3/2003 [facility]. He did have a nuclear stress test performed in 1/2009 and again on 11/13/2015 negative for evidence of ischemia. He presented to [facility] 9/9/ 2019 with central chest discomfort and shortness of breath. It was detected that he was in atrial fibrillation with RVR. He had EKG and enzymatic evidence of a NSTEMI. He had cardiac cath September 10, 2019 with Dr. Mynor Hurst which showed single-vessel CAD in the distal third of the distal right coronary artery about 90% stenosis and distal to the origin of the PDA but prior to the origin of the trifurcating post inferior lateral LV branch. He had successful distal RCA stenting September 10, 2019 with Dr. Aniceto Richardson at [facility]. Continue aspirin 81 mg daily.     2. Paroxysmal atrial fibrillation. Please see previous office notes for review. This patient was recently admitted to [facility] from 04/15/2018 to 04/18/2018 with pleuritic-type chest pain along with cough and shortness of breath. The patient was identified by chest x-ray to have a left lower lobe infiltrate suggestive of pneumonia associated with pleuritis. He was also detected to have new onset or recurrent atrial fibrillation with a rapid ventricular rate. The patient was initially placed on an IV Cardizem infusion but ultimately was converted to metoprolol. The patient's ventricular rate remained rapid despite the initiation of metoprolol and as such amiodarone 200 mg daily was added. At the time of discharge on 04/18/2018 he was still in atrial fibrillation with a ventricular rate in the range of 110/m. He did have an echocardiogram performed during the admission on 04/16/2018 that demonstrated an LV ejection fraction of 60-64% with mild left atrial enlargement and trace to mild mitral valve regurgitation. At the time of his next follow-up visit it was found that by both EKG and examination he was back in sinus rhythm with sinus bradycardia. For now he will  remain on the amiodarone 200 mg daily plus without will reduce metoprolol by switching metoprolol tartrate 50 mg twice a day to metoprolol succinate 50 mg daily. He will remain on Xarelto 20 mg daily. He presented to [facility] September 9, 2019 with central chest discomfort and shortness of breath. It was detected that he was in atrial fibrillation with RVR. He converted to sinus rhythm with IV diltiazem infusion. Echocardiogram done at the time showed EF 60-64%, left atrial size mildly dilated, trace MR, trivial to mild TR. He is now back on on amiodarone 200 mg daily as well as Xarelto 20 mg daily which had been discontinued prior to the readmission. Had afib with RVR and failed DCCV. He saw Dr Nassar and had RFA 10/2/2020. ECG today shows likely aflutter. Will plan for DCCV next week. See Dr Nassar in follow up. Had DCCV 11/12/2020. Went out of rhythm again for a brief period 11/2021. Went out of rhythm for 4 days 01/2022. Patient had RFA done 05/24/2022 with Dr Nassar. ECG done prior shows NSR, RBBB.  The patient is no longer on amiodarone which was discontinued last visit.  He will remain on Xarelto anticoagulation.  He will be stopping 3 days before any type of back injection or lumbar surgery.  Patient will return in 8 weeks for follow-up.  The patient currently remains in sinus rhythm.  He underwent preadmission testing on 2/1/2024 for lumbar surgery EKG shows sinus bradycardia at 49/min occasional PVCs nonspecific ST-T abnormality.  Will reduce the dose of Toprol-XL from 100 mg daily to 50 mg daily.  The patient was recently admitted to [facility] from 5/8/2024 - 5/9/2024 with recurrent atrial fibrillation that converted quickly to sinus rhythm with IV diltiazem.  His metoprolol was increased from 50 mg twice daily to 75 mg twice daily.  He remains on Xarelto anticoagulation.  His high-sensitivity troponins were 126, 163, 218.  EKG today confirms maintenance of sinus bradycardia 56/min  occasional PVCs right bundle branch block conduction delay.  Patient scheduled to see electrophysiology for follow-up on 6/12/2024. At most recent EP visit it was decided to hold off on ablation at this time and the patient would continue to monitor for worsening symptoms. On 10/19 patient went to the ED for concerns of being back in afib, but was discharged as his rate was controlled and he was already on metoprolol and Xarelto. Today, the patient expresses concerns about being in afib for an extended period of time, as he usually goes back in sinus rhythm after 12-18 hours. The patient is scheduled to see EP next Wednesday. He was educated on the incidence of persistent afib and he was reassured that he is on the appropriate medications. Metoprolol was increased to 100 mg daily as his pulse was 100 in office.     3 Xarelto anticoagulation. Patient had spine injections scheduled for tomorrow but canceled appointment due to the need for interrupted anticoagulation if he is a candidate for another ablation.     4. Hypertension. Blood pressure is adequately controlled today. He will be continued on amlodipine 5 mg daily and lisinopril 20 mg daily. His dose of metoprolol was increased to 100 mg daily due to pulse of 100 at today's visit.     5. Hyperlipidemia. The patient did have lab work performed on 10/08/2019 including a lipid panel with cholesterol 218  HDL 61 triglyceride 154. These results are continue to be disappointing on his statin agent atorvastatin 80 mg daily. He will be started on supplemental Zetia 10 mg daily. 5/10/2022, Chol 141, LDL 67, HDL 60, trig 70. Most recent lipid panel on 3/24/2024 chol 140, HDL 74.9, LDL 53, trig 59.      6. Sleep apnea. Please see previous office notes. The patient was referred to sleep medicine and was in fact identified as having sleep apnea that was severe. The patient's initial sleep study was done at [facility] on 01/04/2018 and showed severe  obstructive sleep apnea and he ultimately had a titration study on 02/22/2018. He was placed on ASV therapy with significant clinical improvement. His compliance has been 74% with no leak.     7. BPH.      8. GERD.     9. Bilateral carpal tunnel release     10. S/P Left hip surgery 2007, right 2021. Plans for LTK 03/2023.     11. S/P Right inguinal hernia repair 4/2014, [facility].     12. Osteoarthritis.      13. Status post left shoulder replacement 11/10/2015 with repeat left shoulder replacement 02/22/2016 because of repetitive dislocation.     14. Lumbar Laminectomy, 10/01/2018 with Dr Cardenas at [facility].  Patient recently has been experiencing and further lower back pain.  His L4-L5 disc is shifted.  He is scheduled for an epidural injection on 12/22/2023 and possible surgery on 2/14/2024.  The patient is in fact scheduled for revision of an L4 laminectomy with excision of synovial cyst the right with robotic pedicle screw and interbody fusion 2/14/2024.  He is cleared from the cardiac standpoint for the procedure may hold Xarelto and aspirin 5 days preoperatively.  Lab work 8/16/2023 included sedimentation rate 4 CRP less than 0.10 hematocrit 36.0 uric acid 5.7 and normal electrolyte panel.  14 a.  Status post repeat lumbar surgery 2/14/2024 at [facility].  Patient is starting therapy for his back and lower extremities within the next week.     15. Prostate Urolift surgery with Dr Werner, fall 2018.     16. Gen. medical. The patient had lab work on 10/08/2019 including glycohemoglobin 5.2%, TSH 1.38, PSA 1.6 with both CBC and SMA panels being normal.     17. Venous insufficiency.      18. Hx of psoriatic arthritis     19. Hx of covid-19 vaccine #1 and #2.      20. Planned LTK.          Mildred Valencia, APRN-CNP

## 2025-02-22 DIAGNOSIS — I10 PRIMARY HYPERTENSION: ICD-10-CM

## 2025-02-24 ENCOUNTER — PATIENT OUTREACH (OUTPATIENT)
Dept: PRIMARY CARE | Facility: CLINIC | Age: 75
End: 2025-02-24
Payer: MEDICARE

## 2025-02-24 DIAGNOSIS — M06.9 RHEUMATOID ARTHRITIS, INVOLVING UNSPECIFIED SITE, UNSPECIFIED WHETHER RHEUMATOID FACTOR PRESENT (MULTI): ICD-10-CM

## 2025-02-24 DIAGNOSIS — I48.11 LONGSTANDING PERSISTENT ATRIAL FIBRILLATION (MULTI): ICD-10-CM

## 2025-02-24 DIAGNOSIS — I10 BENIGN ESSENTIAL HYPERTENSION: ICD-10-CM

## 2025-02-24 PROCEDURE — 99490 CHRNC CARE MGMT STAFF 1ST 20: CPT | Performed by: FAMILY MEDICINE

## 2025-02-24 RX ORDER — AMLODIPINE BESYLATE 5 MG/1
5 TABLET ORAL DAILY
Qty: 90 TABLET | Refills: 3 | Status: SHIPPED | OUTPATIENT
Start: 2025-02-24

## 2025-02-24 NOTE — PROGRESS NOTES
"Chart review completed prior to outreach.  Monthly [facility] call completed with patient.  Patient states that things have been going much better.  Was started on new medication for his A-fib, seems to be working well.  Has been seeing pain management for chronic back pain, has had injections in his back previously.  Stated that on March 11 he is scheduled to get a \"different type of back injection\".  Said he is most comfortable while he is laying in bed, as soon as he starts moving he experiences the back pain.  Reporting that his right knee has been bothering him recently.  States that if he \"twists funny\" it will buckle.  Denies any recent falls or injuries.  Does use a cane as needed to assist with ambulation and stability.  Medications reviewed, no refills needed at this time.  Patient is in agreement to future follow-up calls.  "

## 2025-02-25 ENCOUNTER — OFFICE VISIT (OUTPATIENT)
Dept: ORTHOPEDIC SURGERY | Facility: CLINIC | Age: 75
End: 2025-02-25
Payer: MEDICARE

## 2025-02-25 DIAGNOSIS — G56.02 CARPAL TUNNEL SYNDROME ON LEFT: Primary | ICD-10-CM

## 2025-02-25 PROCEDURE — 1036F TOBACCO NON-USER: CPT | Performed by: ORTHOPAEDIC SURGERY

## 2025-02-25 PROCEDURE — 99213 OFFICE O/P EST LOW 20 MIN: CPT | Performed by: ORTHOPAEDIC SURGERY

## 2025-02-25 PROCEDURE — 1125F AMNT PAIN NOTED PAIN PRSNT: CPT | Performed by: ORTHOPAEDIC SURGERY

## 2025-02-25 PROCEDURE — 1159F MED LIST DOCD IN RCRD: CPT | Performed by: ORTHOPAEDIC SURGERY

## 2025-02-25 PROCEDURE — 1157F ADVNC CARE PLAN IN RCRD: CPT | Performed by: ORTHOPAEDIC SURGERY

## 2025-02-25 PROCEDURE — 1160F RVW MEDS BY RX/DR IN RCRD: CPT | Performed by: ORTHOPAEDIC SURGERY

## 2025-02-25 PROCEDURE — 1123F ACP DISCUSS/DSCN MKR DOCD: CPT | Performed by: ORTHOPAEDIC SURGERY

## 2025-02-25 ASSESSMENT — ENCOUNTER SYMPTOMS
CHILLS: 0
SINUS PRESSURE: 0
WOUND: 0
SHORTNESS OF BREATH: 0
FEVER: 0
FATIGUE: 0
ARTHRALGIAS: 1
WHEEZING: 0

## 2025-02-25 ASSESSMENT — PAIN SCALES - GENERAL: PAINLEVEL_OUTOF10: 3

## 2025-02-25 ASSESSMENT — PAIN - FUNCTIONAL ASSESSMENT: PAIN_FUNCTIONAL_ASSESSMENT: 0-10

## 2025-02-25 NOTE — PROGRESS NOTES
Reason for Appointment  Chief Complaint   Patient presents with    Right Hand - Pain     History of Present Illness  Patient is a 74 y.o. male here today for follow-up evaluation of increased left hand numbness and tingling.  He has a history of bilateral carpal tunnel releases done over 20 years ago.  He is having increased numbness and tingling in the left hand.  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  He has not had an EMG on his left side.  No other changes in his past medical history, allergies, or medications.    Past Medical History:   Diagnosis Date    A-fib (Multi)     Abnormal ECG     Sleep apnea        Past Surgical History:   Procedure Laterality Date    ABLATION OF DYSRHYTHMIC FOCUS      BACK SURGERY  02/14/2024    l4-5 fusion    CORONARY STENT PLACEMENT      EYE SURGERY      HERNIA REPAIR  06/20/2014    Inguinal Hernia Repair    JOINT REPLACEMENT Bilateral     hip    JOINT REPLACEMENT Left     knee and shoulder    OTHER SURGICAL HISTORY  10/06/2022    Cystoscopy    SPINE SURGERY      VASECTOMY         Medication Documentation Review Audit       Reviewed by Tere Zee MA (Medical Assistant) on 02/25/25 at 1042      Medication Order Taking? Sig Documenting Provider Last Dose Status   acetaminophen (Tylenol) 500 mg tablet [id number] Yes Take 2 tablets (1,000 mg) by mouth once daily as needed (back pain). Do not crush, chew, or split. Low Reyez MD  Active     Discontinued 02/24/25 0802   amLODIPine (Norvasc) 5 mg tablet [id number] Yes TAKE 1 TABLET BY MOUTH ONCE  DAILY Mynor Hurst MD  Active   armodafinil (Nuvigil) 250 mg tablet [id number] Yes Take 1 tablet (250 mg) by mouth once daily in the morning. Historical Provider, MD  Active   aspirin 81 mg EC tablet [id number] Yes Take 1 tablet (81 mg) by mouth once daily. Historical Provider, MD  Active   atorvastatin (Lipitor) 80 mg tablet [id number]  Yes TAKE 1 TABLET BY MOUTH AT  BEDTIME Mynor Hurst MD  Active   cholecalciferol (Vitamin D-3) 50 MCG (2000 UT) tablet [id number] Yes Take 1 tablet (2,000 Units) by mouth once daily. Historical Provider, MD  Active   cyanocobalamin (Vitamin B-12) 1,000 mcg tablet [id number] Yes Take 1 tablet (1,000 mcg) by mouth once daily. Brandee Feliciano MD  Active   docusate sodium (Colace) 100 mg capsule [id number] Yes Take 1 capsule (100 mg) by mouth once daily. Brandee Feliciano MD  Active   dofetilide (Tikosyn) 250 mcg capsule [id number] Yes Take 1 capsule (250 mcg) by mouth every 12 hours. Do not fill before December 20, 2024. Kelechi Nassar MD  Active   ezetimibe (Zetia) 10 mg tablet [id number] Yes TAKE 1 TABLET BY MOUTH AT  BEDTIME Mynor Hurst MD  Active   folic acid (Folvite) 800 mcg tablet [id number] Yes Take 1 mg by mouth once daily. Historical MD Braden  Active   gabapentin (Neurontin) 600 mg tablet [id number] Yes Take 1 tablet (600 mg) by mouth 2 times a day. Historical Provider, MD  Active   lisinopril 40 mg tablet [id number] Yes TAKE 1 TABLET BY MOUTH ONCE  DAILY Ruben Bradford MD  Active   metoprolol succinate XL (Toprol-XL) 25 mg 24 hr tablet [id number] Yes Take 1 tablet (25 mg) by mouth 2 times a day. Do not crush or chew. Historical MD Braden  Active   omeprazole (PriLOSEC) 20 mg DR capsule [id number] Yes TAKE 1 CAPSULE BY MOUTH ONCE  DAILY Ruben Bradford MD  Active     Discontinued 02/19/25 1123   rivaroxaban (Xarelto) 20 mg tablet [id number] Yes Take 1 tablet (20 mg) by mouth once daily. Ruben Bradford MD  Active   tamsulosin (Flomax) 0.4 mg 24 hr capsule [id number] Yes Take 2 capsules (0.8 mg) by mouth once daily. Citlaly Shane MD  Active   tofacitinib ER (Xeljanz XR) 11 mg tablet extended release 24 hr [id number] Yes Take 1 tablet (11 mg) by mouth once daily. Do not crush, chew or split. Swallow whole. Historical Provider, MD  Active     Discontinued 02/19/25 1022   traMADol  (Ultram) 50 mg tablet [id number] Yes Take 1 tablet (50 mg) by mouth every 8 hours if needed for severe pain (7 - 10). Victoriano Mackenzie MD  Active                    No Known Allergies    Review of Systems   Constitutional:  Negative for chills, fatigue and fever.   HENT:  Negative for postnasal drip, sinus pressure and tinnitus.    Respiratory:  Negative for shortness of breath and wheezing.    Cardiovascular:  Negative for chest pain and leg swelling.   Musculoskeletal:  Positive for arthralgias.   Skin:  Negative for rash and wound.     Exam   On exam right hand shows continued and nerve palsy, unable to flex the index finger or thumb, good motion otherwise in the right long, ring, and small fingers.  He does not have any severe atrophy in the left hand.  He has a mildly positive Tinel's over the left median nerve.  Decent digital motion otherwise in the left hand.  Good pulses and sensation in the upper extremity.    Assessment   Encounter Diagnosis   Name Primary?    Carpal tunnel syndrome on left Yes       Plan   With his increasing numbness in the left hand, we will get an EMG and nerve conduction study test to evaluate for recurrent carpal tunnel syndrome or any other peripheral nerve compression.  He is relying on the left hand with his deficits in the right hand.  He does have a carpal tunnel brace that he can wear at night.  He can follow-up with us after the nerve test.    I, Sofia Agudelo PA-C, am acting as a scribe and attest that this documentation has been prepared under the direction and in the presence of Wade Hensley MD.    By signing below, I, Wade Hensley MD, personally performed the services described in this documentation. All medical record entries made by the scribe were at my direction and in my presence. I have reviewed the chart and agree that the record reflects my personal performance and is accurate and complete.

## 2025-03-05 ENCOUNTER — TELEPHONE (OUTPATIENT)
Dept: PAIN MEDICINE | Facility: CLINIC | Age: 75
End: 2025-03-05
Payer: MEDICARE

## 2025-03-05 NOTE — TELEPHONE ENCOUNTER
I CALLED CHRISTIANE TO REMIND HIM FRIDAY IS HIS LAST DOSE OF XARELTO UNTIL AFTER HIS PROCEDURE ON THE 11TH.  HE WAS GRATEFUL FOR THE REMINDER.

## 2025-03-07 NOTE — PROGRESS NOTES
Pharmacist Clinic: Cardiology Management    Pro Belle Jr. is a 74 y.o. male was referred to Clinical Pharmacy Team for anticoagulation management.     Referring Provider: Mildred Valencia APRN*    THIS IS A FOLLOW UP PATIENT APPOINTMENT. AT LAST VISIT ON 1/13/2025 WITH PHARMACIST (Eva Ordonez).    Appointment was completed by the patient who was reached at .    REVIEW OF PAST APPNT (IF APPLICABLE):   Today's appointment was 6 month follow up regarding anticoagulation with Xarelto. Pro is doing well, reports no abnormal bruising or bleeding. He confirms daily adherence and denies any missed doses. Discussed that Xarelto is recommended to be taken with food with patient confirms he has been doing.   Discussed  PAP renewal, which will be due prior to 4/11/2025 and will be completed at next appointment. Patient is aware to have documents available at that time.    No Known Allergies    Past Medical History:   Diagnosis Date    A-fib (Multi)     Abnormal ECG     Sleep apnea        Current Outpatient Medications on File Prior to Visit   Medication Sig Dispense Refill    acetaminophen (Tylenol) 500 mg tablet Take 2 tablets (1,000 mg) by mouth once daily as needed (back pain). Do not crush, chew, or split.      amLODIPine (Norvasc) 5 mg tablet TAKE 1 TABLET BY MOUTH ONCE  DAILY 90 tablet 3    armodafinil (Nuvigil) 250 mg tablet Take 1 tablet (250 mg) by mouth once daily in the morning.      aspirin 81 mg EC tablet Take 1 tablet (81 mg) by mouth once daily.      atorvastatin (Lipitor) 80 mg tablet TAKE 1 TABLET BY MOUTH AT  BEDTIME 90 tablet 3    cholecalciferol (Vitamin D-3) 50 MCG (2000 UT) tablet Take 1 tablet (2,000 Units) by mouth once daily.      cyanocobalamin (Vitamin B-12) 1,000 mcg tablet Take 1 tablet (1,000 mcg) by mouth once daily.      docusate sodium (Colace) 100 mg capsule Take 1 capsule (100 mg) by mouth once daily.      dofetilide (Tikosyn) 250 mcg capsule Take 1 capsule (250 mcg) by  "mouth every 12 hours. Do not fill before December 20, 2024. 180 capsule 3    ezetimibe (Zetia) 10 mg tablet TAKE 1 TABLET BY MOUTH AT  BEDTIME 90 tablet 3    folic acid (Folvite) 800 mcg tablet Take 1 mg by mouth once daily.      gabapentin (Neurontin) 600 mg tablet Take 1 tablet (600 mg) by mouth 2 times a day.      lisinopril 40 mg tablet TAKE 1 TABLET BY MOUTH ONCE  DAILY 90 tablet 3    metoprolol succinate XL (Toprol-XL) 25 mg 24 hr tablet Take 1 tablet (25 mg) by mouth 2 times a day. Do not crush or chew.      omeprazole (PriLOSEC) 20 mg DR capsule TAKE 1 CAPSULE BY MOUTH ONCE  DAILY 90 capsule 3    rivaroxaban (Xarelto) 20 mg tablet Take 1 tablet (20 mg) by mouth once daily. 90 tablet 3    tamsulosin (Flomax) 0.4 mg 24 hr capsule Take 2 capsules (0.8 mg) by mouth once daily. 180 capsule 3    tofacitinib ER (Xeljanz XR) 11 mg tablet extended release 24 hr Take 1 tablet (11 mg) by mouth once daily. Do not crush, chew or split. Swallow whole.      traMADol (Ultram) 50 mg tablet Take 1 tablet (50 mg) by mouth every 8 hours if needed for severe pain (7 - 10). 90 tablet 2     No current facility-administered medications on file prior to visit.         RELEVANT LAB RESULTS  Lab Results   Component Value Date    BILITOT 0.6 10/19/2024    CALCIUM 9.4 11/24/2024    CO2 26 11/24/2024     (H) 11/24/2024    CREATININE 0.74 11/24/2024    GLUCOSE 88 11/24/2024    ALKPHOS 82 10/19/2024    K 4.0 11/24/2024    PROT 6.0 (L) 10/19/2024     11/24/2024    AST 21 10/19/2024    ALT 19 10/19/2024    BUN 17 11/24/2024    ANIONGAP 13 11/24/2024    MG 2.26 11/24/2024    PHOS 2.1 (L) 05/25/2022    ALBUMIN 3.6 10/19/2024    GFRMALE 90 08/16/2023     Lab Results   Component Value Date    TRIG 59 03/27/2024    CHOL 140 03/27/2024    LDLCALC 53 03/27/2024    HDL 74.9 03/27/2024     No results found for: \"BMCBC\", \"CBCDIF\"     PHARMACEUTICAL ASSESSMENT    MEDICATION RECONCILIATION    Drug Interactions? No clinically significant drug " interactions requiring change in therapy found at the time of this visit.     Medication Documentation Review Audit       Reviewed by Wade Hensley MD (Physician) on 02/25/25 at 1245      Medication Order Taking? Sig Documenting Provider Last Dose Status   acetaminophen (Tylenol) 500 mg tablet [id number] Yes Take 2 tablets (1,000 mg) by mouth once daily as needed (back pain). Do not crush, chew, or split. Low Reyez MD  Active     Discontinued 02/24/25 0802   amLODIPine (Norvasc) 5 mg tablet [id number] Yes TAKE 1 TABLET BY MOUTH ONCE  DAILY Mynor Hurst MD  Active   armodafinil (Nuvigil) 250 mg tablet [id number] Yes Take 1 tablet (250 mg) by mouth once daily in the morning. Historical Provider, MD  Active   aspirin 81 mg EC tablet [id number] Yes Take 1 tablet (81 mg) by mouth once daily. Historical Provider, MD  Active   atorvastatin (Lipitor) 80 mg tablet [id number] Yes TAKE 1 TABLET BY MOUTH AT  BEDTIME Mynor Hurst MD  Active   cholecalciferol (Vitamin D-3) 50 MCG (2000 UT) tablet [id number] Yes Take 1 tablet (2,000 Units) by mouth once daily. Historical Provider, MD  Active   cyanocobalamin (Vitamin B-12) 1,000 mcg tablet [id number] Yes Take 1 tablet (1,000 mcg) by mouth once daily. Historical Provider, MD  Active   docusate sodium (Colace) 100 mg capsule [id number] Yes Take 1 capsule (100 mg) by mouth once daily. Historical Provider, MD  Active   dofetilide (Tikosyn) 250 mcg capsule [id number] Yes Take 1 capsule (250 mcg) by mouth every 12 hours. Do not fill before December 20, 2024. Kelechi Nassar MD  Active   ezetimibe (Zetia) 10 mg tablet [id number] Yes TAKE 1 TABLET BY MOUTH AT  BEDTIME Mynor Hurst MD  Active   folic acid (Folvite) 800 mcg tablet [id number] Yes Take 1 mg by mouth once daily. Brandee Feliciano MD  Active   gabapentin (Neurontin) 600 mg tablet [id number] Yes Take 1 tablet (600 mg) by mouth 2 times a day. Historical MD Braden  Active   lisinopril 40 mg tablet [id number] Yes TAKE 1  TABLET BY MOUTH ONCE  DAILY Ruben Bradford MD  Active   metoprolol succinate XL (Toprol-XL) 25 mg 24 hr tablet [id number] Yes Take 1 tablet (25 mg) by mouth 2 times a day. Do not crush or chew. Historical Provider, MD  Active   omeprazole (PriLOSEC) 20 mg DR capsule [id number] Yes TAKE 1 CAPSULE BY MOUTH ONCE  DAILY Ruben Bradford MD  Active     Discontinued 02/19/25 1123   rivaroxaban (Xarelto) 20 mg tablet [id number] Yes Take 1 tablet (20 mg) by mouth once daily. Ruben Bradford MD  Active   tamsulosin (Flomax) 0.4 mg 24 hr capsule [id number] Yes Take 2 capsules (0.8 mg) by mouth once daily. Citlaly Shane MD  Active   tofacitinib ER (Xeljanz XR) 11 mg tablet extended release 24 hr [id number] Yes Take 1 tablet (11 mg) by mouth once daily. Do not crush, chew or split. Swallow whole. Historical Provider, MD  Active     Discontinued 02/19/25 1022   traMADol (Ultram) 50 mg tablet [id number] Yes Take 1 tablet (50 mg) by mouth every 8 hours if needed for severe pain (7 - 10). Victoriano Mackenzie MD  Active                    DISEASE MANAGEMENT ASSESSMENT:     ANTICOAGULATION ASSESSMENT    The ASCVD Risk score (Alphonso ORELLANA, et al., 2019) failed to calculate for the following reasons:    Risk score cannot be calculated because patient has a medical history suggesting prior/existing ASCVD    DIAGNOSIS: prevention of nonvalvular atrial fibrilliation stroke and systemic embolism  - Patient is projected to be on anticoagulation depending on if in afib  - [id number] Score: [4] (only included if diagnosis is atrial fibrillation)   Age: [<65 (0)] [65-74 (+1)] [> 75 (+2)]: 2  Sex: [Male/Female (+1)]: 0  CHF history: [No/Yes(+1)]: 0  Hypertension history: [No/Yes(+1)]: 1  Stroke/TIA/thromboembolism history: [No/Yes(+2)]: 0  Vascular disease history (prior MI, peripheral artery disease, aortic plaque): [No/Yes(+1)]: 1  Diabetes history: [No/Yes(+1)]: 0    CURRENT PHARMACOTHERAPY:    Xarelto 20 mg every day  CrCl 96  mL/min  Age 74 years  Weight 101 kg  Scr 0.94 mg/dL (1/17/2025)    RELEVANT PAST MEDICAL HISTORY:   HTN, HLD, A-fib with RVR, prediabetes    Affordability/Accessibility:  PAP for Xarelto***  Adherence/Organization: confirms taking Xarelto daily at 8AM with food; denies any missed doses ***  Adverse Reactions: no abnormal bruising/bleeding; no dark stools or hematemesis***  Recent Hospitalizations: ***  Recent Falls/Trauma: None, ensures he takes steps carefully ***  Changes in Tobacco or Alcohol Intake: ***  Tobacco: None  Alcohol: Yes, will drink 1-2 glasses of wine/beer with dinner a few times per week    EDUCATION/COUNSELING:   - Counseled patient on MOA, expectations, duration of therapy, contraindications, administration, and monitoring parameters  - Counseled patient of side effects that are indicative of bleeding such as dark tarry stool, unexplainable bruising, or vomiting up a coffee ground like substance      DISCUSSION/NOTES:   ***    ASSESSMENT AND PLAN:    Assessment/Plan   Problem List Items Addressed This Visit    None      RECOMMENDATIONS/PLAN    Continue: Xarelto 20 mg every day   Follow up: 2 months for  PAP renewal    Last Appointment with Referring Provider: 2/19/2025  Next Appointment with Referring Provider: To be scheduled; 6/5/2025 with Dr. Hurst  Clinical Pharmacist follow up:***  VAF/Application Expiration: Yes    Date: 4/11/2025  Type of Encounter: Deepak Ordonez PharmD    Verbal consent to manage patient's drug therapy was obtained from the patient . They were informed they may decline to participate or withdraw from participation in pharmacy services at any time.    Continue all meds under the continuation of care with the referring provider and clinical pharmacy team.

## 2025-03-11 ENCOUNTER — APPOINTMENT (OUTPATIENT)
Dept: PHARMACY | Facility: HOSPITAL | Age: 75
End: 2025-03-11
Payer: MEDICARE

## 2025-03-11 ENCOUNTER — HOSPITAL ENCOUNTER (OUTPATIENT)
Dept: GASTROENTEROLOGY | Facility: HOSPITAL | Age: 75
Discharge: HOME | End: 2025-03-11
Payer: MEDICARE

## 2025-03-11 VITALS
SYSTOLIC BLOOD PRESSURE: 148 MMHG | RESPIRATION RATE: 17 BRPM | HEIGHT: 68 IN | HEART RATE: 60 BPM | BODY MASS INDEX: 34.1 KG/M2 | WEIGHT: 225 LBS | DIASTOLIC BLOOD PRESSURE: 86 MMHG | OXYGEN SATURATION: 96 %

## 2025-03-11 DIAGNOSIS — M54.16 LUMBAR RADICULOPATHY: ICD-10-CM

## 2025-03-11 PROCEDURE — 2500000004 HC RX 250 GENERAL PHARMACY W/ HCPCS (ALT 636 FOR OP/ED): Performed by: ANESTHESIOLOGY

## 2025-03-11 PROCEDURE — 62323 NJX INTERLAMINAR LMBR/SAC: CPT | Performed by: ANESTHESIOLOGY

## 2025-03-11 PROCEDURE — 2550000001 HC RX 255 CONTRASTS: Performed by: ANESTHESIOLOGY

## 2025-03-11 PROCEDURE — 2500000005 HC RX 250 GENERAL PHARMACY W/O HCPCS: Performed by: ANESTHESIOLOGY

## 2025-03-11 RX ORDER — SODIUM CHLORIDE 9 MG/ML
INJECTION, SOLUTION INTRAMUSCULAR; INTRAVENOUS; SUBCUTANEOUS AS NEEDED
Status: COMPLETED | OUTPATIENT
Start: 2025-03-11 | End: 2025-03-11

## 2025-03-11 RX ORDER — LIDOCAINE HYDROCHLORIDE 5 MG/ML
INJECTION, SOLUTION INFILTRATION; INTRAVENOUS AS NEEDED
Status: COMPLETED | OUTPATIENT
Start: 2025-03-11 | End: 2025-03-11

## 2025-03-11 RX ORDER — TRIAMCINOLONE ACETONIDE 40 MG/ML
INJECTION, SUSPENSION INTRA-ARTICULAR; INTRAMUSCULAR AS NEEDED
Status: COMPLETED | OUTPATIENT
Start: 2025-03-11 | End: 2025-03-11

## 2025-03-11 RX ADMIN — TRIAMCINOLONE ACETONIDE 60 MG: 40 INJECTION, SUSPENSION INTRA-ARTICULAR; INTRAMUSCULAR at 08:29

## 2025-03-11 RX ADMIN — IOHEXOL 1 ML: 240 INJECTION, SOLUTION INTRATHECAL; INTRAVASCULAR; INTRAVENOUS; ORAL at 08:28

## 2025-03-11 RX ADMIN — SODIUM CHLORIDE 4 ML: 9 INJECTION INTRAMUSCULAR; INTRAVENOUS; SUBCUTANEOUS at 08:28

## 2025-03-11 RX ADMIN — LIDOCAINE HYDROCHLORIDE 10 ML: 5 INJECTION, SOLUTION INFILTRATION at 08:28

## 2025-03-11 ASSESSMENT — PAIN SCALES - GENERAL
PAINLEVEL_OUTOF10: 3
PAINLEVEL_OUTOF10: 7

## 2025-03-11 ASSESSMENT — PAIN - FUNCTIONAL ASSESSMENT
PAIN_FUNCTIONAL_ASSESSMENT: 0-10
PAIN_FUNCTIONAL_ASSESSMENT: 0-10

## 2025-03-11 ASSESSMENT — PAIN DESCRIPTION - DESCRIPTORS: DESCRIPTORS: DISCOMFORT

## 2025-03-11 NOTE — H&P
"History Of Present Illness  Pro Belle Jr. is a 74 y.o. male presenting with low back and right leg pain.     Past Medical History  Past Medical History:   Diagnosis Date    A-fib (Multi)     Abnormal ECG     Sleep apnea        Surgical History  Past Surgical History:   Procedure Laterality Date    ABLATION OF DYSRHYTHMIC FOCUS      BACK SURGERY  02/14/2024    l4-5 fusion    CORONARY STENT PLACEMENT      EYE SURGERY      HERNIA REPAIR  06/20/2014    Inguinal Hernia Repair    JOINT REPLACEMENT Bilateral     hip    JOINT REPLACEMENT Left     knee and shoulder    OTHER SURGICAL HISTORY  10/06/2022    Cystoscopy    SPINE SURGERY      VASECTOMY          Social History  He reports that he has quit smoking. His smoking use included cigarettes. He has a 4.5 pack-year smoking history. He has never used smokeless tobacco. He reports current alcohol use of about 8.0 standard drinks of alcohol per week. He reports that he does not use drugs.    Family History  Family History   Problem Relation Name Age of Onset    Cancer Mother Selena     Alcohol abuse Mother Selena     Heart disease Father Pro Sr.     Arthritis Father Pro Sr.     Heart attack Father Pro Van.     Cancer Sister      Coronary artery disease Brother      Atrial fibrillation Brother Fredy Belle     Atrial fibrillation Brother Fredy Belle         Allergies  Patient has no known allergies.    Review of Systems     Physical Exam     Last Recorded Vitals  Blood pressure 142/70, pulse 68, resp. rate 18, height 1.727 m (5' 8\"), weight 102 kg (225 lb), SpO2 97%.    Relevant Results             Assessment/Plan   Assessment & Plan  Lumbar radiculopathy      Caudal epidural steroid injection       I spent 10 minutes in the professional and overall care of this patient.      Victoriano Mackenzie MD    "

## 2025-03-11 NOTE — PERIOPERATIVE NURSING NOTE
Patient alert and awake.   Band aid dry and intact x 1 to low back.  Discharge instructions reviewed patient and spouse,  patient to resume xarelto 3/12/25  Patient ambulates without difficulty, pt denies weakness of lower extremities

## 2025-03-11 NOTE — DISCHARGE INSTRUCTIONS
You underwent a procedure today    After most procedures, it is recommended that you relax and limit your activity for the remainder of the day unless you have been told otherwise by your pain physician.  You should not drive a car, operate machinery, or make important legal decisions unless otherwise directed by your pain physician.  You may resume your normal activity, including exercise, tomorrow.      Keep a written pain diary of how much pain relief you experienced following the procedure and the length of time of pain relief you experienced pain relief. Following diagnostic injections like medial branch nerve blocks, sacroiliac joint blocks, stellate ganglion injections and other blocks, it is very important you record the specific amount of pain relief you experienced immediately after the injectionand how long it lasted. Your doctor will ask you for this information at your follow up visit.     For all injections, please keep the injection site dry and inspect the site for a couple of days. You may remove the Band-Aid the day of the injection at any time.     Some discomfort, bruising or slight swelling may occur at the injection site. This is not abnormal if it occurs.  If needed you may:    -Take over the counter medication such as Tylenol or Motrin.   -Apply an ice pack for 30 minutes, 2 to 3 times a day for the first 24 hours.     You may shower today; no soaking baths, hot tubs, whirlpools or swimming pools for two days.      If you are given steroids in your injection, it may take 3-5 days for the steroid medication to take effect. You may notice a worsening of your symptoms for 1-2 days after the injection. This is not abnormal.  You may use acetaminophen, ibuprofen, or prescription medication that your doctor may have prescribed for you if you need to do so.     A few common side effects of steroids include facial flushing, sweating, restlessness, irritability,difficulty sleeping, increase in blood  sugar, and increased blood pressure. If you have diabetes, please monitor your blood sugar at least once a day for at least 5 days. If you have poorly controlled high blood pressure, monitoryour blood pressure for at least 2 days and contact your primary care physician if these numbers are unusually high for you.      If you take aspirin or non-steroidal anti-inflammatory drugs (examples are Motrin, Advil, ibuprofen, Naprosyn, Voltaren, Relafen, etc.) you may restart these this evening.    You do not need to discontinue non-aspirin-containing pain medications prior to an injection (examples: Celebrex, tramadol, hydrocodone and acetaminophen).      If you take a blood thinning medication (Coumadin, Lovenox, Fragmin,Ticlid, Plavix, Pradaxa, etc.), please discuss this with your primary care physician/cardiologist and your pain physician. These medications MUST be discontinued before you can have an injection safely, without the risk of uncontrolled bleeding. If these medications are not discontinued for an appropriate period of time, you will not be able to receivean injection.      If you are taking Coumadin, please have your INR checked the morning of your procedure and bringthe result to your appointment unless otherwise instructed. If your INR is over 1.2, your injection may need to be rescheduled to avoid uncontrolled bleeding from the needle placement.     Call [facility] at [phone] between 8am-4pm Monday - Friday if you are experiencing the following:    If you received an epidural or spinal injection:    -Headache that doesnot go away with medicine, is worse when sitting or standing up, and is greatly relieved upon lying down.   -Severe pain worse than or different than your baseline pain.   -Chills or fever (101º F or greater).   -Drainage or signs of infection at the injection site     Go directly to the Emergency Department if you are experiencing the following and received an  epidural or spinal injection:   -Abrupt weakness or progressive weakness in your legs that starts after you leave the clinic.   -Abrupt severe or worsening numbness in your legs.   -Inability to urinate after the injection or loss of bowel or bladder control without the urge to defecate or urinate.     If you have a clinical question that cannot wait until your next appointment, please call [phone] between 8am-4pm Monday - Friday or send a [facility] message. We do our best to return all non-emergency messages within 24 hours, Monday - Friday. A nurse or physician will return your message.      If you need to cancel an appointment, please call the scheduling staff at [phone] during normal business hours or leave a message at least 24 hours in advance.     If you are going to be sedated for your next procedure, you MUST have responsible adult who can legally drive accompany you home. You cannot eat or drink for eight hours prior to the planned procedure if you are going to receive sedation. You may take your non-blood thinning medications with a small sip of water.

## 2025-03-19 ENCOUNTER — PATIENT OUTREACH (OUTPATIENT)
Dept: PRIMARY CARE | Facility: CLINIC | Age: 75
End: 2025-03-19
Payer: MEDICARE

## 2025-03-19 DIAGNOSIS — M06.9 RHEUMATOID ARTHRITIS, INVOLVING UNSPECIFIED SITE, UNSPECIFIED WHETHER RHEUMATOID FACTOR PRESENT (MULTI): ICD-10-CM

## 2025-03-19 DIAGNOSIS — I10 BENIGN ESSENTIAL HYPERTENSION: ICD-10-CM

## 2025-03-19 NOTE — PROGRESS NOTES
"Chart review complete prior to monthly outreach.  Monthly CCM call completed with patient.  Patient stated that he had back injections done last week and has noticed a significant improvement.  He is able to stand straight and has a \"normal stride\" to his gait.  Has been sleeping better as well due to the decrease in pain.  Heart rate is now regular and in sinus rhythm, no longer in a fib.  Blood pressure has been good.  Said that he is feeling \"pretty good\" in terms of the RA and joint pain and movements.  Denies any recent falls or injuries.  Medications reviewed, no refills needed at time of call.  Is in agreement to future follow up calls .  "

## 2025-03-24 ENCOUNTER — OFFICE VISIT (OUTPATIENT)
Dept: PRIMARY CARE | Facility: CLINIC | Age: 75
End: 2025-03-24
Payer: MEDICARE

## 2025-03-24 VITALS
HEART RATE: 56 BPM | HEIGHT: 68 IN | BODY MASS INDEX: 32.28 KG/M2 | WEIGHT: 213 LBS | SYSTOLIC BLOOD PRESSURE: 120 MMHG | DIASTOLIC BLOOD PRESSURE: 74 MMHG | OXYGEN SATURATION: 98 % | TEMPERATURE: 98.5 F

## 2025-03-24 DIAGNOSIS — E78.2 COMBINED HYPERLIPIDEMIA: Chronic | ICD-10-CM

## 2025-03-24 DIAGNOSIS — I48.91 ATRIAL FIBRILLATION AND FLUTTER: ICD-10-CM

## 2025-03-24 DIAGNOSIS — I48.92 ATRIAL FIBRILLATION AND FLUTTER: ICD-10-CM

## 2025-03-24 DIAGNOSIS — N13.8 BPH WITH OBSTRUCTION/LOWER URINARY TRACT SYMPTOMS: Chronic | ICD-10-CM

## 2025-03-24 DIAGNOSIS — K21.9 GASTROESOPHAGEAL REFLUX DISEASE, UNSPECIFIED WHETHER ESOPHAGITIS PRESENT: Chronic | ICD-10-CM

## 2025-03-24 DIAGNOSIS — N52.9 ERECTILE DYSFUNCTION, UNSPECIFIED ERECTILE DYSFUNCTION TYPE: ICD-10-CM

## 2025-03-24 DIAGNOSIS — R73.03 PREDIABETES: Chronic | ICD-10-CM

## 2025-03-24 DIAGNOSIS — J44.1 CHRONIC OBSTRUCTIVE PULMONARY DISEASE WITH (ACUTE) EXACERBATION (MULTI): ICD-10-CM

## 2025-03-24 DIAGNOSIS — Z00.00 ROUTINE GENERAL MEDICAL EXAMINATION AT HEALTH CARE FACILITY: Primary | ICD-10-CM

## 2025-03-24 DIAGNOSIS — I10 BENIGN ESSENTIAL HYPERTENSION: Chronic | ICD-10-CM

## 2025-03-24 DIAGNOSIS — N40.1 BPH WITH OBSTRUCTION/LOWER URINARY TRACT SYMPTOMS: Chronic | ICD-10-CM

## 2025-03-24 DIAGNOSIS — I48.91 ATRIAL FIBRILLATION, UNSPECIFIED TYPE (MULTI): ICD-10-CM

## 2025-03-24 DIAGNOSIS — M54.12 CERVICAL RADICULOPATHY: ICD-10-CM

## 2025-03-24 PROBLEM — R53.1 WEAKNESS: Status: RESOLVED | Noted: 2024-06-10 | Resolved: 2025-03-24

## 2025-03-24 PROBLEM — L40.50 PSORIASIS WITH ARTHROPATHY (MULTI): Status: RESOLVED | Noted: 2024-03-22 | Resolved: 2025-03-24

## 2025-03-24 PROBLEM — M06.9 RHEUMATOID ARTHRITIS: Chronic | Status: RESOLVED | Noted: 2023-06-28 | Resolved: 2025-03-24

## 2025-03-24 PROBLEM — Z51.81 ENCOUNTER FOR MONITORING DOFETILIDE THERAPY: Status: RESOLVED | Noted: 2024-11-07 | Resolved: 2025-03-24

## 2025-03-24 PROBLEM — Z79.899 ENCOUNTER FOR MONITORING DOFETILIDE THERAPY: Status: RESOLVED | Noted: 2024-11-07 | Resolved: 2025-03-24

## 2025-03-24 PROBLEM — I83.003: Chronic | Status: RESOLVED | Noted: 2024-03-22 | Resolved: 2025-03-24

## 2025-03-24 PROCEDURE — 1160F RVW MEDS BY RX/DR IN RCRD: CPT | Performed by: FAMILY MEDICINE

## 2025-03-24 PROCEDURE — 1125F AMNT PAIN NOTED PAIN PRSNT: CPT | Performed by: FAMILY MEDICINE

## 2025-03-24 PROCEDURE — 1036F TOBACCO NON-USER: CPT | Performed by: FAMILY MEDICINE

## 2025-03-24 PROCEDURE — 1159F MED LIST DOCD IN RCRD: CPT | Performed by: FAMILY MEDICINE

## 2025-03-24 PROCEDURE — 3078F DIAST BP <80 MM HG: CPT | Performed by: FAMILY MEDICINE

## 2025-03-24 PROCEDURE — 99215 OFFICE O/P EST HI 40 MIN: CPT | Performed by: FAMILY MEDICINE

## 2025-03-24 PROCEDURE — 99214 OFFICE O/P EST MOD 30 MIN: CPT | Performed by: FAMILY MEDICINE

## 2025-03-24 PROCEDURE — 3074F SYST BP LT 130 MM HG: CPT | Performed by: FAMILY MEDICINE

## 2025-03-24 PROCEDURE — G0439 PPPS, SUBSEQ VISIT: HCPCS | Performed by: FAMILY MEDICINE

## 2025-03-24 PROCEDURE — 1123F ACP DISCUSS/DSCN MKR DOCD: CPT | Performed by: FAMILY MEDICINE

## 2025-03-24 PROCEDURE — 1170F FXNL STATUS ASSESSED: CPT | Performed by: FAMILY MEDICINE

## 2025-03-24 PROCEDURE — RXMED WILLOW AMBULATORY MEDICATION CHARGE

## 2025-03-24 PROCEDURE — 1157F ADVNC CARE PLAN IN RCRD: CPT | Performed by: FAMILY MEDICINE

## 2025-03-24 RX ORDER — SILDENAFIL 100 MG/1
100 TABLET, FILM COATED ORAL DAILY PRN
Qty: 12 TABLET | Refills: 3 | Status: SHIPPED | OUTPATIENT
Start: 2025-03-24 | End: 2026-03-24

## 2025-03-24 RX ORDER — GABAPENTIN 300 MG/1
CAPSULE ORAL
Qty: 15 CAPSULE | Refills: 0 | Status: SHIPPED | OUTPATIENT
Start: 2025-03-24

## 2025-03-24 ASSESSMENT — ENCOUNTER SYMPTOMS
PALPITATIONS: 0
SHORTNESS OF BREATH: 0
DEPRESSION: 0
OCCASIONAL FEELINGS OF UNSTEADINESS: 0
LOSS OF SENSATION IN FEET: 0
ABDOMINAL DISTENTION: 0
COUGH: 0

## 2025-03-24 ASSESSMENT — ACTIVITIES OF DAILY LIVING (ADL)
GROCERY_SHOPPING: INDEPENDENT
DOING_HOUSEWORK: INDEPENDENT
DRESSING: INDEPENDENT
TAKING_MEDICATION: INDEPENDENT
BATHING: INDEPENDENT
MANAGING_FINANCES: INDEPENDENT

## 2025-03-24 ASSESSMENT — COLUMBIA-SUICIDE SEVERITY RATING SCALE - C-SSRS
2. HAVE YOU ACTUALLY HAD ANY THOUGHTS OF KILLING YOURSELF?: NO
6. HAVE YOU EVER DONE ANYTHING, STARTED TO DO ANYTHING, OR PREPARED TO DO ANYTHING TO END YOUR LIFE?: NO
1. IN THE PAST MONTH, HAVE YOU WISHED YOU WERE DEAD OR WISHED YOU COULD GO TO SLEEP AND NOT WAKE UP?: NO

## 2025-03-24 ASSESSMENT — PAIN SCALES - GENERAL: PAINLEVEL_OUTOF10: 3

## 2025-03-24 NOTE — PROGRESS NOTES
"Subjective   Reason for Visit: Pro Belle Jr. is an 74 y.o. male here for a Medicare Wellness visit.     Past Medical, Surgical, and Family History reviewed and updated in chart.    Reviewed all medications by prescribing practitioner or clinical pharmacist (such as prescriptions, OTCs, herbal therapies and supplements) and documented in the medical record.    HPI  Hypertension:          c/o Patient here for F/U. at goal in office          c/o Med compliance.          Denies : Med side effects.          Denies : Chest pain.          Denies : Dizziness.          Denies : Leg edema.          Denies : Palpitations.   CAD/ASCVD:          c/o Patient here for F/U. stable, stent to rca.   Hyperlipidemia:          Patient here for F/U. doing well and without complaints, tolerating medicine well.   GERD:          GERD F/U doing well and without complaints, controlled.   Dyslipidemia  -Patient presents for follow up chronic ,stable problem  -Concerns: none   -Treatment:  atorvastatin    Charly/narcolepsy:  sees dr wells for this    Ed:  would like to try something for this  Patient Care Team:  Ruben Bradford MD as PCP - General  Taj Cardenas MD as Surgeon (Orthopaedic Surgery)  Mynor Hurst MD as Consulting Physician (Cardiology)  Umu Cage MD as Referring Physician (Rheumatology)  Susan Price as Care Manager (Case Management)  Susan Price as Care Manager (Case Management)  Eva Ordonez PharmD as Pharmacist (Pharmacy)     Review of Systems   Respiratory:  Negative for cough and shortness of breath.    Cardiovascular:  Negative for chest pain and palpitations.   Gastrointestinal:  Negative for abdominal distention.       Objective   Vitals:  /74   Pulse 56   Temp 36.9 °C (98.5 °F) (Temporal)   Ht 1.727 m (5' 8\")   Wt 96.6 kg (213 lb)   SpO2 98%   BMI 32.39 kg/m²       Physical Exam  Vitals reviewed.   Constitutional:       Appearance: Normal appearance.   HENT:      Right Ear: Tympanic " membrane, ear canal and external ear normal.      Left Ear: Tympanic membrane, ear canal and external ear normal.      Nose: Nose normal.      Mouth/Throat:      Mouth: Mucous membranes are moist.   Eyes:      Extraocular Movements: Extraocular movements intact.      Conjunctiva/sclera: Conjunctivae normal.      Pupils: Pupils are equal, round, and reactive to light.   Cardiovascular:      Rate and Rhythm: Normal rate and regular rhythm.      Pulses: Normal pulses.      Heart sounds: Normal heart sounds.   Pulmonary:      Breath sounds: Normal breath sounds.   Abdominal:      General: Abdomen is flat. Bowel sounds are normal.      Palpations: Abdomen is soft.   Musculoskeletal:         General: Normal range of motion.      Cervical back: Neck supple.   Skin:     General: Skin is warm.   Neurological:      General: No focal deficit present.      Mental Status: He is alert and oriented to person, place, and time.      Cranial Nerves: Cranial nerves 2-12 are intact.   Psychiatric:         Attention and Perception: Attention normal.         Mood and Affect: Mood normal.         Assessment & Plan  Routine general medical examination at health care facility    Orders:    1 Year Follow Up In Primary Care - Wellness Exam    1 Year Follow Up In Primary Care - Wellness Exam; Future    Atrial fibrillation, unspecified type (Multi)    Orders:    Urinalysis with Reflex Microscopic; Future    Comprehensive Metabolic Panel; Future    CBC and Auto Differential; Future    TSH with reflex to Free T4 if abnormal; Future    Lipid Panel; Future    Hemoglobin A1C; Future    Benign essential hypertension    Orders:    Urinalysis with Reflex Microscopic; Future    Comprehensive Metabolic Panel; Future    CBC and Auto Differential; Future    TSH with reflex to Free T4 if abnormal; Future    Lipid Panel; Future    Hemoglobin A1C; Future    Gastroesophageal reflux disease, unspecified whether esophagitis present          Prediabetes    Orders:    Urinalysis with Reflex Microscopic; Future    Comprehensive Metabolic Panel; Future    CBC and Auto Differential; Future    TSH with reflex to Free T4 if abnormal; Future    Lipid Panel; Future    Hemoglobin A1C; Future    BPH with obstruction/lower urinary tract symptoms    Orders:    Urinalysis with Reflex Microscopic; Future    Comprehensive Metabolic Panel; Future    CBC and Auto Differential; Future    TSH with reflex to Free T4 if abnormal; Future    Lipid Panel; Future    Hemoglobin A1C; Future    Combined hyperlipidemia    Orders:    Urinalysis with Reflex Microscopic; Future    Comprehensive Metabolic Panel; Future    CBC and Auto Differential; Future    TSH with reflex to Free T4 if abnormal; Future    Lipid Panel; Future    Hemoglobin A1C; Future    Erectile dysfunction, unspecified erectile dysfunction type    Orders:    sildenafil (Viagra) 100 mg tablet; Take 1 tablet (100 mg) by mouth once daily as needed for erectile dysfunction.    Chronic obstructive pulmonary disease with (acute) exacerbation (Multi)          Pt wants to wean off gabapentin due to his chronic right upper extremity neuropathy.  Discussed weaning off of gabapentin  He will take 300 mg twice daily for 4 days then decrease it to 300 mg daily for 7 days then he will stop  Counseled on medication side effects  Viagra 100 mg as needed hour before intimacy  Continue his other medications as directed  Follow-up in 6 months labs as ordered

## 2025-03-25 ENCOUNTER — APPOINTMENT (OUTPATIENT)
Dept: ORTHOPEDIC SURGERY | Facility: CLINIC | Age: 75
End: 2025-03-25
Payer: MEDICARE

## 2025-03-26 ENCOUNTER — OFFICE VISIT (OUTPATIENT)
Dept: PAIN MEDICINE | Facility: CLINIC | Age: 75
End: 2025-03-26
Payer: MEDICARE

## 2025-03-26 ENCOUNTER — PHARMACY VISIT (OUTPATIENT)
Dept: PHARMACY | Facility: CLINIC | Age: 75
End: 2025-03-26
Payer: MEDICARE

## 2025-03-26 VITALS
SYSTOLIC BLOOD PRESSURE: 130 MMHG | DIASTOLIC BLOOD PRESSURE: 80 MMHG | WEIGHT: 213 LBS | BODY MASS INDEX: 32.28 KG/M2 | HEART RATE: 60 BPM | RESPIRATION RATE: 24 BRPM | HEIGHT: 68 IN | OXYGEN SATURATION: 99 %

## 2025-03-26 DIAGNOSIS — M47.812 CERVICAL SPONDYLOSIS WITHOUT MYELOPATHY: ICD-10-CM

## 2025-03-26 DIAGNOSIS — M54.16 LUMBAR RADICULOPATHY: Primary | ICD-10-CM

## 2025-03-26 DIAGNOSIS — M96.1 POSTLAMINECTOMY SYNDROME, LUMBAR REGION: ICD-10-CM

## 2025-03-26 PROCEDURE — 99214 OFFICE O/P EST MOD 30 MIN: CPT | Performed by: ANESTHESIOLOGY

## 2025-03-26 PROCEDURE — 1036F TOBACCO NON-USER: CPT | Performed by: ANESTHESIOLOGY

## 2025-03-26 PROCEDURE — 3079F DIAST BP 80-89 MM HG: CPT | Performed by: ANESTHESIOLOGY

## 2025-03-26 PROCEDURE — 1157F ADVNC CARE PLAN IN RCRD: CPT | Performed by: ANESTHESIOLOGY

## 2025-03-26 PROCEDURE — 1123F ACP DISCUSS/DSCN MKR DOCD: CPT | Performed by: ANESTHESIOLOGY

## 2025-03-26 PROCEDURE — 3075F SYST BP GE 130 - 139MM HG: CPT | Performed by: ANESTHESIOLOGY

## 2025-03-26 PROCEDURE — 1159F MED LIST DOCD IN RCRD: CPT | Performed by: ANESTHESIOLOGY

## 2025-03-26 PROCEDURE — G2211 COMPLEX E/M VISIT ADD ON: HCPCS | Performed by: ANESTHESIOLOGY

## 2025-03-26 PROCEDURE — 1125F AMNT PAIN NOTED PAIN PRSNT: CPT | Performed by: ANESTHESIOLOGY

## 2025-03-26 ASSESSMENT — ENCOUNTER SYMPTOMS
BACK PAIN: 1
ACTIVITY CHANGE: 1
NECK STIFFNESS: 1
NECK PAIN: 1

## 2025-03-26 ASSESSMENT — PAIN SCALES - GENERAL
PAINLEVEL_OUTOF10: 2
PAINLEVEL_OUTOF10: 2

## 2025-03-26 ASSESSMENT — PAIN - FUNCTIONAL ASSESSMENT: PAIN_FUNCTIONAL_ASSESSMENT: 0-10

## 2025-03-26 NOTE — PROGRESS NOTES
The patient is a 74-year-old male with low back and right leg pain.  The patient reports his pain is much improved after his caudal epidural steroid injection.  He still experiencing some residual pain but overall he is pleased with his response to the injection.  He reports that he is feeling very weak and deconditioned.  He has tried land-based physical therapy twice that seemed to exacerbate his pain.  He has never tried aqua therapy.  He also describes neck and bilateral arm pain especially on the left side.  He would like to consider treatment for his cervical spinal pain in the future.    Review of Systems   Constitutional:  Positive for activity change.   Musculoskeletal:  Positive for back pain, gait problem, neck pain and neck stiffness.   All other systems reviewed and are negative.    GENERAL: alert and appropriate, in no distress, well-hydrated, well-nourished and interactive  SKIN: no rash noted, surgical scars well healed  RESPIRATORY: breathing non-labored and no grunting/flaring/retractions  CHEST: equal chest rise with normal respiratory effort  ABDOMEN: soft and non-tender  BACK: back normal in appearance, spine with reduced ROM  EXTREMITIES: strength intact  NEUROLOGIC: gait antalgic, SLR negative, sensation grossly intact, Micah sign negative, Spurling sign reproduced pain    Assessment and Plan    -Chronicity--chronic spinal pain    -Diagnostics--we reviewed the imaging of his spine     -Pharmacologic--no change    -Psychologic--no need for psychologic intervention from my standpoint.  There are no mental health issues of which I am aware that are contributing to the patient's pain.  There are no substance abuse or alcohol abuse issues of which I am aware that are contributing to the patient's pain.    -Physical--we discussed the importance of physical therapy and exercise.  We discussed avoidance and modification techniques.  I ordered aquatic therapy.    -Intervention--no intervention  planned    I spent time educating the patient on the condition including the treatment and the prognosis.  I invited the patient to call at anytime with any questions.

## 2025-03-28 NOTE — PROGRESS NOTES
Pharmacist Clinic: Cardiology Management    Pro Belle Jr. is a 74 y.o. male was referred to Clinical Pharmacy Team for anticoagulation management.     Referring Provider: Mildred Valencia APRN*    THIS IS A FOLLOW UP PATIENT APPOINTMENT. AT LAST VISIT ON 1/13/2025 WITH PHARMACIST (Eva Ordonez).    Appointment was completed by the patient who was reached at .    REVIEW OF PAST APPNT (IF APPLICABLE):   Today's appointment was 6 month follow up regarding anticoagulation with Xarelto. Pro is doing well, reports no abnormal bruising or bleeding. He confirms daily adherence and denies any missed doses. Discussed that Xarelto is recommended to be taken with food with patient confirms he has been doing.   Discussed  PAP renewal, which will be due prior to 4/11/2025 and will be completed at next appointment. Patient is aware to have documents available at that time.    No Known Allergies    Past Medical History:   Diagnosis Date    A-fib (Multi)     Abnormal ECG     Chronic pain disorder     Hypertension     Joint pain     Low back pain     Neck pain     Shingles     Sleep apnea        Current Outpatient Medications on File Prior to Visit   Medication Sig Dispense Refill    acetaminophen (Tylenol) 500 mg tablet Take 2 tablets (1,000 mg) by mouth once daily as needed (back pain). Do not crush, chew, or split.      amLODIPine (Norvasc) 5 mg tablet TAKE 1 TABLET BY MOUTH ONCE  DAILY 90 tablet 3    armodafinil (Nuvigil) 250 mg tablet Take 1 tablet (250 mg) by mouth once daily in the morning.      aspirin 81 mg EC tablet Take 1 tablet (81 mg) by mouth once daily.      atorvastatin (Lipitor) 80 mg tablet TAKE 1 TABLET BY MOUTH AT  BEDTIME 90 tablet 3    cholecalciferol (Vitamin D-3) 50 MCG (2000 UT) tablet Take 1 tablet (2,000 Units) by mouth once daily.      cyanocobalamin (Vitamin B-12) 1,000 mcg tablet Take 1 tablet (1,000 mcg) by mouth once daily.      docusate sodium (Colace) 100 mg capsule Take 1  capsule (100 mg) by mouth once daily.      dofetilide (Tikosyn) 250 mcg capsule Take 1 capsule (250 mcg) by mouth every 12 hours. Do not fill before December 20, 2024. 180 capsule 3    ezetimibe (Zetia) 10 mg tablet TAKE 1 TABLET BY MOUTH AT  BEDTIME 90 tablet 3    folic acid (Folvite) 800 mcg tablet Take 1 mg by mouth once daily.      gabapentin (Neurontin) 300 mg capsule Take 300 mg twice daily for 4 days, then decrease to 300 mg daily x 7 days, then stop 15 capsule 0    lisinopril 40 mg tablet TAKE 1 TABLET BY MOUTH ONCE  DAILY 90 tablet 3    metoprolol succinate XL (Toprol-XL) 25 mg 24 hr tablet Take 1 tablet (25 mg) by mouth 2 times a day. Do not crush or chew.      omeprazole (PriLOSEC) 20 mg DR capsule TAKE 1 CAPSULE BY MOUTH ONCE  DAILY 90 capsule 3    rivaroxaban (Xarelto) 20 mg tablet Take 1 tablet (20 mg) by mouth once daily. 90 tablet 3    sildenafil (Viagra) 100 mg tablet Take 1 tablet (100 mg) by mouth once daily as needed for erectile dysfunction. 12 tablet 3    tamsulosin (Flomax) 0.4 mg 24 hr capsule Take 2 capsules (0.8 mg) by mouth once daily. 180 capsule 3    tofacitinib ER (Xeljanz XR) 11 mg tablet extended release 24 hr Take 1 tablet (11 mg) by mouth once daily. Do not crush, chew or split. Swallow whole.      traMADol (Ultram) 50 mg tablet Take 1 tablet (50 mg) by mouth every 8 hours if needed for severe pain (7 - 10). 90 tablet 2     No current facility-administered medications on file prior to visit.         RELEVANT LAB RESULTS  Lab Results   Component Value Date    BILITOT 0.6 10/19/2024    CALCIUM 9.4 11/24/2024    CO2 26 11/24/2024     (H) 11/24/2024    CREATININE 0.74 11/24/2024    GLUCOSE 88 11/24/2024    ALKPHOS 82 10/19/2024    K 4.0 11/24/2024    PROT 6.0 (L) 10/19/2024     11/24/2024    AST 21 10/19/2024    ALT 19 10/19/2024    BUN 17 11/24/2024    ANIONGAP 13 11/24/2024    MG 2.26 11/24/2024    PHOS 2.1 (L) 05/25/2022    ALBUMIN 3.6 10/19/2024    GFRMALE 90 08/16/2023  "    Lab Results   Component Value Date    TRIG 59 03/27/2024    CHOL 140 03/27/2024    LDLCALC 53 03/27/2024    HDL 74.9 03/27/2024     No results found for: \"BMCBC\", \"CBCDIF\"     PHARMACEUTICAL ASSESSMENT    MEDICATION RECONCILIATION    Drug Interactions? No clinically significant drug interactions requiring change in therapy found at the time of this visit.     Medication Documentation Review Audit       Reviewed by Jani Prieto LPN (Licensed Nurse) on 03/26/25 at 1526      Medication Order Taking? Sig Documenting Provider Last Dose Status   acetaminophen (Tylenol) 500 mg tablet [id number] Yes Take 2 tablets (1,000 mg) by mouth once daily as needed (back pain). Do not crush, chew, or split. Low Reyez MD  Active   amLODIPine (Norvasc) 5 mg tablet [id number] Yes TAKE 1 TABLET BY MOUTH ONCE  DAILY Mynor Hurst MD  Active   armodafinil (Nuvigil) 250 mg tablet [id number] Yes Take 1 tablet (250 mg) by mouth once daily in the morning. Historical Provider, MD  Active   aspirin 81 mg EC tablet [id number] Yes Take 1 tablet (81 mg) by mouth once daily. Historical Provider, MD  Active   atorvastatin (Lipitor) 80 mg tablet [id number] Yes TAKE 1 TABLET BY MOUTH AT  BEDTIME Mynor Hurst MD  Active   cholecalciferol (Vitamin D-3) 50 MCG (2000 UT) tablet [id number] Yes Take 1 tablet (2,000 Units) by mouth once daily. Historical Provider, MD  Active   cyanocobalamin (Vitamin B-12) 1,000 mcg tablet [id number] Yes Take 1 tablet (1,000 mcg) by mouth once daily. Historical Provider, MD  Active   docusate sodium (Colace) 100 mg capsule [id number] Yes Take 1 capsule (100 mg) by mouth once daily. Historical Provider, MD  Active   dofetilide (Tikosyn) 250 mcg capsule [id number] Yes Take 1 capsule (250 mcg) by mouth every 12 hours. Do not fill before December 20, 2024. Kelechi Nassar MD  Active   ezetimibe (Zetia) 10 mg tablet [id number] Yes TAKE 1 TABLET BY MOUTH AT  BEDTIME Mynor Hurst MD  Active   folic acid (Folvite) 800 " mcg tablet [id number] Yes Take 1 mg by mouth once daily. Historical Provider, MD  Active   gabapentin (Neurontin) 300 mg capsule [id number] Yes Take 300 mg twice daily for 4 days, then decrease to 300 mg daily x 7 days, then stop Ruben Bradford MD  Active     Discontinued 03/24/25 1032   lisinopril 40 mg tablet [id number] Yes TAKE 1 TABLET BY MOUTH ONCE  DAILY Ruben Bradford MD  Active   metoprolol succinate XL (Toprol-XL) 25 mg 24 hr tablet [id number] Yes Take 1 tablet (25 mg) by mouth 2 times a day. Do not crush or chew. Historical Provider, MD  Active   omeprazole (PriLOSEC) 20 mg DR capsule [id number] Yes TAKE 1 CAPSULE BY MOUTH ONCE  DAILY Ruben Bradford MD  Active   rivaroxaban (Xarelto) 20 mg tablet [id number] Yes Take 1 tablet (20 mg) by mouth once daily. Ruben Bradford MD  Active   sildenafil (Viagra) 100 mg tablet [id number] Yes Take 1 tablet (100 mg) by mouth once daily as needed for erectile dysfunction. Ruben Bradford MD  Active   tamsulosin (Flomax) 0.4 mg 24 hr capsule [id number] Yes Take 2 capsules (0.8 mg) by mouth once daily. Citlaly Shane MD  Active   tofacitinib ER (Xeljanz XR) 11 mg tablet extended release 24 hr [id number] Yes Take 1 tablet (11 mg) by mouth once daily. Do not crush, chew or split. Swallow whole. Historical Provider, MD  Active   traMADol (Ultram) 50 mg tablet [id number] Yes Take 1 tablet (50 mg) by mouth every 8 hours if needed for severe pain (7 - 10). Victoriano Mackenzie MD  Active                    DISEASE MANAGEMENT ASSESSMENT:     ANTICOAGULATION ASSESSMENT    The ASCVD Risk score (Alphonso DK, et al., 2019) failed to calculate for the following reasons:    Risk score cannot be calculated because patient has a medical history suggesting prior/existing ASCVD    DIAGNOSIS: prevention of nonvalvular atrial fibrilliation stroke and systemic embolism  - Patient is projected to be on anticoagulation depending on if in afib  - [id number] Score: [4] (only  included if diagnosis is atrial fibrillation)   Age: [<65 (0)] [65-74 (+1)] [> 75 (+2)]: 2  Sex: [Male/Female (+1)]: 0  CHF history: [No/Yes(+1)]: 0  Hypertension history: [No/Yes(+1)]: 1  Stroke/TIA/thromboembolism history: [No/Yes(+2)]: 0  Vascular disease history (prior MI, peripheral artery disease, aortic plaque): [No/Yes(+1)]: 1  Diabetes history: [No/Yes(+1)]: 0    CURRENT PHARMACOTHERAPY:    Xarelto 20 mg every day  CrCl 91 mL/min  Age 74 years  Weight 96.6 kg  Scr 0.94 mg/dL (1/17/2025)    RELEVANT PAST MEDICAL HISTORY:   HTN, HLD, A-fib with RVR, prediabetes    Affordability/Accessibility:  PAP for Xarelto  Adherence/Organization: confirms taking Xarelto daily at 8AM with food; denies any unintentional missed doses. Xarelto was held for 3 days for injection in his back a few weeks ago but his is taking it again.  Adverse Reactions: no abnormal bruising/bleeding; no dark stools or hematemesis  Recent Hospitalizations: None  Recent Falls/Trauma: None  Changes in Tobacco or Alcohol Intake:   Tobacco: None  Alcohol: Yes, will drink 1-2 glasses of wine/beer with dinner a few times per week    EDUCATION/COUNSELING:   - Counseled patient on MOA, expectations, duration of therapy, contraindications, administration, and monitoring parameters  - Counseled patient of side effects that are indicative of bleeding such as dark tarry stool, unexplainable bruising, or vomiting up a coffee ground like substance  - Discussed interaction of alcohol and Xarelto increasing the risk of bleeding. Recommended limiting use and monitoring for signs/symptoms of bleeding.     Patient Assistance Program (PAP) - RENEWAL     Per regulation, patient is due for their annual renewal for their  PAP application. Patient's application is due for renewal by 4/11/2025. Patient understands that if proper documentation is not received before renewal date, they will no longer be able to receive approved medication(s) free of charge.      Application for program to be submitted for the following medications: Xarelto    [facility] Permanent Address: [facility]   Prescription Insurance:   Yes   Members of Household: 2   Files Taxes: Yes - files jointly with spouse       Patient will be other: 2023 income tax form previously sent to pharmacist    Patient verbally reports monthly or yearly income which is less than 400% federal poverty level    Patient aware this process may take up to 6 weeks.     If approved medication must be filled through [facility] and MEDICATION WILL BE MAILED TO PATIENT.       DISCUSSION/NOTES:   Today's appointment was 3 month follow up regarding anticoagulation with Xarelto. Pro is doing well, reports no abnormal bruising or bleeding. No recent hospitalizations or falls.   Discussed interaction of alcohol and Xarelto increasing the risk of bleeding. Recommended limiting use and monitoring for signs/symptoms of bleeding.  Will apply for  PAP renewal and follow up in 6 months.     ASSESSMENT AND PLAN:    Assessment/Plan   Problem List Items Addressed This Visit       Atrial fibrillation (Multi)     Pro continues on anticoagulation with Xarelto. No dosage adjustment required for renal function at this time.         Relevant Orders    Referral to Clinical Pharmacy       RECOMMENDATIONS/PLAN    Continue: Xarelto 20 mg every day   Follow up: 6 months    Last Appointment with Referring Provider: 2/19/2025  Next Appointment with Referring Provider: To be scheduled; 6/5/2025 with Dr. Hurst  Clinical Pharmacist follow up: 10/1/2025  VAF/Application Expiration: Yes    Date: 4/11/2025  Type of Encounter: Deepak Ordonez PharmD    Verbal consent to manage patient's drug therapy was obtained from the patient . They were informed they may decline to participate or withdraw from participation in pharmacy services at any time.    Continue all meds under the continuation of care with the referring provider and  clinical pharmacy team.

## 2025-03-31 NOTE — PROGRESS NOTES
Physical Therapy Evaluation/Treatment    Patient Name: Pro Belle Jr.  MRN: [id number]  Encounter Date: 4/11/2025  Time Calculation  Start Time: 0815  Stop Time: 0900  Time Calculation (min): 45 min    Visit Number:  1/12 (including evaluation)  Planned total visits: 12  Visit Authorized/insurance considerations:  2025: MEDICARE A/B, [employer], [place], NO AUTH ($0 USED PT/ST)   Progress Report due visit #10    Current Problem:  Problem List Items Addressed This Visit    None  Visit Diagnoses         Codes    Lumbar radiculopathy     M54.16    Relevant Orders    Follow Up In Physical Therapy    Postlaminectomy syndrome, lumbar region     M96.1    Relevant Orders    Follow Up In Physical Therapy    Cervical spondylosis without myelopathy     M47.812    Relevant Orders    Follow Up In Physical Therapy          Subjective:  Subjective     SUBJECTIVE:  Main complaint:  Arms and legs are weak, pain decreased an hour or so after getting out of bed and taking Tylenol. Occasionally will take a Tramadol.  Morning stretches for his back helps.  Pt wants to learn water exercises so he can perform independently.  Less back pain since his injection.  Onset Date:  Rx:  03/25/2025    Patient reported hx of injury:   chronic    What aggravates symptoms:  Neck:  Turning his head quickly, looks down too much and then looks up (at his phone)  Back:   Standing >10 minutes, twisting, lift/carry >20#    What improves symptoms:  Neck: stops the movement   Back:  laying down, Tylenol, sitting    Previous Medical Treatment:    caudal epidural steroid injection.     Relevant PMH:  fusion L4-L5.  Bilateral hip replacement laterals (20 y/r, 2023?)  A-fib (pt reports under control)  Epidural Lx  Left shoulder replacement successful (but when his arm was taken out of the harness after back surgery he lost strength in his left arm)  Needs a right shoulder replacement    Red flags:  None    Imaging:  Other:      Imaging  01/29/2025  XR lumbar spine  2-3 views  Status: Final result     PACS Images     Show images for XR lumbar spine 2-3 views  Signed by    Signed Time Phone Pager   Lv Morales MD 1/30/2025 18:18 [phone]     Exam Information    Status Exam Begun Exam Ended   Final 1/29/2025 15:38 1/29/2025 15:43     Study Result    Narrative & Impression   Interpreted By:  Lv Morales,   STUDY:  XR LUMBAR SPINE 2-3 VIEWS; ;  1/29/2025 3:43 pm      INDICATION:  Signs/Symptoms:lumbar radiculopathy.      ,M54.16 Radiculopathy, lumbar region      COMPARISON:  None.      ACCESSION NUMBER(S):  [id number]      ORDERING CLINICIAN:  EZEKIEL BECERRA      FINDINGS:  Lumbar Spine, two views      Laminectomy and post spinal fusion L4-L5.      There is moderate to severe multilevel disc space narrowing with  osteophyte formation throughout the lumbar spine worse at L3-L4.  There is mild retrolisthesis of L3 on L4. No fracture seen. Vascular  calcifications      IMPRESSION:  Moderate to severe multilevel spondylosis worse at L3-L4 with mild  retrolisthesis at this level          MACRO:  None      Signed by: Lv Morales 1/30/2025 6:18 PM  Dictation workstation:   [id number]       XR DEXA bone density  Status: Final result     Signed by    Signed Time Phone Pager   Lisha Roberts MD 10/20/2022 10:44 [phone]      Exam Information    Status Exam Begun Exam Ended   Final 10/17/2022 14:09      Study Result    Narrative & Impression   PROCEDURE:         DEXA BONE DENSITY STUDY - IXR  0269  REASON FOR EXAM: M81.0  RESULT: THIS REPORT CONTAINS AN ADDENDUM - 10/20/2022 10:44 AM     MRN: [id number]  Patient Name: JAKOB BUTLER     ADDENDUM:  Note that no hip images could be performed as the patient has bilateral hip  joint replacements. The right forearm has a T-score of 0.5 and a Z-score of  2.1. There is normal bone mineral density of the right forearm.     *FINAL  Date of Service: 10/17/2022 14:09 Adm #: [id number]  Reading Dr:LISHA ROBERTS Signoff :  "ELLIS FOY     PROCEDURE:     DEXA BONE DENSITY STUDY - IXR  0269  REASON FOR EXAM: M81.0     RESULT: MRN: [id number]  Patient Name: JAKOB BUTLER     STUDY:  DEXA BONE DENSITY STUDY; 10/17/2022 2:09 pm     INDICATION:  M81.0.  Screening Bone Densitometry (DEXA).     COMPARISON:     ACCESSION NUMBER(S):  [id number]     ORDERING CLINICIAN:  CHRISTEN APPIAH     TECHNIQUE:  Bone Densitometry (DEXA) of the lumbar spine and right forearm performed.     FINDINGS:  Lumbar T-Score: 3.2 Z-Score: 4.2  Hip T-Score: 0.5 Z-Score: 2.1           IMPRESSION:  1. The bone mineral density of the lumbar spine and right forearm are  within normal range.     \"The T score compares the patient's BMD to peak bone mass of young  healthy adult. According to the World Health Organization, patient's with  T-scores between -1 and -2.5 are considered osteopenic, T scores of -2.5  or below indicate osteoporosis in (significant bone loss).\"  Dictation workstation:   [id number]     Original Interpreting Physician:   ELLIS FOY MD  Original Transcribed by/Date: [employer] Oct 17 2022 10:01A  Original Electronically Signed by/Date: ELLIS FOY MD Oct 17 2022  2:52P     Addendum Interpreting Physician:  Addendum Transcribed by/Date:   NO ADDENDUM  Addendum Electronically Signed by/Date:           Dictation workstation:   [id number]     Original Interpreting Physician:   ELLIS FOY MD  Original Transcribed by/Date: [employer] Oct 17 2022 10:01A  Original Electronically Signed by/Date: ELLIS FOY MD Oct 17 2022  2:52P     Addendum Interpreting Physician:  ELLIS FOY MD  Addendum Transcribed by/Date: [employer] Oct 20 2022 10:46A  Addendum Electronically Signed by/Date: ELLIS FOY MD Oct 20 2022 10:46A         Cardiology, Vascular, and Other Imaging  No other imaging results found for the past 7 days       Previous Therapy Treatments:    Outpatient Back (May 2024) and neck (October 2024)  Successful:  Back - no (pt had to stop because he developed nerve pain); neck " (yes)    Still doing some of the exercises    Pt stated Goal:    Neck: Able to turn his head without pain  strengthen in his arms and shoulders and be able to raise higher    Back:    able to stand more than 10 minutes without back pain worsening  Strength to walk up to 15 minutes (personal goal 2 miles by his trip in May)    Learn a water program so he can perform independently    General:  General  Reason for Referral: Back and neck discomfort  General Comment: Back and leg injection which helped.    Precautions:  Precautions  Precautions Comment: Lx fusion, 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) (Bilateral hip replacement laterals (20 y/r, 2023?)  A-fib (pt reports under control)  Epidural Lx  Left shoulder replacement successful (but when his arm was taken out of the harness after back surgery he lost strength)  Need a right shoulder replacement)    Pain:  Pain Assessment: 0-10  0-10 (Numeric) Pain Score: 3 (lower back)  Pain Type: Chronic pain  Pain Location: Back  Pain Orientation: Right, Mid  Pain Radiating Towards: buttocks  Pain Descriptors: Aching, Other (Comment) (stiffness.)  Pain Frequency: Intermittent  Pain Onset: Ongoing  Clinical Progression: Gradually improving    Home Living:  Home Living Comment: yes    Home type: House  Stairs: Yes  Lives with: Spouse    Vocation:    Retired  Job/Job tasks:      Prior Function Per Pt/Caregiver Report:  Level of [place]: Independent with ADLs and functional transfers, Independent with homemaking with ambulation    OBJECTIVE:    Posture:  Posture Comment: signficant forward head and rounded shoulders    Posture:       ROM and Strength:    Cervical:    See below     AROM STRENGTH*   Cervical     Flexion 15  10 strong  strong   Extension     /////////////////////////////////////////////////////////////////    R L R L   Rotation 35 20 strong strong   Sidebend 10 10 strong strong     Significant forward  head  *mild pain reported    Shoulder:    See below     AROM/PROM STRENGTH   Shoulder R L R L   Flexion 0/100 20/100 ** 2-/5   Extension WFL WFL WFL WFL   Abduction 0/80 20/90 **/5 2-/5   Internal  Rotation 40 abdomen 3-/5 3-/5   External Rotation 20 20 3-/5 3-/5     **unable to raise against gravity      Elbow:    AROM/strength:  WFL    ROM and Strength:    Lumbar:  AROM limited by 50 percent. Unable to stand erect (flexed knees and hips)    Hip:    See below       AROM STRENGTH   Hip R L R L   Hip Flexion 45 60 3-/5 3/5   Hip Abduction WFL WFL 3-/5 3/5   Hip Adduction WF. WFL 3-/5 3/5     Knee:    See below     AROM STRENGTH   Knee R L R L   Knee Flexion 130 130 3+/5 4/5   Knee Extension -15 0 3+/5 4/5     Ankle:      Strength:  WFL and AROM:  WFL    Flexibility:       R  L   Pectoralis Very tight Very tight   Piriformis NT NT   Hamstring Very tight Very tight   Gastroc/Soleus WFL WFL      Gait:  Gait Comment: decreased alexander, downward head position, flexed knees/hips/trunk, decreased heel strike, WBOS, SPC    Balance:  Balance Comment: Fair-    Outcome Measures:  Other Measures  Oswestry Disablity Index (LENI): 15     Assessment  PT Assessment Results: Decreased strength, Impaired balance, Pain, Orthopedic restrictions, Impaired hearing, Decreased mobility, Decreased range of motion, Decreased endurance  Rehab Prognosis: Good    Pt is a 74 y.o. male who presents with decreased Cx/shoulder ROM, weakness and pain; decreased Lx ROM, weakness and pain; decreased LE ROM and weakness  Pt would benefit from skilled physical therapy to improve flexibility, ROM, strength to reduce pain and improve the patient's current level of functioning including routine exercise program.  Pt would also benefit from proper body mechanics and ergonomic training to better manage the patient's symptoms and reduce exacerbation.      Pt's recovery time and progress/outcomes will likely be impacted by the patient;s history of Lx fusion,  bilateral hip replacements, left shoulder replacement and right shoulder needing a shoulder replacement.    Based on the history including personal factors and/or comorbidities, examination of body systems including body structures and function, activity limitations, and/or participation restrictions, as well as clinical presentation, patient meets criteria for moderate complexity evaluation.    Plan  Treatment/Interventions: Aquatic therapy, Education/ Instruction, Manual therapy, Neuromuscular re-education, Taping techniques, Therapeutic activities, Therapeutic exercises  PT Plan: Skilled PT  PT Frequency: 2 times per week  Duration: 12 week cert period  Onset Date: 03/26/25  Certification Period Start Date: 04/11/25  Certification Period End Date: 07/10/25  Number of Treatments Authorized: 12  Rehab Potential: Good  Plan of Care Agreement: Patient    OP EDUCATION:       Goals:  Active       PT Problem - Lx and Cx       PT Goal 1 - STG       Start:  04/11/25    Expected End:  05/26/25       1.  Improve Cx AROM by 10 deg at deficits  2.  Improve LE AROM by 20 degrees hip flexion, right knee extension by 5 deg  3.  Improve LE strength to 4/5 or better at deficits  4.  Improve trunk strength Good- at deficits; Cx strength to Good and painfree with resistance  5  Improve sitting posture with correct alignment of Cx region and shoulders, more aware of sit tall posturing when using his phone or sitting at the computer  6.  Pain:  0-2  7.  Functional Outcome Measure:  Oswestry 6             PT Goal 2 - LT FUNCTIONAL GOALS       Start:  04/11/25    Expected End:  07/10/25       LONG TERM FUNCTIONAL GOALS  1.  Pt report 40% improvement in being able to turn his head a greater range of movement during ADLs and IADLs  2. Pt able to stand 75% of the time 15 minutes or more for ADLs, IADLs and in the community with minimal back pain  3. Pt able to walk 20 minutes in the community 80% of the time with minimal back  pain  4. Independent with his Aquatic Exercise Program             Patient Stated Goal 1       Start:  04/11/25    Expected End:  07/10/25       Neck: Able to turn his head without pain  strengthen in his arms and shoulders and be able to raise higher    Back:    able to stand more than 10 minutes without back pain worsening  Strength to walk up to 15 minutes (personal goal 2 miles by his trip in May)    Learn a water program so he can perform independently             Treatments this date:      Therapeutic Activities:    OP PT EDUCATION:      Anatomy  Rationale for PT POC (aquatics  Pt needs to progress his walking program at home to meet his goal of walking 2 miles by some time in May    Billed Treatment Times:  Therapeutic Activity 5 min    Plan for next visit:  aquatics for Cx/shoulder ROM stretch and strength (may have to be performed on the bench); LE AROM, stretches and strengthening (see precautions noted above)

## 2025-04-01 ENCOUNTER — APPOINTMENT (OUTPATIENT)
Dept: PHARMACY | Facility: HOSPITAL | Age: 75
End: 2025-04-01
Payer: MEDICARE

## 2025-04-01 DIAGNOSIS — I48.21 PERMANENT ATRIAL FIBRILLATION (MULTI): ICD-10-CM

## 2025-04-01 DIAGNOSIS — I48.91 ATRIAL FIBRILLATION, UNSPECIFIED TYPE (MULTI): ICD-10-CM

## 2025-04-01 NOTE — ASSESSMENT & PLAN NOTE
Pro continues on anticoagulation with Xarelto. No dosage adjustment required for renal function at this time.

## 2025-04-01 NOTE — Clinical Note
Jocelin Levy, Today's appointment was 3 month follow up regarding anticoagulation with Xarelto. Pro is doing well, reports no abnormal bruising or bleeding. No recent hospitalizations or falls. Will apply for  PAP renewal and follow up in 6 months.

## 2025-04-07 ENCOUNTER — APPOINTMENT (OUTPATIENT)
Dept: ORTHOPEDIC SURGERY | Facility: CLINIC | Age: 75
End: 2025-04-07
Payer: MEDICARE

## 2025-04-07 ENCOUNTER — APPOINTMENT (OUTPATIENT)
Dept: PRIMARY CARE | Facility: CLINIC | Age: 75
End: 2025-04-07
Payer: MEDICARE

## 2025-04-07 DIAGNOSIS — G56.02 CARPAL TUNNEL SYNDROME ON LEFT: Primary | ICD-10-CM

## 2025-04-07 PROCEDURE — 99213 OFFICE O/P EST LOW 20 MIN: CPT | Performed by: ORTHOPAEDIC SURGERY

## 2025-04-07 PROCEDURE — 1159F MED LIST DOCD IN RCRD: CPT | Performed by: ORTHOPAEDIC SURGERY

## 2025-04-07 PROCEDURE — 1123F ACP DISCUSS/DSCN MKR DOCD: CPT | Performed by: ORTHOPAEDIC SURGERY

## 2025-04-07 PROCEDURE — 1160F RVW MEDS BY RX/DR IN RCRD: CPT | Performed by: ORTHOPAEDIC SURGERY

## 2025-04-07 PROCEDURE — 1157F ADVNC CARE PLAN IN RCRD: CPT | Performed by: ORTHOPAEDIC SURGERY

## 2025-04-07 PROCEDURE — 1036F TOBACCO NON-USER: CPT | Performed by: ORTHOPAEDIC SURGERY

## 2025-04-07 ASSESSMENT — ENCOUNTER SYMPTOMS
FATIGUE: 0
CHILLS: 0
NUMBNESS: 1
FEVER: 0
SHORTNESS OF BREATH: 0
BRUISES/BLEEDS EASILY: 1
WHEEZING: 0

## 2025-04-07 NOTE — PROGRESS NOTES
Reason for Appointment  No chief complaint on file.    History of Present Illness  Patient is a 74 y.o. male here today for follow-up evaluation of left upper extremity numbness and pain.  Pleasant gentleman had a recent EMG and nerve conduction study that did show moderate carpal tunnel on the left but also some active cervical radicular findings.  We want this evaluated and sent him to get a referral to spinal intervention.  We gave him that today.   Patient is having numbness carpal tunnel type findings but also radicular type findings.  Worse with gripping better with rest  Past Medical History:   Diagnosis Date    A-fib (Multi)     Abnormal ECG     Chronic pain disorder     Hypertension     Joint pain     Low back pain     Neck pain     Shingles     Sleep apnea        Past Surgical History:   Procedure Laterality Date    ABLATION OF DYSRHYTHMIC FOCUS      BACK SURGERY  02/14/2024    l4-5 fusion    CORONARY STENT PLACEMENT      EYE SURGERY      HERNIA REPAIR  06/20/2014    Inguinal Hernia Repair    JOINT REPLACEMENT Bilateral     hip    JOINT REPLACEMENT Left     knee and shoulder    OTHER SURGICAL HISTORY  10/06/2022    Cystoscopy    SPINE SURGERY      VASECTOMY         Medication Documentation Review Audit       Reviewed by Wade Hensley MD (Physician) on 04/07/25 at 1348      Medication Order Taking? Sig Documenting Provider Last Dose Status   acetaminophen (Tylenol) 500 mg tablet [id number] No Take 2 tablets (1,000 mg) by mouth once daily as needed (back pain). Do not crush, chew, or split. Low Reyez MD 3/11/2025 Morning Active   amLODIPine (Norvasc) 5 mg tablet [id number] No TAKE 1 TABLET BY MOUTH ONCE  DAILY Mynor Hurst MD 3/10/2025 Active   armodafinil (Nuvigil) 250 mg tablet [id number] No Take 1 tablet (250 mg) by mouth once daily in the morning. Brandee Feliciano MD 3/10/2025 Morning Active   aspirin 81 mg EC tablet [id number]  Take 1 tablet (81 mg) by mouth once daily. Historical Provider, MD   Active   atorvastatin (Lipitor) 80 mg tablet [id number] No TAKE 1 TABLET BY MOUTH AT  BEDTIME Mynor Hurst MD 3/10/2025 Bedtime Active   cholecalciferol (Vitamin D-3) 50 MCG (2000 UT) tablet [id number] No Take 1 tablet (2,000 Units) by mouth once daily. Brandee Feliciano MD 3/10/2025 Morning Active   cyanocobalamin (Vitamin B-12) 1,000 mcg tablet [id number] No Take 1 tablet (1,000 mcg) by mouth once daily. Brandee Feliciano MD 3/10/2025 Morning Active   docusate sodium (Colace) 100 mg capsule [id number] No Take 1 capsule (100 mg) by mouth once daily. Historical Provider, MD Past Week Active   dofetilide (Tikosyn) 250 mcg capsule [id number] No Take 1 capsule (250 mcg) by mouth every 12 hours. Do not fill before December 20, 2024. Kelechi Nassar MD 3/11/2025 Morning Active   ezetimibe (Zetia) 10 mg tablet [id number] No TAKE 1 TABLET BY MOUTH AT  BEDTIME Mynor Hurst MD 3/10/2025 Bedtime Active   folic acid (Folvite) 800 mcg tablet [id number] No Take 1 mg by mouth once daily. Brandee Feliciano MD 3/10/2025 Morning Active   gabapentin (Neurontin) 300 mg capsule [id number]  Take 300 mg twice daily for 4 days, then decrease to 300 mg daily x 7 days, then stop Ruben Bradford MD  Active   lisinopril 40 mg tablet [id number] No TAKE 1 TABLET BY MOUTH ONCE  DAILY Ruben Bradford MD 3/10/2025 Morning Active   metoprolol succinate XL (Toprol-XL) 25 mg 24 hr tablet [id number] No Take 1 tablet (25 mg) by mouth 2 times a day. Do not crush or chew. Brandee Feliciano MD 3/11/2025 Morning Active   omeprazole (PriLOSEC) 20 mg DR capsule [id number] No TAKE 1 CAPSULE BY MOUTH ONCE  DAILY Ruben Bradford MD 3/10/2025 Morning Active   Discontinued 04/01/25 0934   rivaroxaban (Xarelto) 20 mg tablet [id number]  Take 1 tablet (20 mg) by mouth once daily. Mildred Valencia, APRN-CNP  Active   sildenafil (Viagra) 100 mg tablet [id number]  Take 1 tablet (100 mg) by mouth once daily as needed for erectile dysfunction. Ruben WAGNER  MD Sanchez  Active   tamsulosin (Flomax) 0.4 mg 24 hr capsule [id number] No Take 2 capsules (0.8 mg) by mouth once daily. Citlaly Shane MD 3/10/2025 Evening Active   tofacitinib ER (Xeljanz XR) 11 mg tablet extended release 24 hr [id number] No Take 1 tablet (11 mg) by mouth once daily. Do not crush, chew or split. Swallow whole. Historical Provider, MD 3/11/2025 Morning Active   traMADol (Ultram) 50 mg tablet [id number] No Take 1 tablet (50 mg) by mouth every 8 hours if needed for severe pain (7 - 10). Vcitoriano Mackenzie MD 3/11/2025 Morning Active                    No Known Allergies    Review of Systems   Constitutional:  Negative for chills, fatigue and fever.   Respiratory:  Negative for shortness of breath and wheezing.    Cardiovascular:  Negative for chest pain and leg swelling.   Neurological:  Positive for numbness.   Hematological:  Bruises/bleeds easily.       Exam   On examination he does have some pain in the cervical region and decreased cervical range of motion.  Mildly positive Tinel's over the median nerve with some possible early atrophy type findings.  No wrist instability arthritic changes seen  Assessment   Left carpal tunnel syndrome with left double crush phenomenon with cervical radicular findings  Plan   We did discuss carpal tunnel release in the future but prior to that would like to get an evaluation for cervical radicular type findings that were seen on the EMG.

## 2025-04-09 ENCOUNTER — APPOINTMENT (OUTPATIENT)
Dept: CARDIOLOGY | Facility: CLINIC | Age: 75
End: 2025-04-09
Payer: MEDICARE

## 2025-04-09 LAB
ALBUMIN SERPL-MCNC: 4.3 G/DL (ref 3.6–5.1)
ALP SERPL-CCNC: 107 U/L (ref 35–144)
ALT SERPL-CCNC: 24 U/L (ref 9–46)
ANION GAP SERPL CALCULATED.4IONS-SCNC: 10 MMOL/L (CALC) (ref 7–17)
APPEARANCE UR: CLEAR
AST SERPL-CCNC: 29 U/L (ref 10–35)
BACTERIA #/AREA URNS HPF: ABNORMAL /HPF
BASOPHILS # BLD AUTO: 18 CELLS/UL (ref 0–200)
BASOPHILS NFR BLD AUTO: 0.2 %
BILIRUB SERPL-MCNC: 1 MG/DL (ref 0.2–1.2)
BILIRUB UR QL STRIP: NEGATIVE
BUN SERPL-MCNC: 19 MG/DL (ref 7–25)
CALCIUM SERPL-MCNC: 9.9 MG/DL (ref 8.6–10.3)
CHLORIDE SERPL-SCNC: 103 MMOL/L (ref 98–110)
CHOLEST SERPL-MCNC: 169 MG/DL
CHOLEST/HDLC SERPL: 1.8 (CALC)
CO2 SERPL-SCNC: 27 MMOL/L (ref 20–32)
COLOR UR: YELLOW
CREAT SERPL-MCNC: 0.95 MG/DL (ref 0.7–1.28)
EGFRCR SERPLBLD CKD-EPI 2021: 83 ML/MIN/1.73M2
EOSINOPHIL # BLD AUTO: 153 CELLS/UL (ref 15–500)
EOSINOPHIL NFR BLD AUTO: 1.7 %
ERYTHROCYTE [DISTWIDTH] IN BLOOD BY AUTOMATED COUNT: 12.7 % (ref 11–15)
EST. AVERAGE GLUCOSE BLD GHB EST-MCNC: 120 MG/DL
EST. AVERAGE GLUCOSE BLD GHB EST-SCNC: 6.6 MMOL/L
GLUCOSE SERPL-MCNC: 103 MG/DL (ref 65–99)
GLUCOSE UR QL STRIP: NEGATIVE
HBA1C MFR BLD: 5.8 % OF TOTAL HGB
HCT VFR BLD AUTO: 43.6 % (ref 38.5–50)
HDLC SERPL-MCNC: 92 MG/DL
HGB BLD-MCNC: 14.8 G/DL (ref 13.2–17.1)
HGB UR QL STRIP: NEGATIVE
HYALINE CASTS #/AREA URNS LPF: ABNORMAL /LPF
KETONES UR QL STRIP: NEGATIVE
LDLC SERPL CALC-MCNC: 62 MG/DL (CALC)
LEUKOCYTE ESTERASE UR QL STRIP: ABNORMAL
LYMPHOCYTES # BLD AUTO: 810 CELLS/UL (ref 850–3900)
LYMPHOCYTES NFR BLD AUTO: 9 %
MCH RBC QN AUTO: 33.2 PG (ref 27–33)
MCHC RBC AUTO-ENTMCNC: 33.9 G/DL (ref 32–36)
MCV RBC AUTO: 97.8 FL (ref 80–100)
MONOCYTES # BLD AUTO: 873 CELLS/UL (ref 200–950)
MONOCYTES NFR BLD AUTO: 9.7 %
NEUTROPHILS # BLD AUTO: 7146 CELLS/UL (ref 1500–7800)
NEUTROPHILS NFR BLD AUTO: 79.4 %
NITRITE UR QL STRIP: NEGATIVE
NONHDLC SERPL-MCNC: 77 MG/DL (CALC)
PH UR STRIP: 8 [PH] (ref 5–8)
PLATELET # BLD AUTO: 285 THOUSAND/UL (ref 140–400)
PMV BLD REES-ECKER: 10.2 FL (ref 7.5–12.5)
POTASSIUM SERPL-SCNC: 3.7 MMOL/L (ref 3.5–5.3)
PROT SERPL-MCNC: 7 G/DL (ref 6.1–8.1)
PROT UR QL STRIP: NEGATIVE
RBC # BLD AUTO: 4.46 MILLION/UL (ref 4.2–5.8)
RBC #/AREA URNS HPF: ABNORMAL /HPF
SERVICE CMNT-IMP: ABNORMAL
SODIUM SERPL-SCNC: 140 MMOL/L (ref 135–146)
SP GR UR STRIP: 1.01 (ref 1–1.03)
SQUAMOUS #/AREA URNS HPF: ABNORMAL /HPF
TRIGL SERPL-MCNC: 70 MG/DL
TSH SERPL-ACNC: 0.97 MIU/L (ref 0.4–4.5)
WBC # BLD AUTO: 9 THOUSAND/UL (ref 3.8–10.8)
WBC #/AREA URNS HPF: ABNORMAL /HPF

## 2025-04-11 ENCOUNTER — EVALUATION (OUTPATIENT)
Dept: PHYSICAL THERAPY | Facility: CLINIC | Age: 75
End: 2025-04-11
Payer: MEDICARE

## 2025-04-11 DIAGNOSIS — M54.16 LUMBAR RADICULOPATHY: ICD-10-CM

## 2025-04-11 DIAGNOSIS — M96.1 POSTLAMINECTOMY SYNDROME, LUMBAR REGION: ICD-10-CM

## 2025-04-11 DIAGNOSIS — M47.812 CERVICAL SPONDYLOSIS WITHOUT MYELOPATHY: ICD-10-CM

## 2025-04-11 PROCEDURE — 97162 PT EVAL MOD COMPLEX 30 MIN: CPT | Mod: GP | Performed by: PHYSICAL THERAPIST

## 2025-04-11 ASSESSMENT — PAIN SCALES - GENERAL: PAINLEVEL_OUTOF10: 3

## 2025-04-11 ASSESSMENT — PAIN - FUNCTIONAL ASSESSMENT: PAIN_FUNCTIONAL_ASSESSMENT: 0-10

## 2025-04-11 ASSESSMENT — PAIN DESCRIPTION - DESCRIPTORS: DESCRIPTORS: ACHING;OTHER (COMMENT)

## 2025-04-14 ENCOUNTER — TELEPHONE (OUTPATIENT)
Dept: PHARMACY | Facility: HOSPITAL | Age: 75
End: 2025-04-14
Payer: MEDICARE

## 2025-04-14 PROCEDURE — RXMED WILLOW AMBULATORY MEDICATION CHARGE

## 2025-04-14 NOTE — TELEPHONE ENCOUNTER
Patient Assistance Program Approval:     We are pleased to inform you that your application for assistance has been approved.     This approval is valid through  4/14/2026  as long as the following criteria continue to be satisfied:     Your medication (Xarelto) remains covered under your current insurance plan.   Your prescriber does not discontinue therapy.   You do not seek reimbursement from any other private or government-funded programs for the  medication.    Under this program, the pharmacy will first bill your insurance plan for your indemnified specified medication. The [employer] Assistance Fund will then offset your copay balance, so that your out-of pocket expense for your specialty medication will be $0.00.    Eva Ordonez, PharmD

## 2025-04-18 ENCOUNTER — PHARMACY VISIT (OUTPATIENT)
Dept: PHARMACY | Facility: CLINIC | Age: 75
End: 2025-04-18
Payer: COMMERCIAL

## 2025-04-21 ENCOUNTER — PATIENT OUTREACH (OUTPATIENT)
Dept: PRIMARY CARE | Facility: CLINIC | Age: 75
End: 2025-04-21
Payer: MEDICARE

## 2025-04-21 DIAGNOSIS — K21.9 GASTROESOPHAGEAL REFLUX DISEASE, UNSPECIFIED WHETHER ESOPHAGITIS PRESENT: ICD-10-CM

## 2025-04-21 DIAGNOSIS — I48.91 ATRIAL FIBRILLATION, UNSPECIFIED TYPE (MULTI): ICD-10-CM

## 2025-04-21 DIAGNOSIS — I10 BENIGN ESSENTIAL HYPERTENSION: ICD-10-CM

## 2025-04-21 NOTE — PROGRESS NOTES
Chart review completed prior to outreach.  Monthly [facility] call completed with patient.  Patient reporting that things have been going well, no new injuries or issues to report.  Said that he is starting water therapy for his back and legs tomorrow, and is hoping that this helps him become more active by regaining some strength.  Back injections has made a huge improvement for patient, he is able to stand upright walk with a steady gait, and has allowed him to sleep better at night due to less pain. Patient stated that he is more active now that the pain is less.  Said that he used to be active, but his back pain has limited his mobility. Has started to do more things and noticed that he has to build his muscle back up.  Sleeping about 7-9 hours per night with minimal interruptions.  Usually does not nap during the day.  Denies any recent falls or injuries.  Medications reviewed, no refills needed at time of call.  Is in agreement to future follow up calls.

## 2025-04-22 ENCOUNTER — TREATMENT (OUTPATIENT)
Dept: PHYSICAL THERAPY | Facility: CLINIC | Age: 75
End: 2025-04-22
Payer: MEDICARE

## 2025-04-22 DIAGNOSIS — M96.1 POSTLAMINECTOMY SYNDROME, LUMBAR REGION: ICD-10-CM

## 2025-04-22 DIAGNOSIS — M54.16 LUMBAR RADICULOPATHY: ICD-10-CM

## 2025-04-22 DIAGNOSIS — M47.812 CERVICAL SPONDYLOSIS WITHOUT MYELOPATHY: ICD-10-CM

## 2025-04-22 PROCEDURE — 97113 AQUATIC THERAPY/EXERCISES: CPT | Mod: GP,CQ

## 2025-04-22 ASSESSMENT — PAIN SCALES - GENERAL: PAINLEVEL_OUTOF10: 0 - NO PAIN

## 2025-04-22 ASSESSMENT — PAIN - FUNCTIONAL ASSESSMENT: PAIN_FUNCTIONAL_ASSESSMENT: 0-10

## 2025-04-22 NOTE — PROGRESS NOTES
Physical Therapy Treatment    Patient Name: Pro Belle Jr.  MRN: [id number]  Encounter date:  4/22/2025  Time Calculation  Start Time: 1230  Stop Time: 1314  Time Calculation (min): 44 min     PT Therapeutic Procedures Time Entry  Aquatic Therapy Time Entry: 44       Visit Number:  2/12 (including evaluation)  Planned total visits: 12  Visits Authorized/Insurance Coverage:  2025: MEDICARE A/B, [employer], [place], NO AUTH ($0 USED PT/ST)   Progress Report due visit #10           Current Problem  Problem List Items Addressed This Visit    None  Visit Diagnoses         Codes      Lumbar radiculopathy     M54.16      Postlaminectomy syndrome, lumbar region     M96.1      Cervical spondylosis without myelopathy     M47.812              Precautions  Precautions  Precautions Comment: Lx fusion, 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) (Bilateral hip replacement laterals (20 y/r, 2023?)  A-fib (pt reports under control)  Epidural Lx  Left shoulder replacement successful (but when his arm was taken out of the harness after back surgery he lost strength)  Need a right shoulder replacement)Precautions  Precautions Comment: Lx fusion, 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) (Bilateral hip replacement laterals (20 y/r, 2023?)  A-fib (pt reports under control)  Epidural Lx  Left shoulder replacement successful (but when his arm was taken out of the harness after back surgery he lost strength)  Need a right shoulder replacement)    fusion L4-L5.  Bilateral hip replacement laterals (20 y/r, 2023?)  A-fib (pt reports under control)  Epidural Lx  Left shoulder replacement successful (but when his arm was taken out of the harness after back surgery he lost strength in his left arm)  Needs a right shoulder replacement    Pain  Pain Assessment: 0-10  0-10 (Numeric) Pain Score: 0 - No pain    Subjective  General  General Comment: pt presents for first aquatic therapy  session; eager to begin.  pt denies any formal pain at present.       Objective  Pt presents ambulating with standard cane; decrease trunk rotation during gait;   Pt enters pool with 2 HR assist, non-reciprocal down steps leading with R LE.     Treatment:    Aquatic Therapy:    Pt enters pool with 2 HR assist, non-reciprocal down steps leading with R LE.     -shallow water walking for emphasis on upright trunk posture; abdominal bracing; and stride length:  pt performed 3 laps of fwd; side; and retro walking (red small dumbbells for balance retro walking)    UE ROM and strength:  at 4' depth  -shoulder adduction with red DB x 15  -shoulder horizontal abd/add with red DB on surface x 15 reps  -bicep curl x 15 reps  -shoulder rolls x 15 reps    LE ROM/strength at 4' depth  x 10 reps each  -ankle pf/df     -knee flexion  -3 way hip  -MIP     Seated on bench:  x 10  -LAQ    -ankle pumps with knee extended    Current HEP:  Aquatics only     Has patient been compliant with HEP?  Aquatics only at present       OP EDUCATION:   Rationale of aquatic environment of exercise.    Assessment:   Patient tolerated treatment well. Patient appears to be working hard in clinic and motivated to decrease pain and restore all functional deficits. Verbal and/or tactile cues given for proper form, and avoidance of substitution patterns with all exercises. All infection control policies followed during this visit. No observed antalgia with exercise performance   Pt's response to treatment:  looser ; fatigued   Areas of improvements:  trunk control/postural awareness;  improved stride length while ambulating in pool  Limitations/deficits:  postural deficiencies; chronic pain;  deconditioning.     Pain end of session: 0 of 10    Plan:Plan for next visit:  aquatics for Cx/shoulder ROM stretch and strength (may have to be performed on the bench); LE AROM, stretches and strengthening (see precautions noted above)    Treatment/Interventions:  Aquatic therapy, Education/ Instruction, Manual therapy, Neuromuscular re-education, Taping techniques, Therapeutic activities, Therapeutic exercises  PT Plan: Skilled PT  PT Frequency: 2 times per week  Duration: 12 week cert period  Onset Date: 03/26/25  Certification Period Start Date: 04/11/25  Certification Period End Date: 07/10/25  Number of Treatments Authorized: 12  Rehab Potential: Good  Plan of Care Agreement: Patient  Continue with current POC/no changes    Assessment of current progress against goals:  Insufficient treatment time to assess progress    Goals:  Active       PT Problem - Lx and Cx       PT Goal 1 - STG       Start:  04/11/25    Expected End:  05/26/25       1.  Improve Cx AROM by 10 deg at deficits  2.  Improve LE AROM by 20 degrees hip flexion, right knee extension by 5 deg  3.  Improve LE strength to 4/5 or better at deficits  4.  Improve trunk strength Good- at deficits; Cx strength to Good and painfree with resistance  5  Improve sitting posture with correct alignment of Cx region and shoulders, more aware of sit tall posturing when using his phone or sitting at the computer  6.  Pain:  0-2  7.  Functional Outcome Measure:  Oswestry 6             PT Goal 2 - LT FUNCTIONAL GOALS       Start:  04/11/25    Expected End:  07/10/25       LONG TERM FUNCTIONAL GOALS  1.  Pt report 40% improvement in being able to turn his head a greater range of movement during ADLs and IADLs  2. Pt able to stand 75% of the time 15 minutes or more for ADLs, IADLs and in the community with minimal back pain  3. Pt able to walk 20 minutes in the community 80% of the time with minimal back pain  4. Independent with his Aquatic Exercise Program             Patient Stated Goal 1       Start:  04/11/25    Expected End:  07/10/25       Neck: Able to turn his head without pain  strengthen in his arms and shoulders and be able to raise higher    Back:    able to stand more than 10 minutes without back pain  worsening  Strength to walk up to 15 minutes (personal goal 2 miles by his trip in May)    Learn a water program so he can perform independently

## 2025-04-30 ENCOUNTER — TREATMENT (OUTPATIENT)
Dept: PHYSICAL THERAPY | Facility: CLINIC | Age: 75
End: 2025-04-30
Payer: MEDICARE

## 2025-04-30 DIAGNOSIS — M54.16 LUMBAR RADICULOPATHY: ICD-10-CM

## 2025-04-30 DIAGNOSIS — M96.1 POSTLAMINECTOMY SYNDROME, LUMBAR REGION: ICD-10-CM

## 2025-04-30 DIAGNOSIS — M47.812 CERVICAL SPONDYLOSIS WITHOUT MYELOPATHY: ICD-10-CM

## 2025-04-30 PROCEDURE — 97113 AQUATIC THERAPY/EXERCISES: CPT | Mod: GP,CQ

## 2025-04-30 ASSESSMENT — PAIN SCALES - GENERAL: PAINLEVEL_OUTOF10: 3

## 2025-04-30 ASSESSMENT — PAIN - FUNCTIONAL ASSESSMENT: PAIN_FUNCTIONAL_ASSESSMENT: 0-10

## 2025-04-30 NOTE — PROGRESS NOTES
"    Physical Therapy Treatment    Patient Name: Pro Belle Jr.  MRN: [id number]  Encounter date:  4/30/2025  Time Calculation  Start Time: 1149  Stop Time: 1230  Time Calculation (min): 41 min     PT Therapeutic Procedures Time Entry  Aquatic Therapy Time Entry: 41       Visit Number:  3/12 (including evaluation)  Planned total visits: 12  Visits Authorized/Insurance Coverage:  2025: MEDICARE A/B, [employer], [place], NO AUTH ($0 USED PT/ST)   Progress Report due visit #10           Current Problem  Problem List Items Addressed This Visit    None  Visit Diagnoses         Codes      Lumbar radiculopathy     M54.16      Postlaminectomy syndrome, lumbar region     M96.1      Cervical spondylosis without myelopathy     M47.812                Precautions  Precautions  Precautions Comment: Lx fusion, 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) (Bilateral hip replacement laterals (20 y/r, 2023?)  A-fib (pt reports under control)  Epidural Lx  Left shoulder replacement successful (but when his arm was taken out of the harness after back surgery he lost strength)  Need a right shoulder replacement)Precautions      fusion L4-L5.  Bilateral hip replacement laterals (20 y/r, 2023?)  A-fib (pt reports under control)  Epidural Lx  Left shoulder replacement successful (but when his arm was taken out of the harness after back surgery he lost strength in his left arm)  Needs a right shoulder replacement    Pain  Pain Assessment: 0-10  0-10 (Numeric) Pain Score: 3  Pain Type: Chronic pain  Pain Location: Hip  Pain Orientation: Right, Left    Subjective  General  General Comment: pt reports good tolerance to last session, experiencing transient fatigue but no pain or soreness. \"I'm not used to exercising and not getting pain\".  pt reports minor hip soreness at present, rating at 2-3 of 10       Objective  Pt presents ambulating with standard cane; decrease trunk rotation during gait;   Pt enters pool with 2 HR " assist, non-reciprocal down steps leading with R LE.     Treatment:    Aquatic Therapy:    Pt enters pool with 2 HR assist, non-reciprocal down steps leading with R LE.     -shallow water walking for emphasis on upright trunk posture; abdominal bracing; and stride length:  pt performed 3 laps of fwd; side; and retro walking (red small dumbbells for balance retro walking)    UE ROM and strength:  at 4' depth  -shoulder adduction with red DB x 15  -shoulder horizontal abd/add with red DB on surface x 15 reps  -bicep curl (supinated)  x 2 x 10 reps  -shoulder rolls x 20 reps  -shoulder ER R and L  2 x 10 each    LE ROM/strength at 4' depth  x 20 reps each  -ankle pf/df     -knee flexion  -3 way hip  -MIP     Seated on bench: 2 x 10  -LAQ    -ankle pumps with knee extended    Current HEP:  Aquatics only     Has patient been compliant with HEP? Aquatics only at present       OP EDUCATION:   Importance and rationale of abdominal bracing with exercise performance.     Assessment:   Patient tolerated treatment well. Patient appears to be working hard in clinic and motivated to decrease pain and restore all functional deficits. Verbal and/or tactile cues given for proper form, and avoidance of substitution patterns with all exercises. All infection control policies followed during this visit. No observed antalgia with exercise performance .  Pt demonstrated improved stride length with ambulation in pool this date, most notably with retro walking.   Pt's response to treatment:  looser ; fatigued   Areas of improvements:  trunk control/postural awareness;  improved stride length while ambulating in pool; increased volume with strengthening this date   Limitations/deficits:  postural deficiencies; chronic pain;  deconditioning.     Pain end of session: 0 of 10    Plan:Plan for next visit:  aquatics for Cx/shoulder ROM stretch and strength (may have to be performed on the bench); LE AROM, stretches and strengthening (see  precautions noted above)    Treatment/Interventions: Aquatic therapy, Education/ Instruction, Manual therapy, Neuromuscular re-education, Taping techniques, Therapeutic activities, Therapeutic exercises  PT Plan: Skilled PT  PT Frequency: 2 times per week  Duration: 12 week cert period  Onset Date: 03/26/25  Certification Period Start Date: 04/11/25  Certification Period End Date: 07/10/25  Number of Treatments Authorized: 12  Rehab Potential: Good  Plan of Care Agreement: Patient  Continue with current POC/no changes    Assessment of current progress against goals:  Insufficient treatment time to assess progress    Goals:  Active       PT Problem - Lx and Cx       PT Goal 1 - STG       Start:  04/11/25    Expected End:  05/26/25       1.  Improve Cx AROM by 10 deg at deficits  2.  Improve LE AROM by 20 degrees hip flexion, right knee extension by 5 deg  3.  Improve LE strength to 4/5 or better at deficits  4.  Improve trunk strength Good- at deficits; Cx strength to Good and painfree with resistance  5  Improve sitting posture with correct alignment of Cx region and shoulders, more aware of sit tall posturing when using his phone or sitting at the computer  6.  Pain:  0-2  7.  Functional Outcome Measure:  Oswestry 6             PT Goal 2 - LT FUNCTIONAL GOALS       Start:  04/11/25    Expected End:  07/10/25       LONG TERM FUNCTIONAL GOALS  1.  Pt report 40% improvement in being able to turn his head a greater range of movement during ADLs and IADLs  2. Pt able to stand 75% of the time 15 minutes or more for ADLs, IADLs and in the community with minimal back pain  3. Pt able to walk 20 minutes in the community 80% of the time with minimal back pain  4. Independent with his Aquatic Exercise Program             Patient Stated Goal 1       Start:  04/11/25    Expected End:  07/10/25       Neck: Able to turn his head without pain  strengthen in his arms and shoulders and be able to raise higher    Back:    able to  stand more than 10 minutes without back pain worsening  Strength to walk up to 15 minutes (personal goal 2 miles by his trip in May)    Learn a water program so he can perform independently

## 2025-05-01 NOTE — PROGRESS NOTES
"    Physical Therapy Treatment    Patient Name: Pro Belle Jr.  MRN: [id number]  Encounter date:  5/2/2025  Time Calculation  Start Time: 1246  Stop Time: 1329  Time Calculation (min): 43 min     PT Therapeutic Procedures Time Entry  Aquatic Therapy Time Entry: 43       Visit Number:  3/12 (including evaluation)  Planned total visits: 12  Visits Authorized/Insurance Coverage:  2025: MEDICARE A/B, [employer], [place], NO AUTH ($0 USED PT/ST)   Progress Report due visit #10           Current Problem  Problem List Items Addressed This Visit    None  Visit Diagnoses         Codes      Lumbar radiculopathy     M54.16      Postlaminectomy syndrome, lumbar region     M96.1      Cervical spondylosis without myelopathy     M47.812                  Precautions  Precautions  Precautions Comment: Lx fusion, 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) (Bilateral hip replacement laterals (20 y/r, 2023?)  A-fib (pt reports under control)  Epidural Lx  Left shoulder replacement successful (but when his arm was taken out of the harness after back surgery he lost strength)  Need a right shoulder replacement)      Pain  Pain Assessment: 0-10  0-10 (Numeric) Pain Score: 3  Pain Type: Chronic pain  Pain Location: Back  Pain Orientation: Lower    Subjective  General      Pt reports good tolerance to last session, \"I was fatigued\".  Pt reports feeling as if water therapy is helping and pt is interested in beginning to try one day per week at [facility].      Objective  Pt presents ambulating with standard cane; decrease trunk rotation during gait;   Pt enters pool with 2 HR assist, non-reciprocal down steps leading with R LE.     Treatment:    Aquatic Therapy:    Pt enters pool with 2 HR assist, non-reciprocal down steps leading with R LE.     -shallow water walking for emphasis on upright trunk posture; abdominal bracing; and stride length:  pt performed 3 laps of fwd; side; and retro walking    UE ROM and strength: "  at 4' depth  -shoulder adduction with red DB x 15  -shoulder horizontal abd/add with red DB on surface x 15 reps  -bicep curl (supinated)  x 2 x 10 reps  -shoulder rolls x 20 reps  -shoulder ER R and L  2 x 10 each    LE ROM/strength at 4' depth  x 25 reps each  -ankle pf/df     -knee flexion  -3 way hip  R and L   -MIP     Seated on bench: 2 x 10  -LAQ    -ankle pumps with knee extended    Current HEP:  Aquatics only ;  discussed with pt ok to have a trial of one day of pool/aquatic at local [facility]  (to start next week) then will discuss his tolerance to that with clinician next session.     Has patient been compliant with HEP? Aquatics only at present       OP EDUCATION:   Importance and rationale of abdominal bracing with exercise performance.     Assessment:   Patient tolerated treatment well. Patient appears to be working hard in clinic and motivated to decrease pain and restore all functional deficits. Verbal and/or tactile cues given for proper form, and avoidance of substitution patterns with all exercises. All infection control policies followed during this visit. No observed antalgia with exercise performance .  Pt required intermittent verbal cueing to assist with maintenance of upright trunk posture/abdominal contraction   Pt's response to treatment:  looser ; fatigued   Areas of improvements:  intermittent improved trunk control/postural awareness;  improved stride length while ambulating in pool; increased volume with strengthening this date   Limitations/deficits:  postural deficiencies; chronic pain;  deconditioning.     Pain end of session: 0 of 10    Plan:Plan for next visit:  aquatics for Cx/shoulder ROM stretch and strength (may have to be performed on the bench); LE AROM, stretches and strengthening (see precautions noted above)    Treatment/Interventions: Aquatic therapy, Education/ Instruction, Manual therapy, Neuromuscular re-education, Taping techniques, Therapeutic activities, Therapeutic  exercises  PT Plan: Skilled PT  PT Frequency: 2 times per week  Duration: 12 week cert period  Onset Date: 03/26/25  Certification Period Start Date: 04/11/25  Certification Period End Date: 07/10/25  Number of Treatments Authorized: 12  Rehab Potential: Good  Plan of Care Agreement: Patient  Continue with current POC/no changes    Assessment of current progress against goals:  Insufficient treatment time to assess progress    Goals:  Active       PT Problem - Lx and Cx       PT Goal 1 - STG       Start:  04/11/25    Expected End:  05/26/25       1.  Improve Cx AROM by 10 deg at deficits  2.  Improve LE AROM by 20 degrees hip flexion, right knee extension by 5 deg  3.  Improve LE strength to 4/5 or better at deficits  4.  Improve trunk strength Good- at deficits; Cx strength to Good and painfree with resistance  5  Improve sitting posture with correct alignment of Cx region and shoulders, more aware of sit tall posturing when using his phone or sitting at the computer  6.  Pain:  0-2  7.  Functional Outcome Measure:  Oswestry 6             PT Goal 2 - LT FUNCTIONAL GOALS       Start:  04/11/25    Expected End:  07/10/25       LONG TERM FUNCTIONAL GOALS  1.  Pt report 40% improvement in being able to turn his head a greater range of movement during ADLs and IADLs  2. Pt able to stand 75% of the time 15 minutes or more for ADLs, IADLs and in the community with minimal back pain  3. Pt able to walk 20 minutes in the community 80% of the time with minimal back pain  4. Independent with his Aquatic Exercise Program             Patient Stated Goal 1       Start:  04/11/25    Expected End:  07/10/25       Neck: Able to turn his head without pain  strengthen in his arms and shoulders and be able to raise higher    Back:    able to stand more than 10 minutes without back pain worsening  Strength to walk up to 15 minutes (personal goal 2 miles by his trip in May)    Learn a water program so he can perform independently

## 2025-05-02 ENCOUNTER — TREATMENT (OUTPATIENT)
Dept: PHYSICAL THERAPY | Facility: CLINIC | Age: 75
End: 2025-05-02
Payer: MEDICARE

## 2025-05-02 DIAGNOSIS — M47.812 CERVICAL SPONDYLOSIS WITHOUT MYELOPATHY: ICD-10-CM

## 2025-05-02 DIAGNOSIS — M96.1 POSTLAMINECTOMY SYNDROME, LUMBAR REGION: ICD-10-CM

## 2025-05-02 DIAGNOSIS — M54.16 LUMBAR RADICULOPATHY: ICD-10-CM

## 2025-05-02 PROCEDURE — 97113 AQUATIC THERAPY/EXERCISES: CPT | Mod: GP,CQ

## 2025-05-02 ASSESSMENT — PAIN SCALES - GENERAL: PAINLEVEL_OUTOF10: 3

## 2025-05-02 ASSESSMENT — PAIN - FUNCTIONAL ASSESSMENT: PAIN_FUNCTIONAL_ASSESSMENT: 0-10

## 2025-05-05 NOTE — PROGRESS NOTES
"    Physical Therapy Treatment    Patient Name: Pro Belle Jr.  MRN: [id number]  Encounter date:  5/6/2025  Time Calculation  Start Time: 1315  Stop Time: 1400  Time Calculation (min): 45 min     PT Therapeutic Procedures Time Entry  Aquatic Therapy Time Entry: 45       Visit Number:     5/12 (including evaluation)  Planned total visits: 12  Visits Authorized/Insurance Coverage:  2025: MEDICARE A/B, [employer], [place], NO AUTH ($0 USED PT/ST)   Progress Report due visit #10           Current Problem  Problem List Items Addressed This Visit    None  Visit Diagnoses         Codes      Lumbar radiculopathy     M54.16      Postlaminectomy syndrome, lumbar region     M96.1      Cervical spondylosis without myelopathy     M47.812                    Precautions  Precautions  Precautions Comment: Lx fusion, 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  pt reports he has been pleased with results of therapy so far.  pt reports he was unable to perform and independent trial of pool at [facility] over last week-end secondsary to being busy.       Objective  Pt presents ambulating with standard cane; decrease trunk rotation during gait;   Pt enters pool with 2 HR assist, non-reciprocal down steps leading with R LE.     Treatment:    Aquatic Therapy:    Pt enters pool with 2 HR assist, non-reciprocal down steps leading with R LE.     -shallow water walking for emphasis on upright trunk posture; abdominal bracing; " "and stride length:  pt performed 3 laps of fwd; side; and retro walking    UE ROM and strength:  at 4' depth  -shoulder adduction with red DB x 15 focus on TrA bracing.   -shoulder horizontal abd/add on surface x 15 reps focus on TrA bracing.   -bicep curl/tricep press (neutral)  with red DB x 2 x 10 reps  focus on TrA bracing.   -split stance push/pull (row) with red DB just under surface of water, 2 x 10  focus on TrA bracing.   - posterior shoulder rolls x 20 reps  -shoulder ER R and L  2 x 10 each focus on TrA bracing.     LE ROM/strength at 4' depth  x 25 reps each  -ankle pf/df     -knee flexion  -3 way hip  R and L   -MIP     Seated on bench: x 25  -LAQ    -ankle pumps with knee extended    At steps:  Hamstring stretch R and L  2 x 30\" each    Current HEP:  Aquatics only ;    Has patient been compliant with HEP? Aquatics only at present       OP EDUCATION:   Importance and rationale of abdominal bracing with exercise performance, especially with progressions of DLS with use of red DB    Assessment:   Patient tolerated treatment well. Patient appears to be working hard in clinic and motivated to decrease pain and restore all functional deficits. Verbal and/or tactile cues given for proper form, and avoidance of substitution patterns with all exercises. All infection control policies followed during this visit. No observed antalgia with exercise performance .  Pt required intermittent verbal cueing to assist with maintenance of upright trunk posture/abdominal contraction, though slightly less frequent then previous session.    Pt's response to treatment:  looser ; fatigued   Areas of improvements:  intermittent improved trunk control/postural awareness;  improved stride length while ambulating in pool; increased volume with strengthening this date   Limitations/deficits:  postural deficiencies; chronic pain;  deconditioning.     Pain end of session: 0 of 10    Plan:Plan for next visit:  aquatics for Cx/shoulder " ROM stretch and strength (may have to be performed on the bench); LE AROM, stretches and strengthening (see precautions noted above)    Treatment/Interventions: Aquatic therapy, Education/ Instruction, Manual therapy, Neuromuscular re-education, Taping techniques, Therapeutic activities, Therapeutic exercises  PT Plan: Skilled PT  PT Frequency: 2 times per week  Duration: 12 week cert period  Onset Date: 03/26/25  Certification Period Start Date: 04/11/25  Certification Period End Date: 07/10/25  Number of Treatments Authorized: 12  Rehab Potential: Good  Plan of Care Agreement: Patient  Continue with current POC/no changes    Assessment of current progress against goals:  Insufficient treatment time to assess progress    Goals:  Active       PT Problem - Lx and Cx       PT Goal 1 - STG       Start:  04/11/25    Expected End:  05/26/25       1.  Improve Cx AROM by 10 deg at deficits  2.  Improve LE AROM by 20 degrees hip flexion, right knee extension by 5 deg  3.  Improve LE strength to 4/5 or better at deficits  4.  Improve trunk strength Good- at deficits; Cx strength to Good and painfree with resistance  5  Improve sitting posture with correct alignment of Cx region and shoulders, more aware of sit tall posturing when using his phone or sitting at the computer  6.  Pain:  0-2  7.  Functional Outcome Measure:  Oswestry 6             PT Goal 2 - LT FUNCTIONAL GOALS       Start:  04/11/25    Expected End:  07/10/25       LONG TERM FUNCTIONAL GOALS  1.  Pt report 40% improvement in being able to turn his head a greater range of movement during ADLs and IADLs  2. Pt able to stand 75% of the time 15 minutes or more for ADLs, IADLs and in the community with minimal back pain  3. Pt able to walk 20 minutes in the community 80% of the time with minimal back pain  4. Independent with his Aquatic Exercise Program             Patient Stated Goal 1       Start:  04/11/25    Expected End:  07/10/25       Neck: Able to turn his  head without pain  strengthen in his arms and shoulders and be able to raise higher    Back:    able to stand more than 10 minutes without back pain worsening  Strength to walk up to 15 minutes (personal goal 2 miles by his trip in May)    Learn a water program so he can perform independently

## 2025-05-06 ENCOUNTER — TREATMENT (OUTPATIENT)
Dept: PHYSICAL THERAPY | Facility: CLINIC | Age: 75
End: 2025-05-06
Payer: MEDICARE

## 2025-05-06 DIAGNOSIS — M47.812 CERVICAL SPONDYLOSIS WITHOUT MYELOPATHY: ICD-10-CM

## 2025-05-06 DIAGNOSIS — M96.1 POSTLAMINECTOMY SYNDROME, LUMBAR REGION: ICD-10-CM

## 2025-05-06 DIAGNOSIS — M54.16 LUMBAR RADICULOPATHY: ICD-10-CM

## 2025-05-06 PROCEDURE — 97113 AQUATIC THERAPY/EXERCISES: CPT | Mod: GP,CQ

## 2025-05-06 ASSESSMENT — PAIN - FUNCTIONAL ASSESSMENT: PAIN_FUNCTIONAL_ASSESSMENT: 0-10

## 2025-05-06 ASSESSMENT — PAIN SCALES - GENERAL: PAINLEVEL_OUTOF10: 3

## 2025-05-08 NOTE — PROGRESS NOTES
Physical Therapy Treatment    Patient Name: Pro Belle Jr.  MRN: [id number]  Encounter date:  5/9/2025  Time Calculation  Start Time: 1245  Stop Time: 1328  Time Calculation (min): 43 min     PT Therapeutic Procedures Time Entry  Aquatic Therapy Time Entry: 43       Visit Number:     5/12 (including evaluation)  Planned total visits: 12  Visits Authorized/Insurance Coverage:  2025: MEDICARE A/B, [employer], [place], NO AUTH ($0 USED PT/ST)   Progress Report due visit #10           Current Problem  Problem List Items Addressed This Visit    None  Visit Diagnoses         Codes      Lumbar radiculopathy     M54.16      Postlaminectomy syndrome, lumbar region     M96.1      Cervical spondylosis without myelopathy     M47.812                      Precautions  Precautions  Precautions Comment: Lx fusion, 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) (Bilateral hip replacement laterals (20 y/r, 2023?)  A-fib (pt reports under control)  Epidural Lx  Left shoulder replacement successful (but when his arm was taken out of the harness after back surgery he lost strength)  Need a right shoulder replacement)      Pain  Pain Assessment: 0-10  0-10 (Numeric) Pain Score: 0 - No pain    Subjective  General  General Comment: pt reports good tolerance to last session, only experiencing mild fatigue afterward.  pt denies any pain at present       Objective  Pt presents ambulating with standard cane; decrease trunk rotation during gait;   Pt enters pool with 2 HR assist, non-reciprocal down steps leading with R LE.     Treatment:    Aquatic Therapy:    Pt enters pool with 2 HR assist, non-reciprocal down steps leading with R LE.     -shallow water walking for emphasis on upright trunk posture; abdominal bracing; and stride length:  pt performed 3 laps of fwd; side; and retro walking    UE ROM and strength:  at 4' depth  -shoulder adduction with red DB x 20 focus on TrA bracing.   -shoulder horizontal abd/add on  "surface x 25 reps focus on TrA bracing.   -bicep curl/tricep press (neutral)  with red DB x 2 x 10 reps  focus on TrA bracing.   -split stance push/pull (row) with kick board  just under surface of water, 2 x 10 (x 10 with R foot lead; x 10 L foot lead) focus on TrA bracing.   - posterior shoulder rolls x 20 reps  -shoulder ER R and L  2 x 10 each focus on TrA bracing.     LE ROM/strength at 4' depth  x 25 reps each  -ankle pf/df     -knee flexion  -3 way hip  R and L   -MIP     Seated on bench: x 25  -LAQ    -ankle pumps with knee extended    Shallow water walk holding kick board vertical and 1/2 submerged x 3 laps front and back.   At steps:  Hamstring stretch R and L  2 x 30\" each    Current HEP:  Aquatics only ;    Has patient been compliant with HEP? Aquatics only at present       OP EDUCATION:   Importance and rationale of abdominal bracing with exercise performance, especially with progressions of DLS with use of red DB    Assessment:   Patient tolerated treatment well. Patient appears to be working hard in clinic and motivated to decrease pain and restore all functional deficits. Verbal and/or tactile cues given for proper form, and avoidance of substitution patterns with all exercises. All infection control policies followed during this visit. No observed antalgia with exercise performance .  Pt required intermittent verbal cueing to assist with maintenance of upright trunk posture/abdominal contraction, though slightly less frequent then previous session.    Pt's response to treatment:  looser ; fatigued   Areas of improvements:  intermittent improved trunk control/postural awareness;  improved stride length while ambulating in pool; slight decrease in number of verbal cues for postural reminders.   Limitations/deficits:  postural deficiencies; deconditioning though improving tolerance to exercise vs initial.      Pain end of session: 0 of 10    Plan:Plan for next visit:  aquatics for Cx/shoulder ROM " stretch and strength (may have to be performed on the bench); LE AROM, stretches and strengthening (see precautions noted above)    Treatment/Interventions: Aquatic therapy, Education/ Instruction, Manual therapy, Neuromuscular re-education, Taping techniques, Therapeutic activities, Therapeutic exercises  PT Plan: Skilled PT  PT Frequency: 2 times per week  Duration: 12 week cert period  Onset Date: 03/26/25  Certification Period Start Date: 04/11/25  Certification Period End Date: 07/10/25  Number of Treatments Authorized: 12  Rehab Potential: Good  Plan of Care Agreement: Patient  Continue with current POC/no changes    Assessment of current progress against goals:  Insufficient treatment time to assess progress    Goals:  Active       PT Problem - Lx and Cx       PT Goal 1 - STG       Start:  04/11/25    Expected End:  05/26/25       1.  Improve Cx AROM by 10 deg at deficits  2.  Improve LE AROM by 20 degrees hip flexion, right knee extension by 5 deg  3.  Improve LE strength to 4/5 or better at deficits  4.  Improve trunk strength Good- at deficits; Cx strength to Good and painfree with resistance  5  Improve sitting posture with correct alignment of Cx region and shoulders, more aware of sit tall posturing when using his phone or sitting at the computer  6.  Pain:  0-2  7.  Functional Outcome Measure:  Oswestry 6             PT Goal 2 - LT FUNCTIONAL GOALS       Start:  04/11/25    Expected End:  07/10/25       LONG TERM FUNCTIONAL GOALS  1.  Pt report 40% improvement in being able to turn his head a greater range of movement during ADLs and IADLs  2. Pt able to stand 75% of the time 15 minutes or more for ADLs, IADLs and in the community with minimal back pain  3. Pt able to walk 20 minutes in the community 80% of the time with minimal back pain  4. Independent with his Aquatic Exercise Program             Patient Stated Goal 1       Start:  04/11/25    Expected End:  07/10/25       Neck: Able to turn his  head without pain  strengthen in his arms and shoulders and be able to raise higher    Back:    able to stand more than 10 minutes without back pain worsening  Strength to walk up to 15 minutes (personal goal 2 miles by his trip in May)    Learn a water program so he can perform independently

## 2025-05-09 ENCOUNTER — TREATMENT (OUTPATIENT)
Dept: PHYSICAL THERAPY | Facility: CLINIC | Age: 75
End: 2025-05-09
Payer: MEDICARE

## 2025-05-09 DIAGNOSIS — M47.812 CERVICAL SPONDYLOSIS WITHOUT MYELOPATHY: ICD-10-CM

## 2025-05-09 DIAGNOSIS — M96.1 POSTLAMINECTOMY SYNDROME, LUMBAR REGION: ICD-10-CM

## 2025-05-09 DIAGNOSIS — M54.16 LUMBAR RADICULOPATHY: ICD-10-CM

## 2025-05-09 PROCEDURE — 97113 AQUATIC THERAPY/EXERCISES: CPT | Mod: GP,CQ

## 2025-05-09 ASSESSMENT — PAIN - FUNCTIONAL ASSESSMENT: PAIN_FUNCTIONAL_ASSESSMENT: 0-10

## 2025-05-09 ASSESSMENT — PAIN SCALES - GENERAL: PAINLEVEL_OUTOF10: 0 - NO PAIN

## 2025-05-12 NOTE — PROGRESS NOTES
"    Physical Therapy Treatment    Patient Name: Pro Belle Jr.  MRN: [id number]  Encounter date:  5/13/2025  Time Calculation  Start Time: 1101  Stop Time: 1145  Time Calculation (min): 44 min     PT Therapeutic Procedures Time Entry  Aquatic Therapy Time Entry: 44       Visit Number:     7/12 (including evaluation)  Planned total visits: 12  Visits Authorized/Insurance Coverage:  2025: MEDICARE A/B, [employer], [place], [place] AUTH ($0 USED PT/ST)   Progress Report due visit #10           Current Problem  Problem List Items Addressed This Visit    None  Visit Diagnoses         Codes      Lumbar radiculopathy     M54.16      Postlaminectomy syndrome, lumbar region     M96.1      Cervical spondylosis without myelopathy     M47.812                        Precautions  Precautions  Precautions Comment: Lx fusion, 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) (Bilateral hip replacement laterals (20 y/r, 2023?)  A-fib (pt reports under control)  Epidural Lx  Left shoulder replacement successful (but when his arm was taken out of the harness after back surgery he lost strength)  Need a right shoulder replacement)      Pain  Pain Assessment: 0-10  0-10 (Numeric) Pain Score: 3  Pain Type: Chronic pain  Pain Location: Back  Pain Orientation: Lower, Right  Response to Interventions: Decrease in pain    Subjective  General  General Comment: pt reports experiencing some fatigue after last session, though feels as though aquatic therapy is helping.  pt reports \"I aggravated the nerve on the right side of the lowback this morning, annoying\"  pt reports that when surgery was performed tn the past \"there was a cyst on the nerve that they had to remove\".  pt reports he did perform one independent pool performance at local [facility], went well.       Objective  Pt presents ambulating with standard cane; decrease trunk rotation during gait;   Pt enters pool with 2 HR assist, non-reciprocal down steps leading with R LE. " "    Treatment:    Aquatic Therapy:    Pt enters pool with 2 HR assist, non-reciprocal down steps leading with R LE.     -shallow water walking for emphasis on upright trunk posture; abdominal bracing; and stride length:  pt performed 3 laps of fwd; side; and retro walking    UE ROM and strength:  at 4' depth  -shoulder adduction with red DB x 20 focus on TrA bracing.   -shoulder horizontal abd/add on surface x 25 reps focus on TrA bracing.   -bicep curl/tricep press (neutral)  with red DB x 25 reps  focus on TrA bracing.   -split stance push/pull (row) with kick board  just under surface of water, 2 x 10 (x 10 with R foot lead; x 10 L foot lead) focus on TrA bracing.   - posterior shoulder rolls x 20 reps  -shoulder ER R and L  x 25 reps  each focus on TrA bracing.     LE ROM/strength at 4' depth  x 25 reps each  (one set of 15;  one set of 10).  -ankle pf/df     -knee flexion  -3 way hip  R and L   -MIP     Shallow water walk holding kick board vertical and 1/2 submerged x 3 laps front and back.     At steps:  Hamstring stretch R and L  2 x 30\" each    Current HEP:  Aquatics only ;    Has patient been compliant with HEP? Yes      OP EDUCATION:   Importance and rationale of abdominal bracing with exercise performance, especially with progressions of DLS with use of red DB    Assessment:   Patient tolerated treatment well. Patient appears to be working hard in clinic and motivated to decrease pain and restore all functional deficits. Verbal and/or tactile cues given for proper form, and avoidance of substitution patterns with all exercises. All infection control policies followed during this visit. No observed antalgia with exercise performance .  Pt required intermittent verbal cueing to assist with maintenance of upright trunk posture/abdominal contraction.  Pt demonstrated improved stride length while ambulating in pool, for fwd, lateral, and retro ambulation.   Pt's response to treatment:  looser ; fatigued "   Areas of improvements:  intermittent improved trunk control/postural awareness;  improved stride length while ambulating in pool; slight decrease in number of verbal cues for postural reminders.   Limitations/deficits:  postural deficiencies; deconditioning though continues to improve each session, less rest required this date.       Pain end of session: 0 of 10    Plan:Plan for next visit:  aquatics for Cx/shoulder ROM stretch and strength (may have to be performed on the bench); LE AROM, stretches and strengthening (see precautions noted above)    Treatment/Interventions: Aquatic therapy, Education/ Instruction, Manual therapy, Neuromuscular re-education, Taping techniques, Therapeutic activities, Therapeutic exercises  PT Plan: Skilled PT  PT Frequency: 2 times per week  Duration: 12 week cert period  Onset Date: 03/26/25  Certification Period Start Date: 04/11/25  Certification Period End Date: 07/10/25  Number of Treatments Authorized: 12  Rehab Potential: Good  Plan of Care Agreement: Patient  Continue with current POC/no changes    Assessment of current progress against goals:  Insufficient treatment time to assess progress    Goals:  Active       PT Problem - Lx and Cx       PT Goal 1 - STG       Start:  04/11/25    Expected End:  05/26/25       1.  Improve Cx AROM by 10 deg at deficits  2.  Improve LE AROM by 20 degrees hip flexion, right knee extension by 5 deg  3.  Improve LE strength to 4/5 or better at deficits  4.  Improve trunk strength Good- at deficits; Cx strength to Good and painfree with resistance  5  Improve sitting posture with correct alignment of Cx region and shoulders, more aware of sit tall posturing when using his phone or sitting at the computer  6.  Pain:  0-2  7.  Functional Outcome Measure:  Oswestry 6             PT Goal 2 - LT FUNCTIONAL GOALS       Start:  04/11/25    Expected End:  07/10/25       LONG TERM FUNCTIONAL GOALS  1.  Pt report 40% improvement in being able to turn  his head a greater range of movement during ADLs and IADLs  2. Pt able to stand 75% of the time 15 minutes or more for ADLs, IADLs and in the community with minimal back pain  3. Pt able to walk 20 minutes in the community 80% of the time with minimal back pain  4. Independent with his Aquatic Exercise Program             Patient Stated Goal 1       Start:  04/11/25    Expected End:  07/10/25       Neck: Able to turn his head without pain  strengthen in his arms and shoulders and be able to raise higher    Back:    able to stand more than 10 minutes without back pain worsening  Strength to walk up to 15 minutes (personal goal 2 miles by his trip in May)    Learn a water program so he can perform independently

## 2025-05-13 ENCOUNTER — TREATMENT (OUTPATIENT)
Dept: PHYSICAL THERAPY | Facility: CLINIC | Age: 75
End: 2025-05-13
Payer: MEDICARE

## 2025-05-13 DIAGNOSIS — M54.16 LUMBAR RADICULOPATHY: ICD-10-CM

## 2025-05-13 DIAGNOSIS — M47.812 CERVICAL SPONDYLOSIS WITHOUT MYELOPATHY: ICD-10-CM

## 2025-05-13 DIAGNOSIS — M96.1 POSTLAMINECTOMY SYNDROME, LUMBAR REGION: ICD-10-CM

## 2025-05-13 PROCEDURE — 97113 AQUATIC THERAPY/EXERCISES: CPT | Mod: GP,CQ

## 2025-05-13 ASSESSMENT — PAIN SCALES - GENERAL: PAINLEVEL_OUTOF10: 3

## 2025-05-13 ASSESSMENT — PAIN - FUNCTIONAL ASSESSMENT: PAIN_FUNCTIONAL_ASSESSMENT: 0-10

## 2025-05-14 NOTE — PROGRESS NOTES
Physical Therapy Treatment    Patient Name: Pro Belle Jr.  MRN: [id number]  Encounter date:  5/16/2025  Time Calculation  Start Time: 1117  Stop Time: 1200  Time Calculation (min): 43 min     PT Therapeutic Procedures Time Entry  Aquatic Therapy Time Entry: 40    Visit Number:     8/12 (including evaluation) (automatic visit count is off--manually update)  Planned total visits: 12  Visits Authorized/Insurance Coverage:  2025: MEDICARE A/B, [place], [place], NO AUTH ($0 USED PT/ST)   Progress Report due visit #10     Current Problem  Problem List Items Addressed This Visit    None  Visit Diagnoses         Codes      Lumbar radiculopathy     M54.16      Postlaminectomy syndrome, lumbar region     M96.1      Cervical spondylosis without myelopathy     M47.812           Precautions  Precautions  Precautions Comment: Lx fusion, 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) (Bilateral hip replacement laterals (20 y/r, 2023?)  A-fib (pt reports under control)  Epidural Lx  Left shoulder replacement successful (but when his arm was taken out of the harness after back surgery he lost strength)  Need a right shoulder replacement)      Pain  Pain Assessment: 0-10  0-10 (Numeric) Pain Score: 3  Response to Interventions: Decrease in pain (1)    Subjective  General  General Comment: Pt reports he is feeling good.  He has right hip and leg pain but it is not too bad today.  He feels the water exercises have helped and always feels good afterward         Objective  Significant forward head in standing, decreased flexed posturing this date, stiff gait (decreased trunk rotation)    Treatment:  Aquatic Exercise  Aquatic Exercise Performed: Yes  Aquatic Therapy:    Pt enters pool with 2 HR assist, non-reciprocal down steps leading with R LE.      -shallow water walking for emphasis on upright trunk posture; abdominal bracing; and stride length:  pt performed 3 laps of fwd; side; and retro walking     UE  "ROM and strength:  at 4' depth  -shoulder adduction with red DB x 20 focus on TrA bracing.   -shoulder horizontal abd/add on surface x 25 reps focus on TrA bracing.   -bicep curl/tricep press (neutral)  with red DB x 25 reps  focus on TrA bracing.   -split stance push/pull (row) with kick board  just under surface of water, 2 x 10 (x 10 with R foot lead; x 10 L foot lead) focus on TrA bracing.   - posterior shoulder rolls x 20 reps  -shoulder ER R and L  x 25 reps  each focus on TrA bracing.  (Left with red WW)     LE ROM/strength at 4' depth  x 25 reps each  (one set of 15;  one set of 10).  -ankle pf/df     -knee flexion  -3 way hip  R and L   -MIP      Shallow water walk holding kick board vertical and 1/2 submerged x 3 laps front and back.      At steps:  Hamstring stretch R and L  2 x 30\" each     Current HEP:  Aquatics only ;      Has patient been compliant with HEP?  Not at this time    Billed Treatment Times:  Aquatic Exercise 40 min    OP EDUCATION:  Outpatient Education  Individual(s) Educated: Patient  Education Provided: Home Exercise Program  Education Comment: Rationale for added resistance    Assessment:  PT Assessment  Assessment Comment: Good core control during aquatic exercises. Greater ease of movement  Areas of improvements:  Decreased pain, Improved stability, Improved strength, and Improved gait pattern  Limitations/deficits:  Weakness and pain    Plan:     Continue with current POC/no changes    Assessment of current progress against goals:  Progressing toward functional goals    Goals:  Active       PT Problem - Lx and Cx       PT Goal 1 - STG       Start:  04/11/25    Expected End:  05/26/25       1.  Improve Cx AROM by 10 deg at deficits  2.  Improve LE AROM by 20 degrees hip flexion, right knee extension by 5 deg  3.  Improve LE strength to 4/5 or better at deficits  4.  Improve trunk strength Good- at deficits; Cx strength to Good and painfree with resistance  5  Improve sitting posture " with correct alignment of Cx region and shoulders, more aware of sit tall posturing when using his phone or sitting at the computer  6.  Pain:  0-2  7.  Functional Outcome Measure:  Oswestry 6             PT Goal 2 - LT FUNCTIONAL GOALS       Start:  04/11/25    Expected End:  07/10/25       LONG TERM FUNCTIONAL GOALS  1.  Pt report 40% improvement in being able to turn his head a greater range of movement during ADLs and IADLs  2. Pt able to stand 75% of the time 15 minutes or more for ADLs, IADLs and in the community with minimal back pain  3. Pt able to walk 20 minutes in the community 80% of the time with minimal back pain  4. Independent with his Aquatic Exercise Program             Patient Stated Goal 1       Start:  04/11/25    Expected End:  07/10/25       Neck: Able to turn his head without pain  strengthen in his arms and shoulders and be able to raise higher    Back:    able to stand more than 10 minutes without back pain worsening  Strength to walk up to 15 minutes (personal goal 2 miles by his trip in May)    Learn a water program so he can perform independently

## 2025-05-16 ENCOUNTER — TREATMENT (OUTPATIENT)
Dept: PHYSICAL THERAPY | Facility: CLINIC | Age: 75
End: 2025-05-16
Payer: MEDICARE

## 2025-05-16 DIAGNOSIS — M54.16 LUMBAR RADICULOPATHY: ICD-10-CM

## 2025-05-16 DIAGNOSIS — M47.812 CERVICAL SPONDYLOSIS WITHOUT MYELOPATHY: ICD-10-CM

## 2025-05-16 DIAGNOSIS — M96.1 POSTLAMINECTOMY SYNDROME, LUMBAR REGION: ICD-10-CM

## 2025-05-16 PROCEDURE — 97113 AQUATIC THERAPY/EXERCISES: CPT | Mod: GP | Performed by: PHYSICAL THERAPIST

## 2025-05-16 ASSESSMENT — PAIN - FUNCTIONAL ASSESSMENT: PAIN_FUNCTIONAL_ASSESSMENT: 0-10

## 2025-05-16 ASSESSMENT — PAIN SCALES - GENERAL: PAINLEVEL_OUTOF10: 3

## 2025-05-19 NOTE — PROGRESS NOTES
"    Physical Therapy Treatment    Patient Name: Pro Belle Jr.  MRN: [id number]  Encounter date:  5/21/2025             Visit Number:     9/12 (including evaluation) (automatic visit count is off--manually update)  Planned total visits: 12  Visits Authorized/Insurance Coverage:  2025: [place]IC[place] A/B, [employer], [place] ($0 USED PT/ST)   Progress Report due visit #10     Current Problem  Problem List Items Addressed This Visit    None     Precautions         Pain       Subjective  General            Objective  ***    Treatment:  {PT Treatments:58912}  Aquatic Therapy:    Pt enters pool with 2 HR assist, non-reciprocal down steps leading with R LE.      -shallow water walking for emphasis on upright trunk posture; abdominal bracing; and stride length:  pt performed 3 laps of fwd; side; and retro walking     UE ROM and strength:  at 4' depth  -shoulder adduction with red DB x 20 focus on TrA bracing.   -shoulder horizontal abd/add on surface x 25 reps focus on TrA bracing.   -bicep curl/tricep press (neutral)  with red DB x 25 reps  focus on TrA bracing.   -split stance push/pull (row) with kick board  just under surface of water, 2 x 10 (x 10 with R foot lead; x 10 L foot lead) focus on TrA bracing.   - posterior shoulder rolls x 20 reps  -shoulder ER R and L  x 25 reps  each focus on TrA bracing.  (Left with red WW)     LE ROM/strength at 4' depth  x 25 reps each  (one set of 15;  one set of 10).  -ankle pf/df     -knee flexion  -3 way hip  R and L   -MIP      Shallow water walk holding kick board vertical and 1/2 submerged x 3 laps front and back.      At steps:  Hamstring stretch R and L  2 x 30\" each     Current HEP:  Aquatics only ;     Current HEP:  ***    Has patient been compliant with HEP?  {YES/NO:22608}    Billed Treatment Times:  {Treatment times:68353}    {PT Treatments:16596}  Manual:    ***  Billed Treatment Times:  {Treatment times:87119}      {PT Treatments:57310}  Balance/NMRE:     ***  Billed " Treatment Times:  {Treatment times:42456}    {PT Treatments:38482}  Therapeutic Activity:  ***  Billed Treatment Times:  {Treatment times:02391}    OP EDUCATION:       Assessment:     Areas of improvements:  {basassessmentimprovements:69791}  Limitations/deficits:  {basassessmentlimitations/deficits:81601}    Pain end of session:  ***    Plan:     {BASPLAN:63976}    Assessment of current progress against goals:  {BASPTNOTEGOALASSESSMENT:40001}    Goals:  Active       PT Problem - Lx and Cx       PT Goal 1 - STG       Start:  04/11/25    Expected End:  05/26/25       1.  Improve Cx AROM by 10 deg at deficits  2.  Improve LE AROM by 20 degrees hip flexion, right knee extension by 5 deg  3.  Improve LE strength to 4/5 or better at deficits  4.  Improve trunk strength Good- at deficits; Cx strength to Good and painfree with resistance  5  Improve sitting posture with correct alignment of Cx region and shoulders, more aware of sit tall posturing when using his phone or sitting at the computer  6.  Pain:  0-2  7.  Functional Outcome Measure:  Oswestry 6             PT Goal 2 - LT FUNCTIONAL GOALS       Start:  04/11/25    Expected End:  07/10/25       LONG TERM FUNCTIONAL GOALS  1.  Pt report 40% improvement in being able to turn his head a greater range of movement during ADLs and IADLs  2. Pt able to stand 75% of the time 15 minutes or more for ADLs, IADLs and in the community with minimal back pain  3. Pt able to walk 20 minutes in the community 80% of the time with minimal back pain  4. Independent with his Aquatic Exercise Program             Patient Stated Goal 1       Start:  04/11/25    Expected End:  07/10/25       Neck: Able to turn his head without pain  strengthen in his arms and shoulders and be able to raise higher    Back:    able to stand more than 10 minutes without back pain worsening  Strength to walk up to 15 minutes (personal goal 2 miles by his trip in May)    Learn a water program so he can perform  independently

## 2025-05-20 NOTE — PROGRESS NOTES
Physical Therapy Treatment    Patient Name: Pro Belle Jr.  MRN: [id number]  Encounter date:  5/23/2025  Time Calculation  Start Time: 1116  Stop Time: 1200  Time Calculation (min): 44 min     PT Therapeutic Procedures Time Entry  Aquatic Therapy Time Entry: 40    Visit Number:     9/12 (including evaluation) (automatic visit count is off--manually update)  Planned total visits: 12  Visits Authorized/Insurance Coverage:  2025: [place] A/B, [place], [place] ($0 USED PT/ST)   Progress Report due visit #10 **NEXT VISIT**    Current Problem  Problem List Items Addressed This Visit    None  Visit Diagnoses         Codes      Lumbar radiculopathy     M54.16      Postlaminectomy syndrome, lumbar region     M96.1      Cervical spondylosis without myelopathy     M47.812           Precautions  Precautions  Precautions Comment: Lx fusion, 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) (Bilateral hip replacement laterals (20 y/r, 2023?)  A-fib (pt reports under control)  Epidural Lx  Left shoulder replacement successful (but when his arm was taken out of the harness after back surgery he lost strength)  Need a right shoulder replacement)      Pain  Pain Assessment: 0-10  0-10 (Numeric) Pain Score: 3  Response to Interventions: Decrease in pain (0)    Subjective  General  General Comment: Just a little pain today.  Improving    PT  Visit  Response to Previous Treatment: Patient with no complaints from previous session.    Objective  Good core control and posture in the water    Treatment:     Aquatic Therapy:    Pt enters pool with 2 HR assist, non-reciprocal down steps leading with R LE.      -shallow water walking for emphasis on upright trunk posture; abdominal bracing; and stride length:  pt performed 3 laps of fwd; side; and retro walking     UE ROM and strength:  at 4' depth  -shoulder abduction with red DB x 20 focus on TrA bracing.   -shoulder horizontal abd/add on surface x 25 reps  "focus on TrA bracing.   -bicep curl/tricep press (neutral)  with red DB x 25 reps  focus on TrA bracing.   -split stance push/pull (row) with kick board  just under surface of water, 2 x 10 (x 10 with R foot lead; x 10 L foot lead) focus on TrA bracing.   - posterior shoulder rolls x 20 reps  -shoulder ER R and L  x 25 reps  each focus on TrA bracing.  (Left with red WW)     LE ROM/strength at 4' depth  x 25 reps each  (one set of 15;  one set of 10).  -ankle pf/df     -knee flexion  -3 way hip  R and L   -MIP      Shallow water walk holding kick board vertical and 1/2 submerged x 3 laps front and back.     White noodle, 20x, row    Standing shoulder extension 20x  Trunk rotation with black noodle 20x  Black noodle push out, 2 laps     At steps:  Hamstring stretch R and L  2 x 30\" each     Current HEP:  Aquatics only ;         Has patient been compliant with HEP?  Yes    Billed Treatment Times:  Aquatic Exercise 40 min    OP EDUCATION:  Outpatient Education  Individual(s) Educated: Patient  Education Provided: Home Exercise Program  Education Comment: Pt plans to go to the [facility]  in the lap pool    Assessment:  PT Assessment  Assessment Comment: Pt doing well with the current program.  He needs to be able to climb 20 steps next  Saturday.  Areas of improvements:  Decreased pain, Improved stability, Improved strength, Improved gait pattern, and Improved ability to transfer  Limitations/deficits:  Weakness and low level pain     Plan:     Reassess next visit    Assessment of current progress against goals:  Progressing toward functional goals    Goals:  Active       PT Problem - Lx and Cx       PT Goal 1 - STG       Start:  04/11/25    Expected End:  05/26/25       1.  Improve Cx AROM by 10 deg at deficits  2.  Improve LE AROM by 20 degrees hip flexion, right knee extension by 5 deg  3.  Improve LE strength to 4/5 or better at deficits  4.  Improve trunk strength Good- at deficits; Cx strength to Good and painfree with " resistance  5  Improve sitting posture with correct alignment of Cx region and shoulders, more aware of sit tall posturing when using his phone or sitting at the computer  6.  Pain:  0-2  7.  Functional Outcome Measure:  Oswestry 6             PT Goal 2 - LT FUNCTIONAL GOALS       Start:  04/11/25    Expected End:  07/10/25       LONG TERM FUNCTIONAL GOALS  1.  Pt report 40% improvement in being able to turn his head a greater range of movement during ADLs and IADLs  2. Pt able to stand 75% of the time 15 minutes or more for ADLs, IADLs and in the community with minimal back pain  3. Pt able to walk 20 minutes in the community 80% of the time with minimal back pain  4. Independent with his Aquatic Exercise Program             Patient Stated Goal 1       Start:  04/11/25    Expected End:  07/10/25       Neck: Able to turn his head without pain  strengthen in his arms and shoulders and be able to raise higher    Back:    able to stand more than 10 minutes without back pain worsening  Strength to walk up to 15 minutes (personal goal 2 miles by his trip in May)    Learn a water program so he can perform independently

## 2025-05-21 ENCOUNTER — DOCUMENTATION (OUTPATIENT)
Dept: PHYSICAL THERAPY | Facility: CLINIC | Age: 75
End: 2025-05-21
Payer: MEDICARE

## 2025-05-21 ENCOUNTER — APPOINTMENT (OUTPATIENT)
Dept: PHYSICAL THERAPY | Facility: CLINIC | Age: 75
End: 2025-05-21
Payer: MEDICARE

## 2025-05-21 ENCOUNTER — PATIENT OUTREACH (OUTPATIENT)
Dept: PRIMARY CARE | Facility: CLINIC | Age: 75
End: 2025-05-21
Payer: MEDICARE

## 2025-05-21 DIAGNOSIS — K21.9 GASTROESOPHAGEAL REFLUX DISEASE, UNSPECIFIED WHETHER ESOPHAGITIS PRESENT: ICD-10-CM

## 2025-05-21 DIAGNOSIS — I48.91 ATRIAL FIBRILLATION, UNSPECIFIED TYPE (MULTI): ICD-10-CM

## 2025-05-21 DIAGNOSIS — I10 BENIGN ESSENTIAL HYPERTENSION: ICD-10-CM

## 2025-05-21 NOTE — PROGRESS NOTES
Physical Therapy                 Therapy Communication Note    Patient Name: Pro Belle Jr.  MRN: [id number]  Department:   Room: Room/bed info not found  Today's Date: 5/21/2025     Discipline: Physical Therapy    Missed Visit:       Missed Visit Reason:      Missed Time: Cancel    Comment:

## 2025-05-21 NOTE — PROGRESS NOTES
Care Management Monthly Outreach  Chart review completed  Confirmation of at least 2 patient identifiers  Change in insurance? No    Has patient been to ER/Urgent Care since last outreach? No    Last Office Visit with PCP: 3/24/2025   Next Office Visit with PCP: 9/29/2025   APC Collaboration: n/a    Chronic Conditions and Outreach Summary:   Atrial fibrillation, unspecified type (Multi)    Benign essential hypertension    Gastroesophageal reflux disease, unspecified whether esophagitis present    Patient states that he has started water therapy and has noticed a difference.  It is allowing him to do things in the water that he normally could not do, such as ambulate without a cane.  Reporting that his back pain is not there when in the water like it was with traditional therapy. Still ambulating with a cane to help with stability. No issues with sleeping, averaging about 8 hours a day with minimal interruptions.  Has been getting outside more now that the weather is nicer.     Medications:   Are there medication changes since last visit? No  Refills needed? No    Social Drivers of Health: Addressed in the last 6 months  Care Gaps Addressed? Addressed in the last 6 months  Care Plan addressed: Yes    Upcoming Appointments:   Future Appointments       Date / Time Provider Department Dept Phone    5/23/2025 11:15 AM Chacha Waggoner PT [facility] [phone]    6/5/2025 4:00 PM Mynor Hurst MD [facility] [phone]    6/16/2025 11:00 AM Citlaly Shane MD [facility] [phone]    6/25/2025 11:00 AM Pauline Aguilera, APRN-CNP [facility] [phone]    6/25/2025 1:45 PM Victoriano Mackenzie MD [facility] [phone]    9/29/2025 9:45 AM Ruben Bradford MD [facility] [phone]    10/1/2025 9:20 AM (Arrive by 9:05 AM) Eva WAGNER  Lidia Ordonez [facility]  Arrive at: Your Home [phone]    3/30/2026 10:00 AM Ruben Bradford MD [facility] [phone]          Blood Pressures Reviewed  BP Readings from Last 3 Encounters:   03/26/25 130/80   03/24/25 120/74   03/11/25 148/86     Labs Reviewed:  Lab Results   Component Value Date    CREATININE 0.95 04/08/2025    GLUCOSE 103 (H) 04/08/2025    ALKPHOS 107 04/08/2025    K 3.7 04/08/2025    PROT 7.0 04/08/2025     04/08/2025    CALCIUM 9.9 04/08/2025    AST 29 04/08/2025    ALT 24 04/08/2025    BUN 19 04/08/2025    MG 2.26 11/24/2024    PHOS 2.1 (L) 05/25/2022    GFRMALE 90 08/16/2023     Lab Results   Component Value Date    TRIG 70 04/08/2025    CHOL 169 04/08/2025    LDLCALC 62 04/08/2025    HDL 92 04/08/2025     Lab Results   Component Value Date    HGBA1C 5.8 (H) 04/08/2025    HGBA1C 5.3 10/19/2024    HGBA1C 5.4 10/05/2024     Lab Results   Component Value Date    WBC 9.0 04/08/2025    RBC 4.46 04/08/2025    HGB 14.8 04/08/2025     04/08/2025   No other concerns at this time.  Agreeable to continue monthly outreaches.  Encouraged to call if questions or concerns arise.    Susan Price

## 2025-05-23 ENCOUNTER — TREATMENT (OUTPATIENT)
Dept: PHYSICAL THERAPY | Facility: CLINIC | Age: 75
End: 2025-05-23
Payer: MEDICARE

## 2025-05-23 DIAGNOSIS — M54.16 LUMBAR RADICULOPATHY: ICD-10-CM

## 2025-05-23 DIAGNOSIS — M47.812 CERVICAL SPONDYLOSIS WITHOUT MYELOPATHY: ICD-10-CM

## 2025-05-23 DIAGNOSIS — M96.1 POSTLAMINECTOMY SYNDROME, LUMBAR REGION: ICD-10-CM

## 2025-05-23 PROCEDURE — 97113 AQUATIC THERAPY/EXERCISES: CPT | Mod: GP | Performed by: PHYSICAL THERAPIST

## 2025-05-23 ASSESSMENT — PAIN SCALES - GENERAL: PAINLEVEL_OUTOF10: 3

## 2025-05-23 ASSESSMENT — PAIN - FUNCTIONAL ASSESSMENT: PAIN_FUNCTIONAL_ASSESSMENT: 0-10

## 2025-06-05 ENCOUNTER — OFFICE VISIT (OUTPATIENT)
Dept: CARDIOLOGY | Facility: CLINIC | Age: 75
End: 2025-06-05
Payer: MEDICARE

## 2025-06-05 VITALS
SYSTOLIC BLOOD PRESSURE: 117 MMHG | WEIGHT: 212 LBS | BODY MASS INDEX: 32.23 KG/M2 | OXYGEN SATURATION: 98 % | DIASTOLIC BLOOD PRESSURE: 64 MMHG | HEART RATE: 74 BPM

## 2025-06-05 DIAGNOSIS — I48.91 ATRIAL FIBRILLATION, UNSPECIFIED TYPE (MULTI): ICD-10-CM

## 2025-06-05 DIAGNOSIS — I25.10 CORONARY ARTERY DISEASE INVOLVING NATIVE CORONARY ARTERY OF NATIVE HEART WITHOUT ANGINA PECTORIS: Primary | ICD-10-CM

## 2025-06-05 PROCEDURE — 3074F SYST BP LT 130 MM HG: CPT | Performed by: NURSE PRACTITIONER

## 2025-06-05 PROCEDURE — 1036F TOBACCO NON-USER: CPT | Performed by: NURSE PRACTITIONER

## 2025-06-05 PROCEDURE — G2211 COMPLEX E/M VISIT ADD ON: HCPCS | Performed by: NURSE PRACTITIONER

## 2025-06-05 PROCEDURE — 1159F MED LIST DOCD IN RCRD: CPT | Performed by: NURSE PRACTITIONER

## 2025-06-05 PROCEDURE — 99212 OFFICE O/P EST SF 10 MIN: CPT | Performed by: NURSE PRACTITIONER

## 2025-06-05 PROCEDURE — 3078F DIAST BP <80 MM HG: CPT | Performed by: NURSE PRACTITIONER

## 2025-06-05 PROCEDURE — 99214 OFFICE O/P EST MOD 30 MIN: CPT | Performed by: NURSE PRACTITIONER

## 2025-06-05 PROCEDURE — 1160F RVW MEDS BY RX/DR IN RCRD: CPT | Performed by: NURSE PRACTITIONER

## 2025-06-05 RX ORDER — REGADENOSON 0.08 MG/ML
0.4 INJECTION, SOLUTION INTRAVENOUS
OUTPATIENT
Start: 2025-06-05

## 2025-06-05 RX ORDER — AMINOPHYLLINE 25 MG/ML
125 INJECTION, SOLUTION INTRAVENOUS ONCE AS NEEDED
OUTPATIENT
Start: 2025-06-05

## 2025-06-05 ASSESSMENT — ENCOUNTER SYMPTOMS
NEUROLOGICAL NEGATIVE: 1
CONSTITUTIONAL NEGATIVE: 1
CARDIOVASCULAR NEGATIVE: 1
GASTROINTESTINAL NEGATIVE: 1
RESPIRATORY NEGATIVE: 1
MUSCULOSKELETAL NEGATIVE: 1

## 2025-06-05 NOTE — PROGRESS NOTES
Chief Complaint:   Follow-up    History Of Present Illness:    ..Mr Belle returns in follow up.  Denies chest pain, sob, palpitations or pedal edema.                Last Recorded Vitals:  Blood pressure 117/64, pulse 74, weight 96.2 kg (212 lb), SpO2 98%.     Past Medical History:  Medical History[1]     Past Surgical History:  Surgical History[2]    Social History:  Social History[3]    Family History:  Family History[4]      Allergies:  Patient has no known allergies.    Outpatient Medications:  Current Medications[5]     Physical Exam:  Cardiovascular:      PMI at left midclavicular line. Normal rate. Regular rhythm. Normal S1. Normal S2.       Murmurs: There is no murmur.      No gallop.  No click. No rub.   Pulses:     Intact distal pulses.   Edema:     Peripheral edema absent.       ROS:  Review of Systems   Constitutional: Negative.   Cardiovascular: Negative.    Respiratory: Negative.     Skin: Negative.    Musculoskeletal: Negative.    Gastrointestinal: Negative.    Genitourinary: Negative.    Neurological: Negative.           Last Labs:reviewed  CBC -  Lab Results   Component Value Date    WBC 9.0 04/08/2025    HGB 14.8 04/08/2025    HCT 43.6 04/08/2025    MCV 97.8 04/08/2025     04/08/2025       CMP -  Lab Results   Component Value Date    CALCIUM 9.9 04/08/2025    PHOS 2.1 (L) 05/25/2022    PROT 7.0 04/08/2025    ALBUMIN 4.3 04/08/2025    AST 29 04/08/2025    ALT 24 04/08/2025    ALKPHOS 107 04/08/2025    BILITOT 1.0 04/08/2025       LIPID PANEL -   Lab Results   Component Value Date    CHOL 169 04/08/2025    TRIG 70 04/08/2025    HDL 92 04/08/2025    CHHDL 1.8 04/08/2025    LDLF 57 04/11/2023    VLDL 12 03/27/2024    NHDL 77 04/08/2025       RENAL FUNCTION PANEL -   Lab Results   Component Value Date    GLUCOSE 103 (H) 04/08/2025     04/08/2025    K 3.7 04/08/2025     04/08/2025    CO2 27 04/08/2025    ANIONGAP 10 04/08/2025    BUN 19 04/08/2025    CREATININE 0.95 04/08/2025     GFRMALE 90 08/16/2023    CALCIUM 9.9 04/08/2025    PHOS 2.1 (L) 05/25/2022    ALBUMIN 4.3 04/08/2025        Lab Results   Component Value Date    HGBA1C 5.8 (H) 04/08/2025         Assessment/Plan   Problem List Items Addressed This Visit    None    1. Coronary artery disease, status post PCI to the distal RCA 09/10/2019. This patient originally was identified as having low-grade CAD by cardiac catheterization 3/2003 [facility]. He did have a nuclear stress test performed in 1/2009 and again on 11/13/2015 negative for evidence of ischemia. He presented to [facility] 9/9/ 2019 with central chest discomfort and shortness of breath. It was detected that he was in atrial fibrillation with RVR. He had EKG and enzymatic evidence of a NSTEMI. He had cardiac cath September 10, 2019 with Dr. Mynor Hurst which showed single-vessel CAD in the distal third of the distal right coronary artery about 90% stenosis and distal to the origin of the PDA but prior to the origin of the trifurcating post inferior lateral LV branch. He had successful distal RCA stenting September 10, 2019 with Dr. Aniceto Richardson at [facility]. Continue aspirin 81 mg daily.  Return in follow up with lexiscan.     2. Paroxysmal atrial fibrillation. Please see previous office notes for review. This patient was recently admitted to [facility] from 04/15/2018 to 04/18/2018 with pleuritic-type chest pain along with cough and shortness of breath. The patient was identified by chest x-ray to have a left lower lobe infiltrate suggestive of pneumonia associated with pleuritis. He was also detected to have new onset or recurrent atrial fibrillation with a rapid ventricular rate. The patient was initially placed on an IV Cardizem infusion but ultimately was converted to metoprolol. The patient's ventricular rate remained rapid despite the initiation of metoprolol and as such amiodarone 200 mg daily was added. At the time of  discharge on 04/18/2018 he was still in atrial fibrillation with a ventricular rate in the range of 110/m. He did have an echocardiogram performed during the admission on 04/16/2018 that demonstrated an LV ejection fraction of 60-64% with mild left atrial enlargement and trace to mild mitral valve regurgitation. At the time of his next follow-up visit it was found that by both EKG and examination he was back in sinus rhythm with sinus bradycardia. For now he will remain on the amiodarone 200 mg daily plus without will reduce metoprolol by switching metoprolol tartrate 50 mg twice a day to metoprolol succinate 50 mg daily. He will remain on Xarelto 20 mg daily. He presented to [facility] September 9, 2019 with central chest discomfort and shortness of breath. It was detected that he was in atrial fibrillation with RVR. He converted to sinus rhythm with IV diltiazem infusion. Echocardiogram done at the time showed EF 60-64%, left atrial size mildly dilated, trace MR, trivial to mild TR. He is now back on on amiodarone 200 mg daily as well as Xarelto 20 mg daily which had been discontinued prior to the readmission. Had afib with RVR and failed DCCV. He saw Dr Nassar and had RFA 10/2/2020. ECG today shows likely aflutter. Will plan for DCCV next week. See Dr Nassar in follow up. Had DCCV 11/12/2020. Went out of rhythm again for a brief period 11/2021. Went out of rhythm for 4 days 01/2022. Patient had RFA done 05/24/2022 with Dr Nassar. ECG done prior shows NSR, RBBB.  The patient is no longer on amiodarone which was discontinued last visit.  He will remain on Xarelto anticoagulation.  He will be stopping 3 days before any type of back injection or lumbar surgery.  Patient will return in 8 weeks for follow-up.  The patient currently remains in sinus rhythm.  He underwent preadmission testing on 2/1/2024 for lumbar surgery EKG shows sinus bradycardia at 49/min occasional PVCs nonspecific ST-T abnormality.   Will reduce the dose of Toprol-XL from 100 mg daily to 50 mg daily.  The patient was recently admitted to [facility] from 5/8/2024 - 5/9/2024 with recurrent atrial fibrillation that converted quickly to sinus rhythm with IV diltiazem.  His metoprolol was increased from 50 mg twice daily to 75 mg twice daily.  He remains on Xarelto anticoagulation.  His high-sensitivity troponins were 126, 163, 218.  EKG today confirms maintenance of sinus bradycardia 56/min occasional PVCs right bundle branch block conduction delay.  Patient scheduled to see electrophysiology for follow-up on 6/12/2024. At most recent EP visit it was decided to hold off on ablation at this time and the patient would continue to monitor for worsening symptoms. On 10/19 patient went to the ED for concerns of being back in afib, but was discharged as his rate was controlled and he was already on metoprolol and Xarelto. Today, the patient expresses concerns about being in afib for an extended period of time, as he usually goes back in sinus rhythm after 12-18 hours. The patient is scheduled to see EP next Wednesday. He was educated on the incidence of persistent afib and he was reassured that he is on the appropriate medications. Metoprolol was increased to 100 mg daily as his pulse was 100 in office.     3 Xarelto anticoagulation. Patient had spine injections scheduled for tomorrow but canceled appointment due to the need for interrupted anticoagulation if he is a candidate for another ablation.     4. Hypertension. Blood pressure is adequately controlled today. He will be continued on amlodipine 5 mg daily and lisinopril 20 mg daily. His dose of metoprolol is 25 mg twice daily.      5. Hyperlipidemia. The patient did have lab work performed on 10/08/2019 including a lipid panel with cholesterol 218  HDL 61 triglyceride 154. These results are continue to be disappointing on his statin agent atorvastatin 80 mg daily. He will be started on  supplemental Zetia 10 mg daily. 5/10/2022, Chol 141, LDL 67, HDL 60, trig 70. Most recent lipid panel on 04/2025 chol 169, HDL 92, LDL 62, trig 70.      6. Sleep apnea. Please see previous office notes. The patient was referred to sleep medicine and was in fact identified as having sleep apnea that was severe. The patient's initial sleep study was done at [facility] on 01/04/2018 and showed severe obstructive sleep apnea and he ultimately had a titration study on 02/22/2018. He was placed on ASV therapy with significant clinical improvement. His compliance has been 74% with no leak.     7. BPH.      8. GERD.     9. Bilateral carpal tunnel release     10. S/P Left hip surgery 2007, right 2021. Plans for LTK 03/2023.     11. S/P Right inguinal hernia repair 4/2014, [facility].     12. Osteoarthritis.      13. Status post left shoulder replacement 11/10/2015 with repeat left shoulder replacement 02/22/2016 because of repetitive dislocation.     14. Lumbar Laminectomy, 10/01/2018 with Dr Cardenas at [facility].  Patient recently has been experiencing and further lower back pain.  His L4-L5 disc is shifted.  He is scheduled for an epidural injection on 12/22/2023 and possible surgery on 2/14/2024.  The patient is in fact scheduled for revision of an L4 laminectomy with excision of synovial cyst the right with robotic pedicle screw and interbody fusion 2/14/2024.  He is cleared from the cardiac standpoint for the procedure may hold Xarelto and aspirin 5 days preoperatively.  Lab work 8/16/2023 included sedimentation rate 4 CRP less than 0.10 hematocrit 36.0 uric acid 5.7 and normal electrolyte panel.  14 a.  Status post repeat lumbar surgery 2/14/2024 at [facility].  Patient is starting therapy for his back and lower extremities within the next week.     15. Prostate Urolift surgery with Dr Werner, fall 2018.     16. Gen. medical. The patient had lab work on 10/08/2019 including glycohemoglobin  5.2%, TSH 1.38, PSA 1.6 with both CBC and SMA panels being normal.     17. Venous insufficiency.      18. Hx of psoriatic arthritis     19. Hx of covid-19 vaccine #1 and #2.      20. Planned LTK.          Mildred Valencia, MARGAUX-CNP       [1]   Past Medical History:  Diagnosis Date    A-fib (Multi)     Abnormal ECG     Chronic pain disorder     CTS (carpal tunnel syndrome)     Hypertension     Joint pain     Low back pain     Neck pain     Shingles     Sleep apnea     Weakness of limb    [2]   Past Surgical History:  Procedure Laterality Date    ABLATION OF DYSRHYTHMIC FOCUS      BACK SURGERY  2024    l4-5 fusion    CORONARY STENT PLACEMENT      EYE SURGERY      HERNIA REPAIR  2014    Inguinal Hernia Repair    JOINT REPLACEMENT Bilateral     hip    JOINT REPLACEMENT Left     knee and shoulder    OTHER SURGICAL HISTORY  10/06/2022    Cystoscopy    SPINE SURGERY      VASECTOMY     [3]   Social History  Socioeconomic History    Marital status:    Tobacco Use    Smoking status: Former     Current packs/day: 0.00     Average packs/day: 1.5 packs/day for 3.0 years (4.5 ttl pk-yrs)     Types: Cigarettes     Quit date: 2/15/1995     Years since quittin.3    Smokeless tobacco: Never   Vaping Use    Vaping status: Never Used   Substance and Sexual Activity    Alcohol use: Yes     Alcohol/week: 8.0 standard drinks of alcohol     Types: 4 Glasses of wine, 4 Shots of liquor per week    Drug use: Never    Sexual activity: Yes     Partners: Female     Birth control/protection: Male Sterilization     Social Drivers of Health     Financial Resource Strain: Low Risk  (2024)    Overall Financial Resource Strain (CARDIA)     Difficulty of Paying Living Expenses: Not hard at all   Food Insecurity: No Food Insecurity (10/18/2024)    Hunger Vital Sign     Worried About Running Out of Food in the Last Year: Never true     Ran Out of Food in the Last Year: Never true   Transportation Needs: No Transportation  Needs (11/21/2024)    PRAPARE - Transportation     Lack of Transportation (Medical): No     Lack of Transportation (Non-Medical): No   Physical Activity: Inactive (10/18/2024)    Exercise Vital Sign     Days of Exercise per Week: 0 days     Minutes of Exercise per Session: 0 min   Stress: No Stress Concern Present (5/8/2024)    [employer] [employer] - Occupational Stress Questionnaire     Feeling of Stress : Not at all   Social Connections: Moderately Integrated (5/8/2024)    Social Connection and Isolation Panel [NHANES]     Frequency of Communication with Friends and Family: More than three times a week     Frequency of Social Gatherings with Friends and Family: Three times a week     Attends Baptism Services: 1 to 4 times per year     Active Member of Clubs or Organizations: No     Attends Club or Organization Meetings: Never     Marital Status:    Intimate Partner Violence: Not At Risk (3/11/2025)    Humiliation, Afraid, Rape, and Kick questionnaire     Fear of Current or Ex-Partner: No     Emotionally Abused: No     Physically Abused: No     Sexually Abused: No   Housing Stability: Low Risk  (11/21/2024)    Housing Stability Vital Sign     Unable to Pay for Housing in the Last Year: No     Number of Times Moved in the Last Year: 0     Homeless in the Last Year: No   [4]   Family History  Problem Relation Name Age of Onset    Cancer Mother Selena     Alcohol abuse Mother Selena     Heart disease Father Pro Sr.     Arthritis Father Pro Sr.     Heart attack Father Pro Sr.     Cancer Sister Nannette     Coronary artery disease Brother Gonzalez Belle     Atrial fibrillation Brother Fredy Belle     Atrial fibrillation Brother Fredy Belle    [5]   Current Outpatient Medications   Medication Sig Dispense Refill    acetaminophen (Tylenol) 500 mg tablet Take 2 tablets (1,000 mg) by mouth once daily as needed (back pain). Do not crush, chew, or split.      amLODIPine (Norvasc) 5 mg tablet  TAKE 1 TABLET BY MOUTH ONCE  DAILY 90 tablet 3    armodafinil (Nuvigil) 250 mg tablet Take 1 tablet (250 mg) by mouth once daily in the morning.      aspirin 81 mg EC tablet Take 1 tablet (81 mg) by mouth once daily.      atorvastatin (Lipitor) 80 mg tablet TAKE 1 TABLET BY MOUTH AT  BEDTIME 90 tablet 3    cholecalciferol (Vitamin D-3) 50 MCG (2000 UT) tablet Take 1 tablet (50 mcg) by mouth once daily.      cyanocobalamin (Vitamin B-12) 1,000 mcg tablet Take 1 tablet (1,000 mcg) by mouth once daily.      docusate sodium (Colace) 100 mg capsule Take 1 capsule (100 mg) by mouth once daily.      dofetilide (Tikosyn) 250 mcg capsule Take 1 capsule (250 mcg) by mouth every 12 hours. Do not fill before December 20, 2024. 180 capsule 3    ezetimibe (Zetia) 10 mg tablet TAKE 1 TABLET BY MOUTH AT  BEDTIME 90 tablet 3    folic acid (Folvite) 800 mcg tablet Take 1 mg by mouth once daily.      lisinopril 40 mg tablet TAKE 1 TABLET BY MOUTH ONCE  DAILY 90 tablet 3    metoprolol succinate XL (Toprol-XL) 25 mg 24 hr tablet Take 1 tablet (25 mg) by mouth 2 times a day. Do not crush or chew.      omeprazole (PriLOSEC) 20 mg DR capsule TAKE 1 CAPSULE BY MOUTH ONCE  DAILY 90 capsule 3    rivaroxaban (Xarelto) 20 mg tablet Take 1 tablet (20 mg) by mouth once daily. 90 tablet 3    sildenafil (Viagra) 100 mg tablet Take 1 tablet (100 mg) by mouth once daily as needed for erectile dysfunction. 12 tablet 3    tamsulosin (Flomax) 0.4 mg 24 hr capsule Take 2 capsules (0.8 mg) by mouth once daily. 180 capsule 3    tofacitinib ER (Xeljanz XR) 11 mg tablet extended release 24 hr Take 1 tablet (11 mg) by mouth once daily. Do not crush, chew or split. Swallow whole.      gabapentin (Neurontin) 300 mg capsule Take 300 mg twice daily for 4 days, then decrease to 300 mg daily x 7 days, then stop 15 capsule 0     No current facility-administered medications for this visit.

## 2025-06-09 NOTE — PROGRESS NOTES
Physical Therapy Progress Report / Treatment    Patient Name: Pro Belle Jr.  MRN: [id number]  Encounter date:  6/11/2025  Time Calculation  Start Time: 1515  Stop Time: 1559  Time Calculation (min): 44 min     PT Therapeutic Procedures Time Entry  Aquatic Therapy Time Entry: 38    Visit Number:     10/12 (including evaluation) (automatic visit count is off--manually update)  Planned total visits: 12  Visits Authorized/Insurance Coverage:  2025: MEDICARE A/B, [employer], [place], NO AUTH ($0 USED PT/ST)   Progress Report due visit # (add up to two more visits as needed within the next 30 days*    Current Problem  Problem List Items Addressed This Visit    None  Visit Diagnoses         Codes      Lumbar radiculopathy     M54.16      Postlaminectomy syndrome, lumbar region     M96.1      Cervical spondylosis without myelopathy     M47.812           Precautions  Precautions  Precautions Comment: Lx fusion, 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) (Bilateral hip replacement laterals (20 y/r, 2023?)  A-fib (pt reports under control)  Epidural Lx  Left shoulder replacement successful (but when his arm was taken out of the harness after back surgery he lost strength)  Need a right shoulder replacement)      Pain  Pain Assessment: 0-10  0-10 (Numeric) Pain Score: 1 (1st 15 min in the am 4.  Pain less since the injection)  Response to Interventions: No change in pain    Subjective  General  General Comment: overall improved.  Left arm feels weak at times.    PT  Visit  Response to Previous Treatment: Patient with no complaints from previous session.    Objective  ROM and Strength:     Cervical:     See below              AROM STRENGTH*   Cervical       Flexion 15  10 strong  strong   Extension     /////////////////////////////////////////////////////////////////     R L R L   Rotation 35 30 strong strong   Sidebend 10 10 strong strong      Significant forward head  *mild pain reported      Shoulder:     See below              AROM/PROM STRENGTH   Shoulder R L R L   Flexion 0/100 20/100 ** 2-/5   Extension WFL WFL WFL WFL   Abduction 0/80 20/90 **/5 2-/5   Internal  Rotation 40 abdomen 3-/5 3-/5   External Rotation 20 20 3-/5 3-/5      **unable to raise against gravity without use of his UE     ROM and Strength:     Lumbar:  AROM limited by 50 percent. Unable to stand erect (flexed knees and hips)     Hip:     See below                 AROM STRENGTH   Hip R L R L   Hip Flexion 50 60 3/5 3/5   Hip Abduction WFL WFL 3/5 3/5   Hip Adduction WF. WFL 3/5 3/5      Knee:     See below              AROM STRENGTH   Knee R L R L   Knee Flexion 130 130 4/5 4/5   Knee Extension -15 0 4/5 4/5         Gait:  Gait Comment: improved alexander, downward head position, continue to ambulate with flexed knees/hips/trunk, more narrow BOX SPC     Outcome Measures:  Other Measures  Oswestry Disablity Index (LENI): 15; 06/11/2025 06/11/2025 11    Treatment:  Aquatic Exercise  Aquatic Exercise Performed: Yes  Aquatic Therapy:    Pt enters pool with 2 HR assist, non-reciprocal down steps leading with R LE.      -shallow water walking for emphasis on upright trunk posture; abdominal bracing; and stride length:  pt performed 3 laps of fwd; side; and retro walking     UE ROM and strength:  at 4' depth  -shoulder abduction with red DB x 15 focus on TrA bracing.   -shoulder horizontal abd/add on surface x 15 reps focus on TrA bracing.   -bicep curl/tricep press (neutral)  with red DB x 15 reps  focus on TrA bracing.   -split stance push/pull (row) with kick board  just under surface of water, 2 x 10 (x 10 with R foot lead; x 10 L foot lead) focus on TrA bracing.   - posterior shoulder rolls x 20 reps  -shoulder ER R and L  x 15 reps  each focus on TrA bracing.  (Left with red WW)     LE ROM/strength at 4' depth  x 15 reps each  (one set of 15;  one set of 10).  -ankle pf/df     -knee flexion  -3 way hip  R and L   -MIP      Shallow  "water walk holding kick board vertical and 1/2 submerged x 3 laps front and back.      Red WW, 20x, row     Standing shoulder extension 20x DNP  Trunk rotation with black noodle 20x     At steps:  Hamstring stretch R and L  2 x 30\" each    HEP:  Aquatics only    Has patient been compliant with HEP?  Yes    Billed Treatment Times:  Aquatic Exercise 38 min     OP EDUCATION:  Outpatient Education  Individual(s) Educated: Patient  Education Provided: POC  Education Comment: Pt would like to try going to the [facility] 3x a week to perform his water exercises independently    Assessment:  PT Assessment  Assessment Comment: Pt with good progress toward goals.  He wishes to try performing his program independently.  He has 2 more visits remaining to be used within the next 30 days.  He has agreed to schedule one visit within the next 3-4 weeks to be used as needed should he have issues and he needs to be seen.  Otherwise, pt will be DC'd after 30 days.  Areas of improvements:  Decreased pain, Improved stability, Improved strength, and Improved gait pattern  Limitations/deficits:  Weakness and pain    Plan:     Hold 30 days.  2 visit remaining    Assessment of current progress against goals:  Progressing toward functional goals    Goals:  Active       PT Problem - Lx and Cx       PT Goal 1 - STG (Progressing)       Start:  04/11/25    Expected End:  06/11/25       1.  Improve Cx AROM by 10 deg at deficits - partially met  2.  Improve LE AROM by 20 degrees hip flexion, right knee extension by 5 deg - partially met  3.  Improve LE strength to 4/5 or better at deficits - partially met  4.  Improve trunk strength Good- at deficits; Cx strength to Good and painfree with resistance  5  Improve sitting posture with correct alignment of Cx region and shoulders, more aware of sit tall posturing when using his phone or sitting at the computer  6.  Pain:  0-2   7.  Functional Outcome Measure:  Oswestry 6             PT Goal 2 - LT " FUNCTIONAL GOALS (Progressing)       Start:  04/11/25    Expected End:  07/11/25       LONG TERM FUNCTIONAL GOALS  1. Pt report 40% improvement in being able to turn his head a greater range of movement during ADLs and IADLs - partially to mostly met -random  2. Pt able to stand 75% of the time 15 minutes or more for ADLs, IADLs and in the community with minimal back pain- met  3. Pt able to walk 20 minutes in the community 80% of the time with minimal back pain - met  4. Independent with his Aquatic Exercise Program             Patient Stated Goal 1 (Progressing)       Start:  04/11/25    Expected End:  07/11/25       Pt reports 30-40% improved - varies from day to day  Neck: Able to turn his head without pain  strengthen in his arms and shoulders and be able to raise higher    Back:    able to stand more than 10 minutes without back pain worsening  Strength to walk up to 15 minutes (personal goal 2 miles by his trip in May)    Learn a water program so he can perform independently

## 2025-06-11 ENCOUNTER — TREATMENT (OUTPATIENT)
Dept: PHYSICAL THERAPY | Facility: CLINIC | Age: 75
End: 2025-06-11
Payer: MEDICARE

## 2025-06-11 DIAGNOSIS — M96.1 POSTLAMINECTOMY SYNDROME, LUMBAR REGION: ICD-10-CM

## 2025-06-11 DIAGNOSIS — M54.16 LUMBAR RADICULOPATHY: ICD-10-CM

## 2025-06-11 DIAGNOSIS — M47.812 CERVICAL SPONDYLOSIS WITHOUT MYELOPATHY: ICD-10-CM

## 2025-06-11 PROCEDURE — 97113 AQUATIC THERAPY/EXERCISES: CPT | Mod: GP | Performed by: PHYSICAL THERAPIST

## 2025-06-11 ASSESSMENT — PAIN SCALES - GENERAL: PAINLEVEL_OUTOF10: 1

## 2025-06-11 ASSESSMENT — PAIN - FUNCTIONAL ASSESSMENT: PAIN_FUNCTIONAL_ASSESSMENT: 0-10

## 2025-06-15 NOTE — PROGRESS NOTES
Subjective   Patient ID: Pro Belle Jr. is a 75 y.o. male.    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    DISPOSITION:  The patient was discharged home after the procedure, per routine.    INDICATIONS: :  Mr. Belle is a 75 y.o. 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     The indications, risks and benefits of this procedure were discussed with the patient, consent was obtained prior to the procedure, and to the best of my judgement the patient seemed to understand and agree to the procedure.    PROCEDURE:  The patient  was brought into the procedure suite and informed consent was reviewed and confirmed. Vital signs were obtained prior to the procedure: There were no vitals taken for this visit..  The patient was escorted onto the stretcher, placed supine, prepped with betadine and draped in the usual standard surgical fashion.  Intraurethral 2% viscous lidocaine jelly was used for local analgesia.  A 16 Georgian flexible cystourethroscope was inserted into the urethra.   The penile urethra was normal.  The prostate urethra was slightly enlarged and obstructing with no intravesical lobe.   Upon entering the bladder the entire bladder was surveyed in a 360 degree fashion.  The left and right ureteral orifices were in normal orthotopic position effluxing clear yellow urine, bilaterally.   There was no evidence of any foreign objects, stones or bladder lesions. There was evidence of mild nonspecific erythema spots with the bladder.  The cystoscope was then retroflexed.  The bladder neck was then further examined without any evidence of  lesions. The scope was then removed and in an antegrade fashion, the urethra and bladder were again resurveyed with no evidence of additional lesions.  The cystoscope was then fully removed.   The patient tolerated the procedure well.  Vitals were stable after the procedure.  The patient was able to void and was discharged home.  Verbal and written Post procedure instructions were reviewed with the patient.    IMPRESSION:  Slightly enlarged and obstructing with no intravesical lobe  Mild nonspecific erythema spots within the bladder.       PLAN:  Continue tamsulosin 0.4 mg 2 pills daily at bedtime.  Refer to Dr. Lovett  to consider his options and HoLEP.         HPI  75 y.o. male presents for a cystoscopy today in office. He is status post UroLift, 6 years ago with Dr. Werner with acceptable results. He is status post cystoscopy with bladder biopsy on 09/16/2022. Final pathology detailed urothelial mucosa with amyloid deposition.     He is currently prescribed tamsulosin 0.4 mg 2 pills daily at bedtime. At his last visit, he was advised to consider Myrbetriq if his symptoms worsen in the future.     The patient indicated that his urinary stream is not satisfactory. He awakens multiple times per night, reporting almost every 1.5 hours.     09/16/22 Surgical pathology: (s/p cystoscopy, bladder biopsy)  -- UROTHELIAL MUCOSA WITH AMYLOID DEPOSITION. SEE NOTE.  Note: The Congo Red special stain is positive for amyloid deposition in the lamina propria.    Review of Systems  A complete review of systems was performed. All systems are noted to be negative unless indicated in the history of present illness, impression, active problem list, or past histories.     Objective   Physical Exam    Assessment/Plan   Diagnoses and all orders for this visit:  Benign prostatic hyperplasia with lower urinary tract symptoms, symptom details unspecified      75 y.o. male presents for a cystoscopy today in office. He is status post UroLift,  6 years ago with Dr. Werner with acceptable results. He is status post cystoscopy with bladder biopsy on 09/16/2022. Final pathology detailed urothelial mucosa with amyloid deposition.     The patient underwent a cystoscopy today in office. The patient tolerated the procedure well without incidence. The details are above. The results are Slightly enlarged and obstructing with no intravesical lobe. Mild nonspecific erythema spots within the bladder. As a result of the cystoscopy and his increased urinary symptoms, I would like to refer to Dr. Lovett to consider his options related to treatment such as the HoLEP.       Plan:  Continue tamsulosin 0.4 mg 2 pills daily at bedtime.  Refer to Dr. Lovett  to consider his options and HoLEP.       Scribe Attestation   By signing my name below, I, Betty Chu, [place] attestation that this documentation has been prepared under the direction and in the presence of Citlaly Shane MD.

## 2025-06-16 ENCOUNTER — APPOINTMENT (OUTPATIENT)
Dept: UROLOGY | Facility: CLINIC | Age: 75
End: 2025-06-16
Payer: MEDICARE

## 2025-06-16 DIAGNOSIS — N40.1 BENIGN PROSTATIC HYPERPLASIA WITH LOWER URINARY TRACT SYMPTOMS, SYMPTOM DETAILS UNSPECIFIED: Primary | ICD-10-CM

## 2025-06-16 PROCEDURE — 52000 CYSTOURETHROSCOPY: CPT | Performed by: STUDENT IN AN ORGANIZED HEALTH CARE EDUCATION/TRAINING PROGRAM

## 2025-06-16 PROCEDURE — 99214 OFFICE O/P EST MOD 30 MIN: CPT | Performed by: STUDENT IN AN ORGANIZED HEALTH CARE EDUCATION/TRAINING PROGRAM

## 2025-06-18 ENCOUNTER — APPOINTMENT (OUTPATIENT)
Dept: RADIOLOGY | Facility: HOSPITAL | Age: 75
End: 2025-06-18
Payer: MEDICARE

## 2025-06-18 ENCOUNTER — HOSPITAL ENCOUNTER (EMERGENCY)
Facility: HOSPITAL | Age: 75
Discharge: HOME | End: 2025-06-18
Payer: MEDICARE

## 2025-06-18 VITALS
SYSTOLIC BLOOD PRESSURE: 168 MMHG | BODY MASS INDEX: 31.98 KG/M2 | TEMPERATURE: 98.3 F | HEART RATE: 64 BPM | OXYGEN SATURATION: 98 % | WEIGHT: 210.98 LBS | DIASTOLIC BLOOD PRESSURE: 78 MMHG | HEIGHT: 68 IN | RESPIRATION RATE: 18 BRPM

## 2025-06-18 DIAGNOSIS — M25.512 ACUTE PAIN OF LEFT SHOULDER: Primary | ICD-10-CM

## 2025-06-18 PROCEDURE — 2500000001 HC RX 250 WO HCPCS SELF ADMINISTERED DRUGS (ALT 637 FOR MEDICARE OP): Performed by: NURSE PRACTITIONER

## 2025-06-18 PROCEDURE — 73030 X-RAY EXAM OF SHOULDER: CPT | Mod: LEFT SIDE | Performed by: STUDENT IN AN ORGANIZED HEALTH CARE EDUCATION/TRAINING PROGRAM

## 2025-06-18 PROCEDURE — 99283 EMERGENCY DEPT VISIT LOW MDM: CPT

## 2025-06-18 PROCEDURE — 73030 X-RAY EXAM OF SHOULDER: CPT | Mod: LT

## 2025-06-18 RX ORDER — OXYCODONE AND ACETAMINOPHEN 5; 325 MG/1; MG/1
1 TABLET ORAL ONCE
Refills: 0 | Status: COMPLETED | OUTPATIENT
Start: 2025-06-18 | End: 2025-06-18

## 2025-06-18 RX ADMIN — OXYCODONE HYDROCHLORIDE AND ACETAMINOPHEN 1 TABLET: 5; 325 TABLET ORAL at 15:52

## 2025-06-18 ASSESSMENT — PAIN DESCRIPTION - ORIENTATION: ORIENTATION: LEFT

## 2025-06-18 ASSESSMENT — PAIN SCALES - GENERAL: PAINLEVEL_OUTOF10: 8

## 2025-06-18 ASSESSMENT — PAIN DESCRIPTION - PAIN TYPE: TYPE: ACUTE PAIN

## 2025-06-18 ASSESSMENT — PAIN - FUNCTIONAL ASSESSMENT: PAIN_FUNCTIONAL_ASSESSMENT: 0-10

## 2025-06-18 ASSESSMENT — PAIN DESCRIPTION - LOCATION: LOCATION: SHOULDER

## 2025-06-18 NOTE — DISCHARGE INSTRUCTIONS
Please return to the emergency room immediately if new or worsening symptoms occur. Symptoms which are most concerning are changing or worsening in pain, numbness, or weakness that necessitates immediate return.    Thank you for allowing us to take care of you today. While you are home, you might receive a survey about your care in our hospital. Your nurse and myself, Ruben Griffiths CNP, would love your honest feedback on your care. Your feedback and especially your positive comments help our hospital receive the support we need to continue to serve you and your family. Thank you again for trusting us with your care.

## 2025-06-18 NOTE — ED PROVIDER NOTES
HPI   Chief Complaint   Patient presents with    Shoulder Pain     C/o left shoulder pain since this am at 0945 while sitting on the couch. Cannot lift left arm at all, msps intact. This shoulder has been replaced previously.        HPI  See my MDM      Patient History   Medical History[1]  Surgical History[2]  Family History[3]  Social History[4]    Physical Exam   ED Triage Vitals [06/18/25 1515]   Temperature Heart Rate Respirations BP   36.8 °C (98.3 °F) 64 18 168/78      Pulse Ox Temp Source Heart Rate Source Patient Position   98 % Temporal -- --      BP Location FiO2 (%)     -- --       Physical Exam    CONSTITUTIONAL: Vital signs reviewed as charted, well-developed and in no distress  Eyes: Extraocular muscles are intact. Pupils equal round and reactive to light. Conjunctiva are pink.    ENT: Mucous membranes are moist. Tongue in the midline. Pharynx was without erythema or exudates, uvula midline  LUNGS: Breath sounds equal and clear to auscultation. Good air exchange, no wheezes rales or retractions, pulse oximetry is charted.  HEART: Regular rate and rhythm without murmur thrill or rub, strong tones, auscultation is normal.  ABDOMEN: Soft and nontender without guarding rebound rigidity or mass. Bowel sounds are present and normal in all quadrants. There is no palpable masses or aneurysms identified. No hepatosplenomegaly, normal abdominal exam.  Neuro: The patient is awake, alert and oriented ×3. Moving all 4 extremities and answering questions appropriately.   MUSCULOSKELETAL: The calves are nontender to palpation. Full gross active exam the left shoulder shows no obvious deformity.  Motor sensation and pulse are intact distally.  Cap refill less than 2 seconds.  PSYCH: Awake alert oriented, normal mood and affect.  Skin:  Dry, normal color, warm to the touch, no rash present.      ED Course & MDM   Diagnoses as of 06/18/25 9859   Acute pain of left shoulder                 No data recorded     Oj  Coma Scale Score: 15 (06/18/25 1550 : Meg Herring RN)                           Medical Decision Making  History obtained from: patient    Vital signs, nursing notes, current medications, past medical history, Surgical history, allergies, social history, family History were reviewed.         HPI:  Patient 75-year-old gentleman history of left total shoulder replacement done couple of years ago presenting ED today complaining of sudden onset left shoulder pain occurred while he was sitting on the couch.  States he had a hard time lifting his shoulder and with any movements he has pain.  Denies trauma.  Denies fever chills night sweats.  Nontoxic well-appearing states he tried taking a tramadol at home without much relief      10 point ROS was reviewed and negative except Noted above in HPI.  DDX: as listed above          MDM Summary/considerations:  Labs Reviewed - No data to display  XR shoulder left 2+ views   Final Result   Unremarkable exam as above.             MACRO:   None        Signed by: Cj Lam 6/18/2025 4:12 PM   Dictation workstation:   [id number]        Medications   oxyCODONE-acetaminophen (Percocet) 5-325 mg per tablet 1 tablet (1 tablet oral Given 6/18/25 1552)     Discharge Medication List as of 6/18/2025  4:24 PM          I estimate there is LOW risk for COMPARTMENT SYNDROME, DEEP VENOUS THROMBOSIS, SEPTIC ARTHRITIS, TENDON OR NEUROVASCULAR INJURY, thus I consider the discharge disposition reasonable. We have discussed the diagnosis and risks, and we agree with discharging home to follow-up with their primary doctor or the referral orthopedist. We also discussed returning to the Emergency Department immediately if new or worsening symptoms occur. We have discussed the symptoms which aremost concerning (e.g., changing or worsening pain, numbness, weakness) that necessitates immediate return.    X-ray shows no acute bony abnormality.  Patient offered a sling stating he has 1 at home.   Have offered pain medication he states he has that at home as well.  Does have scheduled follow-up with orthopedics tomorrow.  He was discharged home in stable condition instructed on rest ice compression elevation.    All of the patient's questions were answered to the best of my ability.  Patient states understanding that they have been screened for an emergency today and we have not found any etiology of symptoms that requires emergent treatment or admission to the hospital at this point. They understand that they have not had definitive care day and require follow-up for treatment of their condition. They also state understanding that they may have an emergent condition that may potentially have not of detected at this visit and they must return to the emergency department if they develop any worsening of symptoms or new complaints.      I have evaluated this patient, my supervising physician was available for consultation.            Critical Care: Not warranted at this time        This chart was completed using voice recognition transcription software. Please excuse any errors of transcription including grammatical, punctuation, syntax and spelling errors.  Please contact me with any questions regarding this chart.    Procedure  Procedures       [1]   Past Medical History:  Diagnosis Date    A-fib (Multi)     Abnormal ECG     Chronic pain disorder     CTS (carpal tunnel syndrome)     Hypertension     Joint pain     Low back pain     Neck pain     Shingles     Sleep apnea     Weakness of limb    [2]   Past Surgical History:  Procedure Laterality Date    ABLATION OF DYSRHYTHMIC FOCUS      BACK SURGERY  02/14/2024    l4-5 fusion    CORONARY STENT PLACEMENT      EYE SURGERY      HERNIA REPAIR  06/20/2014    Inguinal Hernia Repair    JOINT REPLACEMENT Bilateral     hip    JOINT REPLACEMENT Left     knee and shoulder    OTHER SURGICAL HISTORY  10/06/2022    Cystoscopy    SPINE SURGERY      VASECTOMY     [3]   Family  History  Problem Relation Name Age of Onset    Cancer Mother Selena     Alcohol abuse Mother Selena     Heart disease Father Pro Sr.     Arthritis Father Pro Sr.     Heart attack Father Pro Sr.     Cancer Sister Nannette     Coronary artery disease Brother Gonzalez Belle     Atrial fibrillation Brother Fredy Belle     Atrial fibrillation Brother Fredy Belle    [4]   Social History  Tobacco Use    Smoking status: Former     Current packs/day: 0.00     Average packs/day: 1.5 packs/day for 3.0 years (4.5 ttl pk-yrs)     Types: Cigarettes     Quit date: 2/15/1995     Years since quittin.3    Smokeless tobacco: Never   Vaping Use    Vaping status: Never Used   Substance Use Topics    Alcohol use: Yes     Alcohol/week: 8.0 standard drinks of alcohol     Types: 4 Glasses of wine, 4 Shots of liquor per week    Drug use: Never        MARGAUX Minor-CNP  [phone]

## 2025-06-24 ENCOUNTER — PATIENT OUTREACH (OUTPATIENT)
Dept: PRIMARY CARE | Facility: CLINIC | Age: 75
End: 2025-06-24
Payer: MEDICARE

## 2025-06-24 DIAGNOSIS — K21.9 GASTROESOPHAGEAL REFLUX DISEASE, UNSPECIFIED WHETHER ESOPHAGITIS PRESENT: ICD-10-CM

## 2025-06-24 DIAGNOSIS — I48.91 ATRIAL FIBRILLATION, UNSPECIFIED TYPE (MULTI): ICD-10-CM

## 2025-06-24 DIAGNOSIS — M06.9 RHEUMATOID ARTHRITIS, INVOLVING UNSPECIFIED SITE, UNSPECIFIED WHETHER RHEUMATOID FACTOR PRESENT (MULTI): ICD-10-CM

## 2025-06-24 DIAGNOSIS — I10 BENIGN ESSENTIAL HYPERTENSION: ICD-10-CM

## 2025-06-24 DIAGNOSIS — J44.1 CHRONIC OBSTRUCTIVE PULMONARY DISEASE WITH (ACUTE) EXACERBATION (MULTI): ICD-10-CM

## 2025-06-24 NOTE — PROGRESS NOTES
"Care Management Monthly Outreach  Chart review completed  Confirmation of at least 2 patient identifiers  Change in insurance? No    Has patient been to ER/Urgent Care since last outreach? Yes - Seen on 06/19 for left shoulder pain/cramping. Was determined to be muscular skeletal in nature    Last Office Visit with PCP: 3/24/2025   Next Office Visit with PCP: 9/29/2025   APC Collaboration: n/a    Chronic Conditions and Outreach Summary:   Atrial fibrillation, unspecified type (Multi)    Benign essential hypertension    Gastroesophageal reflux disease, unspecified whether esophagitis present    Rheumatoid arthritis, involving unspecified site, unspecified whether rheumatoid factor present (Multi)    Chronic obstructive pulmonary disease with (acute) exacerbation (Multi)    Monthly CCM call completed with patient.  Patient states that he completed his water therapy and is continuing to go to the [facility] to swim and do the exercises on his own.  Feels that the therapy \"really helped a lot\" and is moving and feeling better.  Did go to the ER last Thursday, was sitting on porch and got left shoulder pain/cramping that would not go away. Was found to be muscular skeletal and has since resolved. Received knee injections last Friday, noticwed some improvement.  Blood pressure has \"been up a little bit\", no signs or symptoms related to HTN experienced. Breathing has been \"challenging\" with the heat and humidity. Does not have central A/C. Have been a \"bit slower\" this last week, but pacing himself and taking frequent rests. Denies any recent falls or injuries.     Medications:   Are there medication changes since last visit? No  Refills needed? No    Social Drivers of Health: Addressed in the last 6 months  Care Gaps Addressed? Addressed in the last 6 months  Care Plan addressed: Yes    Upcoming Appointments:   Future Appointments       Date / Time Provider Department Dept Phone    6/25/2025 11:00 AM Pauline Aguilera, APRN-CNP " [facility] [phone]    6/25/2025 1:45 PM Victoriano Mackenzie MD [facility] [phone]    7/3/2025 9:30 AM Chacha Waggoner PT [facility] [phone]    9/17/2025 1:50 PM Aniceto Lovett MD [facility]     9/29/2025 9:45 AM Ruben Bradford MD [facility] [phone]    10/1/2025 9:20 AM (Arrive by 9:05 AM) Eva Ordonez PharmD [facility]  Arrive at: Your Home [phone]    10/7/2025 8:15 AM (Arrive by 8:00 AM) [facility] NM ADMIN ROOM [facility] [phone]    10/7/2025 8:45 AM TRI NM 1 [facility] [phone]    10/7/2025 9:15 AM [facility] STRESS LAB 1 [facility] [phone]    10/7/2025 9:45 AM TRI NM ADMIN ROOM [facility] [phone]    10/7/2025 10:00 AM TRI NM 1 [facility] [phone]    2/5/2026 1:00 PM Mynor Hurst MD [facility] [phone]    3/30/2026 10:00 AM Ruben Bradford MD [facility] [phone]          Blood Pressures Reviewed  BP Readings from Last 3 Encounters:   06/18/25 168/78   06/05/25 117/64   03/26/25 130/80     Labs Reviewed:  Lab Results   Component Value Date    CREATININE 0.95 04/08/2025    GLUCOSE 103 (H) 04/08/2025    ALKPHOS 107 04/08/2025    K 3.7 04/08/2025    PROT 7.0 04/08/2025     04/08/2025    CALCIUM 9.9 04/08/2025    AST 29 04/08/2025    ALT 24 04/08/2025    BUN 19 04/08/2025    MG 2.26 11/24/2024    PHOS 2.1 (L) 05/25/2022    GFRMALE 90 08/16/2023     Lab Results   Component Value Date    TRIG 70 04/08/2025    CHOL 169 04/08/2025    LDLCALC 62 04/08/2025    HDL 92 04/08/2025     Lab Results   Component Value Date    HGBA1C 5.8 (H) 04/08/2025    HGBA1C 5.3 10/19/2024    HGBA1C 5.4 10/05/2024     Lab Results   Component Value Date    WBC 9.0 04/08/2025    RBC  4.46 04/08/2025    HGB 14.8 04/08/2025     04/08/2025   No other concerns at this time.  Agreeable to continue monthly outreaches.  Encouraged to call if questions or concerns arise.    Susan Price

## 2025-06-25 ENCOUNTER — PHARMACY VISIT (OUTPATIENT)
Dept: PHARMACY | Facility: CLINIC | Age: 75
End: 2025-06-25
Payer: MEDICARE

## 2025-06-25 ENCOUNTER — PREP FOR PROCEDURE (OUTPATIENT)
Dept: PAIN MEDICINE | Facility: CLINIC | Age: 75
End: 2025-06-25
Payer: MEDICARE

## 2025-06-25 ENCOUNTER — OFFICE VISIT (OUTPATIENT)
Dept: PAIN MEDICINE | Facility: CLINIC | Age: 75
End: 2025-06-25
Payer: MEDICARE

## 2025-06-25 ENCOUNTER — OFFICE VISIT (OUTPATIENT)
Dept: CARDIOLOGY | Facility: CLINIC | Age: 75
End: 2025-06-25
Payer: MEDICARE

## 2025-06-25 VITALS
WEIGHT: 213 LBS | HEIGHT: 68 IN | OXYGEN SATURATION: 99 % | BODY MASS INDEX: 32.28 KG/M2 | RESPIRATION RATE: 22 BRPM | DIASTOLIC BLOOD PRESSURE: 88 MMHG | SYSTOLIC BLOOD PRESSURE: 140 MMHG | HEART RATE: 68 BPM

## 2025-06-25 VITALS
BODY MASS INDEX: 32.39 KG/M2 | WEIGHT: 213 LBS | DIASTOLIC BLOOD PRESSURE: 73 MMHG | SYSTOLIC BLOOD PRESSURE: 160 MMHG | HEART RATE: 63 BPM | OXYGEN SATURATION: 95 %

## 2025-06-25 DIAGNOSIS — Z51.81 ENCOUNTER FOR MONITORING DOFETILIDE THERAPY: ICD-10-CM

## 2025-06-25 DIAGNOSIS — Z79.899 ENCOUNTER FOR MONITORING DOFETILIDE THERAPY: ICD-10-CM

## 2025-06-25 DIAGNOSIS — I48.19 PERSISTENT ATRIAL FIBRILLATION (MULTI): Primary | ICD-10-CM

## 2025-06-25 DIAGNOSIS — M54.16 LUMBAR RADICULOPATHY: Primary | ICD-10-CM

## 2025-06-25 DIAGNOSIS — I48.91 ATRIAL FIBRILLATION, UNSPECIFIED TYPE (MULTI): Primary | ICD-10-CM

## 2025-06-25 DIAGNOSIS — M54.12 CERVICAL RADICULOPATHY: ICD-10-CM

## 2025-06-25 LAB
ATRIAL RATE: 63 BPM
P AXIS: 36 DEGREES
P OFFSET: 203 MS
P ONSET: 132 MS
PR INTERVAL: 182 MS
Q ONSET: 223 MS
QRS COUNT: 10 BEATS
QRS DURATION: 142 MS
QT INTERVAL: 474 MS
QTC CALCULATION(BAZETT): 485 MS
QTC FREDERICIA: 481 MS
R AXIS: -5 DEGREES
T AXIS: -2 DEGREES
T OFFSET: 460 MS
VENTRICULAR RATE: 63 BPM

## 2025-06-25 PROCEDURE — 93005 ELECTROCARDIOGRAM TRACING: CPT | Performed by: NURSE PRACTITIONER

## 2025-06-25 PROCEDURE — RXMED WILLOW AMBULATORY MEDICATION CHARGE

## 2025-06-25 PROCEDURE — 99214 OFFICE O/P EST MOD 30 MIN: CPT | Performed by: NURSE PRACTITIONER

## 2025-06-25 PROCEDURE — 99214 OFFICE O/P EST MOD 30 MIN: CPT | Performed by: ANESTHESIOLOGY

## 2025-06-25 PROCEDURE — 1159F MED LIST DOCD IN RCRD: CPT | Performed by: NURSE PRACTITIONER

## 2025-06-25 PROCEDURE — 1160F RVW MEDS BY RX/DR IN RCRD: CPT | Performed by: NURSE PRACTITIONER

## 2025-06-25 PROCEDURE — 1036F TOBACCO NON-USER: CPT | Performed by: ANESTHESIOLOGY

## 2025-06-25 PROCEDURE — 1036F TOBACCO NON-USER: CPT | Performed by: NURSE PRACTITIONER

## 2025-06-25 PROCEDURE — 3079F DIAST BP 80-89 MM HG: CPT | Performed by: ANESTHESIOLOGY

## 2025-06-25 PROCEDURE — 3078F DIAST BP <80 MM HG: CPT | Performed by: NURSE PRACTITIONER

## 2025-06-25 PROCEDURE — 1125F AMNT PAIN NOTED PAIN PRSNT: CPT | Performed by: ANESTHESIOLOGY

## 2025-06-25 PROCEDURE — 1159F MED LIST DOCD IN RCRD: CPT | Performed by: ANESTHESIOLOGY

## 2025-06-25 PROCEDURE — G2211 COMPLEX E/M VISIT ADD ON: HCPCS | Performed by: ANESTHESIOLOGY

## 2025-06-25 PROCEDURE — 3077F SYST BP >= 140 MM HG: CPT | Performed by: NURSE PRACTITIONER

## 2025-06-25 PROCEDURE — 99212 OFFICE O/P EST SF 10 MIN: CPT

## 2025-06-25 PROCEDURE — 3077F SYST BP >= 140 MM HG: CPT | Performed by: ANESTHESIOLOGY

## 2025-06-25 ASSESSMENT — PAIN SCALES - GENERAL
PAINLEVEL_OUTOF10: 6
PAINLEVEL_OUTOF10: 6

## 2025-06-25 ASSESSMENT — ENCOUNTER SYMPTOMS
NECK PAIN: 1
LOSS OF SENSATION IN FEET: 0
DEPRESSION: 0
BACK PAIN: 1
OCCASIONAL FEELINGS OF UNSTEADINESS: 0
NECK STIFFNESS: 1
ACTIVITY CHANGE: 1

## 2025-06-25 ASSESSMENT — PAIN DESCRIPTION - DESCRIPTORS: DESCRIPTORS: BURNING

## 2025-06-25 ASSESSMENT — PAIN - FUNCTIONAL ASSESSMENT: PAIN_FUNCTIONAL_ASSESSMENT: 0-10

## 2025-06-25 NOTE — PROGRESS NOTES
The patient is a 75-year-old male with low back and bilateral leg pain.  The patient reports that he feels the pain starting to return.  He underwent a caudal epidural steroid injection in January of this year.  The patient reports excellent and long-term relief.  The patient has participated in a formal aqua therapy program.  He joined the [facility] in [facility] to continue his exercise program.  The patient like to consider another injection.  The patient is also experiencing neck and left arm pain.  The pain is not bad enough to treat with an injection at this time.    Review of Systems   Constitutional:  Positive for activity change.   Musculoskeletal:  Positive for back pain, gait problem, neck pain and neck stiffness.   All other systems reviewed and are negative.    GENERAL: alert and appropriate, in no distress, well-hydrated, well-nourished and interactive  SKIN: no rash noted, surgical scars well healed  RESPIRATORY: breathing non-labored and no grunting/flaring/retractions  CHEST: equal chest rise with normal respiratory effort  ABDOMEN: soft and non-tender  BACK: back normal in appearance, spine with reduced ROM  EXTREMITIES: strength intact  NEUROLOGIC: gait antalgic, SLR negative, sensation grossly intact, Micah sign negative, Spurling sign reproduced pain    Assessment and Plan    -Chronicity--chronic spinal pain    -Diagnostics--no new imaging ordered    -Pharmacologic--no change    -Psychologic--no need for psychologic intervention from my standpoint.  There are no mental health issues of which I am aware that are contributing to the patient's pain.  There are no substance abuse or alcohol abuse issues of which I am aware that are contributing to the patient's pain.    -Physical--we discussed the importance of physical therapy and exercise.  We discussed avoidance and modification techniques.    -Intervention--we will consider repeating the caudal epidural steroid injection.  We will also consider a  cervical epidural steroid injection at the C7-T1 level in the future if necessary    I spent time educating the patient on the condition including the treatment and the prognosis.  I invited the patient to call at anytime with any questions.

## 2025-06-25 NOTE — PROGRESS NOTES
"Subjective   Pro Belle Jr. is a 75 y.o. male.    Chief Complaint:  Follow-up    HPI    ROS    Objective   Physical Exam    Lab Review:   {Recent labs:19471::\"not applicable\"}    Assessment/Plan   The encounter diagnosis was Atrial fibrillation, unspecified type (Multi).  " stopped due to bradycardia  Patient unfortunately had multple episodes of Afib/Aflutter lasting 3-6 hours post discharge.  Metoprolol was restarted and symptoms resolved  ECG 1/6/2025: SB with PAC/PVC, HR 56 bpm, RBBB,  ms  ECG 6/25/2025 NSR HR 63 bpm, RBBB  ms,  ms    TODAY patient presents for 6-month follow-up of atrial arrhythmias on dofetilide.  He has been doing well the last 6 months.  He is seeing new pain management doctor and been more active, currently doing water aerobic therapy.  He denies any cardiac arrhythmia symptoms.  He has been feeling well with exercise.  He previously had some early recurrence of A-fib after starting dofetilide, but after adding back his metoprolol he denies any further episodes.    /73   Pulse 63   Wt 96.6 kg (213 lb)   SpO2 95%   BMI 32.39 kg/m²   Current Outpatient Medications on File Prior to Visit   Medication Sig Dispense Refill    acetaminophen (Tylenol) 500 mg tablet Take 2 tablets (1,000 mg) by mouth once daily as needed (back pain). Do not crush, chew, or split.      amLODIPine (Norvasc) 5 mg tablet TAKE 1 TABLET BY MOUTH ONCE  DAILY 90 tablet 3    armodafinil (Nuvigil) 250 mg tablet Take 1 tablet (250 mg) by mouth once daily in the morning.      aspirin 81 mg EC tablet Take 1 tablet (81 mg) by mouth once daily.      atorvastatin (Lipitor) 80 mg tablet TAKE 1 TABLET BY MOUTH AT  BEDTIME 90 tablet 3    cholecalciferol (Vitamin D-3) 50 MCG (2000 UT) tablet Take 1 tablet (50 mcg) by mouth once daily.      cyanocobalamin (Vitamin B-12) 1,000 mcg tablet Take 1 tablet (1,000 mcg) by mouth once daily.      docusate sodium (Colace) 100 mg capsule Take 1 capsule (100 mg) by mouth once daily.      dofetilide (Tikosyn) 250 mcg capsule Take 1 capsule (250 mcg) by mouth every 12 hours. Do not fill before December 20, 2024. 180 capsule 3    ezetimibe (Zetia) 10 mg tablet TAKE 1 TABLET BY MOUTH AT  BEDTIME 90 tablet 3    folic acid (Folvite) 800 mcg  tablet Take 1 mg by mouth once daily.      lisinopril 40 mg tablet TAKE 1 TABLET BY MOUTH ONCE  DAILY 90 tablet 3    metoprolol succinate XL (Toprol-XL) 25 mg 24 hr tablet Take 1 tablet (25 mg) by mouth 2 times a day. Do not crush or chew.      omeprazole (PriLOSEC) 20 mg DR capsule TAKE 1 CAPSULE BY MOUTH ONCE  DAILY 90 capsule 3    rivaroxaban (Xarelto) 20 mg tablet Take 1 tablet (20 mg) by mouth once daily. 90 tablet 3    sildenafil (Viagra) 100 mg tablet Take 1 tablet (100 mg) by mouth once daily as needed for erectile dysfunction. 12 tablet 3    tofacitinib ER (Xeljanz XR) 11 mg tablet extended release 24 hr Take 1 tablet (11 mg) by mouth once daily. Do not crush, chew or split. Swallow whole.      tamsulosin (Flomax) 0.4 mg 24 hr capsule Take 2 capsules (0.8 mg) by mouth once daily. 180 capsule 3     No current facility-administered medications on file prior to visit.         Review of Systems   Constitutional: Negative for diaphoresis, fever and malaise/fatigue.   HENT:  Negative for congestion and sore throat.    Eyes:  Negative for blurred vision and double vision.   Cardiovascular:  Negative for chest pain, dyspnea on exertion, irregular heartbeat, leg swelling, near-syncope, orthopnea, palpitations, paroxysmal nocturnal dyspnea and syncope.   Respiratory:  Negative for cough, hemoptysis, shortness of breath, snoring and sputum production.    Hematologic/Lymphatic: Negative for bleeding problem.   Skin:  Negative for rash.   Musculoskeletal:  Negative for falls, joint pain and myalgias.   Gastrointestinal:  Negative for abdominal pain, diarrhea, nausea and vomiting.   Neurological:  Negative for dizziness, headaches, light-headedness and weakness.   All other systems reviewed and are negative.      Objective   Constitutional:       Appearance: Healthy appearance. Not in distress.   Eyes:      Conjunctiva/sclera: Conjunctivae normal.   HENT:      Nose: Nose normal.    Mouth/Throat:      Pharynx: Oropharynx  is clear.   Pulmonary:      Effort: Pulmonary effort is normal.      Breath sounds: Normal breath sounds. No wheezing. No rhonchi.   Chest:      Chest wall: Not tender to palpatation.   Cardiovascular:      Normal rate. Regular rhythm.      Murmurs: There is no murmur.      No rub.   Pulses:     Intact distal pulses.   Edema:     Peripheral edema absent.   Abdominal:      General: Bowel sounds are normal.      Palpations: Abdomen is soft.   Musculoskeletal: Normal range of motion.      Cervical back: Neck supple. Skin:     General: Skin is warm and dry.   Neurological:      Mental Status: Alert and oriented to person, place and time.      Motor: Motor function is intact.         Lab Review:   Lab Results   Component Value Date     04/08/2025    K 3.7 04/08/2025     04/08/2025    CO2 27 04/08/2025    BUN 19 04/08/2025    CREATININE 0.95 04/08/2025    GLUCOSE 103 (H) 04/08/2025    CALCIUM 9.9 04/08/2025     Lab Results   Component Value Date    WBC 9.0 04/08/2025    HGB 14.8 04/08/2025    HCT 43.6 04/08/2025    MCV 97.8 04/08/2025     04/08/2025     I personally reviewed the patients latest CBC, CMP, Magnesium level,  and TSH.   Lab values are within normal limits.  I personally reviewed the patient's latest PCP and general cardiology notes.      Assessment/Plan   The primary encounter diagnosis was Persistent atrial fibrillation (Multi). A diagnosis of Encounter for monitoring dofetilide therapy was also pertinent to this visit.  Patient maintaining NSR on low dose metoprolol and Dofetilide.  He has been feeling good. He has felt even better since he has been working with the new pain management team and doing water therapy.  Dofetilide education reinforced.  Patient encouraged to take it every 12 hours. Do not double dose if a dose it missed, just take the next dose.  If 3 or more doses are missed in a row, patient will have to be reinitiated on the medication in the hospital. Patient encouraged  to tell all prescribers that he is on Dofetilide due to potential drug interactions.  ECG recommended every 6 months, along with BMP/Magnesium levels  All questions asked and answered  Continue metoprolol, xarelto, and Dofetilide    Follow up with me in 6 months or sooner as needed

## 2025-06-28 DIAGNOSIS — N40.1 BENIGN PROSTATIC HYPERPLASIA WITH LOWER URINARY TRACT SYMPTOMS, SYMPTOM DETAILS UNSPECIFIED: ICD-10-CM

## 2025-07-03 ENCOUNTER — APPOINTMENT (OUTPATIENT)
Dept: PHYSICAL THERAPY | Facility: CLINIC | Age: 75
End: 2025-07-03
Payer: MEDICARE

## 2025-07-03 ENCOUNTER — OFFICE VISIT (OUTPATIENT)
Dept: URGENT CARE | Age: 75
End: 2025-07-03
Payer: MEDICARE

## 2025-07-03 VITALS
HEART RATE: 51 BPM | RESPIRATION RATE: 14 BRPM | SYSTOLIC BLOOD PRESSURE: 163 MMHG | TEMPERATURE: 98.2 F | OXYGEN SATURATION: 97 % | BODY MASS INDEX: 32.39 KG/M2 | DIASTOLIC BLOOD PRESSURE: 88 MMHG | WEIGHT: 213 LBS

## 2025-07-03 DIAGNOSIS — Z23 NEED FOR TD VACCINE: ICD-10-CM

## 2025-07-03 DIAGNOSIS — S81.819A LACERATION OF SHIN: Primary | ICD-10-CM

## 2025-07-03 DIAGNOSIS — S81.812A LACERATION OF LEFT LOWER LEG, INITIAL ENCOUNTER: ICD-10-CM

## 2025-07-03 RX ORDER — MUPIROCIN 20 MG/G
OINTMENT TOPICAL
Qty: 22 G | Refills: 0 | Status: SHIPPED | OUTPATIENT
Start: 2025-07-03 | End: 2025-07-13

## 2025-07-03 ASSESSMENT — ENCOUNTER SYMPTOMS: WOUND: 1

## 2025-07-03 NOTE — PROGRESS NOTES
Subjective   Patient ID: Pro Belle Jr. is a 75 y.o. male. They present today with a chief complaint of Laceration (Patient has cut on left leg, Patient was walking and bumped into a speaker and cut his leg, Patient is on blood thinners. ).    History of Present Illness  Patient 75-year-old male presents today complaining of a laceration to the left lower leg as he bumped his leg against the speaker.  Patient is on Xarelto and was unable to stop the bleeding at home.  Patient reports he is up-to-date for Tdap    Past Medical History  Allergies as of 07/03/2025    (No Known Allergies)       Prescriptions Prior to Admission[1]     Medical History[2]    Surgical History[3]     reports that he quit smoking about 30 years ago. His smoking use included cigarettes. He has a 4.5 pack-year smoking history. He has never used smokeless tobacco. He reports current alcohol use of about 8.0 standard drinks of alcohol per week. He reports that he does not use drugs.    Review of Systems  Review of Systems   Skin:  Positive for wound.   All other systems reviewed and are negative.                                 Objective    Vitals:    07/03/25 1409   BP: 163/88   BP Location: Left arm   Patient Position: Sitting   BP Cuff Size: Adult   Pulse: 51   Resp: 14   Temp: 36.8 °C (98.2 °F)   TempSrc: Oral   SpO2: 97%   Weight: 96.6 kg (213 lb)     No LMP for male patient.    Physical Exam  Vitals reviewed.   General: Vitals Noted. No distress. Normocephalic.  Cardiovascular:     Heart sounds: Normal heart sounds, S1 normal and S2 normal. No murmur heard.     No friction rub.   Pulmonary:      Effort: Pulmonary effort is normal.      Breath sounds: Normal breath sounds and air entry. Lungs clear to auscultation bilaterally   Musculoskeletal: Moves all extremities, no effusion, no edema.  Joint effusion: None  Right upper extremity: No edema.  Left upper extremity: No edema.  Right lower leg: No edema.   Left lower leg: No edema.    Skin:     Comments: laceration to left shin  Neurological:      Cranial Nerves: Cranial nerves 2-12 are intact.      Sensory: No sensory deficit.      Motor: Motor function is intact.      Deep Tendon Reflexes: Reflexes are normal and symmetric.       Laceration Repair    Date/Time: 7/3/2025 3:19 PM    Performed by: ANA Kearney  Authorized by: ANA Kearney    Consent:     Consent obtained:  Verbal    Consent given by:  Patient    Risks, benefits, and alternatives were discussed: yes      Risks discussed:  Infection, need for additional repair, nerve damage, poor wound healing, poor cosmetic result and pain    Alternatives discussed:  No treatment and delayed treatment  Universal protocol:     Patient identity confirmed:  Verbally with patient  Anesthesia:     Anesthesia method:  Local infiltration    Local anesthetic:  Lidocaine 1% w/o epi  Laceration details:     Location:  Leg    Leg location:  L lower leg    Length (cm):  3    Depth (mm):  2  Pre-procedure details:     Preparation:  Patient was prepped and draped in usual sterile fashion  Exploration:     Limited defect created (wound extended): no      Hemostasis achieved with:  Direct pressure    Imaging outcome: foreign body not noted      Wound exploration: wound explored through full range of motion and entire depth of wound visualized    Treatment:     Area cleansed with:  Povidone-iodine    Amount of cleaning:  Standard    Irrigation solution:  Sterile saline    Irrigation volume:  5ml    Debridement:  None    Undermining:  None  Skin repair:     Repair method:  Sutures    Suture size:  5-0    Suture material:  Prolene    Suture technique:  Simple interrupted    Number of sutures:  5  Approximation:     Approximation:  Close  Repair type:     Repair type:  Simple  Post-procedure details:     Dressing:  Adhesive bandage, antibiotic ointment and non-adherent dressing    Procedure completion:  Tolerated well, no immediate  complications      Point of Care Test & Imaging Results from this visit  No results found for this visit on 07/03/25.   Imaging  No results found.    Cardiology, Vascular, and Other Imaging  No other imaging results found for the past 2 days      Diagnostic study results (if any) were reviewed by ANA Kearney.    Assessment/Plan   Allergies, medications, history, and pertinent labs/EKGs/Imaging reviewed by ANA Kearney.     Medical Decision Making  Concern for laceration of left shin. On Xarelto. Repair completed in the office, See procedure for details. Mupirocin topically issued today. Laceration was dressed with gauze. Patient updated for tetanus today. Advised to return for reevaluation if signs of infection take place. Proceed to ER for new or worsening symptoms of systemic infection or ongoing bleeding. Patient agreed with the plan.       Orders and Diagnoses  Diagnoses and all orders for this visit:  Laceration of shin  -     mupirocin (Bactroban) 2 % ointment; Apply topically 3 times a day for 10 days.  -     Td vaccine, age 7 years and older (TDVAX)  Laceration of left lower leg, initial encounter  -     mupirocin (Bactroban) 2 % ointment; Apply topically 3 times a day for 10 days.  -     Td vaccine, age 7 years and older (TDVAX)  Need for Td vaccine  -     Td vaccine, age 7 years and older (TDVAX)  Other orders  -     Laceration Repair      Medical Admin Record      Patient disposition: Home    Electronically signed by ANA Kearney  6:58 PM           [1] (Not in a hospital admission)   [2]   Past Medical History:  Diagnosis Date    A-fib (Multi)     Abnormal ECG     Chronic pain disorder     CTS (carpal tunnel syndrome)     Hypertension     Joint pain     Low back pain     Neck pain     Shingles     Sleep apnea     Weakness of limb    [3]   Past Surgical History:  Procedure Laterality Date    ABLATION OF DYSRHYTHMIC FOCUS      BACK SURGERY  02/14/2024    l4-5 fusion     CORONARY STENT PLACEMENT      EYE SURGERY      HERNIA REPAIR  06/20/2014    Inguinal Hernia Repair    JOINT REPLACEMENT Bilateral     hip    JOINT REPLACEMENT Left     knee and shoulder    OTHER SURGICAL HISTORY  10/06/2022    Cystoscopy    SPINE SURGERY      VASECTOMY

## 2025-07-03 NOTE — PATIENT INSTRUCTIONS
Please return for suture removal in 10 to 14 days  Daily dressing changes, keep laceration clean and dry  Apply topical antibiotic ointment to the site 2 -3 times a day  Return for reevaluation with any signs of infection including significant swelling and redness as well as purulent discharge at the site  Apply ice, elevate extremity, and apply pressure dressing to minimize bleeding

## 2025-07-04 ENCOUNTER — TELEPHONE (OUTPATIENT)
Dept: URGENT CARE | Age: 75
End: 2025-07-04

## 2025-07-04 ASSESSMENT — ENCOUNTER SYMPTOMS
HEMOPTYSIS: 0
SHORTNESS OF BREATH: 0
DIARRHEA: 0
MYALGIAS: 0
FEVER: 0
ABDOMINAL PAIN: 0
VOMITING: 0
SYNCOPE: 0
WEAKNESS: 0
NEAR-SYNCOPE: 0
DIAPHORESIS: 0
DIZZINESS: 0
FALLS: 0
LIGHT-HEADEDNESS: 0
DYSPNEA ON EXERTION: 0
SPUTUM PRODUCTION: 0
IRREGULAR HEARTBEAT: 0
BLURRED VISION: 0
COUGH: 0
PALPITATIONS: 0
PND: 0
HEADACHES: 0
DOUBLE VISION: 0
SNORING: 0
NAUSEA: 0
ORTHOPNEA: 0
SORE THROAT: 0

## 2025-07-08 RX ORDER — TAMSULOSIN HYDROCHLORIDE 0.4 MG/1
0.8 CAPSULE ORAL DAILY
Qty: 180 CAPSULE | Refills: 3 | Status: SHIPPED | OUTPATIENT
Start: 2025-07-08

## 2025-07-17 ENCOUNTER — OFFICE VISIT (OUTPATIENT)
Dept: URGENT CARE | Age: 75
End: 2025-07-17
Payer: MEDICARE

## 2025-07-17 VITALS
SYSTOLIC BLOOD PRESSURE: 161 MMHG | HEART RATE: 72 BPM | TEMPERATURE: 98.6 F | RESPIRATION RATE: 17 BRPM | DIASTOLIC BLOOD PRESSURE: 82 MMHG | OXYGEN SATURATION: 95 % | WEIGHT: 213 LBS | BODY MASS INDEX: 32.39 KG/M2

## 2025-07-17 DIAGNOSIS — Z48.02 VISIT FOR SUTURE REMOVAL: Primary | ICD-10-CM

## 2025-07-17 PROCEDURE — 3079F DIAST BP 80-89 MM HG: CPT

## 2025-07-17 PROCEDURE — 99024 POSTOP FOLLOW-UP VISIT: CPT

## 2025-07-17 PROCEDURE — 1159F MED LIST DOCD IN RCRD: CPT

## 2025-07-17 PROCEDURE — 3077F SYST BP >= 140 MM HG: CPT

## 2025-07-17 NOTE — PROGRESS NOTES
Subjective   Patient ID: Pro Belle Jr. is a 75 y.o. male. They present today with a chief complaint of Suture / Staple Removal (Left lower leg, has had them in for 14 days today. No signs of infection. 5 stitches put in.).    History of Present Illness  HPI    Past Medical History  Allergies as of 07/17/2025    (No Known Allergies)       Prescriptions Prior to Admission[1]     Medical History[2]    Surgical History[3]     reports that he quit smoking about 30 years ago. His smoking use included cigarettes. He has a 4.5 pack-year smoking history. He has never used smokeless tobacco. He reports current alcohol use of about 8.0 standard drinks of alcohol per week. He reports that he does not use drugs.    Review of Systems  Review of Systems   Skin:         Healing laceration to left shin sutures intact   All other systems reviewed and are negative.                                 Objective    Vitals:    07/17/25 1158   BP: 161/82   BP Location: Left arm   Patient Position: Sitting   BP Cuff Size: Adult   Pulse: 56   Resp: 17   Temp: 37 °C (98.6 °F)   TempSrc: Oral   SpO2: 95%   Weight: 96.6 kg (213 lb)     No LMP for male patient.    Physical Exam  Vitals reviewed.   Constitutional:       Appearance: Normal appearance.   HENT:      Head: Normocephalic and atraumatic.     Cardiovascular:      Rate and Rhythm: Normal rate and regular rhythm.      Pulses: Normal pulses.      Heart sounds: Normal heart sounds.   Pulmonary:      Effort: Pulmonary effort is normal.      Breath sounds: Normal breath sounds.     Musculoskeletal:         General: Normal range of motion.      Cervical back: Normal range of motion.     Skin:     General: Skin is warm.      Capillary Refill: Capillary refill takes less than 2 seconds.      Comments: Healing laceration to left lateral calf scabbed minimal erythema     Neurological:      General: No focal deficit present.      Mental Status: He is alert and oriented to person, place, and time.      Psychiatric:         Mood and Affect: Mood normal.         Behavior: Behavior normal.         Procedures    Point of Care Test & Imaging Results from this visit  No results found for this visit on 07/17/25.   Imaging  No results found.    Cardiology, Vascular, and Other Imaging  No other imaging results found for the past 2 days      Diagnostic study results (if any) were reviewed by ANA Burns.    Assessment/Plan   Allergies, medications, history, and pertinent labs/EKGs/Imaging reviewed by ANA Burns.     Medical Decision Making  75-year-old male presents for suture removal he had sutures placed 14 days ago here after he cut his calf lateral calf, he had 6 sutures placed he has been using mupirocin ointment on exam patient is nontoxic-appearing no acute distress vital signs are stable.  He does have a healing laceration with sutures intact scabbed minimal erythema patient has been using mupirocin denies pain.  There is no drainage.  Sutures were removed without difficulty patient is continue mupirocin Puracyn as needed as directed and follow-up with his primary care doctor 1 to 2 days for recheck if symptoms worsen any increased erythema drainage or pain any fever or chills he is to return or go to the emergency room for further management.  Patient agrees with plan of care patient left in stable condition.    Orders and Diagnoses  There are no diagnoses linked to this encounter.    Medical Admin Record      Patient disposition: Home    Electronically signed by ANA Burns  12:25 PM           [1] (Not in a hospital admission)  [2]   Past Medical History:  Diagnosis Date    A-fib (Multi)     Abnormal ECG     Chronic pain disorder     CTS (carpal tunnel syndrome)     Hypertension     Joint pain     Low back pain     Neck pain     Shingles     Sleep apnea     Weakness of limb    [3]   Past Surgical History:  Procedure Laterality Date    ABLATION  OF DYSRHYTHMIC FOCUS      BACK SURGERY  02/14/2024    l4-5 fusion    CORONARY STENT PLACEMENT      EYE SURGERY      HERNIA REPAIR  06/20/2014    Inguinal Hernia Repair    JOINT REPLACEMENT Bilateral     hip    JOINT REPLACEMENT Left     knee and shoulder    OTHER SURGICAL HISTORY  10/06/2022    Cystoscopy    SPINE SURGERY      VASECTOMY

## 2025-07-17 NOTE — PATIENT INSTRUCTIONS
Your sutures were removed monitor symptoms if any redness drainage fever or chills appears please go to the emergency room follow-up with your primary care doctor 1 to 2 days for recheck wash with warm soap and water mild soap like Dial.

## 2025-07-23 ENCOUNTER — PATIENT OUTREACH (OUTPATIENT)
Dept: PRIMARY CARE | Facility: CLINIC | Age: 75
End: 2025-07-23
Payer: MEDICARE

## 2025-07-23 DIAGNOSIS — I48.91 ATRIAL FIBRILLATION, UNSPECIFIED TYPE (MULTI): ICD-10-CM

## 2025-07-23 DIAGNOSIS — J44.1 CHRONIC OBSTRUCTIVE PULMONARY DISEASE WITH (ACUTE) EXACERBATION (MULTI): ICD-10-CM

## 2025-07-23 DIAGNOSIS — M06.9 RHEUMATOID ARTHRITIS, INVOLVING UNSPECIFIED SITE, UNSPECIFIED WHETHER RHEUMATOID FACTOR PRESENT (MULTI): ICD-10-CM

## 2025-07-23 DIAGNOSIS — K21.9 GASTROESOPHAGEAL REFLUX DISEASE, UNSPECIFIED WHETHER ESOPHAGITIS PRESENT: ICD-10-CM

## 2025-07-23 DIAGNOSIS — I10 BENIGN ESSENTIAL HYPERTENSION: ICD-10-CM

## 2025-07-23 NOTE — PROGRESS NOTES
"Care Management Monthly Outreach  Chart review completed  Confirmation of at least 2 patient identifiers  Change in insurance? No    Has patient been to ER/Urgent Care since last outreach? Yes -  on 07/03 for shin laceration    Last Office Visit with PCP: 3/24/2025   Next Office Visit with PCP: 9/29/2025   APC Collaboration: n/a    Chronic Conditions and Outreach Summary:   Atrial fibrillation, unspecified type (Multi)    Benign essential hypertension    Gastroesophageal reflux disease, unspecified whether esophagitis present    Rheumatoid arthritis, involving unspecified site, unspecified whether rheumatoid factor present (Multi)    Chronic obstructive pulmonary disease with (acute) exacerbation (Multi)    Monthly CCM call completed with patient.  Patient stated that he was working in the office, sitting at a desk, and hit his shin on the corner of a speaker. Could not get the bleeding to stop. Was seen at  and received 5 stiches.  Stiches have been removed and the patient states that he is back swimming to work on building strength in legs and back. Denies any falls.  Also stated that he had some labs done and was found to have \"enlarged red blood cells\". Discussed possible causes of macrocytosis with patient. He does take medications for his autoimmune disorder, as well as B12 and folic acid. Denies any major dietary changes.  Said that in the past year or so, has lost about 30lbs, but is still classified as \"overweight\". Medications reviewed.    Medications:   Are there medication changes since last visit? No  Refills needed? No    Social Drivers of Health: Addressed in the last 6 months  Care Gaps Addressed? Addressed in the last 6 months  Care Plan addressed: yes    Upcoming Appointments:   Future Appointments       Date / Time Provider Department Dept Phone    8/11/2025 12:30 PM Chacha Waggoner PT [facility] [phone]    8/12/2025 9:15 AM (Arrive by 8:15 " AM) Victoriano Mackenzie MD; [facility] ENDO 1; TRI C-ARM 1 [facility] [phone]    9/17/2025 1:50 PM Aniceto Lovtet MD [facility]     9/29/2025 9:45 AM Ruben Bradford MD [facility] [phone]    10/1/2025 9:20 AM (Arrive by 9:05 AM) Eva Ordonez PharmD [facility]  Arrive at: [facility] [phone]    10/7/2025 8:15 AM (Arrive by 8:00 AM) TRI NM ADMIN ROOM [facility] [phone]    10/7/2025 8:45 AM TRI NM 1 [facility] [phone]    10/7/2025 9:15 AM TRI STRESS LAB 1 [facility] [phone]    10/7/2025 9:45 AM TRI NM ADMIN ROOM [facility] [phone]    10/7/2025 10:00 AM TRI NM 1 [facility] [phone]    10/15/2025 11:15 AM Victoriano Mackenzie MD [facility] [phone]    12/31/2025 11:00 AM Pauline Aguilera, APRN-CNP [facility] [phone]    2/5/2026 1:00 PM Mynor Hurst MD [facility] [phone]    3/30/2026 10:00 AM Ruben Bradford MD [facility] [phone]          Blood Pressures Reviewed  BP Readings from Last 3 Encounters:   07/17/25 161/82   07/03/25 163/88   06/25/25 140/88     Labs Reviewed:  Lab Results   Component Value Date    CREATININE 0.95 04/08/2025    GLUCOSE 103 (H) 04/08/2025    ALKPHOS 107 04/08/2025    K 3.7 04/08/2025    PROT 7.0 04/08/2025     04/08/2025    CALCIUM 9.9 04/08/2025    AST 29 04/08/2025    ALT 24 04/08/2025    BUN 19 04/08/2025    MG 2.26 11/24/2024    PHOS 2.1 (L) 05/25/2022    GFRMALE 90 08/16/2023     Lab Results   Component Value Date    TRIG 70 04/08/2025    CHOL 169 04/08/2025    LDLCALC 62 04/08/2025    HDL 92 04/08/2025     Lab Results   Component Value Date    HGBA1C 5.8 (H) 04/08/2025    HGBA1C 5.3 10/19/2024    HGBA1C 5.4 10/05/2024     Lab Results   Component Value Date     WBC 9.0 04/08/2025    RBC 4.46 04/08/2025    HGB 14.8 04/08/2025     04/08/2025   No other concerns at this time.  Agreeable to continue monthly outreaches.  Encouraged to call if questions or concerns arise.    Susan Price

## 2025-07-30 PROCEDURE — RXMED WILLOW AMBULATORY MEDICATION CHARGE

## 2025-07-31 ENCOUNTER — PHARMACY VISIT (OUTPATIENT)
Dept: PHARMACY | Facility: CLINIC | Age: 75
End: 2025-07-31
Payer: COMMERCIAL

## 2025-08-06 ENCOUNTER — TELEPHONE (OUTPATIENT)
Dept: PRIMARY CARE | Facility: CLINIC | Age: 75
End: 2025-08-06
Payer: MEDICARE

## 2025-08-09 DIAGNOSIS — I48.91 ATRIAL FIBRILLATION, UNSPECIFIED TYPE (MULTI): Primary | ICD-10-CM

## 2025-08-09 DIAGNOSIS — I10 BENIGN ESSENTIAL HYPERTENSION: Chronic | ICD-10-CM

## 2025-08-11 ENCOUNTER — TREATMENT (OUTPATIENT)
Dept: PHYSICAL THERAPY | Facility: CLINIC | Age: 75
End: 2025-08-11
Payer: MEDICARE

## 2025-08-11 DIAGNOSIS — M96.1 POSTLAMINECTOMY SYNDROME, LUMBAR REGION: ICD-10-CM

## 2025-08-11 DIAGNOSIS — M47.812 CERVICAL SPONDYLOSIS WITHOUT MYELOPATHY: ICD-10-CM

## 2025-08-11 DIAGNOSIS — M54.16 LUMBAR RADICULOPATHY: ICD-10-CM

## 2025-08-11 PROCEDURE — 97113 AQUATIC THERAPY/EXERCISES: CPT | Mod: GP | Performed by: PHYSICAL THERAPIST

## 2025-08-11 ASSESSMENT — PAIN - FUNCTIONAL ASSESSMENT: PAIN_FUNCTIONAL_ASSESSMENT: 0-10

## 2025-08-11 ASSESSMENT — PAIN SCALES - GENERAL: PAINLEVEL_OUTOF10: 3

## 2025-08-12 ENCOUNTER — HOSPITAL ENCOUNTER (OUTPATIENT)
Dept: GASTROENTEROLOGY | Facility: HOSPITAL | Age: 75
Discharge: HOME | End: 2025-08-12
Payer: MEDICARE

## 2025-08-12 VITALS
OXYGEN SATURATION: 97 % | WEIGHT: 205 LBS | SYSTOLIC BLOOD PRESSURE: 173 MMHG | DIASTOLIC BLOOD PRESSURE: 94 MMHG | TEMPERATURE: 97.9 F | BODY MASS INDEX: 31.17 KG/M2 | RESPIRATION RATE: 18 BRPM | HEART RATE: 56 BPM

## 2025-08-12 DIAGNOSIS — M54.16 LUMBAR RADICULOPATHY: ICD-10-CM

## 2025-08-12 PROCEDURE — 62323 NJX INTERLAMINAR LMBR/SAC: CPT | Performed by: ANESTHESIOLOGY

## 2025-08-12 PROCEDURE — 2500000004 HC RX 250 GENERAL PHARMACY W/ HCPCS (ALT 636 FOR OP/ED): Performed by: ANESTHESIOLOGY

## 2025-08-12 PROCEDURE — 2550000001 HC RX 255 CONTRASTS: Performed by: ANESTHESIOLOGY

## 2025-08-12 PROCEDURE — 2500000005 HC RX 250 GENERAL PHARMACY W/O HCPCS: Performed by: ANESTHESIOLOGY

## 2025-08-12 PROCEDURE — 7100000010 HC PHASE TWO TIME - EACH INCREMENTAL 1 MINUTE

## 2025-08-12 PROCEDURE — 7100000009 HC PHASE TWO TIME - INITIAL BASE CHARGE

## 2025-08-12 RX ORDER — TRIAMCINOLONE ACETONIDE 40 MG/ML
INJECTION, SUSPENSION INTRA-ARTICULAR; INTRAMUSCULAR AS NEEDED
Status: COMPLETED | OUTPATIENT
Start: 2025-08-12 | End: 2025-08-12

## 2025-08-12 RX ORDER — METOPROLOL SUCCINATE 25 MG/1
25 TABLET, EXTENDED RELEASE ORAL 2 TIMES DAILY
Qty: 270 TABLET | Refills: 3 | Status: SHIPPED | OUTPATIENT
Start: 2025-08-12

## 2025-08-12 RX ORDER — LIDOCAINE HYDROCHLORIDE 5 MG/ML
INJECTION, SOLUTION INFILTRATION; INTRAVENOUS AS NEEDED
Status: COMPLETED | OUTPATIENT
Start: 2025-08-12 | End: 2025-08-12

## 2025-08-12 RX ORDER — SODIUM CHLORIDE 9 MG/ML
INJECTION, SOLUTION INTRAMUSCULAR; INTRAVENOUS; SUBCUTANEOUS AS NEEDED
Status: COMPLETED | OUTPATIENT
Start: 2025-08-12 | End: 2025-08-12

## 2025-08-12 RX ADMIN — IOHEXOL 1 ML: 240 INJECTION, SOLUTION INTRATHECAL; INTRAVASCULAR; INTRAVENOUS; ORAL at 09:24

## 2025-08-12 RX ADMIN — SODIUM CHLORIDE 4 ML: 9 INJECTION INTRAMUSCULAR; INTRAVENOUS; SUBCUTANEOUS at 09:25

## 2025-08-12 RX ADMIN — LIDOCAINE HYDROCHLORIDE 10 ML: 5 INJECTION, SOLUTION INFILTRATION at 09:24

## 2025-08-12 RX ADMIN — TRIAMCINOLONE ACETONIDE 60 MG: 40 INJECTION, SUSPENSION INTRA-ARTICULAR; INTRAMUSCULAR at 09:25

## 2025-08-12 ASSESSMENT — COLUMBIA-SUICIDE SEVERITY RATING SCALE - C-SSRS
2. HAVE YOU ACTUALLY HAD ANY THOUGHTS OF KILLING YOURSELF?: NO
2. HAVE YOU ACTUALLY HAD ANY THOUGHTS OF KILLING YOURSELF?: NO
1. IN THE PAST MONTH, HAVE YOU WISHED YOU WERE DEAD OR WISHED YOU COULD GO TO SLEEP AND NOT WAKE UP?: NO
6. HAVE YOU EVER DONE ANYTHING, STARTED TO DO ANYTHING, OR PREPARED TO DO ANYTHING TO END YOUR LIFE?: NO
6. HAVE YOU EVER DONE ANYTHING, STARTED TO DO ANYTHING, OR PREPARED TO DO ANYTHING TO END YOUR LIFE?: NO
1. IN THE PAST MONTH, HAVE YOU WISHED YOU WERE DEAD OR WISHED YOU COULD GO TO SLEEP AND NOT WAKE UP?: NO

## 2025-08-12 ASSESSMENT — PAIN SCALES - GENERAL: PAINLEVEL_OUTOF10: 2

## 2025-08-12 ASSESSMENT — PAIN DESCRIPTION - DESCRIPTORS
DESCRIPTORS: ACHING
DESCRIPTORS: ACHING

## 2025-08-12 ASSESSMENT — PAIN - FUNCTIONAL ASSESSMENT
PAIN_FUNCTIONAL_ASSESSMENT: 0-10
PAIN_FUNCTIONAL_ASSESSMENT: 0-10

## 2025-08-18 ENCOUNTER — TELEPHONE (OUTPATIENT)
Dept: PRIMARY CARE | Facility: CLINIC | Age: 75
End: 2025-08-18
Payer: MEDICARE

## 2025-08-19 ENCOUNTER — PATIENT OUTREACH (OUTPATIENT)
Dept: PRIMARY CARE | Facility: CLINIC | Age: 75
End: 2025-08-19
Payer: MEDICARE

## 2025-08-19 DIAGNOSIS — I10 BENIGN ESSENTIAL HYPERTENSION: ICD-10-CM

## 2025-08-19 DIAGNOSIS — K21.9 GASTROESOPHAGEAL REFLUX DISEASE, UNSPECIFIED WHETHER ESOPHAGITIS PRESENT: ICD-10-CM

## 2025-08-19 DIAGNOSIS — J44.1 CHRONIC OBSTRUCTIVE PULMONARY DISEASE WITH (ACUTE) EXACERBATION (MULTI): ICD-10-CM

## 2025-08-19 DIAGNOSIS — M06.9 RHEUMATOID ARTHRITIS, INVOLVING UNSPECIFIED SITE, UNSPECIFIED WHETHER RHEUMATOID FACTOR PRESENT (MULTI): ICD-10-CM

## 2025-08-19 DIAGNOSIS — I48.91 ATRIAL FIBRILLATION, UNSPECIFIED TYPE (MULTI): ICD-10-CM

## 2025-08-21 ENCOUNTER — TELEPHONE (OUTPATIENT)
Dept: PRIMARY CARE | Facility: CLINIC | Age: 75
End: 2025-08-21
Payer: MEDICARE

## 2025-09-17 ENCOUNTER — APPOINTMENT (OUTPATIENT)
Dept: UROLOGY | Facility: HOSPITAL | Age: 75
End: 2025-09-17
Payer: MEDICARE

## 2025-10-01 ENCOUNTER — APPOINTMENT (OUTPATIENT)
Dept: PHARMACY | Facility: HOSPITAL | Age: 75
End: 2025-10-01
Payer: MEDICARE